# Patient Record
Sex: FEMALE | Race: BLACK OR AFRICAN AMERICAN | NOT HISPANIC OR LATINO | URBAN - METROPOLITAN AREA
[De-identification: names, ages, dates, MRNs, and addresses within clinical notes are randomized per-mention and may not be internally consistent; named-entity substitution may affect disease eponyms.]

---

## 2017-02-17 ENCOUNTER — EMERGENCY (EMERGENCY)
Facility: HOSPITAL | Age: 37
LOS: 0 days | Discharge: HOME | End: 2017-02-17
Attending: EMERGENCY MEDICINE | Admitting: EMERGENCY MEDICINE
Payer: COMMERCIAL

## 2017-02-17 VITALS
HEART RATE: 90 BPM | RESPIRATION RATE: 18 BRPM | SYSTOLIC BLOOD PRESSURE: 135 MMHG | OXYGEN SATURATION: 100 % | DIASTOLIC BLOOD PRESSURE: 90 MMHG | TEMPERATURE: 98 F

## 2017-02-17 VITALS
RESPIRATION RATE: 19 BRPM | TEMPERATURE: 98 F | DIASTOLIC BLOOD PRESSURE: 105 MMHG | HEIGHT: 63 IN | OXYGEN SATURATION: 99 % | HEART RATE: 116 BPM | SYSTOLIC BLOOD PRESSURE: 137 MMHG

## 2017-02-17 DIAGNOSIS — Z88.0 ALLERGY STATUS TO PENICILLIN: ICD-10-CM

## 2017-02-17 DIAGNOSIS — R11.0 NAUSEA: ICD-10-CM

## 2017-02-17 DIAGNOSIS — J09.X2 INFLUENZA DUE TO IDENTIFIED NOVEL INFLUENZA A VIRUS WITH OTHER RESPIRATORY MANIFESTATIONS: ICD-10-CM

## 2017-02-17 DIAGNOSIS — R50.9 FEVER, UNSPECIFIED: ICD-10-CM

## 2017-02-17 LAB
FLUAV H1 2009 PAND RNA SPEC QL NAA+PROBE: DETECTED
RAPID RVP RESULT: DETECTED

## 2017-02-17 PROCEDURE — 99053 MED SERV 10PM-8AM 24 HR FAC: CPT

## 2017-02-17 PROCEDURE — 99283 EMERGENCY DEPT VISIT LOW MDM: CPT | Mod: 25

## 2017-02-17 RX ORDER — ONDANSETRON 8 MG/1
4 TABLET, FILM COATED ORAL ONCE
Qty: 0 | Refills: 0 | Status: COMPLETED | OUTPATIENT
Start: 2017-02-17 | End: 2017-02-17

## 2017-02-17 RX ORDER — ONDANSETRON 8 MG/1
1 TABLET, FILM COATED ORAL
Qty: 9 | Refills: 0 | OUTPATIENT
Start: 2017-02-17 | End: 2017-02-20

## 2017-02-17 RX ADMIN — Medication 75 MILLIGRAM(S): at 05:08

## 2017-02-17 RX ADMIN — ONDANSETRON 4 MILLIGRAM(S): 8 TABLET, FILM COATED ORAL at 03:25

## 2017-02-17 NOTE — ED ADULT TRIAGE NOTE - CHIEF COMPLAINT QUOTE
patient reports fever and nausea worsening today since noon. reports leaving work with 102 fever earlier. took motrin 400mg PO one hour prior to arrival

## 2017-02-17 NOTE — ED ADULT NURSE REASSESSMENT NOTE - NS ED NURSE REASSESS COMMENT FT1
patient resting comfortably. VSS. patient eating and drinking without difficulty. denies nausea at this time. RVP completed. will continue ot monitor.

## 2018-09-17 NOTE — ED PROVIDER NOTE - OBJECTIVE STATEMENT
36 year old female with complaint of fever onset at work tonight 1-2 hr pta. Temp 102. took 2 advil. starting to sweat and feel better. also feels nauseous although she has not vomited. felt well earlier today. no known ill contacts. no cp . no cough. no diarrhea. no abd pain. no past medical hx. no surg hx. no tobacco. no illicits. no EtoH. did not have a flu vaccine this year. 16

## 2019-04-05 ENCOUNTER — INPATIENT (INPATIENT)
Facility: HOSPITAL | Age: 39
LOS: 14 days | Discharge: HOME | DRG: 545 | End: 2019-04-20
Attending: INTERNAL MEDICINE | Admitting: HOSPITALIST
Payer: COMMERCIAL

## 2019-04-05 VITALS
HEART RATE: 103 BPM | HEIGHT: 63 IN | OXYGEN SATURATION: 99 % | WEIGHT: 240.08 LBS | RESPIRATION RATE: 20 BRPM | TEMPERATURE: 98 F | SYSTOLIC BLOOD PRESSURE: 159 MMHG | DIASTOLIC BLOOD PRESSURE: 111 MMHG

## 2019-04-05 DIAGNOSIS — D69.6 THROMBOCYTOPENIA, UNSPECIFIED: ICD-10-CM

## 2019-04-05 DIAGNOSIS — C50.919 MALIGNANT NEOPLASM OF UNSPECIFIED SITE OF UNSPECIFIED FEMALE BREAST: Chronic | ICD-10-CM

## 2019-04-05 LAB
ABO RH CONFIRMATION: SIGNIFICANT CHANGE UP
ALBUMIN SERPL ELPH-MCNC: 3.7 G/DL — SIGNIFICANT CHANGE UP (ref 3.3–5.2)
ALP SERPL-CCNC: 112 U/L — SIGNIFICANT CHANGE UP (ref 40–120)
ALT FLD-CCNC: 31 U/L — SIGNIFICANT CHANGE UP
ANION GAP SERPL CALC-SCNC: 14 MMOL/L — SIGNIFICANT CHANGE UP (ref 5–17)
ANISOCYTOSIS BLD QL: SLIGHT — SIGNIFICANT CHANGE UP
APPEARANCE UR: ABNORMAL
AST SERPL-CCNC: 108 U/L — HIGH
BACTERIA # UR AUTO: ABNORMAL
BASOPHILS # BLD AUTO: 0 K/UL — SIGNIFICANT CHANGE UP (ref 0–0.2)
BASOPHILS NFR BLD AUTO: 0.1 % — SIGNIFICANT CHANGE UP (ref 0–2)
BILIRUB SERPL-MCNC: 1.9 MG/DL — SIGNIFICANT CHANGE UP (ref 0.4–2)
BILIRUB UR-MCNC: NEGATIVE — SIGNIFICANT CHANGE UP
BLD GP AB SCN SERPL QL: SIGNIFICANT CHANGE UP
BUN SERPL-MCNC: 32 MG/DL — HIGH (ref 8–20)
CALCIUM SERPL-MCNC: 8.6 MG/DL — SIGNIFICANT CHANGE UP (ref 8.6–10.2)
CHLORIDE SERPL-SCNC: 99 MMOL/L — SIGNIFICANT CHANGE UP (ref 98–107)
CO2 SERPL-SCNC: 23 MMOL/L — SIGNIFICANT CHANGE UP (ref 22–29)
COLOR SPEC: ABNORMAL
CREAT SERPL-MCNC: 2.8 MG/DL — HIGH (ref 0.5–1.3)
DIFF PNL FLD: ABNORMAL
EOSINOPHIL # BLD AUTO: 0 K/UL — SIGNIFICANT CHANGE UP (ref 0–0.5)
EOSINOPHIL NFR BLD AUTO: 0.3 % — SIGNIFICANT CHANGE UP (ref 0–6)
EPI CELLS # UR: ABNORMAL
GLUCOSE SERPL-MCNC: 116 MG/DL — HIGH (ref 70–115)
GLUCOSE UR QL: 50 MG/DL
HCG SERPL-ACNC: <4 MIU/ML — SIGNIFICANT CHANGE UP
HCT VFR BLD CALC: 35 % — LOW (ref 37–47)
HGB BLD-MCNC: 11.6 G/DL — LOW (ref 12–16)
INR BLD: 1.08 RATIO — SIGNIFICANT CHANGE UP (ref 0.88–1.16)
KETONES UR-MCNC: ABNORMAL
LEUKOCYTE ESTERASE UR-ACNC: ABNORMAL
LYMPHOCYTES # BLD AUTO: 18.6 % — LOW (ref 20–55)
LYMPHOCYTES # BLD AUTO: 2.8 K/UL — SIGNIFICANT CHANGE UP (ref 1–4.8)
MACROCYTES BLD QL: SLIGHT — SIGNIFICANT CHANGE UP
MCHC RBC-ENTMCNC: 28.4 PG — SIGNIFICANT CHANGE UP (ref 27–31)
MCHC RBC-ENTMCNC: 33.1 G/DL — SIGNIFICANT CHANGE UP (ref 32–36)
MCV RBC AUTO: 85.8 FL — SIGNIFICANT CHANGE UP (ref 81–99)
MICROCYTES BLD QL: SLIGHT — SIGNIFICANT CHANGE UP
MONOCYTES # BLD AUTO: 1.8 K/UL — HIGH (ref 0–0.8)
MONOCYTES NFR BLD AUTO: 11.6 % — HIGH (ref 3–10)
NEUTROPHILS # BLD AUTO: 10.5 K/UL — HIGH (ref 1.8–8)
NEUTROPHILS NFR BLD AUTO: 68.9 % — SIGNIFICANT CHANGE UP (ref 37–73)
NITRITE UR-MCNC: NEGATIVE — SIGNIFICANT CHANGE UP
PH UR: 6.5 — SIGNIFICANT CHANGE UP (ref 5–8)
PLAT MORPH BLD: NORMAL — SIGNIFICANT CHANGE UP
PLATELET # BLD AUTO: 9 K/UL — CRITICAL LOW (ref 150–400)
POTASSIUM SERPL-MCNC: 5 MMOL/L — SIGNIFICANT CHANGE UP (ref 3.5–5.3)
POTASSIUM SERPL-SCNC: 5 MMOL/L — SIGNIFICANT CHANGE UP (ref 3.5–5.3)
PROT SERPL-MCNC: 7.7 G/DL — SIGNIFICANT CHANGE UP (ref 6.6–8.7)
PROT UR-MCNC: 500 MG/DL
PROTHROM AB SERPL-ACNC: 12.4 SEC — SIGNIFICANT CHANGE UP (ref 10–12.9)
RBC # BLD: 4.08 M/UL — LOW (ref 4.4–5.2)
RBC # FLD: 16.9 % — HIGH (ref 11–15.6)
RBC BLD AUTO: NORMAL — SIGNIFICANT CHANGE UP
RBC CASTS # UR COMP ASSIST: ABNORMAL /HPF (ref 0–4)
SCHISTOCYTES BLD QL AUTO: SLIGHT — SIGNIFICANT CHANGE UP
SODIUM SERPL-SCNC: 136 MMOL/L — SIGNIFICANT CHANGE UP (ref 135–145)
SP GR SPEC: 1.01 — SIGNIFICANT CHANGE UP (ref 1.01–1.02)
TYPE + AB SCN PNL BLD: SIGNIFICANT CHANGE UP
UROBILINOGEN FLD QL: NEGATIVE MG/DL — SIGNIFICANT CHANGE UP
WBC # BLD: 15.3 K/UL — HIGH (ref 4.8–10.8)
WBC # FLD AUTO: 15.3 K/UL — HIGH (ref 4.8–10.8)
WBC UR QL: SIGNIFICANT CHANGE UP

## 2019-04-05 PROCEDURE — 99223 1ST HOSP IP/OBS HIGH 75: CPT | Mod: GC

## 2019-04-05 PROCEDURE — 99285 EMERGENCY DEPT VISIT HI MDM: CPT

## 2019-04-05 PROCEDURE — 70450 CT HEAD/BRAIN W/O DYE: CPT | Mod: 26

## 2019-04-05 PROCEDURE — 93010 ELECTROCARDIOGRAM REPORT: CPT

## 2019-04-05 PROCEDURE — 99497 ADVNCD CARE PLAN 30 MIN: CPT | Mod: 25

## 2019-04-05 RX ORDER — FOLIC ACID 0.8 MG
1 TABLET ORAL DAILY
Qty: 0 | Refills: 0 | Status: DISCONTINUED | OUTPATIENT
Start: 2019-04-05 | End: 2019-04-20

## 2019-04-05 RX ORDER — THIAMINE MONONITRATE (VIT B1) 100 MG
100 TABLET ORAL DAILY
Qty: 0 | Refills: 0 | Status: DISCONTINUED | OUTPATIENT
Start: 2019-04-05 | End: 2019-04-20

## 2019-04-05 RX ORDER — ONDANSETRON 8 MG/1
8 TABLET, FILM COATED ORAL ONCE
Qty: 0 | Refills: 0 | Status: COMPLETED | OUTPATIENT
Start: 2019-04-05 | End: 2019-04-05

## 2019-04-05 RX ORDER — SODIUM CHLORIDE 9 MG/ML
1000 INJECTION, SOLUTION INTRAVENOUS
Qty: 0 | Refills: 0 | Status: COMPLETED | OUTPATIENT
Start: 2019-04-05 | End: 2019-04-05

## 2019-04-05 RX ORDER — HYDRALAZINE HCL 50 MG
5 TABLET ORAL EVERY 6 HOURS
Qty: 0 | Refills: 0 | Status: DISCONTINUED | OUTPATIENT
Start: 2019-04-05 | End: 2019-04-20

## 2019-04-05 RX ORDER — SODIUM CHLORIDE 9 MG/ML
2000 INJECTION INTRAMUSCULAR; INTRAVENOUS; SUBCUTANEOUS ONCE
Qty: 0 | Refills: 0 | Status: COMPLETED | OUTPATIENT
Start: 2019-04-05 | End: 2019-04-05

## 2019-04-05 RX ADMIN — SODIUM CHLORIDE 2000 MILLILITER(S): 9 INJECTION INTRAMUSCULAR; INTRAVENOUS; SUBCUTANEOUS at 20:14

## 2019-04-05 RX ADMIN — Medication 60 MILLIGRAM(S): at 22:17

## 2019-04-05 RX ADMIN — SODIUM CHLORIDE 125 MILLILITER(S): 9 INJECTION, SOLUTION INTRAVENOUS at 23:41

## 2019-04-05 RX ADMIN — ONDANSETRON 8 MILLIGRAM(S): 8 TABLET, FILM COATED ORAL at 20:16

## 2019-04-05 RX ADMIN — Medication 5 MILLIGRAM(S): at 23:40

## 2019-04-05 NOTE — H&P ADULT - ASSESSMENT
37 yo F with no significant medical history presents with b/l eye swelling and n/v after alcohol use 39 yo F with no significant medical history presents with cc of n/v , weakness , blurry vision , found to have severe thrombocytopenia  and LISSETT 2/2 to rhabdomyolysis.    Severe thrombocytopenia    admit to medicine service , any bed  Unclear about cause , could be ITP   report recent URI one week ago  platelet count = 9  Hemo/onco Dr Obando was called by ER KAROLINE Hernandez , recommend to start prednisone 60 mg qd and no need for platelet transfusion at this time   denies any bleeding, Physical exam neg for rash or ecchymosis   f/u platelet count in the am   bed rest   will check HIV and hepatitis panel     LISSETT 2/2 to Rhabdomyolysis  pt states that 2-3 weeks ago she started doing exercise   was having body aches and also report dark urine   Cr= 2.8 and BUN = 32   CK= 421  pt had BL upper eyelids swelling which could be due to proteinuria due to LISSETT/ATN , will check protein /Cr ratio    s/p 2 lit of fluids in the ER   c/w IV fluids 125 cc x hour   f/u CR/BUN in the AM   will consider nephro consult if no improvement due to risk of ATN      Leukocytosis   wbc = 15.3, most likely reactive   will trend   no fever     Elevated blood pressure   pt w/o hx of HTN   most likely 2/2 to LISSETT   BP high in the ER = 159/111  started Hydralazine IV push  PRN  will consider adding po med in the am if BP still high     Abnormal UA  UA shows 11-25 rbc, most likely 2/2 to rhabdo    Elevated AST w/ normal ALT  AST = 108 w/ normal ALT (alcohol use )  pt report that over the past month she has been drinking more frequently   also could be due to rhabdo    Preventive measure  DVT phrop: VCD boot for now due to thrombocytopenia

## 2019-04-05 NOTE — H&P ADULT - HISTORY OF PRESENT ILLNESS
Ms. Gutierres is a 39 yo F with no significant PMH who p/w n/v, eye swelling/blurry vision since 2am this morning. Pt states she was out drinking tequila shots the night prior, subsequently at 2am was taking a shower when she examined her eye in the mirror and noticed her R eye looked swollen. She subsequently began vomiting the tequila she drank, NBNB., no hematemesis.  An hour later she noticed the left eye also appeared swollen. She describes her vision became "like she was underwater", but denies eye pain, photophobia, other visual disturbances. Denies LOC/head trauma/fall.   Since being in the ED she has noticed that her left eye swelling has noticeably improved.   ROS notable for URI symptoms the week prior.  Positive sick contacts in her household last week (family was sick with URI symptoms)     Patient states one episode of epistaxis for a short period of time about 1 month ago, denies gingival bleeding, hematuria, hematochezia/BRBPR/melenic stools, denies abnormal uterine bleeding/heavy menses, last pap WNL. Ms. Gutierres is a 39 yo F with no significant PMH who p/w n/v, eye swelling/blurry vision since 2am this morning. Pt states she was out drinking tequila shots the night prior, subsequently at 2am was taking a shower when she examined her eye in the mirror and noticed her R eye looked swollen. She subsequently began vomiting the tequila she drank, NBNB., no hematemesis.  An hour later she noticed the left eye also appeared swollen. She describes her vision became "like she was underwater", but denies eye pain, photophobia, other visual disturbances. Denies LOC/head trauma/fall.   Since being in the ED she has noticed that her left eye swelling has noticeably improved.   ROS notable for URI symptoms the week prior.  Positive sick contacts in her household last week (family was sick with URI symptoms) . Denies recent travel    Patient states one episode of epistaxis for a short period of time about 1 month ago, denies gingival bleeding, hematuria, hematochezia/BRBPR/melenic stools, denies abnormal uterine bleeding/heavy menses, last pap WNL. Ms. Gutierres is a 39 yo F with no significant PMH who p/w n/v, eye swelling/blurry vision since 2am this morning. Pt states she was out drinking tequila shots the night prior, subsequently at 2am was taking a shower when she examined her eye in the mirror and noticed her R eye looked swollen. She subsequently began vomiting the tequila she drank, NBNB., no hematemesis.  An hour later she noticed the left eye also appeared swollen. She describes her vision became "like she was underwater", but denies eye pain, photophobia, other visual disturbances. Denies LOC/head trauma/fall.     Since being in the ED she has noticed that her left eye swelling has noticeably improved.   ROS notable for URI symptoms the week prior. NO pain at this time, no CP, SOB, diarrhea/constipation, fever/chills  Positive sick contacts in her household last week (family was sick with URI symptoms) . Denies recent travel    Patient states one episode of epistaxis for a short period of time about 1 month ago, denies gingival bleeding, dysuria, hematuria, hematochezia/BRBPR/melenic stools, denies abnormal uterine bleeding/heavy menses, last pap WNL. Ms. Gutierres is a 39 yo F with no significant PMH who p/w n/v, eye swelling/blurry vision since 2am this morning. Pt states she was out drinking tequila shots the night prior, subsequently at 2am was taking a shower when she examined her eye in the mirror and noticed bilateral upper eyelids swollen. She subsequently began vomiting the tequila she drank, NBNB., no hematemesis.  An hour later she noticed the left eye also appeared swollen. She describes her vision became "like she was underwater", but denies eye pain, photophobia, other visual disturbances. Denies LOC/head trauma/fall. Pt also report that over the past 2-3 weeks she started going to the gym ( 4 times per weeks ) and she was following an exercise program, she had body pain .     Since being in the ED she has noticed that her left eye swelling has noticeably improved.   ROS notable for URI symptoms the week prior. NO pain at this time, no CP, SOB, diarrhea/constipation, fever/chills  Positive sick contacts in her household last week (family was sick with URI symptoms) . Denies recent travel    Patient states one episode of epistaxis for a short period of time about 1 month ago, denies gingival bleeding, dysuria, hematuria, hematochezia/BRBPR/melenic stools, denies abnormal uterine bleeding/heavy menses, last pap WNL.

## 2019-04-05 NOTE — ED STATDOCS - PROGRESS NOTE DETAILS
PA note: PT evaluated by intake physician. HPI/PE/ROS as noted above. Will follow up plan per intake physician. Dr. Doan and I spoke to patient regarding results and low platelet count of 9. Dr. De La Vega, Heme/onc was contacted regarding patient. As per Dr. De La Vega, she recommends starting PO Prednisone 60mg in ED, getting additional labs as ordered, continuing IVF to maintain renal function. She states NO need for platelet transfusion at this time. Will continue to monitor and assess patient and will admit pt. Case discussed with Dr. Bourne, will admit pt

## 2019-04-05 NOTE — ED STATDOCS - OBJECTIVE STATEMENT
38 y.o otherwise healthy F presents with c/o nausea, vomiting and blurry vision starting last night. Pt was drinking last night, when she returned home noticed her right eye swelling and took benadryl after words she began vomiting and started to experience blurry vision in both eyes. Describes vision as similar to "seeing under water". Pt was able to fall asleep, today had continuing blurry vision which has been improving in her right eye but the same in her left and continuing nausea. No hx of similar symptoms. No known sick contacts. Non-smoker. Denies diarrhea, slurred speech or additional pysical complaints at this time.   Unclear LNMP.

## 2019-04-05 NOTE — ED STATDOCS - CLINICAL SUMMARY MEDICAL DECISION MAKING FREE TEXT BOX
38 y.o F presents to ED for blurry vision, nausea, vomiting starting last night after drinking. Plan for basic labs, CT-head, UA, IV hydration, anti-emetics, check results and reassess.

## 2019-04-05 NOTE — ED STATDOCS - CARE PLAN
Principal Discharge DX:	Thrombocytopenia Principal Discharge DX:	Thrombocytopenia  Secondary Diagnosis:	LISSETT (acute kidney injury)

## 2019-04-05 NOTE — H&P ADULT - NSHPSOCIALHISTORY_GEN_ALL_CORE
Drinks 1-2 times a week when she goes out with , 2-3 drinks at a time (wine, michel)   Denies smoking  Denies drugs  Lives with  Drinks 1-2 times a week when she goes out with , 2-3 drinks at a time (wine, techila )   Denies smoking  Denies drugs  Lives with  Drinks 1-2 times a week when she goes out with , 2-3 drinks at a time (wine, tequila )   Denies smoking  Denies drugs  Lives with

## 2019-04-05 NOTE — ED STATDOCS - ATTENDING CONTRIBUTION TO CARE
I, Akira Doan, performed the initial face to face bedside interview with this patient regarding history of present illness, review of symptoms and relevant past medical, social and family history.  I completed an independent physical examination.  I was the initial provider who evaluated this patient. I have signed out the follow up of any pending tests (i.e. labs, radiological studies) to the ACP.  I have communicated the patient’s plan of care and disposition with the ACP.

## 2019-04-05 NOTE — H&P ADULT - NSHPLABSRESULTS_GEN_ALL_CORE
11.6   15.3  )-----------( 9        ( 05 Apr 2019 19:58 )             35.0   04-05    136  |  99  |  32.0<H>  ----------------------------<  116<H>  5.0   |  23.0  |  2.80<H>    Ca    8.6      05 Apr 2019 19:58    TPro  7.7  /  Alb  3.7  /  TBili  1.9  /  DBili  x   /  AST  108<H>  /  ALT  31  /  AlkPhos  112  04-05  ABO Rh Confirmatory Specimen (04.05.19 @ 22:33)    ABO Rh Confirmation: O POS  Type + Screen (04.05.19 @ 21:06)    Type + Screen: O POS  Urine Microscopic-Add On (NC) (04.05.19 @ 20:34)    Bacteria: Few    Epithelial Cells: Moderate    Red Blood Cell - Urine: 11-25 /HPF    White Blood Cell - Urine: 3-5  Urinalysis (04.05.19 @ 20:34)    pH Urine: 6.5    Glucose Qualitative, Urine: 50 mg/dL    Blood, Urine: Large    Color: Brown    Urine Appearance: very cloudy    Bilirubin: Negative    Ketone - Urine: Trace    Specific Gravity: 1.015    Protein, Urine: 500 mg/dL    Urobilinogen: Negative mg/dL    Nitrite: Negative    Leukocyte Esterase Concentration: Trace

## 2019-04-05 NOTE — ED ADULT NURSE NOTE - OBJECTIVE STATEMENT
Patient present to Ed with c/o bilateral swollen eyes, Abdominal pain, nausea and vomiting. Patient presents to ED A/Ox4, VSS, denies chest pain or sob.  Respirations are even and unlabored, lungs cta, +bowel x4 quads, abdomen soft, tender/nondistended, skin w/d/i.

## 2019-04-05 NOTE — H&P ADULT - NSHPPHYSICALEXAM_GEN_ALL_CORE
Vital Signs Last 24 Hrs  T(C): 36.9 (05 Apr 2019 22:45), Max: 36.9 (05 Apr 2019 22:45)  T(F): 98.4 (05 Apr 2019 22:45), Max: 98.4 (05 Apr 2019 22:45)  HR: 102 (05 Apr 2019 22:45) (102 - 103)  BP: 165/111 (05 Apr 2019 22:45) (159/111 - 165/111)  BP(mean): --  RR: 19 (05 Apr 2019 22:45) (19 - 20)  SpO2: 99% (05 Apr 2019 22:45) (99% - 99%) Vital Signs Last 24 Hrs  T(C): 36.9 (05 Apr 2019 22:45), Max: 36.9 (05 Apr 2019 22:45)  T(F): 98.4 (05 Apr 2019 22:45), Max: 98.4 (05 Apr 2019 22:45)  HR: 102 (05 Apr 2019 22:45) (102 - 103)  BP: 165/111 (05 Apr 2019 22:45) (159/111 - 165/111)  RR: 19 (05 Apr 2019 22:45) (19 - 20)  SpO2: 99% (05 Apr 2019 22:45) (99% - 99%)    GENERAL: NAD, well-groomed, well-developed  HEAD:  Atraumatic, Normocephalic  EYES: EOMI, PERRLA, conjunctiva and sclera clear, positive swelling over both upper eyelids   ENMT: No tonsillar erythema, exudates, or enlargement; Moist mucous membranes, Good dentition, No lesions  NECK: Supple  Respiratory: Clear to auscultation bilaterally; No rales, rhonchi, wheezing, or rubs  CV: Regular rate and rhythm; No murmurs, rubs, or gallops  Gastrointestinal: Soft, Nontender, Nondistended; Bowel sounds present  EXTREMITIES:  2+ Peripheral Pulses, No clubbing, cyanosis, or edema  LYMPH: No lymphadenopathy noted  NERVOUS SYSTEM:  Alert & Oriented X3, Good concentration; Motor Strength 5/5 B/L upper and lower extremities;. CN II-XII grossly intact   SKIN: No rashes or lesions

## 2019-04-06 DIAGNOSIS — N17.9 ACUTE KIDNEY FAILURE, UNSPECIFIED: ICD-10-CM

## 2019-04-06 DIAGNOSIS — D69.6 THROMBOCYTOPENIA, UNSPECIFIED: ICD-10-CM

## 2019-04-06 LAB
ALBUMIN SERPL ELPH-MCNC: 3.6 G/DL — SIGNIFICANT CHANGE UP (ref 3.3–5.2)
ALBUMIN SERPL ELPH-MCNC: 3.7 G/DL — SIGNIFICANT CHANGE UP (ref 3.3–5.2)
ALP SERPL-CCNC: 116 U/L — SIGNIFICANT CHANGE UP (ref 40–120)
ALP SERPL-CCNC: 76 U/L — SIGNIFICANT CHANGE UP (ref 40–120)
ALT FLD-CCNC: 23 U/L — SIGNIFICANT CHANGE UP
ALT FLD-CCNC: 28 U/L — SIGNIFICANT CHANGE UP
ANION GAP SERPL CALC-SCNC: 17 MMOL/L — SIGNIFICANT CHANGE UP (ref 5–17)
ANION GAP SERPL CALC-SCNC: 19 MMOL/L — HIGH (ref 5–17)
APPEARANCE UR: CLEAR — SIGNIFICANT CHANGE UP
AST SERPL-CCNC: 33 U/L — HIGH
AST SERPL-CCNC: 88 U/L — HIGH
BACTERIA # UR AUTO: NEGATIVE — SIGNIFICANT CHANGE UP
BILIRUB SERPL-MCNC: 1.4 MG/DL — SIGNIFICANT CHANGE UP (ref 0.4–2)
BILIRUB SERPL-MCNC: 2.1 MG/DL — HIGH (ref 0.4–2)
BILIRUB UR-MCNC: NEGATIVE — SIGNIFICANT CHANGE UP
BUN SERPL-MCNC: 42 MG/DL — HIGH (ref 8–20)
BUN SERPL-MCNC: 51 MG/DL — HIGH (ref 8–20)
CALCIUM SERPL-MCNC: 8.4 MG/DL — LOW (ref 8.6–10.2)
CALCIUM SERPL-MCNC: 8.7 MG/DL — SIGNIFICANT CHANGE UP (ref 8.6–10.2)
CHLORIDE SERPL-SCNC: 101 MMOL/L — SIGNIFICANT CHANGE UP (ref 98–107)
CHLORIDE SERPL-SCNC: 94 MMOL/L — LOW (ref 98–107)
CK MB CFR SERPL CALC: 7 NG/ML — HIGH (ref 0–6.7)
CK SERPL-CCNC: 436 U/L — HIGH (ref 25–170)
CO2 SERPL-SCNC: 17 MMOL/L — LOW (ref 22–29)
CO2 SERPL-SCNC: 28 MMOL/L — SIGNIFICANT CHANGE UP (ref 22–29)
COLOR SPEC: YELLOW — SIGNIFICANT CHANGE UP
CREAT ?TM UR-MCNC: 32 MG/DL — SIGNIFICANT CHANGE UP
CREAT SERPL-MCNC: 4.05 MG/DL — HIGH (ref 0.5–1.3)
CREAT SERPL-MCNC: 5.02 MG/DL — HIGH (ref 0.5–1.3)
DIFF PNL FLD: ABNORMAL
EPI CELLS # UR: SIGNIFICANT CHANGE UP
FOLATE SERPL-MCNC: 12.8 NG/ML — SIGNIFICANT CHANGE UP
GLUCOSE SERPL-MCNC: 215 MG/DL — HIGH (ref 70–115)
GLUCOSE SERPL-MCNC: 248 MG/DL — HIGH (ref 70–115)
GLUCOSE UR QL: 100 MG/DL
HAPTOGLOB SERPL-MCNC: <20 MG/DL — LOW (ref 34–200)
HCG SERPL-ACNC: <4 MIU/ML — SIGNIFICANT CHANGE UP
HCT VFR BLD CALC: 28.7 % — LOW (ref 37–47)
HCT VFR BLD CALC: 34.1 % — LOW (ref 37–47)
HGB BLD-MCNC: 11.3 G/DL — LOW (ref 12–16)
HGB BLD-MCNC: 9.6 G/DL — LOW (ref 12–16)
HIV 1 & 2 AB SERPL IA.RAPID: SIGNIFICANT CHANGE UP
HIV 1+2 AB+HIV1 P24 AG SERPL QL IA: SIGNIFICANT CHANGE UP
KETONES UR-MCNC: NEGATIVE — SIGNIFICANT CHANGE UP
LDH SERPL L TO P-CCNC: 1923 U/L — HIGH (ref 98–192)
LEUKOCYTE ESTERASE UR-ACNC: NEGATIVE — SIGNIFICANT CHANGE UP
MAGNESIUM SERPL-MCNC: 2.1 MG/DL — SIGNIFICANT CHANGE UP (ref 1.6–2.6)
MCHC RBC-ENTMCNC: 27.9 PG — SIGNIFICANT CHANGE UP (ref 27–31)
MCHC RBC-ENTMCNC: 28 PG — SIGNIFICANT CHANGE UP (ref 27–31)
MCHC RBC-ENTMCNC: 33.1 G/DL — SIGNIFICANT CHANGE UP (ref 32–36)
MCHC RBC-ENTMCNC: 33.4 G/DL — SIGNIFICANT CHANGE UP (ref 32–36)
MCV RBC AUTO: 83.7 FL — SIGNIFICANT CHANGE UP (ref 81–99)
MCV RBC AUTO: 84.2 FL — SIGNIFICANT CHANGE UP (ref 81–99)
MYOGLOBIN SERPL-MCNC: 80 NG/ML — HIGH (ref 25–58)
NITRITE UR-MCNC: NEGATIVE — SIGNIFICANT CHANGE UP
OSMOLALITY UR: 212 MOSM/KG — LOW (ref 300–1000)
PH UR: 7 — SIGNIFICANT CHANGE UP (ref 5–8)
PHOSPHATE SERPL-MCNC: 2.9 MG/DL — SIGNIFICANT CHANGE UP (ref 2.4–4.7)
PLATELET # BLD AUTO: 5 K/UL — CRITICAL LOW (ref 150–400)
PLATELET # BLD AUTO: 6 K/UL — CRITICAL LOW (ref 150–400)
POTASSIUM SERPL-MCNC: 3.7 MMOL/L — SIGNIFICANT CHANGE UP (ref 3.5–5.3)
POTASSIUM SERPL-MCNC: 4 MMOL/L — SIGNIFICANT CHANGE UP (ref 3.5–5.3)
POTASSIUM SERPL-SCNC: 3.7 MMOL/L — SIGNIFICANT CHANGE UP (ref 3.5–5.3)
POTASSIUM SERPL-SCNC: 4 MMOL/L — SIGNIFICANT CHANGE UP (ref 3.5–5.3)
POTASSIUM UR-SCNC: 12 MMOL/L — SIGNIFICANT CHANGE UP
PROT ?TM UR-MCNC: 104 MG/DL — HIGH (ref 0–12)
PROT SERPL-MCNC: 6.3 G/DL — LOW (ref 6.6–8.7)
PROT SERPL-MCNC: 7.6 G/DL — SIGNIFICANT CHANGE UP (ref 6.6–8.7)
PROT UR-MCNC: 100 MG/DL
PROT/CREAT UR-RTO: 3.2 RATIO — HIGH
RAPID RVP RESULT: SIGNIFICANT CHANGE UP
RBC # BLD: 3.43 M/UL — LOW (ref 4.4–5.2)
RBC # BLD: 4.02 M/UL — LOW (ref 4.4–5.2)
RBC # BLD: 4.05 M/UL — LOW (ref 4.4–5.2)
RBC # FLD: 17.4 % — HIGH (ref 11–15.6)
RBC # FLD: 17.7 % — HIGH (ref 11–15.6)
RBC CASTS # UR COMP ASSIST: ABNORMAL /HPF (ref 0–4)
RETICS #: 12.9 K/UL — LOW (ref 25–125)
RETICS/RBC NFR: 3.2 % — HIGH (ref 0.5–2.5)
SODIUM SERPL-SCNC: 137 MMOL/L — SIGNIFICANT CHANGE UP (ref 135–145)
SODIUM SERPL-SCNC: 139 MMOL/L — SIGNIFICANT CHANGE UP (ref 135–145)
SODIUM UR-SCNC: 58 MMOL/L — SIGNIFICANT CHANGE UP
SP GR SPEC: 1 — LOW (ref 1.01–1.02)
TSH SERPL-MCNC: 0.7 UIU/ML — SIGNIFICANT CHANGE UP (ref 0.27–4.2)
UROBILINOGEN FLD QL: NEGATIVE MG/DL — SIGNIFICANT CHANGE UP
VIT B12 SERPL-MCNC: 584 PG/ML — SIGNIFICANT CHANGE UP (ref 232–1245)
WBC # BLD: 15.6 K/UL — HIGH (ref 4.8–10.8)
WBC # BLD: 16.3 K/UL — HIGH (ref 4.8–10.8)
WBC # FLD AUTO: 15.6 K/UL — HIGH (ref 4.8–10.8)
WBC # FLD AUTO: 16.3 K/UL — HIGH (ref 4.8–10.8)
WBC UR QL: SIGNIFICANT CHANGE UP

## 2019-04-06 PROCEDURE — 71045 X-RAY EXAM CHEST 1 VIEW: CPT | Mod: 26

## 2019-04-06 PROCEDURE — 74176 CT ABD & PELVIS W/O CONTRAST: CPT | Mod: 26

## 2019-04-06 PROCEDURE — 99233 SBSQ HOSP IP/OBS HIGH 50: CPT | Mod: GC

## 2019-04-06 PROCEDURE — 99497 ADVNCD CARE PLAN 30 MIN: CPT | Mod: GC

## 2019-04-06 PROCEDURE — 99255 IP/OBS CONSLTJ NEW/EST HI 80: CPT

## 2019-04-06 PROCEDURE — 71250 CT THORAX DX C-: CPT | Mod: 26

## 2019-04-06 RX ORDER — SODIUM BICARBONATE 1 MEQ/ML
0.15 SYRINGE (ML) INTRAVENOUS
Qty: 150 | Refills: 0 | Status: DISCONTINUED | OUTPATIENT
Start: 2019-04-06 | End: 2019-04-07

## 2019-04-06 RX ORDER — CALCIUM GLUCONATE 100 MG/ML
2 VIAL (ML) INTRAVENOUS ONCE
Qty: 0 | Refills: 0 | Status: COMPLETED | OUTPATIENT
Start: 2019-04-06 | End: 2019-04-06

## 2019-04-06 RX ORDER — AMLODIPINE BESYLATE 2.5 MG/1
5 TABLET ORAL DAILY
Qty: 0 | Refills: 0 | Status: DISCONTINUED | OUTPATIENT
Start: 2019-04-06 | End: 2019-04-19

## 2019-04-06 RX ORDER — METOPROLOL TARTRATE 50 MG
25 TABLET ORAL
Qty: 0 | Refills: 0 | Status: DISCONTINUED | OUTPATIENT
Start: 2019-04-06 | End: 2019-04-20

## 2019-04-06 RX ORDER — INFLUENZA VIRUS VACCINE 15; 15; 15; 15 UG/.5ML; UG/.5ML; UG/.5ML; UG/.5ML
0.5 SUSPENSION INTRAMUSCULAR ONCE
Qty: 0 | Refills: 0 | Status: DISCONTINUED | OUTPATIENT
Start: 2019-04-06 | End: 2019-04-20

## 2019-04-06 RX ORDER — DIPHENHYDRAMINE HCL 50 MG
50 CAPSULE ORAL ONCE
Qty: 0 | Refills: 0 | Status: COMPLETED | OUTPATIENT
Start: 2019-04-06 | End: 2019-04-06

## 2019-04-06 RX ORDER — ACETAMINOPHEN 500 MG
650 TABLET ORAL EVERY 6 HOURS
Qty: 0 | Refills: 0 | Status: DISCONTINUED | OUTPATIENT
Start: 2019-04-06 | End: 2019-04-20

## 2019-04-06 RX ADMIN — Medication 1 TABLET(S): at 12:02

## 2019-04-06 RX ADMIN — Medication 25 MILLIGRAM(S): at 12:02

## 2019-04-06 RX ADMIN — Medication 200 GRAM(S): at 13:57

## 2019-04-06 RX ADMIN — Medication 5 MILLIGRAM(S): at 05:25

## 2019-04-06 RX ADMIN — Medication 1 MILLIGRAM(S): at 12:02

## 2019-04-06 RX ADMIN — Medication 60 MILLIGRAM(S): at 05:31

## 2019-04-06 RX ADMIN — AMLODIPINE BESYLATE 5 MILLIGRAM(S): 2.5 TABLET ORAL at 05:55

## 2019-04-06 RX ADMIN — Medication 110 MEQ/KG/HR: at 22:00

## 2019-04-06 RX ADMIN — Medication 100 MILLIGRAM(S): at 13:57

## 2019-04-06 RX ADMIN — Medication 5 MILLIGRAM(S): at 05:35

## 2019-04-06 RX ADMIN — Medication 110 MEQ/KG/HR: at 12:02

## 2019-04-06 RX ADMIN — Medication 50 MILLIGRAM(S): at 13:56

## 2019-04-06 RX ADMIN — Medication 25 MILLIGRAM(S): at 17:30

## 2019-04-06 NOTE — CONSULT NOTE ADULT - PROBLEM SELECTOR RECOMMENDATION 9
-consistent with Thrombocytopenic Thrombotic Purpura (TTP).   -discussed with ICU team as well as [facility].   -2 units FFP ordered prior to plasmapheresis  -Will initiate PLEX treatment urgently and continue with daily treatments based on patient response.  -Will send [id number] antibody for confirmation.  -BHcg negative  -check Head/Chest/Abd/Pelvis CT  -DVT prophylaxis with SCDs only for now

## 2019-04-06 NOTE — CONSULT NOTE ADULT - SUBJECTIVE AND OBJECTIVE BOX
Patient is a 38y old  Female who presents with a chief complaint of blurry vision, nausea/vomiting (2019 10:10)      BRIEF HOSPITAL COURSE: 38F with no PMHx who presented to ER yesterday with blurred vision and nausea vomiting. Reported to have had a period of confusion as well. Found to have severe thrombocytopenia and Acute renal failure. Denies any recent illness, No medications. No smoker, no drugs. Admits to drinking Tequila on friday evening, but is not an alcoholic. Eecenlty began exercising at [facility] gym and is on a 'Medi-fast" diet. She is awake and alert no in no acute distress. No fevers or chills.    Ultimately found have and elevated LDH and Schistocytes on her Peripheral Smear, consistent with suspected TTP.      Events last 24 hours: as above     PAST MEDICAL & SURGICAL HISTORY:  No pertinent past medical history  No significant past surgical history    Allergies    penicillin (Hives)    Intolerances      FAMILY HISTORY:  FHx: breast cancer      Family history otherwise noncontributory.    Social History: No smoking/Drugs ETOH: 2-4 drinks/week  Review of Systems:    ALL OTHER REVIEW OF SYSTEMS EXCEPT PER HPI NEGATIVE.      Medications:    amLODIPine   Tablet 5 milliGRAM(s) Oral daily  hydrALAZINE Injectable 5 milliGRAM(s) IV Push every 6 hours PRN  metoprolol tartrate 25 milliGRAM(s) Oral two times a day      acetaminophen   Tablet .. 650 milliGRAM(s) Oral every 6 hours PRN            predniSONE   Tablet 60 milliGRAM(s) Oral daily    folic acid 1 milliGRAM(s) Oral daily  multivitamin 1 Tablet(s) Oral daily  sodium bicarbonate  Infusion 0.152 mEq/kG/Hr IV Continuous <Continuous>  thiamine 100 milliGRAM(s) Oral daily    influenza   Vaccine 0.5 milliLiter(s) IntraMuscular once              ICU Vital Signs Last 24 Hrs  T(C): 36.8 (2019 12:00), Max: 37.2 (2019 04:00)  T(F): 98.2 (2019 12:00), Max: 99 (2019 04:00)  HR: 101 (2019 13:00) (96 - 115)  BP: 161/95 (2019 13:00) (139/84 - 189/107)  BP(mean): 121 (2019 13:00) (121 - 134)  ABP: --  ABP(mean): --  RR: 30 (2019 13:00) (17 - 33)  SpO2: 100% (2019 13:00) (98% - 100%)    Vital Signs Last 24 Hrs  T(C): 36.8 (2019 12:00), Max: 37.2 (2019 04:00)  T(F): 98.2 (2019 12:00), Max: 99 (2019 04:00)  HR: 101 (2019 13:00) (96 - 115)  BP: 161/95 (2019 13:00) (139/84 - 189/107)  BP(mean): 121 (2019 13:00) (121 - 134)  RR: 30 (2019 13:00) (17 - 33)  SpO2: 100% (2019 13:00) (98% - 100%)    Physical Examination:  GENERAL: Obese Female in no acute distress.  A+O x 3 now. Follows all commands well    EYES: Pupils equal, reactive to light.  Symmetric.  EARS, NOSE, THROAT: Normal; supple neck, no lymphadenopathy; trachea midline  PULM: Clear to auscultation bilaterally, no significant sputum production  CVS: Regular rate and rhythm, no murmurs, rubs, or gallops  GI: Soft, nondistended, nontender, normoactive bowel sounds, no masses, no guarding  EXTREMITIES: No edema.    SKIN: Warm and well perfused, no rashes noted.  NEURO: Alert, oriented, interactive, nonfocal.  No neurological deficits      LABS:                        11.3   15.6  )-----------( 6        ( 2019 06:26 )             34.1     04-    137  |  101  |  42.0<H>  ----------------------------<  215<H>  4.0   |  17.0<L>  |  4.05<H>    Ca    8.7      2019 06:25    TPro  7.6  /  Alb  3.6  /  TBili  2.1<H>  /  DBili  x   /  AST  88<H>  /  ALT  28  /  AlkPhos  116  04-06    PT/INR - ( 2019 22:33 )   PT: 12.4 sec;   INR: 1.08 ratio           Urinalysis Basic - ( 2019 11:45 )    Color: Yellow / Appearance: Clear / S.005 / pH: x  Gluc: x / Ketone: Negative  / Bili: Negative / Urobili: Negative mg/dL   Blood: x / Protein: 100 mg/dL / Nitrite: Negative   Leuk Esterase: Negative / RBC: 6-10 /HPF / WBC 0-2   Sq Epi: x / Non Sq Epi: Occasional / Bacteria: Negative      CULTURES:              RADIOLOGY: CXR: Clear     CRITICAL CARE TIME SPENT:  45 mins were spent evaluating and treating this critical patient, including speaking with all consultants as well as the staff, patient and family.

## 2019-04-06 NOTE — PROVIDER CONTACT NOTE (CRITICAL VALUE NOTIFICATION) - RECOMMENDATIONS
UNABLE TO GET HOLD OF CCPAJOSE ANGEL.  STEVE WAS CALLED.  I WAS TOLD TO GET IN TOUCH WITH CCPA AS, EICU DOES NOT WRITE ORDERS UNLESS ITS FOR ELECTROLYTES.  WILL F/U WITH CCPA.

## 2019-04-06 NOTE — CONSULT NOTE ADULT - PROBLEM SELECTOR RECOMMENDATION 2
-Likely secondary to TTP.  -Gentle Hydration.   -Beginning PLEX  - renally adjust all meds and avoid nephrotoxins.   -Nephrology consult

## 2019-04-06 NOTE — PROGRESS NOTE ADULT - SUBJECTIVE AND OBJECTIVE BOX
Patient is a 38y Female Admitted with chief complaint of  chief complaint of blurry vision, nausea/vomiting , low platelet count  (05 Apr 2019 23:15)    Patient seen and examined at bedside, No acute overnight events. This am pt report that his vision has improved but still is a little blurry , denies any nose bleeding, rectal or any other kind of bleeding   ROS: Denies fever, chest pain, SOB, abdominal pain, diarrhea, constipation, calf pain.  Patient ambulating, eating well, voiding and last BM yesterday       VS:   Vital Signs Last 24 Hrs  T(C): 37.2 (06 Apr 2019 06:25), Max: 37.2 (06 Apr 2019 04:00)  T(F): 98.9 (06 Apr 2019 06:25), Max: 99 (06 Apr 2019 04:00)  HR: 102 (06 Apr 2019 06:25) (101 - 111)  BP: 139/84 (06 Apr 2019 06:25) (139/84 - 189/107)  RR: 18 (06 Apr 2019 06:25) (17 - 20)  SpO2: 99% (06 Apr 2019 06:25) (98% - 99%)    PHYSICAL EXAM:  GENERAL: NAD, well-groomed, well-developed  	HEAD:  Atraumatic, Normocephalic  	EYES: EOMI, PERRLA, conjunctiva and sclera clear, positive swelling over both upper eyelids   	ENMT: No tonsillar erythema, exudates, or enlargement; Moist mucous membranes, Good dentition, No lesions  	NECK: Supple  	Respiratory: Clear to auscultation bilaterally; No rales, rhonchi, wheezing, or rubs  	CV: Regular rate and rhythm; No murmurs, rubs, or gallops  	Gastrointestinal: Soft, Nontender, Nondistended; Bowel sounds present  	EXTREMITIES:  2+ Peripheral Pulses, No clubbing, cyanosis, or edema  	LYMPH: No lymphadenopathy noted  	NERVOUS SYSTEM:  Alert & Oriented X3, Good concentration; Motor Strength 5/5 B/L upper and lower extremities;. CN II-XII grossly intact   SKIN: few petechial over both forearms          Labs:                        11.6   15.3  )-----------( 9        ( 05 Apr 2019 19:58 )             35.0   04-05    136  |  99  |  32.0<H>  ----------------------------<  116<H>  5.0   |  23.0  |  2.80<H>    Ca    8.6      05 Apr 2019 19:58    TPro  7.7  /  Alb  3.7  /  TBili  1.9  /  DBili  x   /  AST  108<H>  /  ALT  31  /  AlkPhos  112  04-05        Radiology:  < from: CT Head No Cont (04.05.19 @ 19:58) >    IMPRESSION:      No evidence of an acute transcortical infarction or   hemorrhage. MR is a more sensitive imaging modality for the evaluation of   an acute infarction.     < end of copied text >      Medications:  MEDICATIONS  (STANDING):  amLODIPine   Tablet 5 milliGRAM(s) Oral daily  folic acid 1 milliGRAM(s) Oral daily  influenza   Vaccine 0.5 milliLiter(s) IntraMuscular once  multivitamin 1 Tablet(s) Oral daily  predniSONE   Tablet 60 milliGRAM(s) Oral daily  thiamine 100 milliGRAM(s) Oral daily    MEDICATIONS  (PRN):  acetaminophen   Tablet .. 650 milliGRAM(s) Oral every 6 hours PRN Temp greater or equal to 38C (100.4F), Mild Pain (1 - 3)  hydrALAZINE Injectable 5 milliGRAM(s) IV Push every 6 hours PRN if SBP > 165 or DBP > 100

## 2019-04-06 NOTE — PROGRESS NOTE ADULT - ASSESSMENT
37 yo F with no significant medical history presents with cc of n/v , weakness , blurry vision , found to have severe thrombocytopenia  and LISSETT 2/2 to rhabdomyolysis. Hospital course complicated w/ HTN, she was started on amlodipine 5 mg qd. No active bleeding , pt has been tx w/ prednisone 60 mg qd . Hematology consulted by ER attending.     Severe thrombocytopenia, concern for ITP  Unclear about cause , could be ITP   report recent URI one week ago  RVP pending   platelet count = 9  Hemo/onco Dr Obando was called by ER KAROLINE Hernandez , recommend to start prednisone 60 mg qd and no need for platelet transfusion at this time   no active bleeding   f/u platelet count in the am   bed rest   will check HIV , RUBIO 13,  hepatitis panel , RVP    LISSETT 2/2 to Rhabdomyolysis  pt states that 2-3 weeks ago she started doing intensive  exercise   was having body aches and  dark urine   Cr= 2.8 and BUN = 32 , denies any hx of renal disease or abnormal labs  CK= 421  pt had BL upper eyelids swelling which could be due to proteinuria due to LISSETT/ATN , will check protein /Cr ratio    s/p 2 lit of fluids in the ER   c/w IV fluids 125 cc x hour   f/u CR/BUN in the AM   will consider nephro consult if no improvement due to risk of ATN      Leukocytosis   wbc = 15.3, most likely reactive   will trend   no fever   RVP pending     Elevated blood pressure   pt w/o hx of HTN   most likely 2/2 to LISSETT   BP was consistenly high all night even after tx w/ hydralazine  Hydralazine IV push  PRN  started on amlodipine 5 mg qd  BP normalized this am     Abnormal UA  UA shows 11-25 rbc, most likely 2/2 to rhabdo    Elevated AST w/ normal ALT  AST = 108 w/ normal ALT (alcohol use )  pt report that over the past month she has been drinking more frequently   also could be due to rhabdo    Preventive measure  DVT phrop: VCD boot for now due to thrombocytopenia Sarcoidosis

## 2019-04-06 NOTE — CONSULT NOTE ADULT - PROBLEM SELECTOR RECOMMENDATION 2
Likely secondary to TTP.  Gentle Hydration.   Beginning PLEX  Will renally adjust all meds and avoid nephrotoxins.   At present continued to make good urine  Nephrology consult

## 2019-04-06 NOTE — CONSULT NOTE ADULT - SUBJECTIVE AND OBJECTIVE BOX
Patient is a 38y old  Female who presents with a chief complaint of blurry vision, nausea/vomiting (2019 09:19)    HPI:  Ms. Gutierres is a 37 yo AA  F with no significant PMH who p/w n/v, eye swelling/blurry vision since 2am this morning. Pt states she was out drinking tequila shots the night prior, subsequently at 2am was taking a shower when she examined her eye in the mirror and noticed bilateral upper eyelids swollen. She subsequently began vomiting the tequila she drank, NBNB., no hematemesis.  An hour later she noticed the left eye also appeared swollen. She describes her vision became "like she was underwater", but denies eye pain, photophobia, other visual disturbances. Denies LOC/head trauma/fall. Pt also report that over the past 2-3 weeks she started going to the gym ( 4 times per weeks ) and she was following an exercise program, she had body pain .     Since being in the ED she has noticed that her left eye swelling has noticeably improved.   ROS notable for URI symptoms the week prior. NO pain at this time, no CP, SOB, diarrhea/constipation, fever/chills  Positive sick contacts in her household last week (family was sick with URI symptoms) . Denies recent travel    Patient states one episode of epistaxis for a short period of time about 1 month ago, denies gingival bleeding, dysuria, hematuria, hematochezia/BRBPR/melenic stools, denies abnormal uterine bleeding/heavy menses, last pap WNL. (2019 23:15)    PAST MEDICAL & SURGICAL HISTORY:  No pertinent past medical history  No significant past surgical history    FAMILY HISTORY:  FHx: breast cancer    Social History: Father - ESRD , Related to IV Drug use,    MEDICATIONS  (STANDING):  amLODIPine   Tablet 5 milliGRAM(s) Oral daily  folic acid 1 milliGRAM(s) Oral daily  influenza   Vaccine 0.5 milliLiter(s) IntraMuscular once  multivitamin 1 Tablet(s) Oral daily  predniSONE   Tablet 60 milliGRAM(s) Oral daily  sodium bicarbonate  Infusion 0.152 mEq/kG/Hr (110 mL/Hr) IV Continuous <Continuous>  thiamine 100 milliGRAM(s) Oral daily    Allergies    penicillin (Hives)    REVIEW OF SYSTEMS:    CONSTITUTIONAL: No fever, weight loss, or fatigue  EYES: +  eye pain, visual disturbances,  No  discharge  ENMT:  No difficulty hearing, tinnitus, vertigo; No sinus or throat pain  NECK: No pain or stiffness  BREASTS: No pain, masses, or nipple discharge  RESPIRATORY: No cough, wheezing, chills or hemoptysis; No shortness of breath  CARDIOVASCULAR: No chest pain, palpitations, dizziness, or leg swelling  GASTROINTESTINAL: No abdominal or epigastric pain. No nausea, vomiting, or hematemesis; No diarrhea or constipation. No melena or hematochezia.  GENITOURINARY: No dysuria, frequency, hematuria, or incontinence  NEUROLOGICAL: No headaches, memory loss, loss of strength, numbness, or tremors  SKIN: No itching, burning, rashes, or lesions   LYMPH NODES: No enlarged glands  ENDOCRINE: No heat or cold intolerance; No hair loss  MUSCULOSKELETAL: No joint pain or swelling; No muscle, back, or extremity pain  PSYCHIATRIC: No depression, anxiety, mood swings, or difficulty sleeping  HEME/LYMPH: No easy bruising, or bleeding gums  ALLERGY AND IMMUNOLOGIC: No hives or eczema      Vital Signs Last 24 Hrs  T(C): 36.9 (2019 09:36), Max: 37.2 (2019 04:00)  T(F): 98.5 (2019 09:36), Max: 99 (2019 04:00)  HR: 115 (2019 10:00) (96 - 115)  BP: 171/99 (2019 10:00) (139/84 - 189/107)  BP(mean): 128 (2019 10:00) (128 - 134)  RR: 33 (2019 10:00) (17 - 33)  SpO2: 100% (2019 10:00) (98% - 100%)    PHYSICAL EXAM:    GENERAL: NAD, well-groomed, well-developed , Pale,  HEAD:  Atraumatic, Normocephalic  EYES: EOMI, PERRLA, conjunctiva and sclera clear  ENMT: No tonsillar erythema, exudates, or enlargement; Moist mucous membranes, Good dentition, No lesions  NECK: Supple, No JVD, Normal thyroid  NERVOUS SYSTEM:  Alert & Oriented X3, Good concentration; Motor Strength 5/5 B/L upper and lower extremities; DTRs 2+ intact and symmetric  CHEST/LUNG: Clear to percussion bilaterally; No rales, rhonchi, wheezing, or rubs  HEART: Regular rate and rhythm; No murmurs, rubs, or gallops  ABDOMEN: Soft, Nontender, Nondistended; Bowel sounds present  EXTREMITIES:  2+ Peripheral Pulses, No clubbing, cyanosis, or edema  LYMPH: No lymphadenopathy noted  SKIN: No rashes or lesions      LABS:                        11.3   15.6  )-----------( 6        ( 2019 06:26 )             34.1     -    137  |  101  |  42.0<H>  ----------------------------<  215<H>  4.0   |  17.0<L>  |  4.05<H>    Ca    8.7      2019 06:25    TPro  7.6  /  Alb  3.6  /  TBili  2.1<H>  /  DBili  x   /  AST  88<H>  /  ALT  28  /  AlkPhos  116  -    PT/INR - ( 2019 22:33 )   PT: 12.4 sec;   INR: 1.08 ratio      Urinalysis Basic - ( 2019 20:34 )    Color: Brown / Appearance: very cloudy / S.015 / pH: x  Gluc: x / Ketone: Trace  / Bili: Negative / Urobili: Negative mg/dL   Blood: x / Protein: 500 mg/dL / Nitrite: Negative   Leuk Esterase: Trace / RBC: 11-25 /HPF / WBC 3-5   Sq Epi: x / Non Sq Epi: Moderate / Bacteria: Few    RADIOLOGY & ADDITIONAL TESTS:    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 The smear shows multiple helmet cells (arrows) and other fragmented red cells (small arrowhead); microspherocytes are also seen (large arrowheads). The platelet number is reduced; the large platelet in the center (dashed arrow) suggests that the thrombocytopenia is due to enhanced destruction.    EXAM:  CT BRAIN                          PROCEDURE DATE:  2019      INTERPRETATION:  CLINICAL HISTORY: Headache vision changes    COMPARISON: None.    TECHNIQUE: Noncontrast CT of the head. Multiplanar reformations are   submitted.    FINDINGS:  There is no compelling evidence for an acute transcortical infarction.   There is no evidence of mass, mass effect, midline shift or extra-axial   fluid collection. The lateral ventricles and cortical sulci are   age-appropriate in size and configuration. The orbits, mastoid air cells   and visualized paranasal sinuses are unremarkable. The calvarium is   intact.    IMPRESSION:      No evidence of an acute transcortical infarction or   hemorrhage. MR is a more sensitive imaging modality for the evaluation of   an acute infarction.     ELÍAS BENITEZ M.D., ATTENDING RADIOLOGIST  This document has been electronically signed. 2019  8:04PM    A : TTP , LISSETT     P : Complements, RUBIO TS - 13 Activity , LDH ,     PEX - Emergently,     Hematology Evaln.,     Strict UO , Trend Scr.,     Kidney Biopsy - Once Platelets Improve,  ? HD

## 2019-04-06 NOTE — PROVIDER CONTACT NOTE (CRITICAL VALUE NOTIFICATION) - ACTION/TREATMENT ORDERED:
SPOKE TO JOSE ANGEL HUANG ON 4 TOWER TO MAKE HER AWARE OF CRITICAL FINDINGS.  NO NEW ORDERS GIVEN AT THIS TIME.  CONTINUE WITH CURRENT MEDICAL MANAGEMENT.

## 2019-04-06 NOTE — PROGRESS NOTE ADULT - SUBJECTIVE AND OBJECTIVE BOX
Seen , Examined,    Vital Signs Last 24 Hrs  T(C): 36.8 (2019 09:17), Max: 37.2 (2019 04:00)  T(F): 98.3 (2019 09:17), Max: 99 (2019 04:00)  HR: 111 (2019 09:17) (96 - 111)  BP: 142/88 (2019 09:17) (139/84 - 189/107)  BP(mean): --  RR: 18 (2019 09:17) (17 - 20)  SpO2: 98% (2019 09:17) (98% - 99%)    137    |  101    |  42.0<H>  ----------------------------<  215<H>  Ca:8.7   (2019 06:25)  4.0     |  17.0<L>  |  4.05<H>      eGFR if Non : 13 <L>  eGFR if : 15 <L>    TPro  7.6    /  Alb  3.6    /  TBili  2.1<H>  /  DBili  x      /  AST  88<H>  /  ALT  28     /  AlkPhos  116    2019 06:25                           11.3<L>  15.6<H> )-----------( 6<LL>    ( 2019 06:26 )                34.1<L>    Phos:-- Mg:-- PTH:-- Uric acid:-- Serum Osm:--  Ferritin:-- Iron:-- TIBC:-- Tsat:--  B12:0.70 uIU/mL TSH:584 pg/mL ( @ 06:25)    Urinalysis Basic - ( 2019 20:34 )  Color: Brown<!> / Appearance: very cloudy<!> / S.015 / pH: x  Gluc: x / Ketone: Trace<!>  / Bili: Negative / Urobili: Negative mg/dL   Blood: x / Protein: 500 mg/dL<!> / Nitrite: Negative   Leuk Esterase: Trace<!> / RBC: 11-25 /HPF<!> / WBC 3-5   Sq Epi: x / Non Sq Epi: Moderate<!> / Bacteria: Few<!>          D/W Lalit ( MICU )    DX : TTP,     Full note to Follow,

## 2019-04-06 NOTE — CONSULT NOTE ADULT - SUBJECTIVE AND OBJECTIVE BOX
Patient is a 38y old  Female who presents with a chief complaint of blurry vision, nausea/vomiting (2019 10:10)      BRIEF HOSPITAL COURSE: 38F with no PMHx who presented to ER yesterday with blurred vision and nausea vomiting. Reported to have had a period of confusion as well. Found to have severe thrombocytopenia and Acute renal failure. Denies any recent illness, No medications. No smoker, no drugs. Admits to drinking Tequila on friday evening, but is not an alcoholic. Eecenlty began exercising at [facility] gym and is on a 'Medi-fast" diet. She is awake and alert no in no acute distress. No fevers or chills.    Ultimately found have and elevated LDH and Schistocytes on her Peripheral Smear, consistent with suspected TTP.      Events last 24 hours: as above     PAST MEDICAL & SURGICAL HISTORY:  No pertinent past medical history  No significant past surgical history    Allergies    penicillin (Hives)    Intolerances      FAMILY HISTORY:  FHx: breast cancer      Family history otherwise noncontributory.    Social History: No smoking/Drugs ETOH: 2-4 drinks/week  Review of Systems:    ALL OTHER REVIEW OF SYSTEMS EXCEPT PER HPI NEGATIVE.      Medications:    amLODIPine   Tablet 5 milliGRAM(s) Oral daily  hydrALAZINE Injectable 5 milliGRAM(s) IV Push every 6 hours PRN  metoprolol tartrate 25 milliGRAM(s) Oral two times a day      acetaminophen   Tablet .. 650 milliGRAM(s) Oral every 6 hours PRN            predniSONE   Tablet 60 milliGRAM(s) Oral daily    folic acid 1 milliGRAM(s) Oral daily  multivitamin 1 Tablet(s) Oral daily  sodium bicarbonate  Infusion 0.152 mEq/kG/Hr IV Continuous <Continuous>  thiamine 100 milliGRAM(s) Oral daily    influenza   Vaccine 0.5 milliLiter(s) IntraMuscular once              ICU Vital Signs Last 24 Hrs  T(C): 36.8 (2019 12:00), Max: 37.2 (2019 04:00)  T(F): 98.2 (2019 12:00), Max: 99 (2019 04:00)  HR: 101 (2019 13:00) (96 - 115)  BP: 161/95 (2019 13:00) (139/84 - 189/107)  BP(mean): 121 (2019 13:00) (121 - 134)  ABP: --  ABP(mean): --  RR: 30 (2019 13:00) (17 - 33)  SpO2: 100% (2019 13:00) (98% - 100%)    Vital Signs Last 24 Hrs  T(C): 36.8 (2019 12:00), Max: 37.2 (2019 04:00)  T(F): 98.2 (2019 12:00), Max: 99 (2019 04:00)  HR: 101 (2019 13:00) (96 - 115)  BP: 161/95 (2019 13:00) (139/84 - 189/107)  BP(mean): 121 (2019 13:00) (121 - 134)  RR: 30 (2019 13:00) (17 - 33)  SpO2: 100% (2019 13:00) (98% - 100%)        I&O's Detail    2019 07:01  -  2019 13:59  --------------------------------------------------------  IN:    Plasma: 250 mL    sodium bicarbonate  Infusion: 330 mL  Total IN: 580 mL    OUT:    Voided: 200 mL  Total OUT: 200 mL    Total NET: 380 mL            LABS:                        11.3   15.6  )-----------( 6        ( 2019 06:26 )             34.1     04-06    137  |  101  |  42.0<H>  ----------------------------<  215<H>  4.0   |  17.0<L>  |  4.05<H>    Ca    8.7      2019 06:25    TPro  7.6  /  Alb  3.6  /  TBili  2.1<H>  /  DBili  x   /  AST  88<H>  /  ALT  28  /  AlkPhos  116  04-06      CARDIAC MARKERS ( 2019 06:26 )  x     / x     / 436 U/L / x     / 7.0 ng/mL  CARDIAC MARKERS ( 2019 19:58 )  x     / x     / 421 U/L / x     / 5.3 ng/mL      CAPILLARY BLOOD GLUCOSE        PT/INR - ( 2019 22:33 )   PT: 12.4 sec;   INR: 1.08 ratio           Urinalysis Basic - ( 2019 11:45 )    Color: Yellow / Appearance: Clear / S.005 / pH: x  Gluc: x / Ketone: Negative  / Bili: Negative / Urobili: Negative mg/dL   Blood: x / Protein: 100 mg/dL / Nitrite: Negative   Leuk Esterase: Negative / RBC: 6-10 /HPF / WBC 0-2   Sq Epi: x / Non Sq Epi: Occasional / Bacteria: Negative      CULTURES:      Physical Examination:    GENERAL: Obese Female in no acute distress.  A+O x 3 now. Follows all commands well      EYES: Pupils equal, reactive to light.  Symmetric.    EARS, NOSE, THROAT: Normal; supple neck, no lymphadenopathy; trachea midline    PULM: Clear to auscultation bilaterally, no significant sputum production    CVS: Regular rate and rhythm, no murmurs, rubs, or gallops    GI: Soft, nondistended, nontender, normoactive bowel sounds, no masses, no guarding    EXTREMITIES: No edema    SKIN: Warm and well perfused, no rashes noted.    NEURO: Alert, oriented, interactive, nonfocal    DEVICES:     RADIOLOGY: CXR: Clear     CRITICAL CARE TIME SPENT:  45 mins were spent evaluating and treating this critical patient, including speaking with all consultants as well as the staff, patient and family.

## 2019-04-06 NOTE — CONSULT NOTE ADULT - PROBLEM SELECTOR RECOMMENDATION 9
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   Discussed with Hematology as well as [facility]. Will initiate PLEX treatment urgently and continue with daily treatments based on patient response.  Will send ADAMTS antibody for confirmation as well.  Awaiting [facility] testing   WIll check Head/Chest/Abd/Pelvis CT as well.  DVT prophylaxis with SCDs only for now

## 2019-04-07 LAB
ALBUMIN SERPL ELPH-MCNC: 3.6 G/DL — SIGNIFICANT CHANGE UP (ref 3.3–5.2)
ALBUMIN SERPL ELPH-MCNC: 3.9 G/DL — SIGNIFICANT CHANGE UP (ref 3.3–5.2)
ALP SERPL-CCNC: 68 U/L — SIGNIFICANT CHANGE UP (ref 40–120)
ALP SERPL-CCNC: 76 U/L — SIGNIFICANT CHANGE UP (ref 40–120)
ALT FLD-CCNC: 23 U/L — SIGNIFICANT CHANGE UP
ALT FLD-CCNC: 25 U/L — SIGNIFICANT CHANGE UP
ANION GAP SERPL CALC-SCNC: 14 MMOL/L — SIGNIFICANT CHANGE UP (ref 5–17)
ANION GAP SERPL CALC-SCNC: 16 MMOL/L — SIGNIFICANT CHANGE UP (ref 5–17)
AST SERPL-CCNC: 27 U/L — SIGNIFICANT CHANGE UP
AST SERPL-CCNC: 27 U/L — SIGNIFICANT CHANGE UP
BILIRUB SERPL-MCNC: 0.7 MG/DL — SIGNIFICANT CHANGE UP (ref 0.4–2)
BILIRUB SERPL-MCNC: 1.1 MG/DL — SIGNIFICANT CHANGE UP (ref 0.4–2)
BUN SERPL-MCNC: 55 MG/DL — HIGH (ref 8–20)
BUN SERPL-MCNC: 57 MG/DL — HIGH (ref 8–20)
CALCIUM SERPL-MCNC: 8.4 MG/DL — LOW (ref 8.6–10.2)
CALCIUM SERPL-MCNC: 8.4 MG/DL — LOW (ref 8.6–10.2)
CHLORIDE SERPL-SCNC: 90 MMOL/L — LOW (ref 98–107)
CHLORIDE SERPL-SCNC: 90 MMOL/L — LOW (ref 98–107)
CO2 SERPL-SCNC: 32 MMOL/L — HIGH (ref 22–29)
CO2 SERPL-SCNC: 34 MMOL/L — HIGH (ref 22–29)
CREAT ?TM UR-MCNC: 46 MG/DL — SIGNIFICANT CHANGE UP
CREAT SERPL-MCNC: 5.06 MG/DL — HIGH (ref 0.5–1.3)
CREAT SERPL-MCNC: 5.15 MG/DL — HIGH (ref 0.5–1.3)
CULTURE RESULTS: SIGNIFICANT CHANGE UP
GLUCOSE BLDC GLUCOMTR-MCNC: 201 MG/DL — HIGH (ref 70–99)
GLUCOSE SERPL-MCNC: 216 MG/DL — HIGH (ref 70–115)
GLUCOSE SERPL-MCNC: 234 MG/DL — HIGH (ref 70–115)
HAPTOGLOB SERPL-MCNC: <20 MG/DL — LOW (ref 34–200)
HAV IGM SER-ACNC: SIGNIFICANT CHANGE UP
HBV CORE IGM SER-ACNC: SIGNIFICANT CHANGE UP
HBV SURFACE AG SER-ACNC: SIGNIFICANT CHANGE UP
HCT VFR BLD CALC: 27 % — LOW (ref 37–47)
HCT VFR BLD CALC: 30.2 % — LOW (ref 37–47)
HCV AB S/CO SERPL IA: 0.08 S/CO — SIGNIFICANT CHANGE UP (ref 0–0.99)
HCV AB SERPL-IMP: SIGNIFICANT CHANGE UP
HGB BLD-MCNC: 10.1 G/DL — LOW (ref 12–16)
HGB BLD-MCNC: 9.2 G/DL — LOW (ref 12–16)
MAGNESIUM SERPL-MCNC: 1.8 MG/DL — SIGNIFICANT CHANGE UP (ref 1.6–2.6)
MAGNESIUM SERPL-MCNC: 2 MG/DL — SIGNIFICANT CHANGE UP (ref 1.6–2.6)
MCHC RBC-ENTMCNC: 28.2 PG — SIGNIFICANT CHANGE UP (ref 27–31)
MCHC RBC-ENTMCNC: 28.4 PG — SIGNIFICANT CHANGE UP (ref 27–31)
MCHC RBC-ENTMCNC: 33.4 G/DL — SIGNIFICANT CHANGE UP (ref 32–36)
MCHC RBC-ENTMCNC: 34.1 G/DL — SIGNIFICANT CHANGE UP (ref 32–36)
MCV RBC AUTO: 83.3 FL — SIGNIFICANT CHANGE UP (ref 81–99)
MCV RBC AUTO: 84.4 FL — SIGNIFICANT CHANGE UP (ref 81–99)
PHOSPHATE SERPL-MCNC: 3.5 MG/DL — SIGNIFICANT CHANGE UP (ref 2.4–4.7)
PLATELET # BLD AUTO: 11 K/UL — CRITICAL LOW (ref 150–400)
PLATELET # BLD AUTO: 13 K/UL — CRITICAL LOW (ref 150–400)
POTASSIUM SERPL-MCNC: 3.4 MMOL/L — LOW (ref 3.5–5.3)
POTASSIUM SERPL-MCNC: 3.7 MMOL/L — SIGNIFICANT CHANGE UP (ref 3.5–5.3)
POTASSIUM SERPL-SCNC: 3.4 MMOL/L — LOW (ref 3.5–5.3)
POTASSIUM SERPL-SCNC: 3.7 MMOL/L — SIGNIFICANT CHANGE UP (ref 3.5–5.3)
PROT ?TM UR-MCNC: 34 MG/DL — HIGH (ref 0–12)
PROT SERPL-MCNC: 6.5 G/DL — LOW (ref 6.6–8.7)
PROT SERPL-MCNC: 6.5 G/DL — LOW (ref 6.6–8.7)
PROT/CREAT UR-RTO: 0.7 RATIO — HIGH
RBC # BLD: 3.24 M/UL — LOW (ref 4.4–5.2)
RBC # BLD: 3.58 M/UL — LOW (ref 4.4–5.2)
RBC # FLD: 17.7 % — HIGH (ref 11–15.6)
RBC # FLD: 17.8 % — HIGH (ref 11–15.6)
SODIUM SERPL-SCNC: 136 MMOL/L — SIGNIFICANT CHANGE UP (ref 135–145)
SODIUM SERPL-SCNC: 140 MMOL/L — SIGNIFICANT CHANGE UP (ref 135–145)
SPECIMEN SOURCE: SIGNIFICANT CHANGE UP
WBC # BLD: 18 K/UL — HIGH (ref 4.8–10.8)
WBC # BLD: 20.1 K/UL — HIGH (ref 4.8–10.8)
WBC # FLD AUTO: 18 K/UL — HIGH (ref 4.8–10.8)
WBC # FLD AUTO: 20.1 K/UL — HIGH (ref 4.8–10.8)

## 2019-04-07 PROCEDURE — 99223 1ST HOSP IP/OBS HIGH 75: CPT

## 2019-04-07 PROCEDURE — 99233 SBSQ HOSP IP/OBS HIGH 50: CPT

## 2019-04-07 PROCEDURE — 76770 US EXAM ABDO BACK WALL COMP: CPT | Mod: 26

## 2019-04-07 RX ORDER — CALCIUM GLUCONATE 100 MG/ML
2 VIAL (ML) INTRAVENOUS ONCE
Qty: 0 | Refills: 0 | Status: COMPLETED | OUTPATIENT
Start: 2019-04-07 | End: 2019-04-07

## 2019-04-07 RX ORDER — POTASSIUM CHLORIDE 20 MEQ
20 PACKET (EA) ORAL ONCE
Qty: 0 | Refills: 0 | Status: COMPLETED | OUTPATIENT
Start: 2019-04-07 | End: 2019-04-07

## 2019-04-07 RX ADMIN — AMLODIPINE BESYLATE 5 MILLIGRAM(S): 2.5 TABLET ORAL at 05:56

## 2019-04-07 RX ADMIN — Medication 100 MILLIGRAM(S): at 11:22

## 2019-04-07 RX ADMIN — Medication 60 MILLIGRAM(S): at 05:56

## 2019-04-07 RX ADMIN — Medication 25 MILLIGRAM(S): at 17:27

## 2019-04-07 RX ADMIN — Medication 1 MILLIGRAM(S): at 11:22

## 2019-04-07 RX ADMIN — Medication 20 MILLIEQUIVALENT(S): at 11:22

## 2019-04-07 RX ADMIN — Medication 200 GRAM(S): at 13:11

## 2019-04-07 RX ADMIN — Medication 5 MILLIGRAM(S): at 18:14

## 2019-04-07 RX ADMIN — Medication 25 MILLIGRAM(S): at 05:56

## 2019-04-07 RX ADMIN — Medication 1 TABLET(S): at 11:23

## 2019-04-07 NOTE — CONSULT NOTE ADULT - PROBLEM SELECTOR RECOMMENDATION 9
-consistent with Thrombocytopenic Thrombotic Purpura (TTP).  Etiology unclear - ? viral-related illness  -discussed with ICU team   -s/p plasmapheresis x 2 starting 4/6/19  -continue with daily treatments based on patient response.  -[id number] pending  -BHcg negative  -Head/Chest/Abd/Pelvis CT- negative for malignancy  -DVT prophylaxis with SCDs only for now -consistent with Thrombocytopenic Thrombotic Purpura (TTP).  Etiology unclear - ? viral-related illness given vomiting episode after eating chinese food night of admit. No recent infections or new medications.  CT CAP negative.     -discussed with ICU team   -s/p plasmapheresis x 2 starting 4/6/19  -continue with daily treatments based on patient response.  -[id number] pending  -BHcg negative  -Head/Chest/Abd/Pelvis CT- negative for malignancy  -DVT prophylaxis with SCDs only for now

## 2019-04-07 NOTE — PROGRESS NOTE ADULT - SUBJECTIVE AND OBJECTIVE BOX
Patient is a 38y old  Female who presents with a chief complaint of blurry vision, nausea/vomiting (2019 09:19)    HPI:  Ms. Gutierres is a 37 yo AA  F with no significant PMH who p/w n/v, eye swelling/blurry vision since 2am this morning. Pt states she was out drinking tequila shots the night prior, subsequently at 2am was taking a shower when she examined her eye in the mirror and noticed bilateral upper eyelids swollen. She subsequently began vomiting the tequila she drank, NBNB., no hematemesis.  An hour later she noticed the left eye also appeared swollen. She describes her vision became "like she was underwater", but denies eye pain, photophobia, other visual disturbances. Denies LOC/head trauma/fall. Pt also report that over the past 2-3 weeks she started going to the gym ( 4 times per weeks ) and she was following an exercise program, she had body pain .     Since being in the ED she has noticed that her left eye swelling has noticeably improved.   ROS notable for URI symptoms the week prior. NO pain at this time, no CP, SOB, diarrhea/constipation, fever/chills  Positive sick contacts in her household last week (family was sick with URI symptoms) . Denies recent travel    Patient states one episode of epistaxis for a short period of time about 1 month ago, denies gingival bleeding, dysuria, hematuria, hematochezia/BRBPR/melenic stools, denies abnormal uterine bleeding/heavy menses, last pap WNL. (2019 23:15)    PAST MEDICAL & SURGICAL HISTORY:  No pertinent past medical history  No significant past surgical history    FAMILY HISTORY:  FHx: breast cancer    Social History: Father - ESRD , Related to IV Drug use,    MEDICATIONS  (STANDING):  amLODIPine   Tablet 5 milliGRAM(s) Oral daily  folic acid 1 milliGRAM(s) Oral daily  influenza   Vaccine 0.5 milliLiter(s) IntraMuscular once  multivitamin 1 Tablet(s) Oral daily  predniSONE   Tablet 60 milliGRAM(s) Oral daily  sodium bicarbonate  Infusion 0.152 mEq/kG/Hr (110 mL/Hr) IV Continuous <Continuous>  thiamine 100 milliGRAM(s) Oral daily    Allergies    penicillin (Hives)    REVIEW OF SYSTEMS:    CONSTITUTIONAL: No fever, weight loss, or fatigue  EYES: +  eye pain, visual disturbances,  No  discharge  ENMT:  No difficulty hearing, tinnitus, vertigo; No sinus or throat pain  NECK: No pain or stiffness  BREASTS: No pain, masses, or nipple discharge  RESPIRATORY: No cough, wheezing, chills or hemoptysis; No shortness of breath  CARDIOVASCULAR: No chest pain, palpitations, dizziness, or leg swelling  GASTROINTESTINAL: No abdominal or epigastric pain. No nausea, vomiting, or hematemesis; No diarrhea or constipation. No melena or hematochezia.  GENITOURINARY: No dysuria, frequency, hematuria, or incontinence  NEUROLOGICAL: No headaches, memory loss, loss of strength, numbness, or tremors  SKIN: No itching, burning, rashes, or lesions   LYMPH NODES: No enlarged glands  ENDOCRINE: No heat or cold intolerance; No hair loss  MUSCULOSKELETAL: No joint pain or swelling; No muscle, back, or extremity pain  PSYCHIATRIC: No depression, anxiety, mood swings, or difficulty sleeping  HEME/LYMPH: No easy bruising, or bleeding gums  ALLERGY AND IMMUNOLOGIC: No hives or eczema    PHYSICAL EXAM:    GENERAL: NAD, well-groomed, well-developed , Pale,  HEAD:  Atraumatic, Normocephalic  EYES: EOMI, PERRLA, conjunctiva and sclera clear  ENMT: No tonsillar erythema, exudates, or enlargement; Moist mucous membranes, Good dentition, No lesions  NECK: Supple, No JVD, Normal thyroid  NERVOUS SYSTEM:  Alert & Oriented X3, Good concentration; Motor Strength 5/5 B/L upper and lower extremities; DTRs 2+ intact and symmetric  CHEST/LUNG: Clear to percussion bilaterally; No rales, rhonchi, wheezing, or rubs  HEART: Regular rate and rhythm; No murmurs, rubs, or gallops  ABDOMEN: Soft, Nontender, Nondistended; Bowel sounds present  EXTREMITIES:  2+ Peripheral Pulses, No clubbing, cyanosis, or edema  LYMPH: No lymphadenopathy noted  SKIN: No rashes or lesions      LABS:                        11.3   15.6  )-----------( 6        ( 2019 06:26 )             34.1     04-06    137  |  101  |  42.0<H>  ----------------------------<  215<H>  4.0   |  17.0<L>  |  4.05<H>    Ca    8.7      2019 06:25    TPro  7.6  /  Alb  3.6  /  TBili  2.1<H>  /  DBili  x   /  AST  88<H>  /  ALT  28  /  AlkPhos  116  04-06    PT/INR - ( 2019 22:33 )   PT: 12.4 sec;   INR: 1.08 ratio      Urinalysis Basic - ( 2019 20:34 )    Color: Brown / Appearance: very cloudy / S.015 / pH: x  Gluc: x / Ketone: Trace  / Bili: Negative / Urobili: Negative mg/dL   Blood: x / Protein: 500 mg/dL / Nitrite: Negative   Leuk Esterase: Trace / RBC: 11-25 /HPF / WBC 3-5   Sq Epi: x / Non Sq Epi: Moderate / Bacteria: Few    RADIOLOGY & ADDITIONAL TESTS:  {PRIVATE "TYPE=PICT;ALT=Image"}{PRIVATE "TYPE=PICT;ALT=Image"}  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 The smear shows multiple helmet cells (arrows) and other fragmented red cells (small arrowhead); microspherocytes are also seen (large arrowheads). The platelet number is reduced; the large platelet in the center (dashed arrow) suggests that the thrombocytopenia is due to enhanced destruction.    EXAM:  CT BRAIN                          PROCEDURE DATE:  2019      INTERPRETATION:  CLINICAL HISTORY: Headache vision changes    COMPARISON: None.    TECHNIQUE: Noncontrast CT of the head. Multiplanar reformations are   submitted.    FINDINGS:  There is no compelling evidence for an acute transcortical infarction.   There is no evidence of mass, mass effect, midline shift or extra-axial   fluid collection. The lateral ventricles and cortical sulci are   age-appropriate in size and configuration. The orbits, mastoid air cells   and visualized paranasal sinuses are unremarkable. The calvarium is   intact.    IMPRESSION:      No evidence of an acute transcortical infarction or   hemorrhage. MR is a more sensitive imaging modality for the evaluation of   an acute infarction.     ELÍAS BENITEZ M.D., ATTENDING RADIOLOGIST  This document has been electronically signed. 2019  8:04PM    A : TTP , LISSETT     P : Complements, RUBIO TS - 13 Activity , LDH ,     PEX - Emergently,     Hematology Evaln.,     Strict UO , Trend Scr.,     Kidney Biopsy - Once Platelets Improve,  ? 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 Thrombotic microangiopathy (TMA) describes a specific pathologic lesion of arterioles and capillaries that leads to microvascular thrombosis. Not all MAHA is caused by a TMA, but nearly all TMAs cause MAHA and thrombocytopenia.  ?Primary TMA syndromes include thrombotic thrombocytopenic purpura (TTP; hereditary or acquired severe [id number] deficiency), Shiga toxin-mediated hemolytic uremic syndrome (ST-HUS), complement-mediated TMA (hereditary or acquired complement regulation abnormality), drug-induced TMA (DITMA; immune or toxic), metabolism-mediated TMA (hereditary disorder of vitamin B12 metabolism), and coagulation-mediated TMA (hereditary deficiency of a coagulation regulator)   ?The initial evaluation is focused on confirming that the patient has true MAHA and thrombocytopenia, and excluding systemic disorders that manifest these findings , based on a consideration of presenting findings and likely causes. Some systemic disorders such as severe hypertension and preeclampsia/HELLP syndrome are obvious. Systemic malignancies may not be initially apparent and may require other testing for diagnosis. Systemic infections should be obvious but may mimic clinical features of TTP and thus require microbial testing.   ?Clinical features helpful for distinguishing among primary TMAs include the age of the patient (eg, child versus adult), rate of onset of presenting symptoms, the severity of thrombocytopenia, and the presence and rate of onset of kidney injury.   ?In some cases, additional aspects of the history or specific physical findings may suggest a specific primary TMA. Several days of nonspecific symptoms, with or without neurologic abnormalities, is characteristic for TTP . Exposure to farm animals, under-cooked meat, or contaminated water several days prior to the onset of gastrointestinal symptoms is seen in many cases of ST-HUS. Ingestion of a quinine-containing beverage followed by the abrupt onset of fever, chills, and gastrointestinal symptoms is typical of DITMA.   ?All patients with a suspected primary TMA should have a complete blood count (CBC) with platelet count, lactate dehydrogenase (LDH) level, and serum creatinine, which are followed daily. [id number] activity is measured prior to the initiation of plasma exchange (PEX). Patients with kidney injury should have urine output measured. Those with severe abdominal pain and diarrhea or exposure to a known outbreak of infectious diarrhea should have a stool culture for enterohemorrhagic Escherichia coli (or Shigella dysenteriae in [place]) and immunoassay for Shiga toxin. Those with negative testing for TTP and ST-HUS should have serum levels of homocysteine and methylmalonic acid measured. (See 'Laboratory evaluation' above.)  ?The immediate management issue for a patient with a suspected TMA is deciding whether to perform PEX for a presumptive diagnosis of TTP or to start anti-complement therapy for a presumptive diagnosis of complement-mediated TMA. Other interventions include drug discontinuation for presumed DITMA; hydroxocobalamin, betaine, and folinic acid (leucovorin) for cobalamin C deficiency-mediated TMA; and/or supportive care, with transfusions as needed. (See 'Immediate management decisions' above.)      ICU Vital Signs Last 24 Hrs,    T(C): 37.3 (2019 08:00), Max: 37.4 (2019 20:00)  T(F): 99.1 (2019 08:00), Max: 99.4 (2019 20:00)  HR: 97 (2019 10:00) (91 - 114)  BP: 159/89 (2019 10:00) (110/71 - 182/118)  BP(mean): 117 (2019 10:00) (83 - 146)  ABP: --  ABP(mean): --  RR: 26 (2019 10:00) (18 - 36)  SpO2: 97% (2019 10:00) (97% - 100%)    136    |  90<L>  |  55.0<H>  ----------------------------<  216<H>  Ca:8.4<L> (2019 09:39)  3.4<L>   |  32.0<H>  |  5.15<H>      eGFR if Non : 10 <L>  eGFR if : 11 <L>    TPro  6.5<L>  /  Alb  3.6    /  TBili  1.1    /  DBili  x      /  AST  27     /  ALT  23     /  AlkPhos  76     2019 09:39                        9.2<L>  18.0<H> )-----------( 13<LL>    ( 2019 09:39 )             27.0<L>    Phos:3.5 mg/dL M.0 mg/dL PTH:-- Uric acid:-- Serum Osm:--  Ferritin:-- Iron:-- TIBC:-- Tsat:--  B12:-- TSH:-- ( @ 09:39)    Urinalysis Basic - ( 2019 11:45 )  Color: Yellow / Appearance: Clear / S.005<L> / pH: x  Gluc: x / Ketone: Negative  / Bili: Negative / Urobili: Negative mg/dL   Blood: x / Protein: 100 mg/dL<!> / Nitrite: Negative   Leuk Esterase: Negative / RBC: 6-10 /HPF<!> / WBC 0-2   Sq Epi: x / Non Sq Epi: Occasional / Bacteria: Negative      UProt:-- UCr:46 mg/dL P/C Ratio:0.7 Ratio<H> 24 hour Prot:-- UVol:-- CrCl:--  [place]or[place]:-- UOsm:-- UVol:-- UCl:-- [place]:-- ( @ 09:38)    I&O's Detail    2019 07:  -  2019 07:00  --------------------------------------------------------  IN:    IV PiggyBack: 100 mL    Oral Fluid: 100 mL    Plasma: 250 mL    sodium bicarbonate  Infusion: 2310 mL  Total IN: 2760 mL    OUT:    Voided: 1300 mL  Total OUT: 1300 mL    Total NET: 1460 mL      2019 07:  -  2019 10:55  --------------------------------------------------------  IN:    sodium bicarbonate  Infusion: 330 mL  Total IN: 330 mL    OUT:    Voided: 450 mL  Total OUT: 450 mL    Total NET: -120 mL    EXAM:  CT ABDOMEN AND PELVIS                         EXAM:  CT CHEST                          PROCEDURE DATE:  2019      INTERPRETATION:  CT of the chest, abdomen and pelvis.   COMPARISON: None  CLINICAL INFORMATION: Nausea vomiting. History of thrombocytopenia, acute   renal failure . episodes of hemoptysis.  PROCEDURE:   Noncontrast CT chest abdomen and pelvis.  2.5 mm axial slice thickness images were obtained. Coronal and sagittal   reformats were also obtained.   Maximum intensity projection,(MIP), imaging was created and interpreted.  FINDINGS:    The airway shows normal caliber and contour with patent lumen.    There are no focal airspace consolidations. The lung interstitium is   normal.     There is no pleural effusion or pneumothorax.    There are no mediastinal masses or lymphadenopathy.     The mediastinum great vessels are normal.     The heart is normal. There is no pericardial effusion.    There is no free intra-abdominal air or ascites.     The liver, spleen, pancreas, adrenal glands, gallbladder are normal.    There is no intra or extrahepatic biliary ductal dilatation.    The stomach, duodenum, small and large bowel and appendix are within   normal limits.    Both kidneys show normal morphology without masses or hydronephrosis.   THERE IS NONSPECIFIC PERINEPHRIC STRANDING .    The urinary bladder shows normal morphology and contour.      Uterus and adnexa unremarkable. There are no retroperitoneal masses or   abnormal lymphadenopathy.  The retroperitoneal vessels are normal.      The bones and soft tissues are intact.    IMPRESSION:     Bilateral perinephric stranding otherwise unremarkable chest, abdomen and   pelvic CT pathology. Lungs clear.      MEJIA EPPERSON M.D., ATTENDING RADIOLOGIST  This document has been electronically signed. 2019  5:19PM

## 2019-04-07 NOTE — CONSULT NOTE ADULT - REASON FOR ADMISSION
blurry vision, nausea/vomiting
blurry vision, nausea/vomiting, TTP

## 2019-04-07 NOTE — PROGRESS NOTE ADULT - ASSESSMENT
38F with No PMHx who persent with severe thrombocytopenia and acute renal failure, consistent with TTP.      Problem/Recommendation - 1:  Problem: Thrombocytopenia. 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   Discussed with Hematology as well as [facility].   Continuing Daily PLEX, which patient is tolerating well.  PLT slight rise from yesterday.  Sent  ADAMTS antibody for confirmation as well.  Checking GI PCR   Replace electrolytres    DVT prophylaxis with SCDs only for now.     Problem/Recommendation - 2:  ·  Problem: Acute renal failure, unspecified acute renal failure type.  Recommendation: Likely secondary to TTP.  Hydrated with Bicarb overnight. Serum Co2 32 now   At present continue to make good urine  Potassium remains low  Will adjust diet  Sodium Bicarb drip DC  Nephrology F/U   No need for HD at this point

## 2019-04-07 NOTE — DIETITIAN INITIAL EVALUATION ADULT. - PERTINENT LABORATORY DATA
04-07 Na136 mmol/L Glu 216 mg/dL<H> K+ 3.4 mmol/L<L> Cr  5.15 mg/dL<H> BUN 55.0 mg/dL<H> Phos 3.5 mg/dL Alb 3.6 g/dL PAB n/a

## 2019-04-07 NOTE — DIETITIAN INITIAL EVALUATION ADULT. - OTHER INFO
38F with No PMHx who persent with severe thrombocytopenia and acute renal failure, consistent with TTP. Pt with BMI of 48.7. did not want to discuss weight history and declined education at this time.

## 2019-04-07 NOTE — CONSULT NOTE ADULT - SUBJECTIVE AND OBJECTIVE BOX
HPI:  Patient is a 38y old  Female who presents with a chief complaint of blurry vision, nausea/vomiting, ultimately found have and elevated LDH and schistocytes on her peripheral smear, consistent with TTP.  She is s/p second PLEX with mild improvement in platelets.  No new events reported overnight.      PAST MEDICAL & SURGICAL HISTORY:  No pertinent past medical history  No significant past surgical history      MEDICATIONS  (STANDING):  amLODIPine   Tablet 5 milliGRAM(s) Oral daily  folic acid 1 milliGRAM(s) Oral daily  influenza   Vaccine 0.5 milliLiter(s) IntraMuscular once  metoprolol tartrate 25 milliGRAM(s) Oral two times a day  multivitamin 1 Tablet(s) Oral daily  predniSONE   Tablet 60 milliGRAM(s) Oral daily  thiamine 100 milliGRAM(s) Oral daily    MEDICATIONS  (PRN):  acetaminophen   Tablet .. 650 milliGRAM(s) Oral every 6 hours PRN Temp greater or equal to 38C (100.4F), Mild Pain (1 - 3)  hydrALAZINE Injectable 5 milliGRAM(s) IV Push every 6 hours PRN if SBP > 165 or DBP > 100      Allergies    penicillin (Hives)    Intolerances        FAMILY HISTORY:  FHx: breast cancer     Vital Signs Last 24 Hrs  T(C): 36.7 (2019 16:00), Max: 37.4 (2019 00:02)  T(F): 98 (2019 16:00), Max: 99.4 (2019 00:02)  HR: 100 (2019 20:00) (91 - 107)  BP: 148/87 (2019 20:00) (110/71 - 176/97)  BP(mean): 111 (2019 20:00) (83 - 127)  RR: 24 (2019 20:00) (18 - 34)  SpO2: 99% (2019 20:00) (97% - 100%)  d      LABS:  CBC Full  -  ( 2019 17:44 )  WBC Count : 20.1 K/uL  RBC Count : 3.58 M/uL  Hemoglobin : 10.1 g/dL  Hematocrit : 30.2 %  Platelet Count - Automated : 11 K/uL  Mean Cell Volume : 84.4 fl  Mean Cell Hemoglobin : 28.2 pg  Mean Cell Hemoglobin Concentration : 33.4 g/dL  Auto Neutrophil # : x  Auto Lymphocyte # : x  Auto Monocyte # : x  Auto Eosinophil # : x  Auto Basophil # : x  Auto Neutrophil % : x  Auto Lymphocyte % : x  Auto Monocyte % : x  Auto Eosinophil % : x  Auto Basophil % : x        140  |  90<L>  |  57.0<H>  ----------------------------<  234<H>  3.7   |  34.0<H>  |  5.06<H>    Ca    8.4<L>      2019 17:44  Phos  3.5       Mg     1.8         TPro  6.5<L>  /  Alb  3.9  /  TBili  0.7  /  DBili  x   /  AST  27  /  ALT  25  /  AlkPhos  68      PT/INR - ( 2019 22:33 )   PT: 12.4 sec;   INR: 1.08 ratio           Urinalysis Basic - ( 2019 11:45 )    Color: Yellow / Appearance: Clear / S.005 / pH: x  Gluc: x / Ketone: Negative  / Bili: Negative / Urobili: Negative mg/dL   Blood: x / Protein: 100 mg/dL / Nitrite: Negative   Leuk Esterase: Negative / RBC: 6-10 /HPF / WBC 0-2   Sq Epi: x / Non Sq Epi: Occasional / Bacteria: Negative        RADIOLOGY & ADDITIONAL STUDIES: HPI:  Patient is a 38y old  Female who presents with a chief complaint of blurry vision, nausea/vomiting, ultimately found have and elevated LDH and schistocytes on her peripheral smear, consistent with TTP.  She is s/p second PLEX with mild improvement in platelets.  No new events reported overnight.  Vision improved.  No bleeding.        PAST MEDICAL & SURGICAL HISTORY:  No pertinent past medical history  No significant past surgical history      MEDICATIONS  (STANDING):  amLODIPine   Tablet 5 milliGRAM(s) Oral daily  folic acid 1 milliGRAM(s) Oral daily  influenza   Vaccine 0.5 milliLiter(s) IntraMuscular once  metoprolol tartrate 25 milliGRAM(s) Oral two times a day  multivitamin 1 Tablet(s) Oral daily  predniSONE   Tablet 60 milliGRAM(s) Oral daily  thiamine 100 milliGRAM(s) Oral daily    MEDICATIONS  (PRN):  acetaminophen   Tablet .. 650 milliGRAM(s) Oral every 6 hours PRN Temp greater or equal to 38C (100.4F), Mild Pain (1 - 3)  hydrALAZINE Injectable 5 milliGRAM(s) IV Push every 6 hours PRN if SBP > 165 or DBP > 100      Allergies    penicillin (Hives)    Intolerances        FAMILY HISTORY:  FHx: breast cancer     Vital Signs Last 24 Hrs  T(C): 36.7 (2019 16:00), Max: 37.4 (2019 00:02)  T(F): 98 (2019 16:00), Max: 99.4 (2019 00:02)  HR: 100 (2019 20:00) (91 - 107)  BP: 148/87 (2019 20:00) (110/71 - 176/97)  BP(mean): 111 (2019 20:00) (83 - 127)  RR: 24 (2019 20:00) (18 - 34)  SpO2: 99% (2019 20:00) (97% - 100%)      LABS:  CBC Full  -  ( 2019 17:44 )  WBC Count : 20.1 K/uL  RBC Count : 3.58 M/uL  Hemoglobin : 10.1 g/dL  Hematocrit : 30.2 %  Platelet Count - Automated : 11 K/uL  Mean Cell Volume : 84.4 fl  Mean Cell Hemoglobin : 28.2 pg  Mean Cell Hemoglobin Concentration : 33.4 g/dL  Auto Neutrophil # : x  Auto Lymphocyte # : x  Auto Monocyte # : x  Auto Eosinophil # : x  Auto Basophil # : x  Auto Neutrophil % : x  Auto Lymphocyte % : x  Auto Monocyte % : x  Auto Eosinophil % : x  Auto Basophil % : x        140  |  90<L>  |  57.0<H>  ----------------------------<  234<H>  3.7   |  34.0<H>  |  5.06<H>    Ca    8.4<L>      2019 17:44  Phos  3.5       Mg     1.8         TPro  6.5<L>  /  Alb  3.9  /  TBili  0.7  /  DBili  x   /  AST  27  /  ALT  25  /  AlkPhos  68      PT/INR - ( 2019 22:33 )   PT: 12.4 sec;   INR: 1.08 ratio           Urinalysis Basic - ( 2019 11:45 )    Color: Yellow / Appearance: Clear / S.005 / pH: x  Gluc: x / Ketone: Negative  / Bili: Negative / Urobili: Negative mg/dL   Blood: x / Protein: 100 mg/dL / Nitrite: Negative   Leuk Esterase: Negative / RBC: 6-10 /HPF / WBC 0-2   Sq Epi: x / Non Sq Epi: Occasional / Bacteria: Negative        RADIOLOGY & ADDITIONAL STUDIES:

## 2019-04-07 NOTE — CONSULT NOTE ADULT - ASSESSMENT
Microangiopathic hemolytic anemia (MAHA) is a descriptive term for non-immune hemolytic anemia resulting from intravascular red blood cell fragmentation that produces schistocytes on the peripheral blood smear . Thrombotic microangiopathy (TMA) describes a specific pathologic lesion of arterioles and capillaries that leads to microvascular thrombosis. Not all MAHA is caused by a TMA, but nearly all TMAs cause MAHA and thrombocytopenia.  ?Primary TMA syndromes include thrombotic thrombocytopenic purpura (TTP; hereditary or acquired severe [id number] deficiency), Shiga toxin-mediated hemolytic uremic syndrome (ST-HUS), complement-mediated TMA (hereditary or acquired complement regulation abnormality), drug-induced TMA (DITMA; immune or toxic), metabolism-mediated TMA (hereditary disorder of vitamin B12 </contents/cyanocobalamin-vitamin-b12-drug-information?search=TTP&ucmosSsd=67403&source=see_link> metabolism), and coagulation-mediated TMA (hereditary deficiency of a coagulation regulator)   ?The initial evaluation is focused on confirming that the patient has true MAHA and thrombocytopenia, and excluding systemic disorders that manifest these findings , based on a consideration of presenting findings and likely causes. Some systemic disorders such as severe hypertension and preeclampsia/HELLP syndrome are obvious. Systemic malignancies may not be initially apparent and may require other testing for diagnosis. Systemic infections should be obvious but may mimic clinical features of TTP and thus require microbial testing.   ?Clinical features helpful for distinguishing among primary TMAs include the age of the patient (eg, child versus adult), rate of onset of presenting symptoms, the severity of thrombocytopenia, and the presence and rate of onset of kidney injury.   ?In some cases, additional aspects of the history or specific physical findings may suggest a specific primary TMA. Several days of nonspecific symptoms, with or without neurologic abnormalities, is characteristic for TTP . Exposure to farm animals, under-cooked meat, or contaminated water several days prior to the onset of gastrointestinal symptoms is seen in many cases of ST-HUS. Ingestion of a quinine-containing beverage followed by the abrupt onset of fever, chills, and gastrointestinal symptoms is typical of DITMA.   ?All patients with a suspected primary TMA should have a complete blood count (CBC) with platelet count, lactate dehydrogenase (LDH) level, and serum creatinine, which are followed daily. [id number] activity is measured prior to the initiation of plasma exchange (PEX). Patients with kidney injury should have urine output measured. Those with severe abdominal pain and diarrhea or exposure to a known outbreak of infectious diarrhea should have a stool culture for enterohemorrhagic Escherichia coli (or Shigella dysenteriae in [place]) and immunoassay for Shiga toxin. Those with negative testing for TTP and ST-HUS should have serum levels of homocysteine and methylmalonic acid measured. (See 'Laboratory evaluation' </contents/approach-to-the-patient-with-suspected-zae-zdg-jd-faoxs-yxrptoglze-qbxhnixvumujnst-tma?search=TTP&source=search_result&selectedTitle=1~140&usage_type=default&display_rank=1> above.)  ?The immediate management issue for a patient with a suspected TMA is deciding whether to perform PEX for a presumptive diagnosis of TTP or to start anti-complement therapy for a presumptive diagnosis of complement-mediated TMA. Other interventions include drug discontinuation for presumed DITMA; hydroxocobalamin </contents/hydroxocobalamin-vitamin-b12a-supplement-and-cyanide-antidote-drug-information?search=TTP&tyoywOst=61492&source=see_link>, betaine </contents/betaine-anhydrous-not-equivalent-to-betaine-hydrochloride-drug-information?search=TTP&lnpetNge=81935&source=see_link>, and folinic acid (leucovorin </contents/leucovorin-drug-information?search=TTP&qtjoiJxq=81501&source=see_link>) for cobalamin C deficiency-mediated TMA; and/or supportive care, with transfusions as needed. (See 'Immediate management decisions' </contents/approach-to-the-patient-with-suspected-vjg-vws-hg-ycsyi-qeogcyfmaj-ctbhqpslcworslf-tma?search=TTP&source=search_result&selectedTitle=1~140&usage_type=default&display_rank=1> above.)
38F with No PMHx who persent with severe thrombocytopenia and acute renal failure, consistent with TTP.
38F with no PMHx who persents with severe thrombocytopenia and acute renal failure, consistent with TTP.
38F with no PMHx who persents with severe thrombocytopenia and acute renal failure, consistent with TTP.

## 2019-04-07 NOTE — PROGRESS NOTE ADULT - SUBJECTIVE AND OBJECTIVE BOX
Patient is a 38y old  Female who presents with a chief complaint of blurry vision, nausea/vomiting (2019 10:54)      BRIEF HOSPITAL COURSE: 38F with no past history who was admitted with severe thrombocytopenia and ARF, consistent with TTP. Began PLEX treatments yetserday    Events last 24 hours: Today feeling well. without complaints PLatelet count up to 13K from 5k. Awaiting PLEX treatment today      PAST MEDICAL & SURGICAL HISTORY:  No pertinent past medical history  No significant past surgical history      Review of Systems:  CONSTITUTIONAL: No fever, chills, or fatigue  EYES: No eye pain, visual disturbances, or discharge  ENMT:  No difficulty hearing, tinnitus, vertigo; No sinus or throat pain  NECK: No pain or stiffness  RESPIRATORY: No cough, wheezing, chills or hemoptysis; No shortness of breath  CARDIOVASCULAR: No chest pain, palpitations, dizziness, or leg swelling  GASTROINTESTINAL: No abdominal or epigastric pain. No nausea, vomiting, or hematemesis; No diarrhea or constipation. No melena or hematochezia.  GENITOURINARY: No dysuria, frequency, hematuria, or incontinence  NEUROLOGICAL: No headaches, memory loss, loss of strength, numbness, or tremors  SKIN: No itching, burning, rashes, or lesions   MUSCULOSKELETAL: No joint pain or swelling; No muscle, back, or extremity pain  PSYCHIATRIC: No depression, anxiety, mood swings, or difficulty sleeping      Medications:    amLODIPine   Tablet 5 milliGRAM(s) Oral daily  hydrALAZINE Injectable 5 milliGRAM(s) IV Push every 6 hours PRN  metoprolol tartrate 25 milliGRAM(s) Oral two times a day      acetaminophen   Tablet .. 650 milliGRAM(s) Oral every 6 hours PRN            predniSONE   Tablet 60 milliGRAM(s) Oral daily    folic acid 1 milliGRAM(s) Oral daily  multivitamin 1 Tablet(s) Oral daily  thiamine 100 milliGRAM(s) Oral daily    influenza   Vaccine 0.5 milliLiter(s) IntraMuscular once              ICU Vital Signs Last 24 Hrs  T(C): 37.1 (2019 12:00), Max: 37.4 (2019 20:00)  T(F): 98.7 (2019 12:00), Max: 99.4 (2019 20:00)  HR: 102 (2019 13:00) (91 - 110)  BP: 167/97 (2019 13:00) (110/71 - 174/87)  BP(mean): 125 (2019 13:00) (83 - 125)  ABP: --  ABP(mean): --  RR: 21 (2019 13:00) (18 - 36)  SpO2: 99% (2019 13:00) (97% - 100%)          I&O's Detail    2019 07:01  -  2019 07:00  --------------------------------------------------------  IN:    IV PiggyBack: 100 mL    Oral Fluid: 100 mL    Plasma: 250 mL    sodium bicarbonate  Infusion: 2310 mL  Total IN: 2760 mL    OUT:    Voided: 1300 mL  Total OUT: 1300 mL    Total NET: 1460 mL      2019 07:01  -  2019 14:33  --------------------------------------------------------  IN:    IV PiggyBack: 100 mL    sodium bicarbonate  Infusion: 330 mL  Total IN: 430 mL    OUT:    Voided: 1650 mL  Total OUT: 1650 mL    Total NET: -1220 mL            LABS:                        9.2    18.0  )-----------( 13       ( 2019 09:39 )             27.0     04-07    136  |  90<L>  |  55.0<H>  ----------------------------<  216<H>  3.4<L>   |  32.0<H>  |  5.15<H>    Ca    8.4<L>      2019 09:39  Phos  3.5     04-07  Mg     2.0     04-07    TPro  6.5<L>  /  Alb  3.6  /  TBili  1.1  /  DBili  x   /  AST  27  /  ALT  23  /  AlkPhos  76  04-07      CARDIAC MARKERS ( 2019 06:26 )  x     / x     / 436 U/L / x     / 7.0 ng/mL  CARDIAC MARKERS ( 2019 19:58 )  x     / x     / 421 U/L / x     / 5.3 ng/mL      CAPILLARY BLOOD GLUCOSE      POCT Blood Glucose.: 201 mg/dL (2019 07:56)    PT/INR - ( 2019 22:33 )   PT: 12.4 sec;   INR: 1.08 ratio           Urinalysis Basic - ( 2019 11:45 )    Color: Yellow / Appearance: Clear / S.005 / pH: x  Gluc: x / Ketone: Negative  / Bili: Negative / Urobili: Negative mg/dL   Blood: x / Protein: 100 mg/dL / Nitrite: Negative   Leuk Esterase: Negative / RBC: 6-10 /HPF / WBC 0-2   Sq Epi: x / Non Sq Epi: Occasional / Bacteria: Negative      CULTURES:  Rapid RVP Result: NotDetec (19 @ 06:39)      Physical Examination:    General: No acute distress.  Alert, oriented, interactive, nonfocal    HEENT: Pupils equal, reactive to light.  Symmetric.    PULM: Clear to auscultation bilaterally, no significant sputum production    CVS: Regular rate and rhythm, no murmurs, rubs, or gallops    ABD: Soft, nondistended, nontender, normoactive bowel sounds, no masses    EXT: No edema, nontender    SKIN: Warm and well perfused, no rashes noted.    RADIOLOGY: CT Abd/Pelvis:   FINDINGS:    The airway shows normal caliber and contour with patent lumen.    There are no focal airspace consolidations. The lung interstitium is   normal.     There is no pleural effusion or pneumothorax.    There are no mediastinal masses or lymphadenopathy.     The mediastinum great vessels are normal.     The heart is normal. There is no pericardial effusion.    There is no free intra-abdominal air or ascites.     The liver, spleen, pancreas, adrenal glands, gallbladder are normal.    There is no intra or extrahepatic biliary ductal dilatation.    The stomach, duodenum, small and large bowel and appendix are within   normal limits.    Both kidneys show normal morphology without masses or hydronephrosis.   THERE IS NONSPECIFIC PERINEPHRIC STRANDING .    The urinary bladder shows normal morphology and contour.      Uterus and adnexa unremarkable. There are no retroperitoneal masses or   abnormal lymphadenopathy.  The retroperitoneal vessels are normal.      The bones and soft tissues are intact.    IMPRESSION:     Bilateral perinephric stranding otherwise unremarkable chest, abdomen and   pelvic CT pathology. Lungs clear.      CRITICAL CARE TIME SPENT:  35 mins were spent evaluating and treating this critical patient, including speaking with the patient, family, staff and the consultants

## 2019-04-08 DIAGNOSIS — R73.9 HYPERGLYCEMIA, UNSPECIFIED: ICD-10-CM

## 2019-04-08 LAB
ANION GAP SERPL CALC-SCNC: 17 MMOL/L — SIGNIFICANT CHANGE UP (ref 5–17)
APTT BLD: 26.4 SEC — LOW (ref 27.5–36.3)
BUN SERPL-MCNC: 58 MG/DL — HIGH (ref 8–20)
C3 SERPL-MCNC: 135 MG/DL — SIGNIFICANT CHANGE UP (ref 81–157)
C4 SERPL-MCNC: 35 MG/DL — SIGNIFICANT CHANGE UP (ref 13–39)
CALCIUM SERPL-MCNC: 8 MG/DL — LOW (ref 8.6–10.2)
CHLORIDE SERPL-SCNC: 92 MMOL/L — LOW (ref 98–107)
CO2 SERPL-SCNC: 29 MMOL/L — SIGNIFICANT CHANGE UP (ref 22–29)
CREAT SERPL-MCNC: 5.02 MG/DL — HIGH (ref 0.5–1.3)
GLUCOSE BLDC GLUCOMTR-MCNC: 162 MG/DL — HIGH (ref 70–99)
GLUCOSE BLDC GLUCOMTR-MCNC: 224 MG/DL — HIGH (ref 70–99)
GLUCOSE SERPL-MCNC: 288 MG/DL — HIGH (ref 70–115)
HCT VFR BLD CALC: 29.3 % — LOW (ref 37–47)
HGB BLD-MCNC: 9.5 G/DL — LOW (ref 12–16)
INR BLD: 1.1 RATIO — SIGNIFICANT CHANGE UP (ref 0.88–1.16)
MAGNESIUM SERPL-MCNC: 1.6 MG/DL — SIGNIFICANT CHANGE UP (ref 1.6–2.6)
MCHC RBC-ENTMCNC: 28.1 PG — SIGNIFICANT CHANGE UP (ref 27–31)
MCHC RBC-ENTMCNC: 32.4 G/DL — SIGNIFICANT CHANGE UP (ref 32–36)
MCV RBC AUTO: 86.7 FL — SIGNIFICANT CHANGE UP (ref 81–99)
MYOGLOBIN UR-MCNC: 256 MCG/L — HIGH
PHOSPHATE SERPL-MCNC: 4.3 MG/DL — SIGNIFICANT CHANGE UP (ref 2.4–4.7)
PLATELET # BLD AUTO: 54 K/UL — LOW (ref 150–400)
POTASSIUM SERPL-MCNC: 3.8 MMOL/L — SIGNIFICANT CHANGE UP (ref 3.5–5.3)
POTASSIUM SERPL-SCNC: 3.8 MMOL/L — SIGNIFICANT CHANGE UP (ref 3.5–5.3)
PROTHROM AB SERPL-ACNC: 12.7 SEC — SIGNIFICANT CHANGE UP (ref 10–12.9)
RBC # BLD: 3.38 M/UL — LOW (ref 4.4–5.2)
RBC # FLD: 17.9 % — HIGH (ref 11–15.6)
SODIUM SERPL-SCNC: 138 MMOL/L — SIGNIFICANT CHANGE UP (ref 135–145)
TOTAL HEM COMP BLD-ACNC: >98 U/ML — HIGH (ref 42–95)
WBC # BLD: 16.6 K/UL — HIGH (ref 4.8–10.8)
WBC # FLD AUTO: 16.6 K/UL — HIGH (ref 4.8–10.8)

## 2019-04-08 PROCEDURE — 99233 SBSQ HOSP IP/OBS HIGH 50: CPT

## 2019-04-08 PROCEDURE — 99232 SBSQ HOSP IP/OBS MODERATE 35: CPT

## 2019-04-08 RX ORDER — DEXTROSE 50 % IN WATER 50 %
25 SYRINGE (ML) INTRAVENOUS ONCE
Qty: 0 | Refills: 0 | Status: DISCONTINUED | OUTPATIENT
Start: 2019-04-08 | End: 2019-04-08

## 2019-04-08 RX ORDER — DEXTROSE 50 % IN WATER 50 %
15 SYRINGE (ML) INTRAVENOUS ONCE
Qty: 0 | Refills: 0 | Status: DISCONTINUED | OUTPATIENT
Start: 2019-04-08 | End: 2019-04-08

## 2019-04-08 RX ORDER — SODIUM CHLORIDE 9 MG/ML
1000 INJECTION, SOLUTION INTRAVENOUS
Qty: 0 | Refills: 0 | Status: DISCONTINUED | OUTPATIENT
Start: 2019-04-08 | End: 2019-04-08

## 2019-04-08 RX ORDER — MAGNESIUM SULFATE 500 MG/ML
2 VIAL (ML) INJECTION ONCE
Qty: 0 | Refills: 0 | Status: COMPLETED | OUTPATIENT
Start: 2019-04-08 | End: 2019-04-08

## 2019-04-08 RX ORDER — GLUCAGON INJECTION, SOLUTION 0.5 MG/.1ML
1 INJECTION, SOLUTION SUBCUTANEOUS ONCE
Qty: 0 | Refills: 0 | Status: DISCONTINUED | OUTPATIENT
Start: 2019-04-08 | End: 2019-04-08

## 2019-04-08 RX ORDER — INSULIN LISPRO 100/ML
VIAL (ML) SUBCUTANEOUS
Qty: 0 | Refills: 0 | Status: DISCONTINUED | OUTPATIENT
Start: 2019-04-08 | End: 2019-04-20

## 2019-04-08 RX ORDER — CALCIUM GLUCONATE 100 MG/ML
2 VIAL (ML) INTRAVENOUS ONCE
Qty: 0 | Refills: 0 | Status: COMPLETED | OUTPATIENT
Start: 2019-04-08 | End: 2019-04-08

## 2019-04-08 RX ORDER — POTASSIUM CHLORIDE 20 MEQ
40 PACKET (EA) ORAL ONCE
Qty: 0 | Refills: 0 | Status: COMPLETED | OUTPATIENT
Start: 2019-04-08 | End: 2019-04-08

## 2019-04-08 RX ORDER — DEXTROSE 50 % IN WATER 50 %
12.5 SYRINGE (ML) INTRAVENOUS ONCE
Qty: 0 | Refills: 0 | Status: DISCONTINUED | OUTPATIENT
Start: 2019-04-08 | End: 2019-04-08

## 2019-04-08 RX ADMIN — Medication 25 MILLIGRAM(S): at 17:44

## 2019-04-08 RX ADMIN — Medication 2: at 17:44

## 2019-04-08 RX ADMIN — Medication 5 MILLIGRAM(S): at 16:20

## 2019-04-08 RX ADMIN — Medication 1: at 21:45

## 2019-04-08 RX ADMIN — Medication 1 MILLIGRAM(S): at 12:29

## 2019-04-08 RX ADMIN — Medication 60 MILLIGRAM(S): at 05:15

## 2019-04-08 RX ADMIN — AMLODIPINE BESYLATE 5 MILLIGRAM(S): 2.5 TABLET ORAL at 05:15

## 2019-04-08 RX ADMIN — Medication 200 GRAM(S): at 13:00

## 2019-04-08 RX ADMIN — Medication 1 TABLET(S): at 12:28

## 2019-04-08 RX ADMIN — Medication 25 MILLIGRAM(S): at 05:15

## 2019-04-08 RX ADMIN — Medication 50 GRAM(S): at 12:29

## 2019-04-08 RX ADMIN — Medication 40 MILLIEQUIVALENT(S): at 12:28

## 2019-04-08 RX ADMIN — Medication 100 MILLIGRAM(S): at 12:28

## 2019-04-08 NOTE — PROGRESS NOTE ADULT - SUBJECTIVE AND OBJECTIVE BOX
Platelet count 54,000.  Creatinine remains around 5.  No headache or visual changes.  Plan -   1) Continue daily plasmapheresis  2) Monitor CBC, renal function, LDH.

## 2019-04-08 NOTE — PROGRESS NOTE ADULT - SUBJECTIVE AND OBJECTIVE BOX
Patient is a 38y old  Female who presents with a chief complaint of blurry vision, nausea/vomiting (08 Apr 2019 12:02)      BRIEF HOSPITAL COURSE: 38F with no past history who was admitted with severe thrombocytopenia and ARF, consistent with TTP. Began PLEX treatment 4/6    Events last 24 hours: no events    PAST MEDICAL & SURGICAL HISTORY:  No pertinent past medical history  No significant past surgical history        Medications:    amLODIPine   Tablet 5 milliGRAM(s) Oral daily  hydrALAZINE Injectable 5 milliGRAM(s) IV Push every 6 hours PRN  metoprolol tartrate 25 milliGRAM(s) Oral two times a day      acetaminophen   Tablet .. 650 milliGRAM(s) Oral every 6 hours PRN            dextrose 40% Gel 15 Gram(s) Oral once PRN  dextrose 50% Injectable 12.5 Gram(s) IV Push once  dextrose 50% Injectable 25 Gram(s) IV Push once  dextrose 50% Injectable 25 Gram(s) IV Push once  glucagon  Injectable 1 milliGRAM(s) IntraMuscular once PRN  insulin lispro (HumaLOG) corrective regimen sliding scale   SubCutaneous Before meals and at bedtime  predniSONE   Tablet 60 milliGRAM(s) Oral daily    calcium gluconate IVPB 2 Gram(s) IV Intermittent once  dextrose 5%. 1000 milliLiter(s) IV Continuous <Continuous>  folic acid 1 milliGRAM(s) Oral daily  multivitamin 1 Tablet(s) Oral daily  thiamine 100 milliGRAM(s) Oral daily    influenza   Vaccine 0.5 milliLiter(s) IntraMuscular once              ICU Vital Signs Last 24 Hrs  T(C): 36.9 (08 Apr 2019 12:00), Max: 37.2 (08 Apr 2019 04:00)  T(F): 98.4 (08 Apr 2019 12:00), Max: 98.9 (08 Apr 2019 04:00)  HR: 87 (08 Apr 2019 15:00) (84 - 107)  BP: 163/95 (08 Apr 2019 15:00) (137/82 - 176/97)  BP(mean): 121 (08 Apr 2019 15:00) (97 - 127)  ABP: --  ABP(mean): --  RR: 29 (08 Apr 2019 15:00) (10 - 43)  SpO2: 100% (08 Apr 2019 15:00) (95% - 100%)          I&O's Detail    07 Apr 2019 07:01  -  08 Apr 2019 07:00  --------------------------------------------------------  IN:    IV PiggyBack: 100 mL    Oral Fluid: 720 mL    sodium bicarbonate  Infusion: 330 mL  Total IN: 1150 mL    OUT:    Voided: 4425 mL  Total OUT: 4425 mL    Total NET: -3275 mL      08 Apr 2019 07:01  -  08 Apr 2019 15:47  --------------------------------------------------------  IN:    IV PiggyBack: 50 mL  Total IN: 50 mL    OUT:    Voided: 2500 mL  Total OUT: 2500 mL    Total NET: -2450 mL            LABS:                        9.5    16.6  )-----------( 54       ( 08 Apr 2019 10:05 )             29.3     04-08    138  |  92<L>  |  58.0<H>  ----------------------------<  288<H>  3.8   |  29.0  |  5.02<H>    Ca    8.0<L>      08 Apr 2019 10:05  Phos  4.3     04-08  Mg     1.6     04-08    TPro  6.5<L>  /  Alb  3.9  /  TBili  0.7  /  DBili  x   /  AST  27  /  ALT  25  /  AlkPhos  68  04-07          CAPILLARY BLOOD GLUCOSE      POCT Blood Glucose.: 201 mg/dL (07 Apr 2019 07:56)    PT/INR - ( 08 Apr 2019 10:05 )   PT: 12.7 sec;   INR: 1.10 ratio         PTT - ( 08 Apr 2019 10:05 )  PTT:26.4 sec    CULTURES:  Culture Results:   GI PCR Results: NOT detected  *******Please Note:*******  GI panel PCR evaluates for:  Campylobacter, Plesiomonas shigelloides, Salmonella,  Vibrio, Yersinia enterocolitica, Enteroaggregative  Escherichia coli (EAEC), Enteropathogenic E.coli (EPEC),  Enterotoxigenic E. coli (ETEC) lt/st, Shiga-like  toxin-producing E. coli (STEC) stx1/stx2,  Shigella/ Enteroinvasive E. coli (EIEC), Cryptosporidium,  Cyclospora cayetanensis, Entamoeba histolytica,  Giardia lamblia, Adenovirus F 40/41, Astrovirus,  Norovirus GI/GII, Rotavirus A, Sapovirus (04-07 @ 15:25)  Rapid RVP Result: NotDetec (04-06 @ 06:39)      Physical Examination:    General: No acute distress.      HEENT: Pupils equal, reactive to light.  Symmetric.    PULM: Clear to auscultation bilaterally, no significant sputum production    NECK: Supple, no lymphadenopathy, trachea midline    CVS: Regular rate and rhythm, no murmurs, rubs, or gallops    GI: Soft, nondistended, nontender, normoactive bowel sounds, no masses    EXT: trace edema, nontender    SKIN: Warm and well perfused, no rashes noted.    NEURO: Alert, oriented, interactive, nonfocal    DEVICES:     RADIOLOGY:   < from: US Kidney and Bladder (04.07.19 @ 17:42) >  FINDINGS:    Right kidney:  10.8 cm. Renal cortex increased and heterogeneous in   echogenicity. No hydronephrosis. Perinephric fat stranding on the prior   CT is not appreciated on the current ultrasound.    Left kidney:  11.2 cm. Renal cortex increase in heterogeneous in   echogenicity. No hydronephrosis. Perinephric fat stranding on the prior   CT is not appreciated on the current ultrasound.    Urinary bladder: Within normal limits.    IMPRESSION:     No hydronephrosis.    Renal cortex increase in heterogeneous in echogenicity of uncertain   etiology; clinical correlation is recommended for underlying infection.    Follow-up is recommended.                    WARNER MCPHERSON   This document has been electronically signed. Apr 8 2019 8:01AM    < end of copied text >    < from: CT Abdomen and Pelvis No Cont (04.06.19 @ 17:08) >  PROCEDURE:   Noncontrast CT chest abdomen and pelvis.  2.5 mm axial slice thickness images were obtained. Coronal and sagittal   reformats were also obtained.   Maximum intensity projection,(MIP), imaging was created and interpreted.  FINDINGS:    The airway shows normal caliber and contour with patent lumen.    There are no focal airspace consolidations. The lung interstitium is   normal.     There is no pleural effusion or pneumothorax.    There are no mediastinal masses or lymphadenopathy.     The mediastinum great vessels are normal.     The heart is normal. There is no pericardial effusion.    There is no free intra-abdominal air or ascites.     The liver, spleen, pancreas, adrenal glands, gallbladder are normal.    There is no intra or extrahepatic biliary ductal dilatation.    The stomach, duodenum, small and large bowel and appendix are within   normal limits.    Both kidneys show normal morphology without masses or hydronephrosis.   THERE IS NONSPECIFIC PERINEPHRIC STRANDING .    The urinary bladder shows normal morphology and contour.      Uterus and adnexa unremarkable. There are no retroperitoneal masses or   abnormal lymphadenopathy.  The retroperitoneal vessels are normal.      The bones and soft tissues are intact.    IMPRESSION:     Bilateral perinephric stranding otherwise unremarkable chest, abdomen and   pelvic CT pathology. Lungs clear.                    MEJIA EPPERSON M.D., ATTENDING RADIOLOGIST  This document has been electronically signed. Apr 6 2019  5:19PM        < end of copied text >  CRITICAL CARE TIME SPENT:

## 2019-04-08 NOTE — PROGRESS NOTE ADULT - SUBJECTIVE AND OBJECTIVE BOX
[facility] DIVISION OF KIDNEY DISEASES AND HYPERTENSION -- FOLLOW UP NOTE  --------------------------------------------------------------------------------  Chief Complaint: TTP    24 hour events/subjective:  Pt seen and examined  Lying in bed in NAD; seen in SICU this morning boarded for MICU  Hematology following for PLEX      PAST HISTORY  --------------------------------------------------------------------------------  No significant changes to PMH, PSH, FHx, SHx, unless otherwise noted    ALLERGIES & MEDICATIONS  --------------------------------------------------------------------------------  Allergies    penicillin (Hives)    Intolerances      Standing Inpatient Medications  amLODIPine   Tablet 5 milliGRAM(s) Oral daily  dextrose 5%. 1000 milliLiter(s) IV Continuous <Continuous>  dextrose 50% Injectable 12.5 Gram(s) IV Push once  dextrose 50% Injectable 25 Gram(s) IV Push once  dextrose 50% Injectable 25 Gram(s) IV Push once  folic acid 1 milliGRAM(s) Oral daily  influenza   Vaccine 0.5 milliLiter(s) IntraMuscular once  insulin lispro (HumaLOG) corrective regimen sliding scale   SubCutaneous Before meals and at bedtime  magnesium sulfate  IVPB 2 Gram(s) IV Intermittent once  metoprolol tartrate 25 milliGRAM(s) Oral two times a day  multivitamin 1 Tablet(s) Oral daily  potassium chloride    Tablet ER 40 milliEquivalent(s) Oral once  predniSONE   Tablet 60 milliGRAM(s) Oral daily  thiamine 100 milliGRAM(s) Oral daily    PRN Inpatient Medications  acetaminophen   Tablet .. 650 milliGRAM(s) Oral every 6 hours PRN  dextrose 40% Gel 15 Gram(s) Oral once PRN  glucagon  Injectable 1 milliGRAM(s) IntraMuscular once PRN  hydrALAZINE Injectable 5 milliGRAM(s) IV Push every 6 hours PRN      REVIEW OF SYSTEMS  --------------------------------------------------------------------------------  Gen: No weight changes, fatigue, fevers/chills, weakness  Skin: No rashes  Head/Eyes/Ears/Mouth: No headache; Normal hearing; Normal vision w/o blurriness; No sinus pain/discomfort, sore throat  Respiratory: No dyspnea, cough, wheezing, hemoptysis  CV: No chest pain, PND, orthopnea  GI: No abdominal pain, diarrhea, constipation, nausea, vomiting, melena, hematochezia  : No increased frequency, dysuria, hematuria, nocturia  MSK: No joint pain/swelling; no back pain; no edema  Neuro: No dizziness/lightheadedness, weakness, seizures, numbness, tingling  Heme: No easy bruising or bleeding  Endo: No heat/cold intolerance  Psych: No significant nervousness, anxiety, stress, depression    All other systems were reviewed and are negative, except as noted.    VITALS/PHYSICAL EXAM  --------------------------------------------------------------------------------  T(C): 37.2 (04-08-19 @ 08:00), Max: 37.2 (04-08-19 @ 04:00)  HR: 84 (04-08-19 @ 11:00) (84 - 107)  BP: 143/86 (04-08-19 @ 11:00) (137/82 - 176/97)  RR: 24 (04-08-19 @ 11:00) (10 - 43)  SpO2: 96% (04-08-19 @ 11:00) (95% - 100%)  Wt(kg): --        04-07-19 @ 07:01  -  04-08-19 @ 07:00  --------------------------------------------------------  IN: 1150 mL / OUT: 4425 mL / NET: -3275 mL    04-08-19 @ 07:01  -  04-08-19 @ 12:03  --------------------------------------------------------  IN: 0 mL / OUT: 1300 mL / NET: -1300 mL      Physical Exam:  	Gen: NAD, well-appearing  	HEENT: PERRL, supple neck, clear oropharynx  	Pulm: CTA B/L  	CV: RRR, S1S2; no rub  	Back: No spinal or CVA tenderness; no sacral edema  	Abd: +BS, soft, nontender/nondistended  	: No suprapubic tenderness  	UE: Warm, FROM, no clubbing, intact strength; no edema; no asterixis  	LE: Warm, FROM, no clubbing, intact strength; no edema  	Neuro: No focal deficits, intact gait  	Psych: Normal affect and mood  	Skin: Warm, without rashes  	Vascular access:    LABS/STUDIES  --------------------------------------------------------------------------------              9.5    16.6  >-----------<  54       [04-08-19 @ 10:05]              29.3     138  |  92  |  58.0  ----------------------------<  288      [04-08-19 @ 10:05]  3.8   |  29.0  |  5.02        Ca     8.0     [04-08-19 @ 10:05]      Mg     1.6     [04-08-19 @ 10:05]      Phos  4.3     [04-08-19 @ 10:05]    TPro  6.5  /  Alb  3.9  /  TBili  0.7  /  DBili  x   /  AST  27  /  ALT  25  /  AlkPhos  68  [04-07-19 @ 17:44]    PT/INR: PT 12.7 , INR 1.10       [04-08-19 @ 10:05]  PTT: 26.4       [04-08-19 @ 10:05]      Creatinine Trend:  SCr 5.02 [04-08 @ 10:05]  SCr 5.06 [04-07 @ 17:44]  SCr 5.15 [04-07 @ 09:39]  SCr 5.02 [04-06 @ 21:43]  SCr 4.05 [04-06 @ 06:25]    Urinalysis - [04-06-19 @ 11:45]      Color Yellow / Appearance Clear / SG 1.005 / pH 7.0      Gluc 100 / Ketone Negative  / Bili Negative / Urobili Negative       Blood Large / Protein 100 / Leuk Est Negative / Nitrite Negative      RBC 6-10 / WBC 0-2 / Hyaline  / Gran  / Sq Epi  / Non Sq Epi Occasional / Bacteria Negative    Urine Creatinine 46      [04-07-19 @ 09:38]  Urine Protein 34.0      [04-07-19 @ 09:38]  Urine Sodium 58      [04-06-19 @ 11:44]  Urine Potassium 12      [04-06-19 @ 11:44]  Urine Osmolality 212      [04-06-19 @ 11:45]    TSH 0.70      [04-06-19 @ 06:25]    HBsAg Nonreact      [04-06-19 @ 21:49]  HCV 0.08, Nonreact      [04-06-19 @ 21:49]  HIV Nonreact      [04-06-19 @ 20:48]  HIV Nonreact      [04-06-19 @ 06:25]    C3 Complement 135      [04-06-19 @ 23:56]  C4 Complement 35      [04-06-19 @ 23:56]

## 2019-04-08 NOTE — PROGRESS NOTE ADULT - ASSESSMENT
38F with No PMHx who persent with severe thrombocytopenia and acute renal failure, consistent with TTP.

## 2019-04-08 NOTE — PROGRESS NOTE ADULT - ASSESSMENT
1) TTP  2) HTN  3) LISSETT  4) Proteinuria  5) Microscopic hematuria    Continue with PLEX per hematology  Prednisone 60mg daily;  No emergent need for HD at this time;  Will continue to follow;    d/w family; SICU team

## 2019-04-09 LAB
ANION GAP SERPL CALC-SCNC: 16 MMOL/L — SIGNIFICANT CHANGE UP (ref 5–17)
BLD GP AB SCN SERPL QL: SIGNIFICANT CHANGE UP
BUN SERPL-MCNC: 50 MG/DL — HIGH (ref 8–20)
CALCIUM SERPL-MCNC: 8.2 MG/DL — LOW (ref 8.6–10.2)
CHLORIDE SERPL-SCNC: 94 MMOL/L — LOW (ref 98–107)
CO2 SERPL-SCNC: 30 MMOL/L — HIGH (ref 22–29)
CREAT SERPL-MCNC: 3.99 MG/DL — HIGH (ref 0.5–1.3)
FREE HEMOGLOBIN URINE: 3.2 MG/DL — HIGH
GLUCOSE BLDC GLUCOMTR-MCNC: 109 MG/DL — HIGH (ref 70–99)
GLUCOSE BLDC GLUCOMTR-MCNC: 222 MG/DL — HIGH (ref 70–99)
GLUCOSE BLDC GLUCOMTR-MCNC: 226 MG/DL — HIGH (ref 70–99)
GLUCOSE BLDC GLUCOMTR-MCNC: 238 MG/DL — HIGH (ref 70–99)
GLUCOSE SERPL-MCNC: 113 MG/DL — SIGNIFICANT CHANGE UP (ref 70–115)
HBA1C BLD-MCNC: 5.5 % — SIGNIFICANT CHANGE UP (ref 4–5.6)
HCT VFR BLD CALC: 29.3 % — LOW (ref 37–47)
HGB BLD-MCNC: 9.5 G/DL — LOW (ref 12–16)
LDH SERPL L TO P-CCNC: 287 U/L — HIGH (ref 98–192)
MAGNESIUM SERPL-MCNC: 2 MG/DL — SIGNIFICANT CHANGE UP (ref 1.6–2.6)
MCHC RBC-ENTMCNC: 28.4 PG — SIGNIFICANT CHANGE UP (ref 27–31)
MCHC RBC-ENTMCNC: 32.4 G/DL — SIGNIFICANT CHANGE UP (ref 32–36)
MCV RBC AUTO: 87.5 FL — SIGNIFICANT CHANGE UP (ref 81–99)
PHOSPHATE SERPL-MCNC: 5.1 MG/DL — HIGH (ref 2.4–4.7)
PLATELET # BLD AUTO: 115 K/UL — LOW (ref 150–400)
POTASSIUM SERPL-MCNC: 3.4 MMOL/L — LOW (ref 3.5–5.3)
POTASSIUM SERPL-SCNC: 3.4 MMOL/L — LOW (ref 3.5–5.3)
RBC # BLD: 3.35 M/UL — LOW (ref 4.4–5.2)
RBC # FLD: 18 % — HIGH (ref 11–15.6)
SODIUM SERPL-SCNC: 140 MMOL/L — SIGNIFICANT CHANGE UP (ref 135–145)
TYPE + AB SCN PNL BLD: SIGNIFICANT CHANGE UP
WBC # BLD: 23.8 K/UL — HIGH (ref 4.8–10.8)
WBC # FLD AUTO: 23.8 K/UL — HIGH (ref 4.8–10.8)

## 2019-04-09 PROCEDURE — 99233 SBSQ HOSP IP/OBS HIGH 50: CPT

## 2019-04-09 RX ORDER — CALCIUM GLUCONATE 100 MG/ML
2 VIAL (ML) INTRAVENOUS ONCE
Qty: 0 | Refills: 0 | Status: COMPLETED | OUTPATIENT
Start: 2019-04-09 | End: 2019-04-09

## 2019-04-09 RX ORDER — DEXTROSE MONOHYDRATE, SODIUM CHLORIDE, AND POTASSIUM CHLORIDE 50; .745; 4.5 G/1000ML; G/1000ML; G/1000ML
1000 INJECTION, SOLUTION INTRAVENOUS
Qty: 0 | Refills: 0 | Status: DISCONTINUED | OUTPATIENT
Start: 2019-04-09 | End: 2019-04-14

## 2019-04-09 RX ADMIN — Medication 60 MILLIGRAM(S): at 05:50

## 2019-04-09 RX ADMIN — DEXTROSE MONOHYDRATE, SODIUM CHLORIDE, AND POTASSIUM CHLORIDE 80 MILLILITER(S): 50; .745; 4.5 INJECTION, SOLUTION INTRAVENOUS at 11:00

## 2019-04-09 RX ADMIN — Medication 2: at 11:06

## 2019-04-09 RX ADMIN — Medication 100 MILLIGRAM(S): at 11:00

## 2019-04-09 RX ADMIN — Medication 1 MILLIGRAM(S): at 11:00

## 2019-04-09 RX ADMIN — Medication 25 MILLIGRAM(S): at 17:08

## 2019-04-09 RX ADMIN — Medication 1 TABLET(S): at 11:00

## 2019-04-09 RX ADMIN — AMLODIPINE BESYLATE 5 MILLIGRAM(S): 2.5 TABLET ORAL at 05:50

## 2019-04-09 RX ADMIN — Medication 2: at 09:17

## 2019-04-09 RX ADMIN — Medication 2: at 17:06

## 2019-04-09 RX ADMIN — Medication 25 MILLIGRAM(S): at 05:50

## 2019-04-09 RX ADMIN — Medication 200 GRAM(S): at 16:48

## 2019-04-09 NOTE — PROGRESS NOTE ADULT - ASSESSMENT
The patient is a 38 year old female with no significant past medical history who presented to the Er on 4/6 with complaints of blurred vision. She was found to have severe thrombocytopenia with LISSETT and metabolic acidosis and hypertension. Hematology was consulted and she was started on PO prednisone. Peripheral smear showed schistocytes with elevated LDH and low haptoglobin with worsening renal failure and thrombocytopenia. She was transferred to the ICu for initiation of plasmapheresis Nephrology was consulted; recommended hydration with plasmapheresis with no indication for HD at this time. HIV and hepatitis panels were negative. CT chest/abdomen/pelvis showed perinephric stranding bilaterally. CT head and chest xray were negative.     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 LISSETT with metabolic acidosis secondary to TTP: Continue treatment  Per Renal no indication for HD at this time  IV hydration  Monitor BMP    3. Hypertension: BP stable on metoprolol and Norvasc    4. Hyperglycemia likely secondary to steroids. Hba1c ordered  HSS> MOnitor bsl    VTE_ chemical prophylaxis held in the setting of thrombocytopenia. The patient is a 38 year old female with no significant past medical history who presented to the Er on 4/6 with complaints of blurred vision. She was found to have severe thrombocytopenia with LISSETT and metabolic acidosis and hypertension. Hematology was consulted and she was started on PO prednisone. Peripheral smear showed schistocytes with elevated LDH and low haptoglobin with worsening renal failure and thrombocytopenia. She was transferred to the ICu for initiation of plasmapheresis Nephrology was consulted; recommended hydration with plasmapheresis with no indication for HD at this time. HIV and hepatitis panels were negative. CT chest/abdomen/pelvis showed perinephric stranding bilaterally. CT head and chest xray were negative.     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 Sams  Hematology following  CRANE T13 sent on 4/6  Monitor CBC, renal function, LDH.      2. LISSETT with metabolic acidosis secondary to TTP: Continue treatment  Per Renal no indication for HD at this time  Kidney biopsy, Once Platelets improve as per renal  IV hydration  Monitor BMP    3. Hypertension: BP stable on metoprolol and Norvasc    4. Hyperglycemia likely secondary to steroids. Hba1c ordered  HSS> MOnitor bsl    VTE- okay to give chemical prophylaxis with resolution of thrombocytopenia

## 2019-04-09 NOTE — PROGRESS NOTE ADULT - SUBJECTIVE AND OBJECTIVE BOX
ICU Vital Signs Last 24 Hrs  T(C): 36.9 (2019 08:00), Max: 37.2 (2019 20:00)  T(F): 98.4 (2019 08:00), Max: 98.9 (2019 20:00)  HR: 82 (2019 08:00) (76 - 97)  BP: 145/87 (2019 08:00) (132/67 - 170/100)  BP(mean): 109 (2019 08:00) (93 - 129)  ABP: --  ABP(mean): --  RR: 29 (2019 08:00) (18 - 33)  SpO2: 100% (2019 08:00) (93% - 100%)    140    |  94<L>  |  50.0<H>  ----------------------------<  113    Ca:8.2<L> (2019 05:01)  3.4<L>   |  30.0<H>  |  3.99<H>      eGFR if Non : 13 <L>  eGFR if : 15 <L>    TPro  6.5<L>  /  Alb  3.9    /  TBili  0.7    /  DBili  x      /  AST  27     /  ALT  25     /  AlkPhos  68     2019 17:44                               9.5<L>  23.8<H> )-----------( 115<L>    ( 2019 05:01 )                  29.3<L>    Phos: 5.1 mg/dL<H> M.0 mg/dL PTH:-- Uric acid:-- Serum Osm:--  Ferritin:-- Iron:-- TIBC:-- Tsat:--  B12:-- TSH:-- ( @ 05:01)    Urinalysis Basic - ( 2019 11:45 )  Color: Yellow / Appearance: Clear / S.005<L> / pH: x  Gluc: x / Ketone: Negative  / Bili: Negative / Urobili: Negative mg/dL   Blood: x / Protein: 100 mg/dL<!> / Nitrite: Negative   Leuk Esterase: Negative / RBC: 6-10 /HPF<!> / WBC 0-2   Sq Epi: x / Non Sq Epi: Occasional / Bacteria: Negative    UProt:-- UCr:46 mg/dL P/C Ratio:0.7 Ratio<H> 24 hour Prot:-- UVol:-- CrCl:--  Loraine:-- UOsm:-- UVol:-- UCl:-- UK:-- ( @ 09:38)  [facility] DIVISION OF KIDNEY DISEASES AND HYPERTENSION -- FOLLOW UP NOTE  --------------------------------------------------------------------------------  Chief Complaint: TTP    24 hour events/subjective:  Pt seen and examined    S/P PLEX ( # 3)    PAST HISTORY  --------------------------------------------------------------------------------  No significant changes to PMH, PSH, FHx, SHx, unless otherwise noted    ALLERGIES & MEDICATIONS  --------------------------------------------------------------------------------  Allergies    penicillin (Hives)    Standing Inpatient Medications  amLODIPine   Tablet 5 milliGRAM(s) Oral daily  dextrose 5%. 1000 milliLiter(s) IV Continuous <Continuous>  dextrose 50% Injectable 12.5 Gram(s) IV Push once  dextrose 50% Injectable 25 Gram(s) IV Push once  dextrose 50% Injectable 25 Gram(s) IV Push once  folic acid 1 milliGRAM(s) Oral daily  influenza   Vaccine 0.5 milliLiter(s) IntraMuscular once  insulin lispro (HumaLOG) corrective regimen sliding scale   SubCutaneous Before meals and at bedtime  magnesium sulfate  IVPB 2 Gram(s) IV Intermittent once  metoprolol tartrate 25 milliGRAM(s) Oral two times a day  multivitamin 1 Tablet(s) Oral daily  potassium chloride    Tablet ER 40 milliEquivalent(s) Oral once  predniSONE   Tablet 60 milliGRAM(s) Oral daily  thiamine 100 milliGRAM(s) Oral daily    PRN Inpatient Medications  acetaminophen   Tablet .. 650 milliGRAM(s) Oral every 6 hours PRN  dextrose 40% Gel 15 Gram(s) Oral once PRN  glucagon  Injectable 1 milliGRAM(s) IntraMuscular once PRN  hydrALAZINE Injectable 5 milliGRAM(s) IV Push every 6 hours PRN      REVIEW OF SYSTEMS  --------------------------------------------------------------------------------  Gen: No weight changes, fatigue, fevers/chills, weakness  Skin: No rashes  Head/Eyes/Ears/Mouth: No headache; Normal hearing; Normal vision w/o blurriness; No sinus pain/discomfort, sore throat  Respiratory: No dyspnea, cough, wheezing, hemoptysis  CV: No chest pain, PND, orthopnea  GI: No abdominal pain, diarrhea, constipation, nausea, vomiting, melena, hematochezia  : No increased frequency, dysuria, hematuria, nocturia  MSK: No joint pain/swelling; no back pain; no edema  Neuro: No dizziness/lightheadedness, weakness, seizures, numbness, tingling  Heme: No easy bruising or bleeding  Endo: No heat/cold intolerance  Psych: No significant nervousness, anxiety, stress, depression    All other systems were reviewed and are negative, except as noted.    VITALS/PHYSICAL EXAM  --------------------------------------------------------------------------------  T(C): 37.2 (19 @ 08:00), Max: 37.2 (19 @ 04:00)  HR: 84 (19 @ 11:00) (84 - 107)  BP: 143/86 (19 @ 11:00) (137/82 - 176/97)  RR: 24 (19 @ 11:00) (10 - 43)  SpO2: 96% (19 @ 11:00) (95% - 100%)  Wt(kg): --    19 @ 07:01  -  19 @ 07:00  --------------------------------------------------------  IN: 1150 mL / OUT: 4425 mL / NET: -3275 mL    19 @ 07:01  -  19 @ 12:03  --------------------------------------------------------  IN: 0 mL / OUT: 1300 mL / NET: -1300 mL    Physical Exam:  	Gen: NAD, well-appearing  	HEENT: PERRL, supple neck, clear oropharynx  	Pulm: CTA B/L  	CV: RRR, S1S2; no rub  	Back: No spinal or CVA tenderness; no sacral edema  	Abd: +BS, soft, nontender/nondistended  	: No suprapubic tenderness  	UE: Warm, FROM, no clubbing, intact strength; no edema; no asterixis  	LE: Warm, FROM, no clubbing, intact strength; no edema  	Neuro: No focal deficits, intact gait  	Psych: Normal affect and mood  	Skin: Warm, without rashes  	Vascular access:    LABS/STUDIES  --------------------------------------------------------------------------------              9.5    16.6  >-----------<  54       [19 10:05]              29.3     138  |  92  |  58.0  ----------------------------<  288      [19 10:05]  3.8   |  29.0  |  5.02        Ca     8.0     [19 10:05]      Mg     1.6     [19 10:05]      Phos  4.3     [19 10:05]    TPro  6.5  /  Alb  3.9  /  TBili  0.7  /  DBili  x   /  AST  27  /  ALT  25  /  AlkPhos  68  [19 17:44]    PT/INR: PT 12.7 , INR 1.10       [19 10:05]  PTT: 26.4       [19 10:05]      Creatinine Trend:  SCr 5.02 [ 10:05]  SCr 5.06 [ 17:44]  SCr 5.15 [ 09:39]  SCr 5.02 [ 21:43]  SCr 4.05 [ 06:25]    Urinalysis - [19 11:45]      Color Yellow / Appearance Clear / SG 1.005 / pH 7.0      Gluc 100 / Ketone Negative  / Bili Negative / Urobili Negative       Blood Large / Protein 100 / Leuk Est Negative / Nitrite Negative      RBC 6-10 / WBC 0-2 / Hyaline  / Gran  / Sq Epi  / Non Sq Epi Occasional / Bacteria Negative    Urine Creatinine 46      [19 09:38]  Urine Protein 34.0      [19 09:38]  Urine Sodium 58      [19 11:44]  Urine Potassium 12      [19 11:44]  Urine Osmolality 212      [19 11:45]    TSH 0.70      [19 06:25]    HBsAg Nonreact      [19 21:49]  HCV 0.08, Nonreact      [19 @ 21:49]  HIV Nonreact      [19 @ 20:48]  HIV Nonreact      [19 @ 06:25]    C3 Complement 135      [19 @ 23:56]  C4 Complement 35      [19 @ 23:56]      Assessment and Plan:     1) TTP  2) HTN  3) LISSETT  4) Proteinuria  5) Microscopic hematuria    PLEX per hematology  Prednisone 60mg daily;

## 2019-04-09 NOTE — PROGRESS NOTE ADULT - ASSESSMENT
YOLANDA 13 Activity  - P :    eGFR Improving,    Will consider  Kidney biopsy, Once Platelets improve,    Diet Liberalized, K+ Repletion,    D/W RN,   & Dr. Malik,

## 2019-04-09 NOTE — PROGRESS NOTE ADULT - SUBJECTIVE AND OBJECTIVE BOX
CC: Follow up TTP    INTERVAL HPI/OVERNIGHT EVENTS: Patient seen and examined, denies headache or blurred vision. No acute events overnight.       Vital Signs Last 24 Hrs  T(C): 36.9 (09 Apr 2019 08:00), Max: 37.2 (08 Apr 2019 20:00)  T(F): 98.4 (09 Apr 2019 08:00), Max: 98.9 (08 Apr 2019 20:00)  HR: 82 (09 Apr 2019 08:00) (76 - 97)  BP: 145/87 (09 Apr 2019 08:00) (132/67 - 170/100)  BP(mean): 109 (09 Apr 2019 08:00) (93 - 129)  RR: 29 (09 Apr 2019 08:00) (18 - 33)  SpO2: 100% (09 Apr 2019 08:00) (93% - 100%)    PHYSICAL EXAM:    GENERAL: NAD, AOX3  HEAD:  Atraumatic, Normocephalic  EYES: EOMI, PERRLA, conjunctiva and sclera clear  ENMT: Moist mucous membranes  CHEST/LUNG: Clear to auscultation bilaterally; No rales, rhonchi, wheezing, or rubs  HEART: Regular rate and rhythm; No murmurs, rubs, or gallops  ABDOMEN: Soft, Nontender, Nondistended; Bowel sounds present  EXTREMITIES:  2+ Peripheral Pulses, No clubbing, cyanosis, or edema        MEDICATIONS  (STANDING):  amLODIPine   Tablet 5 milliGRAM(s) Oral daily  folic acid 1 milliGRAM(s) Oral daily  influenza   Vaccine 0.5 milliLiter(s) IntraMuscular once  insulin lispro (HumaLOG) corrective regimen sliding scale   SubCutaneous Before meals and at bedtime  metoprolol tartrate 25 milliGRAM(s) Oral two times a day  multivitamin 1 Tablet(s) Oral daily  predniSONE   Tablet 60 milliGRAM(s) Oral daily  sodium chloride 0.45% with potassium chloride 20 mEq/L 1000 milliLiter(s) (80 mL/Hr) IV Continuous <Continuous>  thiamine 100 milliGRAM(s) Oral daily    MEDICATIONS  (PRN):  acetaminophen   Tablet .. 650 milliGRAM(s) Oral every 6 hours PRN Temp greater or equal to 38C (100.4F), Mild Pain (1 - 3)  hydrALAZINE Injectable 5 milliGRAM(s) IV Push every 6 hours PRN if SBP > 165 or DBP > 100      Allergies    penicillin (Hives)    Intolerances          LABS:                          9.5    23.8  )-----------( 115      ( 09 Apr 2019 05:01 )             29.3     04-09    140  |  94<L>  |  50.0<H>  ----------------------------<  113  3.4<L>   |  30.0<H>  |  3.99<H>    Ca    8.2<L>      09 Apr 2019 05:01  Phos  5.1     04-09  Mg     2.0     04-09    TPro  6.5<L>  /  Alb  3.9  /  TBili  0.7  /  DBili  x   /  AST  27  /  ALT  25  /  AlkPhos  68  04-07    PT/INR - ( 08 Apr 2019 10:05 )   PT: 12.7 sec;   INR: 1.10 ratio         PTT - ( 08 Apr 2019 10:05 )  PTT:26.4 sec      RADIOLOGY & ADDITIONAL TESTS:

## 2019-04-09 NOTE — PROGRESS NOTE ADULT - SUBJECTIVE AND OBJECTIVE BOX
Patient is a 38y old  Female who presents with a chief complaint of blurry vision, nausea/vomiting (09 Apr 2019 09:25)      BRIEF HOSPITAL COURSE: 38F with no past history who was admitted with severe thrombocytopenia and ARF, consistent with TTP. Began PLEX treatment 4/6/19    Events last 24 hours: tolerating PLEX, completed 3/5, will have 4th today    PAST MEDICAL & SURGICAL HISTORY:  No pertinent past medical history  No significant past surgical history        Medications:    amLODIPine   Tablet 5 milliGRAM(s) Oral daily  hydrALAZINE Injectable 5 milliGRAM(s) IV Push every 6 hours PRN  metoprolol tartrate 25 milliGRAM(s) Oral two times a day      acetaminophen   Tablet .. 650 milliGRAM(s) Oral every 6 hours PRN            insulin lispro (HumaLOG) corrective regimen sliding scale   SubCutaneous Before meals and at bedtime  predniSONE   Tablet 60 milliGRAM(s) Oral daily    folic acid 1 milliGRAM(s) Oral daily  multivitamin 1 Tablet(s) Oral daily  sodium chloride 0.45% with potassium chloride 20 mEq/L 1000 milliLiter(s) IV Continuous <Continuous>  thiamine 100 milliGRAM(s) Oral daily    influenza   Vaccine 0.5 milliLiter(s) IntraMuscular once              ICU Vital Signs Last 24 Hrs  T(C): 36.9 (09 Apr 2019 08:00), Max: 37.2 (08 Apr 2019 20:00)  T(F): 98.4 (09 Apr 2019 08:00), Max: 98.9 (08 Apr 2019 20:00)  HR: 82 (09 Apr 2019 08:00) (76 - 97)  BP: 145/87 (09 Apr 2019 08:00) (132/67 - 170/100)  BP(mean): 109 (09 Apr 2019 08:00) (93 - 129)  ABP: --  ABP(mean): --  RR: 29 (09 Apr 2019 08:00) (18 - 33)  SpO2: 100% (09 Apr 2019 08:00) (93% - 100%)          I&O's Detail    08 Apr 2019 07:01  -  09 Apr 2019 07:00  --------------------------------------------------------  IN:    IV PiggyBack: 150 mL    Oral Fluid: 725 mL  Total IN: 875 mL    OUT:    Voided: 4600 mL  Total OUT: 4600 mL    Total NET: -3725 mL            LABS:                        9.5    23.8  )-----------( 115      ( 09 Apr 2019 05:01 )             29.3     04-09    140  |  94<L>  |  50.0<H>  ----------------------------<  113  3.4<L>   |  30.0<H>  |  3.99<H>    Ca    8.2<L>      09 Apr 2019 05:01  Phos  5.1     04-09  Mg     2.0     04-09    TPro  6.5<L>  /  Alb  3.9  /  TBili  0.7  /  DBili  x   /  AST  27  /  ALT  25  /  AlkPhos  68  04-07          CAPILLARY BLOOD GLUCOSE      POCT Blood Glucose.: 222 mg/dL (09 Apr 2019 09:14)    PT/INR - ( 08 Apr 2019 10:05 )   PT: 12.7 sec;   INR: 1.10 ratio         PTT - ( 08 Apr 2019 10:05 )  PTT:26.4 sec    CULTURES:  Culture Results:   GI PCR Results: NOT detected  *******Please Note:*******  GI panel PCR evaluates for:  Campylobacter, Plesiomonas shigelloides, Salmonella,  Vibrio, Yersinia enterocolitica, Enteroaggregative  Escherichia coli (EAEC), Enteropathogenic E.coli (EPEC),  Enterotoxigenic E. coli (ETEC) lt/st, Shiga-like  toxin-producing E. coli (STEC) stx1/stx2,  Shigella/ Enteroinvasive E. coli (EIEC), Cryptosporidium,  Cyclospora cayetanensis, Entamoeba histolytica,  Giardia lamblia, Adenovirus F 40/41, Astrovirus,  Norovirus GI/GII, Rotavirus A, Sapovirus (04-07 @ 15:25)  Rapid RVP Result: NotDetec (04-06 @ 06:39)      Physical Examination:    General: No acute distress.      HEENT: Pupils equal, reactive to light.  Symmetric.    PULM: Clear to auscultation bilaterally, no significant sputum production    NECK: Supple, no lymphadenopathy, trachea midline    CVS: Regular rate and rhythm, no murmurs, rubs, or gallops    GI: Soft, nondistended, nontender, normoactive bowel sounds, no masses    EXT: No edema, nontender    SKIN: Warm and well perfused, no rashes noted.    NEURO: Alert, oriented, interactive, nonfocal    DEVICES: [facility] 4/6    RADIOLOGY:     CRITICAL CARE TIME SPENT:

## 2019-04-10 LAB
ANION GAP SERPL CALC-SCNC: 15 MMOL/L — SIGNIFICANT CHANGE UP (ref 5–17)
BUN SERPL-MCNC: 50 MG/DL — HIGH (ref 8–20)
CALCIUM SERPL-MCNC: 7.7 MG/DL — LOW (ref 8.6–10.2)
CHLORIDE SERPL-SCNC: 94 MMOL/L — LOW (ref 98–107)
CO2 SERPL-SCNC: 31 MMOL/L — HIGH (ref 22–29)
CREAT SERPL-MCNC: 3.49 MG/DL — HIGH (ref 0.5–1.3)
GLUCOSE BLDC GLUCOMTR-MCNC: 154 MG/DL — HIGH (ref 70–99)
GLUCOSE BLDC GLUCOMTR-MCNC: 173 MG/DL — HIGH (ref 70–99)
GLUCOSE BLDC GLUCOMTR-MCNC: 183 MG/DL — HIGH (ref 70–99)
GLUCOSE BLDC GLUCOMTR-MCNC: 314 MG/DL — HIGH (ref 70–99)
GLUCOSE SERPL-MCNC: 126 MG/DL — HIGH (ref 70–115)
HCT VFR BLD CALC: 30.1 % — LOW (ref 37–47)
HGB BLD-MCNC: 9.7 G/DL — LOW (ref 12–16)
MCHC RBC-ENTMCNC: 28.6 PG — SIGNIFICANT CHANGE UP (ref 27–31)
MCHC RBC-ENTMCNC: 32.2 G/DL — SIGNIFICANT CHANGE UP (ref 32–36)
MCV RBC AUTO: 88.8 FL — SIGNIFICANT CHANGE UP (ref 81–99)
PLATELET # BLD AUTO: 222 K/UL — SIGNIFICANT CHANGE UP (ref 150–400)
POTASSIUM SERPL-MCNC: 3.5 MMOL/L — SIGNIFICANT CHANGE UP (ref 3.5–5.3)
POTASSIUM SERPL-SCNC: 3.5 MMOL/L — SIGNIFICANT CHANGE UP (ref 3.5–5.3)
RBC # BLD: 3.39 M/UL — LOW (ref 4.4–5.2)
RBC # FLD: 17.8 % — HIGH (ref 11–15.6)
SODIUM SERPL-SCNC: 140 MMOL/L — SIGNIFICANT CHANGE UP (ref 135–145)
WBC # BLD: 25.7 K/UL — HIGH (ref 4.8–10.8)
WBC # FLD AUTO: 25.7 K/UL — HIGH (ref 4.8–10.8)

## 2019-04-10 PROCEDURE — 99233 SBSQ HOSP IP/OBS HIGH 50: CPT

## 2019-04-10 PROCEDURE — 99232 SBSQ HOSP IP/OBS MODERATE 35: CPT

## 2019-04-10 RX ORDER — DIPHENHYDRAMINE HCL 50 MG
50 CAPSULE ORAL ONCE
Qty: 0 | Refills: 0 | Status: COMPLETED | OUTPATIENT
Start: 2019-04-10 | End: 2019-04-10

## 2019-04-10 RX ORDER — CALCIUM GLUCONATE 100 MG/ML
2 VIAL (ML) INTRAVENOUS ONCE
Qty: 0 | Refills: 0 | Status: COMPLETED | OUTPATIENT
Start: 2019-04-10 | End: 2019-04-10

## 2019-04-10 RX ADMIN — Medication 200 GRAM(S): at 11:00

## 2019-04-10 RX ADMIN — Medication 1: at 08:57

## 2019-04-10 RX ADMIN — Medication 25 MILLIGRAM(S): at 17:13

## 2019-04-10 RX ADMIN — Medication 25 MILLIGRAM(S): at 06:39

## 2019-04-10 RX ADMIN — Medication 1: at 17:12

## 2019-04-10 RX ADMIN — Medication 100 MILLIGRAM(S): at 12:05

## 2019-04-10 RX ADMIN — Medication 50 MILLIGRAM(S): at 10:15

## 2019-04-10 RX ADMIN — Medication 60 MILLIGRAM(S): at 06:39

## 2019-04-10 RX ADMIN — Medication 1 TABLET(S): at 12:05

## 2019-04-10 RX ADMIN — Medication 1: at 22:13

## 2019-04-10 RX ADMIN — Medication 4: at 12:05

## 2019-04-10 RX ADMIN — Medication 1 MILLIGRAM(S): at 12:05

## 2019-04-10 NOTE — PROGRESS NOTE ADULT - SUBJECTIVE AND OBJECTIVE BOX
[facility] DIVISION OF KIDNEY DISEASES AND HYPERTENSION -- FOLLOW UP NOTE  --------------------------------------------------------------------------------  Chief Complaint: TTP    24 hour events/subjective:  Seen/examined  Cr improving  Platelets improving  On PLEX today; #8      PAST HISTORY  --------------------------------------------------------------------------------  No significant changes to PMH, PSH, FHx, SHx, unless otherwise noted    ALLERGIES & MEDICATIONS  --------------------------------------------------------------------------------  Allergies    penicillin (Hives)    Intolerances      Standing Inpatient Medications  amLODIPine   Tablet 5 milliGRAM(s) Oral daily  folic acid 1 milliGRAM(s) Oral daily  influenza   Vaccine 0.5 milliLiter(s) IntraMuscular once  insulin lispro (HumaLOG) corrective regimen sliding scale   SubCutaneous Before meals and at bedtime  metoprolol tartrate 25 milliGRAM(s) Oral two times a day  multivitamin 1 Tablet(s) Oral daily  predniSONE   Tablet 60 milliGRAM(s) Oral daily  sodium chloride 0.45% with potassium chloride 20 mEq/L 1000 milliLiter(s) IV Continuous <Continuous>  thiamine 100 milliGRAM(s) Oral daily    PRN Inpatient Medications  acetaminophen   Tablet .. 650 milliGRAM(s) Oral every 6 hours PRN  hydrALAZINE Injectable 5 milliGRAM(s) IV Push every 6 hours PRN      REVIEW OF SYSTEMS  --------------------------------------------------------------------------------  Gen: No weight changes, fatigue, fevers/chills, weakness  Skin: No rashes  Head/Eyes/Ears/Mouth: No headache; Normal hearing; Normal vision w/o blurriness; No sinus pain/discomfort, sore throat  Respiratory: No dyspnea, cough, wheezing, hemoptysis  CV: No chest pain, PND, orthopnea  GI: No abdominal pain, diarrhea, constipation, nausea, vomiting, melena, hematochezia  : No increased frequency, dysuria, hematuria, nocturia  MSK: No joint pain/swelling; no back pain; no edema  Neuro: No dizziness/lightheadedness, weakness, seizures, numbness, tingling  Heme: No easy bruising or bleeding  Endo: No heat/cold intolerance  Psych: No significant nervousness, anxiety, stress, depression    All other systems were reviewed and are negative, except as noted.    VITALS/PHYSICAL EXAM  --------------------------------------------------------------------------------  T(C): 37.2 (04-10-19 @ 15:47), Max: 37.3 (04-10-19 @ 06:35)  HR: 82 (04-10-19 @ 15:47) (80 - 86)  BP: 144/91 (04-10-19 @ 15:47) (107/68 - 144/91)  RR: 18 (04-10-19 @ 15:47) (18 - 19)  SpO2: 97% (04-10-19 @ 15:47) (97% - 100%)  Wt(kg): --        04-09-19 @ 07:01  -  04-10-19 @ 07:00  --------------------------------------------------------  IN: 1720 mL / OUT: 1100 mL / NET: 620 mL    04-10-19 @ 07:01  -  04-10-19 @ 16:22  --------------------------------------------------------  IN: 360 mL / OUT: 750 mL / NET: -390 mL      Physical Exam:  	Gen: NAD, well-appearing  	HEENT: PERRL, supple neck, clear oropharynx  	Pulm: CTA B/L  	CV: RRR, S1S2; no rub  	Back: No spinal or CVA tenderness; no sacral edema  	Abd: +BS, soft, nontender/nondistended  	: No suprapubic tenderness  	UE: Warm, FROM, no clubbing, intact strength; no edema; no asterixis  	LE: Warm, FROM, no clubbing, intact strength; no edema  	Neuro: No focal deficits, intact gait  	Psych: Normal affect and mood  	Skin: Warm, without rashes  	Vascular access:    LABS/STUDIES  --------------------------------------------------------------------------------              9.7    25.7  >-----------<  222      [04-10-19 @ 06:09]              30.1     140  |  94  |  50.0  ----------------------------<  126      [04-10-19 @ 06:09]  3.5   |  31.0  |  3.49        Ca     7.7     [04-10-19 @ 06:09]      Mg     2.0     [04-09-19 @ 05:01]      Phos  5.1     [04-09-19 @ 05:01]                [04-09-19 @ 05:01]    Creatinine Trend:  SCr 3.49 [04-10 @ 06:09]  SCr 3.99 [04-09 @ 05:01]  SCr 5.02 [04-08 @ 10:05]  SCr 5.06 [04-07 @ 17:44]  SCr 5.15 [04-07 @ 09:39]    Urinalysis - [04-06-19 @ 11:45]      Color Yellow / Appearance Clear / SG 1.005 / pH 7.0      Gluc 100 / Ketone Negative  / Bili Negative / Urobili Negative       Blood Large / Protein 100 / Leuk Est Negative / Nitrite Negative      RBC 6-10 / WBC 0-2 / Hyaline  / Gran  / Sq Epi  / Non Sq Epi Occasional / Bacteria Negative    Urine Creatinine 46      [04-07-19 @ 09:38]  Urine Protein 34.0      [04-07-19 @ 09:38]  Urine Sodium 58      [04-06-19 @ 11:44]  Urine Potassium 12      [04-06-19 @ 11:44]  Urine Osmolality 212      [04-06-19 @ 11:45]    HbA1c 5.5      [04-09-19 @ 15:19]  TSH 0.70      [04-06-19 @ 06:25]    HBsAg Nonreact      [04-06-19 @ 21:49]  HCV 0.08, Nonreact      [04-06-19 @ 21:49]  HIV Nonreact      [04-06-19 @ 20:48]  HIV Nonreact      [04-06-19 @ 06:25]    C3 Complement 135      [04-06-19 @ 23:56]  C4 Complement 35      [04-06-19 @ 23:56]

## 2019-04-10 NOTE — PROGRESS NOTE ADULT - ASSESSMENT
The patient is a 38 year old female with no significant past medical history who presented to the Er on 4/6 with complaints of blurred vision. She was found to have severe thrombocytopenia with LISSETT and metabolic acidosis and hypertension. Hematology was consulted and she was started on PO prednisone. Peripheral smear showed schistocytes with elevated LDH and low haptoglobin with worsening renal failure and thrombocytopenia. She was transferred to the ICu for initiation of plasmapheresis Nephrology was consulted; recommended hydration with plasmapheresis with no indication for HD at this time. HIV and hepatitis panels were negative. CT chest/abdomen/pelvis showed perinephric stranding bilaterally. CT head and chest xray were negative.     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 LISSETT with metabolic acidosis secondary to TTP: Continue treatment  Per Renal no indication for HD at this time  IV hydration  Monitor BMP    3. Hypertension: BP stable on metoprolol and Norvasc    4. Hyperglycemia likely secondary to steroids. Hba1c ordered  HSS> MOnitor bsl    VTE_ chemical prophylaxis held in the setting of thrombocytopenia. The patient is a 38 year old female with no significant past medical history who presented to the Er on 4/6 with complaints of blurred vision. She was found to have severe thrombocytopenia with LISSETT and metabolic acidosis and hypertension. Hematology was consulted and she was started on PO prednisone. Peripheral smear showed schistocytes with elevated LDH and low haptoglobin with worsening renal failure and thrombocytopenia. She was transferred to the ICu for initiation of plasmapheresis Nephrology was consulted; recommended hydration with plasmapheresis with no indication for HD at this time. HIV and hepatitis panels were negative. CT chest/abdomen/pelvis showed perinephric stranding bilaterally. CT head and chest xray were negative.     Assessment/Plan:    1. 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 Sams  Hematology following  CRANE T13 sent on 4/6    2. LISSETT with metabolic acidosis secondary to TTP: Continue treatment  Per Renal no indication for HD at this time  Renal bx after platelet counts improve as per renal  IV hydration  Monitor BMP    3. Hypertension: BP stable on metoprolol and Norvasc    4. Hyperglycemia likely secondary to steroids. Hba1c 5.5  HSS> MOnitor bsl    VTE_ chemical prophylaxis started as plt counts improved, okay with hemonc

## 2019-04-10 NOTE — PROGRESS NOTE ADULT - SUBJECTIVE AND OBJECTIVE BOX
Doing well.  Platelet count now normal, creatinine improving to 3.5, LDH normalized.  [id number] pending.  Glucose elevation secondary to steroid use.  Daily therapeutic plasma exchange will continue daily until all features related to organ involvement, including renal failure, have resolved and the platelet count has stably recovered, with no further hemolysis.  Prednisone dose can be cut to 40 mg x 3 days and then tapered.

## 2019-04-10 NOTE — PROGRESS NOTE ADULT - ASSESSMENT
1) TTP  2) HTN  3) LISSETT  4) Proteinuria  5) Microscopic hematuria    PLEX per hematology  Prednisone 60mg daily;  Plan for renal biopsy in AM by IR;   Spoke with IR; on schedule  NPO after midnight; order placed    d/w Dr Cobb

## 2019-04-10 NOTE — PROGRESS NOTE ADULT - SUBJECTIVE AND OBJECTIVE BOX
CHIEF COMPLAINT/INTERVAL HISTORY:    Patient is a 38y old  Female who presents with a chief complaint of blurry vision, nausea/vomiting (09 Apr 2019 10:18)      HPI:  Ms. Gutierres is a 39 yo F with no significant PMH who p/w n/v, eye swelling/blurry vision since 2am this morning. Pt states she was out drinking tequila shots the night prior, subsequently at 2am was taking a shower when she examined her eye in the mirror and noticed bilateral upper eyelids swollen. She subsequently began vomiting the tequila she drank, NBNB., no hematemesis.  An hour later she noticed the left eye also appeared swollen. She describes her vision became "like she was underwater", but denies eye pain, photophobia, other visual disturbances. Denies LOC/head trauma/fall. Pt also report that over the past 2-3 weeks she started going to the gym ( 4 times per weeks ) and she was following an exercise program, she had body pain .     Since being in the ED she has noticed that her left eye swelling has noticeably improved.   ROS notable for URI symptoms the week prior. NO pain at this time, no CP, SOB, diarrhea/constipation, fever/chills  Positive sick contacts in her household last week (family was sick with URI symptoms) . Denies recent travel    Patient states one episode of epistaxis for a short period of time about 1 month ago, denies gingival bleeding, dysuria, hematuria, hematochezia/BRBPR/melenic stools, denies abnormal uterine bleeding/heavy menses, last pap WNL. (05 Apr 2019 23:15)      SUBJECTIVE & OBJECTIVE/ ROS: Pt seen and examined at bedside. No chest pain, palpitations, sob, light headedness/dizziness, difficulty breathing/cough, fevers/chills, abdominal pain, n/v, diarrhea/constipation, dysuria or increased urinary frequency.     ICU Vital Signs Last 24 Hrs  T(C): 37.3 (10 Apr 2019 09:51), Max: 37.3 (10 Apr 2019 06:35)  T(F): 99.1 (10 Apr 2019 09:51), Max: 99.1 (10 Apr 2019 06:35)  HR: 86 (10 Apr 2019 09:51) (80 - 86)  BP: 137/81 (10 Apr 2019 09:51) (107/68 - 139/90)  BP(mean): --  ABP: --  ABP(mean): --  RR: 18 (10 Apr 2019 09:51) (18 - 19)  SpO2: 98% (10 Apr 2019 06:35) (98% - 100%)        MEDICATIONS  (STANDING):  amLODIPine   Tablet 5 milliGRAM(s) Oral daily  folic acid 1 milliGRAM(s) Oral daily  influenza   Vaccine 0.5 milliLiter(s) IntraMuscular once  insulin lispro (HumaLOG) corrective regimen sliding scale   SubCutaneous Before meals and at bedtime  metoprolol tartrate 25 milliGRAM(s) Oral two times a day  multivitamin 1 Tablet(s) Oral daily  predniSONE   Tablet 60 milliGRAM(s) Oral daily  sodium chloride 0.45% with potassium chloride 20 mEq/L 1000 milliLiter(s) (80 mL/Hr) IV Continuous <Continuous>  thiamine 100 milliGRAM(s) Oral daily    MEDICATIONS  (PRN):  acetaminophen   Tablet .. 650 milliGRAM(s) Oral every 6 hours PRN Temp greater or equal to 38C (100.4F), Mild Pain (1 - 3)  hydrALAZINE Injectable 5 milliGRAM(s) IV Push every 6 hours PRN if SBP > 165 or DBP > 100      LABS:                        9.7    25.7  )-----------( 222      ( 10 Apr 2019 06:09 )             30.1     04-10    140  |  94<L>  |  50.0<H>  ----------------------------<  126<H>  3.5   |  31.0<H>  |  3.49<H>    Ca    7.7<L>      10 Apr 2019 06:09  Phos  5.1     04-09  Mg     2.0     04-09            CAPILLARY BLOOD GLUCOSE      POCT Blood Glucose.: 314 mg/dL (10 Apr 2019 11:54)  POCT Blood Glucose.: 154 mg/dL (10 Apr 2019 08:05)  POCT Blood Glucose.: 109 mg/dL (09 Apr 2019 21:48)  POCT Blood Glucose.: 238 mg/dL (09 Apr 2019 17:00)      RECENT CULTURES:      RADIOLOGY & ADDITIONAL TESTS:      PHYSICAL EXAM:    GENERAL: NAD, AOX3, morbidly obese  HEAD:  Atraumatic, Normocephalic  EYES: EOMI, PERRLA, conjunctiva and sclera clear  ENMT: Moist mucous membranes  CHEST/LUNG: Clear to auscultation bilaterally; No rales, rhonchi, wheezing, or rubs, +perm cath  HEART: Regular rate and rhythm; No murmurs, rubs, or gallops  ABDOMEN: Soft, Nontender, Nondistended; Bowel sounds present  EXTREMITIES:  2+ Peripheral Pulses, No clubbing, cyanosis, or edema

## 2019-04-11 ENCOUNTER — RESULT REVIEW (OUTPATIENT)
Age: 39
End: 2019-04-11

## 2019-04-11 LAB
ADAMTS13 ACTIVITY: <2 % — LOW
ADAMTS13 ACTIVITY: <2 % — LOW
ADAMTS13 REFLEX COMMENT: SIGNIFICANT CHANGE UP
ADAMTS13 REFLEX COMMENT: SIGNIFICANT CHANGE UP
ANION GAP SERPL CALC-SCNC: 14 MMOL/L — SIGNIFICANT CHANGE UP (ref 5–17)
ANISOCYTOSIS BLD QL: SLIGHT — SIGNIFICANT CHANGE UP
BUN SERPL-MCNC: 40 MG/DL — HIGH (ref 8–20)
CALCIUM SERPL-MCNC: 7.5 MG/DL — LOW (ref 8.6–10.2)
CHLORIDE SERPL-SCNC: 97 MMOL/L — LOW (ref 98–107)
CO2 SERPL-SCNC: 28 MMOL/L — SIGNIFICANT CHANGE UP (ref 22–29)
CREAT SERPL-MCNC: 2.5 MG/DL — HIGH (ref 0.5–1.3)
EOSINOPHIL NFR BLD AUTO: 2 % — SIGNIFICANT CHANGE UP (ref 0–5)
GLUCOSE BLDC GLUCOMTR-MCNC: 154 MG/DL — HIGH (ref 70–99)
GLUCOSE BLDC GLUCOMTR-MCNC: 208 MG/DL — HIGH (ref 70–99)
GLUCOSE BLDC GLUCOMTR-MCNC: 234 MG/DL — HIGH (ref 70–99)
GLUCOSE SERPL-MCNC: 103 MG/DL — SIGNIFICANT CHANGE UP (ref 70–115)
HCT VFR BLD CALC: 29.2 % — LOW (ref 37–47)
HGB BLD-MCNC: 9.2 G/DL — LOW (ref 12–16)
HYPOCHROMIA BLD QL: SLIGHT — SIGNIFICANT CHANGE UP
LDH SERPL L TO P-CCNC: 244 U/L — HIGH (ref 98–192)
LYMPHOCYTES # BLD AUTO: 26 % — SIGNIFICANT CHANGE UP (ref 20–55)
MACROCYTES BLD QL: SLIGHT — SIGNIFICANT CHANGE UP
MAGNESIUM SERPL-MCNC: 1.6 MG/DL — SIGNIFICANT CHANGE UP (ref 1.6–2.6)
MCHC RBC-ENTMCNC: 28.1 PG — SIGNIFICANT CHANGE UP (ref 27–31)
MCHC RBC-ENTMCNC: 31.5 G/DL — LOW (ref 32–36)
MCV RBC AUTO: 89.3 FL — SIGNIFICANT CHANGE UP (ref 81–99)
MICROCYTES BLD QL: SLIGHT — SIGNIFICANT CHANGE UP
MONOCYTES NFR BLD AUTO: 8 % — SIGNIFICANT CHANGE UP (ref 3–10)
NEUTROPHILS NFR BLD AUTO: 64 % — SIGNIFICANT CHANGE UP (ref 37–73)
PHOSPHATE SERPL-MCNC: 5.2 MG/DL — HIGH (ref 2.4–4.7)
PLAT MORPH BLD: NORMAL — SIGNIFICANT CHANGE UP
PLATELET # BLD AUTO: 298 K/UL — SIGNIFICANT CHANGE UP (ref 150–400)
POIKILOCYTOSIS BLD QL AUTO: SLIGHT — SIGNIFICANT CHANGE UP
POLYCHROMASIA BLD QL SMEAR: SLIGHT — SIGNIFICANT CHANGE UP
POTASSIUM SERPL-MCNC: 3.4 MMOL/L — LOW (ref 3.5–5.3)
POTASSIUM SERPL-SCNC: 3.4 MMOL/L — LOW (ref 3.5–5.3)
RBC # BLD: 3.27 M/UL — LOW (ref 4.4–5.2)
RBC # FLD: 17.4 % — HIGH (ref 11–15.6)
RBC BLD AUTO: ABNORMAL
SODIUM SERPL-SCNC: 139 MMOL/L — SIGNIFICANT CHANGE UP (ref 135–145)
VWF CP INHIB PPP-ACNC: 76 U/ML — HIGH
VWF CP INHIB PPP-ACNC: 82 U/ML — HIGH
WBC # BLD: 23.8 K/UL — HIGH (ref 4.8–10.8)
WBC # FLD AUTO: 23.8 K/UL — HIGH (ref 4.8–10.8)

## 2019-04-11 PROCEDURE — 99233 SBSQ HOSP IP/OBS HIGH 50: CPT

## 2019-04-11 PROCEDURE — 88348 ELECTRON MICROSCOPY DX: CPT | Mod: 26

## 2019-04-11 PROCEDURE — 88312 SPECIAL STAINS GROUP 1: CPT | Mod: 26

## 2019-04-11 PROCEDURE — 88350 IMFLUOR EA ADDL 1ANTB STN PX: CPT | Mod: 26

## 2019-04-11 PROCEDURE — 50200 RENAL BIOPSY PERQ: CPT | Mod: RT

## 2019-04-11 PROCEDURE — 88346 IMFLUOR 1ST 1ANTB STAIN PX: CPT | Mod: 26

## 2019-04-11 PROCEDURE — 88313 SPECIAL STAINS GROUP 2: CPT | Mod: 26

## 2019-04-11 PROCEDURE — 77012 CT SCAN FOR NEEDLE BIOPSY: CPT | Mod: 26

## 2019-04-11 PROCEDURE — 88305 TISSUE EXAM BY PATHOLOGIST: CPT | Mod: 26

## 2019-04-11 RX ORDER — POTASSIUM CHLORIDE 20 MEQ
40 PACKET (EA) ORAL ONCE
Qty: 0 | Refills: 0 | Status: COMPLETED | OUTPATIENT
Start: 2019-04-11 | End: 2019-04-11

## 2019-04-11 RX ORDER — DIPHENHYDRAMINE HCL 50 MG
50 CAPSULE ORAL ONCE
Qty: 0 | Refills: 0 | Status: COMPLETED | OUTPATIENT
Start: 2019-04-11 | End: 2019-04-11

## 2019-04-11 RX ORDER — CALCIUM GLUCONATE 100 MG/ML
1 VIAL (ML) INTRAVENOUS ONCE
Qty: 0 | Refills: 0 | Status: COMPLETED | OUTPATIENT
Start: 2019-04-11 | End: 2019-04-11

## 2019-04-11 RX ADMIN — Medication 2: at 12:46

## 2019-04-11 RX ADMIN — Medication 60 MILLIGRAM(S): at 05:31

## 2019-04-11 RX ADMIN — Medication 200 GRAM(S): at 13:17

## 2019-04-11 RX ADMIN — Medication 1: at 17:15

## 2019-04-11 RX ADMIN — Medication 40 MILLIGRAM(S): at 17:17

## 2019-04-11 RX ADMIN — Medication 25 MILLIGRAM(S): at 05:32

## 2019-04-11 RX ADMIN — AMLODIPINE BESYLATE 5 MILLIGRAM(S): 2.5 TABLET ORAL at 05:32

## 2019-04-11 RX ADMIN — Medication 25 MILLIGRAM(S): at 17:17

## 2019-04-11 RX ADMIN — Medication 100 MILLIGRAM(S): at 12:46

## 2019-04-11 RX ADMIN — Medication 50 MILLIGRAM(S): at 13:16

## 2019-04-11 RX ADMIN — Medication 40 MILLIEQUIVALENT(S): at 17:17

## 2019-04-11 RX ADMIN — Medication 2: at 22:47

## 2019-04-11 RX ADMIN — Medication 1 TABLET(S): at 12:46

## 2019-04-11 RX ADMIN — Medication 1 MILLIGRAM(S): at 12:46

## 2019-04-11 NOTE — PROGRESS NOTE ADULT - ASSESSMENT
24 hour events/subjective:  Seen/examined  SCr.,  improving  Platelets improving  On PLEX today; # 9    1) TTP  2) HTN  3) LISSETT  4) Proteinuria  5) Microscopic hematuria    PLEX per hematology  Prednisone 60mg daily;    S/P Biopsy, No Complications,

## 2019-04-11 NOTE — PROGRESS NOTE ADULT - SUBJECTIVE AND OBJECTIVE BOX
Vascular & Interventional Radiology Pre-Procedure Note    Procedure Name:  renal biopsy .      HPI: 38y Female with renal failure, TTP     Allergies: penicillin (Hives)    Medications (Abx/Cardiac/Anticoagulation/Blood Products)  amLODIPine   Tablet: 5 milliGRAM(s) Oral (04-11 @ 05:32)  metoprolol tartrate: 25 milliGRAM(s) Oral (04-11 @ 05:32)    Data:    T(C): 36.7  HR: 85  BP: 145/78  RR: 18  SpO2: 98%    04-11    139  |  97<L>  |  40.0<H>  ----------------------------<  103  3.4<L>   |  28.0  |  2.50<H>    Ca    7.5<L>      11 Apr 2019 05:46  Phos  5.2     04-11  Mg     1.6     04-11                              9.2    23.8  )-----------( 298      ( 11 Apr 2019 05:46 )             29.2       Plan:   -38y Female presents for renal biopsy.  No DDAVP due to steroirds  -Risks/Benefits/alternatives explained with the patient and/or healthcare proxy and witnessed informed consent obtained.

## 2019-04-11 NOTE — PROGRESS NOTE ADULT - SUBJECTIVE AND OBJECTIVE BOX
CHIEF COMPLAINT/INTERVAL HISTORY:    Patient is a 38y old  Female who presents with a chief complaint of blurry vision, nausea/vomiting (09 Apr 2019 10:18)      HPI:  Ms. Gutierres is a 37 yo F with no significant PMH who p/w n/v, eye swelling/blurry vision since 2am this morning. Pt states she was out drinking tequila shots the night prior, subsequently at 2am was taking a shower when she examined her eye in the mirror and noticed bilateral upper eyelids swollen. She subsequently began vomiting the tequila she drank, NBNB., no hematemesis.  An hour later she noticed the left eye also appeared swollen. She describes her vision became "like she was underwater", but denies eye pain, photophobia, other visual disturbances. Denies LOC/head trauma/fall. Pt also report that over the past 2-3 weeks she started going to the gym ( 4 times per weeks ) and she was following an exercise program, she had body pain .     Since being in the ED she has noticed that her left eye swelling has noticeably improved.   ROS notable for URI symptoms the week prior. NO pain at this time, no CP, SOB, diarrhea/constipation, fever/chills  Positive sick contacts in her household last week (family was sick with URI symptoms) . Denies recent travel    Patient states one episode of epistaxis for a short period of time about 1 month ago, denies gingival bleeding, dysuria, hematuria, hematochezia/BRBPR/melenic stools, denies abnormal uterine bleeding/heavy menses, last pap WNL. (05 Apr 2019 23:15)      SUBJECTIVE & OBJECTIVE/ ROS: Pt seen and examined at bedside. PLEX 6 ongoing. No chest pain, palpitations, sob, light headedness/dizziness, difficulty breathing/cough, fevers/chills, abdominal pain, n/v, diarrhea/constipation, dysuria or increased urinary frequency.     ICU Vital Signs Last 24 Hrs  T(C): 37.3 (10 Apr 2019 09:51), Max: 37.3 (10 Apr 2019 06:35)  T(F): 99.1 (10 Apr 2019 09:51), Max: 99.1 (10 Apr 2019 06:35)  HR: 86 (10 Apr 2019 09:51) (80 - 86)  BP: 137/81 (10 Apr 2019 09:51) (107/68 - 139/90)  BP(mean): --  ABP: --  ABP(mean): --  RR: 18 (10 Apr 2019 09:51) (18 - 19)  SpO2: 98% (10 Apr 2019 06:35) (98% - 100%)        MEDICATIONS  (STANDING):  amLODIPine   Tablet 5 milliGRAM(s) Oral daily  folic acid 1 milliGRAM(s) Oral daily  influenza   Vaccine 0.5 milliLiter(s) IntraMuscular once  insulin lispro (HumaLOG) corrective regimen sliding scale   SubCutaneous Before meals and at bedtime  metoprolol tartrate 25 milliGRAM(s) Oral two times a day  multivitamin 1 Tablet(s) Oral daily  predniSONE   Tablet 60 milliGRAM(s) Oral daily  sodium chloride 0.45% with potassium chloride 20 mEq/L 1000 milliLiter(s) (80 mL/Hr) IV Continuous <Continuous>  thiamine 100 milliGRAM(s) Oral daily    MEDICATIONS  (PRN):  acetaminophen   Tablet .. 650 milliGRAM(s) Oral every 6 hours PRN Temp greater or equal to 38C (100.4F), Mild Pain (1 - 3)  hydrALAZINE Injectable 5 milliGRAM(s) IV Push every 6 hours PRN if SBP > 165 or DBP > 100      LABS:                        9.7    25.7  )-----------( 222      ( 10 Apr 2019 06:09 )             30.1     04-10    140  |  94<L>  |  50.0<H>  ----------------------------<  126<H>  3.5   |  31.0<H>  |  3.49<H>    Ca    7.7<L>      10 Apr 2019 06:09  Phos  5.1     04-09  Mg     2.0     04-09            CAPILLARY BLOOD GLUCOSE      POCT Blood Glucose.: 314 mg/dL (10 Apr 2019 11:54)  POCT Blood Glucose.: 154 mg/dL (10 Apr 2019 08:05)  POCT Blood Glucose.: 109 mg/dL (09 Apr 2019 21:48)  POCT Blood Glucose.: 238 mg/dL (09 Apr 2019 17:00)      RECENT CULTURES:      RADIOLOGY & ADDITIONAL TESTS:      PHYSICAL EXAM:    GENERAL: NAD, AOX3, morbidly obese  HEAD:  Atraumatic, Normocephalic  EYES: EOMI, PERRLA, conjunctiva and sclera clear  ENMT: Moist mucous membranes  CHEST/LUNG: Clear to auscultation bilaterally; No rales, rhonchi, wheezing, or rubs, +perm cath  HEART: Regular rate and rhythm; No murmurs, rubs, or gallops  ABDOMEN: Soft, Nontender, Nondistended; Bowel sounds present  EXTREMITIES:  2+ Peripheral Pulses, No clubbing, cyanosis, or edema

## 2019-04-11 NOTE — PROGRESS NOTE ADULT - ASSESSMENT
The patient is a 38 year old female with no significant past medical history who presented to the Er on 4/6 with complaints of blurred vision. She was found to have severe thrombocytopenia with LISSETT and metabolic acidosis and hypertension. Hematology was consulted and she was started on PO prednisone. Peripheral smear showed schistocytes with elevated LDH and low haptoglobin with worsening renal failure and thrombocytopenia. She was transferred to the ICu for initiation of plasmapheresis Nephrology was consulted; recommended hydration with plasmapheresis with no indication for HD at this time. HIV and hepatitis panels were negative. CT chest/abdomen/pelvis showed perinephric stranding bilaterally. CT head and chest xray were negative.     Assessment/Plan:    1. TTP on plasmapheresis-  PLEX day 6, c/w daily for now until kidney function improves & then qOD as per hemonc. Daily PLEX ordered  Platelet count improved to normal  Taper prednisone to 40 x 3 days then 20 x 3 days then 10 x 3 days then stop since low  suspicion for ITP as discussed with Dr. Sams  Hematology following  CRANE T13 sent on 4/6    2. LISSETT with metabolic acidosis secondary to TTP: Continue treatment  Per Renal no indication for HD at this time  s/p Renal bx, f/u path  IV hydration  Monitor BMP    3. Hypertension: BP stable on metoprolol and Norvasc    4. Hyperglycemia likely secondary to steroids. Hba1c 5.5  HSS> MOnitor bsl    VTE_ chemical prophylaxis started as plt counts improved, okay with hemonc

## 2019-04-11 NOTE — PROGRESS NOTE ADULT - SUBJECTIVE AND OBJECTIVE BOX
Patient is a 38y old  Female who presents with a chief complaint of blurry vision, nausea/vomiting (09 Apr 2019 10:18)      HPI:  Ms. Gutierres is a 39 yo F with no significant PMH who p/w n/v, eye swelling/blurry vision since 2am this morning. Pt states she was out drinking tequila shots the night prior, subsequently at 2am was taking a shower when she examined her eye in the mirror and noticed bilateral upper eyelids swollen. She subsequently began vomiting the tequila she drank, NBNB., no hematemesis.  An hour later she noticed the left eye also appeared swollen. She describes her vision became "like she was underwater", but denies eye pain, photophobia, other visual disturbances. Denies LOC/head trauma/fall. Pt also report that over the past 2-3 weeks she started going to the gym ( 4 times per weeks ) and she was following an exercise program, she had body pain .     Since being in the ED she has noticed that her left eye swelling has noticeably improved.   ROS notable for URI symptoms the week prior. NO pain at this time, no CP, SOB, diarrhea/constipation, fever/chills  Positive sick contacts in her household last week (family was sick with URI symptoms) . Denies recent travel    Patient states one episode of epistaxis for a short period of time about 1 month ago, denies gingival bleeding, dysuria, hematuria, hematochezia/BRBPR/melenic stools, denies abnormal uterine bleeding/heavy menses, last pap WNL. (05 Apr 2019 23:15)    SUBJECTIVE & OBJECTIVE/ ROS: Pt seen and examined at bedside. PLEX 6 ongoing. No chest pain, palpitations, sob, light headedness/ dizziness, difficulty breathing/cough, fevers/chills, abdominal pain, n/v, diarrhea/constipation, dysuria or increased urinary frequency.     ICU Vital Signs Last 24 Hrs,    T(C): 37.3 (10 Apr 2019 09:51), Max: 37.3 (10 Apr 2019 06:35)  T(F): 99.1 (10 Apr 2019 09:51), Max: 99.1 (10 Apr 2019 06:35)  HR: 86 (10 Apr 2019 09:51) (80 - 86)  BP: 137/81 (10 Apr 2019 09:51) (107/68 - 139/90)  RR: 18 (10 Apr 2019 09:51) (18 - 19)  SpO2: 98% (10 Apr 2019 06:35) (98% - 100%)    MEDICATIONS  (STANDING):  amLODIPine   Tablet 5 milliGRAM(s) Oral daily  folic acid 1 milliGRAM(s) Oral daily  influenza   Vaccine 0.5 milliLiter(s) IntraMuscular once  insulin lispro (HumaLOG) corrective regimen sliding scale   SubCutaneous Before meals and at bedtime  metoprolol tartrate 25 milliGRAM(s) Oral two times a day  multivitamin 1 Tablet(s) Oral daily  predniSONE   Tablet 60 milliGRAM(s) Oral daily  sodium chloride 0.45% with potassium chloride 20 mEq/L 1000 milliLiter(s) (80 mL/Hr) IV Continuous <Continuous>  thiamine 100 milliGRAM(s) Oral daily    LABS:                        9.7    25.7  )-----------( 222      ( 10 Apr 2019 06:09 )             30.1     04-10    140  |  94<L>  |  50.0<H>  ----------------------------<  126<H>  3.5   |  31.0<H>  |  3.49<H>    Ca    7.7<L>      10 Apr 2019 06:09  Phos  5.1     04-09  Mg     2.0     04-09    CAPILLARY BLOOD GLUCOSE    POCT Blood Glucose.: 314 mg/dL (10 Apr 2019 11:54)  POCT Blood Glucose.: 154 mg/dL (10 Apr 2019 08:05)  POCT Blood Glucose.: 109 mg/dL (09 Apr 2019 21:48)  POCT Blood Glucose.: 238 mg/dL (09 Apr 2019 17:00)    PHYSICAL EXAM:    GENERAL: NAD, AOX3, obese, PLEX in Progress ( Replacement - FFP )  HEAD:  Atraumatic, Normocephalic  EYES: EOMI, PERRLA, conjunctiva and sclera clear  ENMT: Moist mucous membranes  CHEST/LUNG: Clear to auscultation bilaterally; No rales, rhonchi, wheezing, or rubs, +perm cath  HEART: Regular rate and rhythm; No murmurs, rubs, or gallops  ABDOMEN: Soft, Nontender, Nondistended; Bowel sounds present  EXTREMITIES:  2+ Peripheral Pulses, No clubbing, cyanosis, or edema    Assessment and Plan:     The patient is a 38 year old female with no significant past medical history who presented to the Er on 4/6 with complaints of blurred vision. She was found to have severe thrombocytopenia with LISSETT and metabolic acidosis and hypertension. Hematology was consulted and she was started on PO prednisone. Peripheral smear showed schistocytes with elevated LDH and low haptoglobin with worsening renal failure and thrombocytopenia. She was transferred to the ICu for initiation of plasmapheresis  recommended,  hydration with plasmapheresis with no indication for HD at this time.     HIV and hepatitis panels were negative.     CT chest/abdomen/pelvis showed perinephric stranding bilaterally.     CT head and chest x-ray were negative.     Assessment/Plan:    1. TTP on plasmapheresis-  Daily until kidney function improves & then QOD,  Platelet count improved to normal  Taper prednisone to 40 x 3 days then 20 x 3 days then 10 x 3 days then DROSA MARIA PABLO3 sent on 4/6 - P :    2. LISSETT - Related to TTP, TMA -  Improving,    3. Hypertension: BP stable on metoprolol and Norvasc    4. Hyperglycemia likely secondary to steroids. Hba1c 5.5 %,

## 2019-04-11 NOTE — PROGRESS NOTE ADULT - SUBJECTIVE AND OBJECTIVE BOX
Vascular & Interventional Radiology Post-Procedure Note    Pre-Procedure Diagnosis: TTP, renal failure  Post-Procedure Diagnosis: Same as pre.    : edgar  Assistant(s): ____    Procedure Details/Findings: 2 coaxial cores lower pole right kidney. deemed adequate acc to pathologist.  biosentry tract sealant.  no sig hematoma.      Access (if applicable): ____    Complications: 0  Estimated Blood Loss: Minimal  Anethesia: 1% Lidocane SQ  Specimen: taken by pathologist   Contrast: 0  Sedation: mod sedation  Patient Condition/Disposition: Stable    Plan:   f/u results  no anticoagulation for 48 hrs  maintain sbp < 140  bedrest on right side with rolled towel beneath puncture site 2 hrs

## 2019-04-12 LAB
ANION GAP SERPL CALC-SCNC: 13 MMOL/L — SIGNIFICANT CHANGE UP (ref 5–17)
BASOPHILS # BLD AUTO: 0 K/UL — SIGNIFICANT CHANGE UP (ref 0–0.2)
BASOPHILS NFR BLD AUTO: 0.1 % — SIGNIFICANT CHANGE UP (ref 0–2)
BUN SERPL-MCNC: 33 MG/DL — HIGH (ref 8–20)
CALCIUM SERPL-MCNC: 7.4 MG/DL — LOW (ref 8.6–10.2)
CHLORIDE SERPL-SCNC: 99 MMOL/L — SIGNIFICANT CHANGE UP (ref 98–107)
CO2 SERPL-SCNC: 27 MMOL/L — SIGNIFICANT CHANGE UP (ref 22–29)
CREAT SERPL-MCNC: 2.12 MG/DL — HIGH (ref 0.5–1.3)
EOSINOPHIL # BLD AUTO: 0.2 K/UL — SIGNIFICANT CHANGE UP (ref 0–0.5)
EOSINOPHIL NFR BLD AUTO: 0.9 % — SIGNIFICANT CHANGE UP (ref 0–6)
GLUCOSE BLDC GLUCOMTR-MCNC: 143 MG/DL — HIGH (ref 70–99)
GLUCOSE BLDC GLUCOMTR-MCNC: 177 MG/DL — HIGH (ref 70–99)
GLUCOSE BLDC GLUCOMTR-MCNC: 193 MG/DL — HIGH (ref 70–99)
GLUCOSE BLDC GLUCOMTR-MCNC: 248 MG/DL — HIGH (ref 70–99)
GLUCOSE SERPL-MCNC: 183 MG/DL — HIGH (ref 70–115)
HCT VFR BLD CALC: 27.7 % — LOW (ref 37–47)
HGB BLD-MCNC: 8.8 G/DL — LOW (ref 12–16)
LDH SERPL L TO P-CCNC: 295 U/L — HIGH (ref 98–192)
LYMPHOCYTES # BLD AUTO: 13.2 % — LOW (ref 20–55)
LYMPHOCYTES # BLD AUTO: 3.3 K/UL — SIGNIFICANT CHANGE UP (ref 1–4.8)
MAGNESIUM SERPL-MCNC: 1.6 MG/DL — SIGNIFICANT CHANGE UP (ref 1.6–2.6)
MCHC RBC-ENTMCNC: 28.6 PG — SIGNIFICANT CHANGE UP (ref 27–31)
MCHC RBC-ENTMCNC: 31.8 G/DL — LOW (ref 32–36)
MCV RBC AUTO: 89.9 FL — SIGNIFICANT CHANGE UP (ref 81–99)
MONOCYTES # BLD AUTO: 0.6 K/UL — SIGNIFICANT CHANGE UP (ref 0–0.8)
MONOCYTES NFR BLD AUTO: 2.5 % — LOW (ref 3–10)
NEUTROPHILS # BLD AUTO: 20.6 K/UL — HIGH (ref 1.8–8)
NEUTROPHILS NFR BLD AUTO: 82.7 % — HIGH (ref 37–73)
PHOSPHATE SERPL-MCNC: 3.5 MG/DL — SIGNIFICANT CHANGE UP (ref 2.4–4.7)
PLATELET # BLD AUTO: 337 K/UL — SIGNIFICANT CHANGE UP (ref 150–400)
POTASSIUM SERPL-MCNC: 3.9 MMOL/L — SIGNIFICANT CHANGE UP (ref 3.5–5.3)
POTASSIUM SERPL-SCNC: 3.9 MMOL/L — SIGNIFICANT CHANGE UP (ref 3.5–5.3)
RBC # BLD: 3.08 M/UL — LOW (ref 4.4–5.2)
RBC # FLD: 17.8 % — HIGH (ref 11–15.6)
SODIUM SERPL-SCNC: 139 MMOL/L — SIGNIFICANT CHANGE UP (ref 135–145)
WBC # BLD: 24.9 K/UL — HIGH (ref 4.8–10.8)
WBC # FLD AUTO: 24.9 K/UL — HIGH (ref 4.8–10.8)

## 2019-04-12 PROCEDURE — 99233 SBSQ HOSP IP/OBS HIGH 50: CPT

## 2019-04-12 PROCEDURE — 99232 SBSQ HOSP IP/OBS MODERATE 35: CPT

## 2019-04-12 RX ORDER — DIPHENHYDRAMINE HCL 50 MG
50 CAPSULE ORAL ONCE
Qty: 0 | Refills: 0 | Status: COMPLETED | OUTPATIENT
Start: 2019-04-12 | End: 2019-04-12

## 2019-04-12 RX ORDER — CALCIUM GLUCONATE 100 MG/ML
1 VIAL (ML) INTRAVENOUS ONCE
Qty: 0 | Refills: 0 | Status: COMPLETED | OUTPATIENT
Start: 2019-04-12 | End: 2019-04-12

## 2019-04-12 RX ADMIN — Medication 2: at 17:58

## 2019-04-12 RX ADMIN — Medication 1 MILLIGRAM(S): at 13:36

## 2019-04-12 RX ADMIN — Medication 200 GRAM(S): at 09:07

## 2019-04-12 RX ADMIN — Medication 1 TABLET(S): at 13:36

## 2019-04-12 RX ADMIN — Medication 50 MILLIGRAM(S): at 09:09

## 2019-04-12 RX ADMIN — Medication 25 MILLIGRAM(S): at 06:36

## 2019-04-12 RX ADMIN — Medication 40 MILLIGRAM(S): at 06:36

## 2019-04-12 RX ADMIN — Medication 1: at 09:23

## 2019-04-12 RX ADMIN — Medication 100 MILLIGRAM(S): at 13:37

## 2019-04-12 RX ADMIN — Medication 25 MILLIGRAM(S): at 18:01

## 2019-04-12 RX ADMIN — Medication 0: at 22:15

## 2019-04-12 RX ADMIN — Medication 1: at 12:52

## 2019-04-12 RX ADMIN — AMLODIPINE BESYLATE 5 MILLIGRAM(S): 2.5 TABLET ORAL at 06:36

## 2019-04-12 RX ADMIN — DEXTROSE MONOHYDRATE, SODIUM CHLORIDE, AND POTASSIUM CHLORIDE 80 MILLILITER(S): 50; .745; 4.5 INJECTION, SOLUTION INTRAVENOUS at 14:27

## 2019-04-12 NOTE — PROGRESS NOTE ADULT - ASSESSMENT
TTP;  PLEX per hematology  Improving renal fx  Biopsy proven;  Continue current treatment  d/w Dr Cobb

## 2019-04-12 NOTE — PROGRESS NOTE ADULT - SUBJECTIVE AND OBJECTIVE BOX
CHIEF COMPLAINT/INTERVAL HISTORY:    Patient is a 38y old  Female who presents with a chief complaint of blurry vision, nausea/vomiting (11 Apr 2019 18:30)      HPI:  Ms. Gutierres is a 37 yo F with no significant PMH who p/w n/v, eye swelling/blurry vision since 2am this morning. Pt states she was out drinking tequila shots the night prior, subsequently at 2am was taking a shower when she examined her eye in the mirror and noticed bilateral upper eyelids swollen. She subsequently began vomiting the tequila she drank, NBNB., no hematemesis.  An hour later she noticed the left eye also appeared swollen. She describes her vision became "like she was underwater", but denies eye pain, photophobia, other visual disturbances. Denies LOC/head trauma/fall. Pt also report that over the past 2-3 weeks she started going to the gym ( 4 times per weeks ) and she was following an exercise program, she had body pain .     Since being in the ED she has noticed that her left eye swelling has noticeably improved.   ROS notable for URI symptoms the week prior. NO pain at this time, no CP, SOB, diarrhea/constipation, fever/chills  Positive sick contacts in her household last week (family was sick with URI symptoms) . Denies recent travel    Patient states one episode of epistaxis for a short period of time about 1 month ago, denies gingival bleeding, dysuria, hematuria, hematochezia/BRBPR/melenic stools, denies abnormal uterine bleeding/heavy menses, last pap WNL. (05 Apr 2019 23:15)      SUBJECTIVE & OBJECTIVE/ ROS: Pt seen and examined at bedside. No chest pain, palpitations, sob, light headedness/dizziness, difficulty breathing/cough, fevers/chills, abdominal pain, n/v, diarrhea/constipation, dysuria or increased urinary frequency.     ICU Vital Signs Last 24 Hrs  T(C): 37.2 (12 Apr 2019 15:00), Max: 37.3 (11 Apr 2019 21:21)  T(F): 98.9 (12 Apr 2019 15:00), Max: 99.2 (11 Apr 2019 21:21)  HR: 87 (12 Apr 2019 15:00) (75 - 91)  BP: 142/97 (12 Apr 2019 15:00) (84/52 - 156/87)  BP(mean): --  ABP: --  ABP(mean): --  RR: 18 (12 Apr 2019 15:00) (18 - 18)  SpO2: 98% (12 Apr 2019 15:00) (98% - 99%)        MEDICATIONS  (STANDING):  amLODIPine   Tablet 5 milliGRAM(s) Oral daily  folic acid 1 milliGRAM(s) Oral daily  influenza   Vaccine 0.5 milliLiter(s) IntraMuscular once  insulin lispro (HumaLOG) corrective regimen sliding scale   SubCutaneous Before meals and at bedtime  metoprolol tartrate 25 milliGRAM(s) Oral two times a day  multivitamin 1 Tablet(s) Oral daily  predniSONE   Tablet 40 milliGRAM(s) Oral daily  sodium chloride 0.45% with potassium chloride 20 mEq/L 1000 milliLiter(s) (80 mL/Hr) IV Continuous <Continuous>  thiamine 100 milliGRAM(s) Oral daily    MEDICATIONS  (PRN):  acetaminophen   Tablet .. 650 milliGRAM(s) Oral every 6 hours PRN Temp greater or equal to 38C (100.4F), Mild Pain (1 - 3)  hydrALAZINE Injectable 5 milliGRAM(s) IV Push every 6 hours PRN if SBP > 165 or DBP > 100      LABS:                        8.8    24.9  )-----------( 337      ( 12 Apr 2019 09:35 )             27.7     04-12    139  |  99  |  33.0<H>  ----------------------------<  183<H>  3.9   |  27.0  |  2.12<H>    Ca    7.4<L>      12 Apr 2019 09:35  Phos  3.5     04-12  Mg     1.6     04-12            CAPILLARY BLOOD GLUCOSE      POCT Blood Glucose.: 193 mg/dL (12 Apr 2019 12:27)  POCT Blood Glucose.: 177 mg/dL (12 Apr 2019 09:19)  POCT Blood Glucose.: 208 mg/dL (11 Apr 2019 22:29)  POCT Blood Glucose.: 154 mg/dL (11 Apr 2019 16:59)      RECENT CULTURES:      RADIOLOGY & ADDITIONAL TESTS:      PHYSICAL EXAM:    GENERAL: NAD, AOX3, morbidly obese  HEAD:  Atraumatic, Normocephalic  EYES: EOMI, PERRLA, conjunctiva and sclera clear  ENMT: Moist mucous membranes  CHEST/LUNG: Clear to auscultation bilaterally; No rales, rhonchi, wheezing, or rubs, +perm cath  HEART: Regular rate and rhythm; No murmurs, rubs, or gallops  ABDOMEN: Soft, Nontender, Nondistended; Bowel sounds present  EXTREMITIES:  2+ Peripheral Pulses, No clubbing, cyanosis, or edema

## 2019-04-12 NOTE — PROGRESS NOTE ADULT - SUBJECTIVE AND OBJECTIVE BOX
[facility] DIVISION OF KIDNEY DISEASES AND HYPERTENSION -- FOLLOW UP NOTE  --------------------------------------------------------------------------------  Chief Complaint: TTP    24 hour events/subjective:  Seen/examined  No complaints  Getting PLEX  Biopsy confirmed TTP ; [id number] positive;      PAST HISTORY  --------------------------------------------------------------------------------  No significant changes to PMH, PSH, FHx, SHx, unless otherwise noted    ALLERGIES & MEDICATIONS  --------------------------------------------------------------------------------  Allergies    penicillin (Hives)    Intolerances      Standing Inpatient Medications  amLODIPine   Tablet 5 milliGRAM(s) Oral daily  folic acid 1 milliGRAM(s) Oral daily  influenza   Vaccine 0.5 milliLiter(s) IntraMuscular once  insulin lispro (HumaLOG) corrective regimen sliding scale   SubCutaneous Before meals and at bedtime  metoprolol tartrate 25 milliGRAM(s) Oral two times a day  multivitamin 1 Tablet(s) Oral daily  predniSONE   Tablet 40 milliGRAM(s) Oral daily  sodium chloride 0.45% with potassium chloride 20 mEq/L 1000 milliLiter(s) IV Continuous <Continuous>  thiamine 100 milliGRAM(s) Oral daily    PRN Inpatient Medications  acetaminophen   Tablet .. 650 milliGRAM(s) Oral every 6 hours PRN  hydrALAZINE Injectable 5 milliGRAM(s) IV Push every 6 hours PRN      REVIEW OF SYSTEMS  --------------------------------------------------------------------------------  Gen: No weight changes, fatigue, fevers/chills, weakness  Skin: No rashes  Head/Eyes/Ears/Mouth: No headache; Normal hearing; Normal vision w/o blurriness; No sinus pain/discomfort, sore throat  Respiratory: No dyspnea, cough, wheezing, hemoptysis  CV: No chest pain, PND, orthopnea  GI: No abdominal pain, diarrhea, constipation, nausea, vomiting, melena, hematochezia  : No increased frequency, dysuria, hematuria, nocturia  MSK: No joint pain/swelling; no back pain; no edema  Neuro: No dizziness/lightheadedness, weakness, seizures, numbness, tingling  Heme: No easy bruising or bleeding  Endo: No heat/cold intolerance  Psych: No significant nervousness, anxiety, stress, depression    All other systems were reviewed and are negative, except as noted.    VITALS/PHYSICAL EXAM  --------------------------------------------------------------------------------  T(C): 37.2 (04-12-19 @ 15:00), Max: 37.3 (04-11-19 @ 21:21)  HR: 87 (04-12-19 @ 15:00) (75 - 91)  BP: 142/97 (04-12-19 @ 15:00) (121/81 - 156/87)  RR: 18 (04-12-19 @ 15:00) (18 - 18)  SpO2: 98% (04-12-19 @ 15:00) (98% - 99%)  Wt(kg): --        04-11-19 @ 07:01  -  04-12-19 @ 07:00  --------------------------------------------------------  IN: 951 mL / OUT: 500 mL / NET: 451 mL      Physical Exam:  	Gen: NAD, well-appearing  	HEENT: PERRL, supple neck, clear oropharynx  	Pulm: CTA B/L  	CV: RRR, S1S2; no rub  	Back: No spinal or CVA tenderness; no sacral edema  	Abd: +BS, soft, nontender/nondistended  	: No suprapubic tenderness  	UE: Warm, FROM, no clubbing, intact strength; no edema; no asterixis  	LE: Warm, FROM, no clubbing, intact strength; no edema  	Neuro: No focal deficits, intact gait  	Psych: Normal affect and mood  	Skin: Warm, without rashes  	Vascular access:    LABS/STUDIES  --------------------------------------------------------------------------------              8.8    24.9  >-----------<  337      [04-12-19 @ 09:35]              27.7     139  |  99  |  33.0  ----------------------------<  183      [04-12-19 @ 09:35]  3.9   |  27.0  |  2.12        Ca     7.4     [04-12-19 @ 09:35]      Mg     1.6     [04-12-19 @ 09:35]      Phos  3.5     [04-12-19 @ 09:35]                [04-12-19 @ 09:35]    Creatinine Trend:  SCr 2.12 [04-12 @ 09:35]  SCr 2.50 [04-11 @ 05:46]  SCr 3.49 [04-10 @ 06:09]  SCr 3.99 [04-09 @ 05:01]  SCr 5.02 [04-08 @ 10:05]    Urinalysis - [04-06-19 @ 11:45]      Color Yellow / Appearance Clear / SG 1.005 / pH 7.0      Gluc 100 / Ketone Negative  / Bili Negative / Urobili Negative       Blood Large / Protein 100 / Leuk Est Negative / Nitrite Negative      RBC 6-10 / WBC 0-2 / Hyaline  / Gran  / Sq Epi  / Non Sq Epi Occasional / Bacteria Negative    Urine Creatinine 46      [04-07-19 @ 09:38]  Urine Protein 34.0      [04-07-19 @ 09:38]  Urine Sodium 58      [04-06-19 @ 11:44]  Urine Potassium 12      [04-06-19 @ 11:44]  Urine Osmolality 212      [04-06-19 @ 11:45]    HbA1c 5.5      [04-09-19 @ 15:19]  TSH 0.70      [04-06-19 @ 06:25]    HBsAg Nonreact      [04-06-19 @ 21:49]  HCV 0.08, Nonreact      [04-06-19 @ 21:49]  HIV Nonreact      [04-06-19 @ 20:48]  HIV Nonreact      [04-06-19 @ 06:25]    C3 Complement 135      [04-06-19 @ 23:56]  C4 Complement 35      [04-06-19 @ 23:56]

## 2019-04-12 NOTE — PROGRESS NOTE ADULT - ASSESSMENT
The patient is a 38 year old female with no significant past medical history who presented to the [facility] on 4/6 with complaints of blurred vision. She was found to have severe thrombocytopenia with LISSETT and metabolic acidosis and hypertension. Hematology was consulted and she was started on PO prednisone. Peripheral smear showed schistocytes with elevated LDH and low haptoglobin with worsening renal failure and thrombocytopenia. She was transferred to the [facility] for initiation of plasmapheresis Nephrology was consulted; recommended hydration with plasmapheresis with no indication for HD at this time. HIV and hepatitis panels were negative. CT chest/abdomen/pelvis showed perinephric stranding bilaterally. CT head and chest xray were negative.     Assessment/Plan:    1. 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 Daily PLEX ordered  Platelet count improved to normal  Taper prednisone to 40 x 3 days then 20 x 3 days then 10 x 3 days then stop since low  suspicion for ITP as discussed with Dr. Sams  Hematology following  CRANE T13 antibody high    2. LISSETT with metabolic acidosis secondary to TTP: Continue treatment  Per Renal no indication for HD at this time  Renal bx consistent with TTP, f/u path  IV hydration  Monitor BMP    3. Hypertension: BP stable on metoprolol and Norvasc    4. Hyperglycemia likely secondary to steroids. Hba1c 5.5  HSS> MOnitor bsl    VTE_ chemical prophylaxis started as plt counts improved, okay with hemonc The patient is a 38 year old female with no significant past medical history who presented to the Er on 4/6 with complaints of blurred vision. She was found to have severe thrombocytopenia with LISSETT and metabolic acidosis and hypertension. Hematology was consulted and she was started on PO prednisone. Peripheral smear showed schistocytes with elevated LDH and low haptoglobin with worsening renal failure and thrombocytopenia. She was transferred to the [facility] for initiation of plasmapheresis Nephrology was consulted; recommended hydration with plasmapheresis with no indication for HD at this time. HIV and hepatitis panels were negative. CT chest/abdomen/pelvis showed perinephric stranding bilaterally. CT head and chest xray were negative. Pt on PLEX day 7. c/w daily for now until kidney function improves & then qOD as per hemonc. Daily PLEX ordered. Renal bx consistent with TTP. CRANE T13 antibody high      Assessment/Plan:    1. 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 Daily PLEX ordered  Platelet count improved to normal  Taper prednisone to 40 x 3 days then 20 x 3 days then 10 x 3 days then stop since low  suspicion for ITP as discussed with Dr. Sams  Hematology following  CRANE T13 antibody high    2. LISSETT with metabolic acidosis secondary to TTP: Continue treatment  Per Renal no indication for HD at this time  Renal bx consistent with TTP  IV hydration  Monitor BMP    3. Hypertension: BP stable on metoprolol and Norvasc    4. Hyperglycemia likely secondary to steroids. Hba1c 5.5  HSS> MOnitor bsl    VTE_ chemical prophylaxis started as plt counts improved, okay with hemonc

## 2019-04-13 LAB
ALBUMIN SERPL ELPH-MCNC: 3.6 G/DL — SIGNIFICANT CHANGE UP (ref 3.3–5.2)
ANION GAP SERPL CALC-SCNC: 11 MMOL/L — SIGNIFICANT CHANGE UP (ref 5–17)
ANION GAP SERPL CALC-SCNC: 13 MMOL/L — SIGNIFICANT CHANGE UP (ref 5–17)
ANISOCYTOSIS BLD QL: SLIGHT — SIGNIFICANT CHANGE UP
BUN SERPL-MCNC: 33 MG/DL — HIGH (ref 8–20)
BUN SERPL-MCNC: 34 MG/DL — HIGH (ref 8–20)
CALCIUM SERPL-MCNC: 7 MG/DL — LOW (ref 8.6–10.2)
CALCIUM SERPL-MCNC: 7.3 MG/DL — LOW (ref 8.6–10.2)
CHLORIDE SERPL-SCNC: 100 MMOL/L — SIGNIFICANT CHANGE UP (ref 98–107)
CHLORIDE SERPL-SCNC: 99 MMOL/L — SIGNIFICANT CHANGE UP (ref 98–107)
CO2 SERPL-SCNC: 26 MMOL/L — SIGNIFICANT CHANGE UP (ref 22–29)
CO2 SERPL-SCNC: 27 MMOL/L — SIGNIFICANT CHANGE UP (ref 22–29)
CREAT SERPL-MCNC: 1.77 MG/DL — HIGH (ref 0.5–1.3)
CREAT SERPL-MCNC: 1.77 MG/DL — HIGH (ref 0.5–1.3)
EOSINOPHIL NFR BLD AUTO: 3 % — SIGNIFICANT CHANGE UP (ref 0–5)
GLUCOSE BLDC GLUCOMTR-MCNC: 140 MG/DL — HIGH (ref 70–99)
GLUCOSE BLDC GLUCOMTR-MCNC: 194 MG/DL — HIGH (ref 70–99)
GLUCOSE BLDC GLUCOMTR-MCNC: 210 MG/DL — HIGH (ref 70–99)
GLUCOSE BLDC GLUCOMTR-MCNC: 220 MG/DL — HIGH (ref 70–99)
GLUCOSE SERPL-MCNC: 140 MG/DL — HIGH (ref 70–115)
GLUCOSE SERPL-MCNC: 214 MG/DL — HIGH (ref 70–115)
HCT VFR BLD CALC: 27.2 % — LOW (ref 37–47)
HGB BLD-MCNC: 8.6 G/DL — LOW (ref 12–16)
HYPOCHROMIA BLD QL: SLIGHT — SIGNIFICANT CHANGE UP
LDH SERPL L TO P-CCNC: 243 U/L — HIGH (ref 98–192)
LYMPHOCYTES # BLD AUTO: 32 % — SIGNIFICANT CHANGE UP (ref 20–55)
MACROCYTES BLD QL: SLIGHT — SIGNIFICANT CHANGE UP
MAGNESIUM SERPL-MCNC: 1.5 MG/DL — LOW (ref 1.8–2.6)
MCHC RBC-ENTMCNC: 28.3 PG — SIGNIFICANT CHANGE UP (ref 27–31)
MCHC RBC-ENTMCNC: 31.6 G/DL — LOW (ref 32–36)
MCV RBC AUTO: 89.5 FL — SIGNIFICANT CHANGE UP (ref 81–99)
MICROCYTES BLD QL: SLIGHT — SIGNIFICANT CHANGE UP
MONOCYTES NFR BLD AUTO: 7 % — SIGNIFICANT CHANGE UP (ref 3–10)
MYELOCYTES NFR BLD: 1 % — HIGH (ref 0–0)
NEUTROPHILS NFR BLD AUTO: 57 % — SIGNIFICANT CHANGE UP (ref 37–73)
PHOSPHATE SERPL-MCNC: 3 MG/DL — SIGNIFICANT CHANGE UP (ref 2.4–4.7)
PHOSPHATE SERPL-MCNC: 3.1 MG/DL — SIGNIFICANT CHANGE UP (ref 2.4–4.7)
PLAT MORPH BLD: NORMAL — SIGNIFICANT CHANGE UP
PLATELET # BLD AUTO: 351 K/UL — SIGNIFICANT CHANGE UP (ref 150–400)
POIKILOCYTOSIS BLD QL AUTO: SLIGHT — SIGNIFICANT CHANGE UP
POTASSIUM SERPL-MCNC: 3.3 MMOL/L — LOW (ref 3.5–5.3)
POTASSIUM SERPL-MCNC: 4.2 MMOL/L — SIGNIFICANT CHANGE UP (ref 3.5–5.3)
POTASSIUM SERPL-SCNC: 3.3 MMOL/L — LOW (ref 3.5–5.3)
POTASSIUM SERPL-SCNC: 4.2 MMOL/L — SIGNIFICANT CHANGE UP (ref 3.5–5.3)
RBC # BLD: 3.04 M/UL — LOW (ref 4.4–5.2)
RBC # FLD: 17.6 % — HIGH (ref 11–15.6)
RBC BLD AUTO: ABNORMAL
SODIUM SERPL-SCNC: 137 MMOL/L — SIGNIFICANT CHANGE UP (ref 135–145)
SODIUM SERPL-SCNC: 139 MMOL/L — SIGNIFICANT CHANGE UP (ref 135–145)
WBC # BLD: 18 K/UL — HIGH (ref 4.8–10.8)
WBC # FLD AUTO: 18 K/UL — HIGH (ref 4.8–10.8)

## 2019-04-13 PROCEDURE — 99232 SBSQ HOSP IP/OBS MODERATE 35: CPT

## 2019-04-13 PROCEDURE — 99233 SBSQ HOSP IP/OBS HIGH 50: CPT

## 2019-04-13 RX ORDER — DIPHENHYDRAMINE HCL 50 MG
50 CAPSULE ORAL ONCE
Qty: 0 | Refills: 0 | Status: COMPLETED | OUTPATIENT
Start: 2019-04-14 | End: 2019-04-17

## 2019-04-13 RX ORDER — CALCIUM GLUCONATE 100 MG/ML
1 VIAL (ML) INTRAVENOUS ONCE
Qty: 0 | Refills: 0 | Status: DISCONTINUED | OUTPATIENT
Start: 2019-04-14 | End: 2019-04-20

## 2019-04-13 RX ORDER — MAGNESIUM SULFATE 500 MG/ML
1 VIAL (ML) INJECTION EVERY 8 HOURS
Qty: 0 | Refills: 0 | Status: COMPLETED | OUTPATIENT
Start: 2019-04-13 | End: 2019-04-15

## 2019-04-13 RX ORDER — DIPHENHYDRAMINE HCL 50 MG
50 CAPSULE ORAL ONCE
Qty: 0 | Refills: 0 | Status: COMPLETED | OUTPATIENT
Start: 2019-04-13 | End: 2019-04-13

## 2019-04-13 RX ORDER — POTASSIUM CHLORIDE 20 MEQ
20 PACKET (EA) ORAL
Qty: 0 | Refills: 0 | Status: COMPLETED | OUTPATIENT
Start: 2019-04-13 | End: 2019-04-13

## 2019-04-13 RX ORDER — CALCIUM GLUCONATE 100 MG/ML
1 VIAL (ML) INTRAVENOUS ONCE
Qty: 0 | Refills: 0 | Status: COMPLETED | OUTPATIENT
Start: 2019-04-13 | End: 2019-04-13

## 2019-04-13 RX ADMIN — Medication 20 MILLIEQUIVALENT(S): at 17:15

## 2019-04-13 RX ADMIN — Medication 20 MILLIEQUIVALENT(S): at 18:40

## 2019-04-13 RX ADMIN — Medication 200 GRAM(S): at 10:54

## 2019-04-13 RX ADMIN — Medication 50 MILLIGRAM(S): at 10:22

## 2019-04-13 RX ADMIN — Medication 25 MILLIGRAM(S): at 06:26

## 2019-04-13 RX ADMIN — Medication 100 MILLIGRAM(S): at 12:58

## 2019-04-13 RX ADMIN — Medication 100 GRAM(S): at 21:55

## 2019-04-13 RX ADMIN — Medication 25 MILLIGRAM(S): at 18:14

## 2019-04-13 RX ADMIN — DEXTROSE MONOHYDRATE, SODIUM CHLORIDE, AND POTASSIUM CHLORIDE 80 MILLILITER(S): 50; .745; 4.5 INJECTION, SOLUTION INTRAVENOUS at 06:26

## 2019-04-13 RX ADMIN — Medication 1 MILLIGRAM(S): at 12:58

## 2019-04-13 RX ADMIN — AMLODIPINE BESYLATE 5 MILLIGRAM(S): 2.5 TABLET ORAL at 06:26

## 2019-04-13 RX ADMIN — Medication 1 TABLET(S): at 12:58

## 2019-04-13 RX ADMIN — Medication 1: at 18:13

## 2019-04-13 RX ADMIN — Medication 2: at 13:01

## 2019-04-13 RX ADMIN — Medication 20 MILLIEQUIVALENT(S): at 21:55

## 2019-04-13 RX ADMIN — Medication 40 MILLIGRAM(S): at 06:26

## 2019-04-13 RX ADMIN — Medication 2: at 21:56

## 2019-04-13 RX ADMIN — DEXTROSE MONOHYDRATE, SODIUM CHLORIDE, AND POTASSIUM CHLORIDE 80 MILLILITER(S): 50; .745; 4.5 INJECTION, SOLUTION INTRAVENOUS at 21:55

## 2019-04-13 NOTE — CHART NOTE - NSCHARTNOTEFT_GEN_A_CORE
Source: Patient    Current Diet: Tenrox    Patient reports good appetite/intake -- not interested in diet education at this time     PO intake:  100%    Current Weight:   (4/11) 123kg  (4/6) 124kg    % Weight Change -- 1+ generalized edema noted     Pertinent Medications: MEDICATIONS  (STANDING):  amLODIPine   Tablet 5 milliGRAM(s) Oral daily  folic acid 1 milliGRAM(s) Oral daily  influenza   Vaccine 0.5 milliLiter(s) IntraMuscular once  insulin lispro (HumaLOG) corrective regimen sliding scale   SubCutaneous Before meals and at bedtime  metoprolol tartrate 25 milliGRAM(s) Oral two times a day  multivitamin 1 Tablet(s) Oral daily  predniSONE   Tablet 40 milliGRAM(s) Oral daily  sodium chloride 0.45% with potassium chloride 20 mEq/L 1000 milliLiter(s) (80 mL/Hr) IV Continuous <Continuous>  thiamine 100 milliGRAM(s) Oral daily    MEDICATIONS  (PRN):  acetaminophen   Tablet .. 650 milliGRAM(s) Oral every 6 hours PRN Temp greater or equal to 38C (100.4F), Mild Pain (1 - 3)  hydrALAZINE Injectable 5 milliGRAM(s) IV Push every 6 hours PRN if SBP > 165 or DBP > 100    Pertinent Labs: CBC Full  -  ( 13 Apr 2019 06:50 )  WBC Count : 18.0 K/uL  RBC Count : 3.04 M/uL  Hemoglobin : 8.6 g/dL  Hematocrit : 27.2 %  Platelet Count - Automated : 351 K/uL  Mean Cell Volume : 89.5 fl  Mean Cell Hemoglobin : 28.3 pg  Mean Cell Hemoglobin Concentration : 31.6 g/dL  Auto Neutrophil # : x  Auto Lymphocyte # : x  Auto Monocyte # : x  Auto Eosinophil # : x  Auto Basophil # : x  Auto Neutrophil % : 57.0 %  Auto Lymphocyte % : 32.0 %  Auto Monocyte % : 7.0 %  Auto Eosinophil % : 3.0 %  Auto Basophil % : x  BUN 34, Cr 1.77, Glu 140     Skin: intact     Nutrition focused physical exam conducted - found signs of malnutrition [ x]absent [ ]present    Subcutaneous fat loss: [ ] Orbital fat pads region, [ ]Buccal fat region, [ ]Triceps region,  [ ]Ribs region    Muscle wasting: [ ]Temples region, [ ]Clavicle region, [ ]Shoulder region, [ ]Scapula region, [ ]Interosseous region,  [ ]thigh region, [ ]Calf region    Estimated Needs:   [ x] no change since previous assessment  [ ] recalculated:     Current Nutrition Diagnosis:· Obese, Class III related to energy intake exceeding expenditure as evidenced by BMI 48.7       Recommendations:   1. Continue current diet  2. Weight loss education needed- Pt declined. Encouraged healthy eating     Monitoring and Evaluation:   [x ] PO intake [x ] Tolerance to diet prescription [X] Weights  [X] Follow up per protocol [X] Labs:

## 2019-04-13 NOTE — PROGRESS NOTE ADULT - SUBJECTIVE AND OBJECTIVE BOX
Doing very well clinically.  Platelet count normal 351,000.  Creatinine improving 1.7.  [id number] activity and antibody studies and renal biopsy results all confirm TTP.  Continue daily PLEX until renal function normal.  Continue steroid taper.

## 2019-04-13 NOTE — PROGRESS NOTE ADULT - SUBJECTIVE AND OBJECTIVE BOX
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  The patient is in no acute distress.  Denied any chest pain, palpitations, dyspnea, or abdominal pain.  Offers no complaints.    Vital Signs Last 24 Hrs  T(C): 36.7 (13 Apr 2019 15:29), Max: 37.6 (12 Apr 2019 23:32)  T(F): 98.1 (13 Apr 2019 15:29), Max: 99.6 (12 Apr 2019 23:32)  HR: 78 (13 Apr 2019 15:29) (78 - 89)  BP: 115/73 (13 Apr 2019 15:29) (115/73 - 142/90)  BP(mean): --  RR: 19 (13 Apr 2019 15:29) (16 - 19)  SpO2: 100% (13 Apr 2019 15:29) (100% - 100%)    CAPILLARY BLOOD GLUCOSE  POCT Blood Glucose.: 194 mg/dL (13 Apr 2019 17:14)  POCT Blood Glucose.: 220 mg/dL (13 Apr 2019 13:00)  POCT Blood Glucose.: 140 mg/dL (13 Apr 2019 08:07)  POCT Blood Glucose.: 143 mg/dL (12 Apr 2019 22:13)    PHYSICAL EXAMINATION:  ----------------------------------------  General appearance: No acute distress, Awake, Alert  HEENT: Normocephalic, Atraumatic, Conjunctiva clear, EOMI  Neck: Supple, No JVD, No tenderness, Right neck catheter  Lungs: Breath sound equal bilaterally, No wheezes, No rales  Cardiovascular: S1S2, Regular rhythm  Abdomen: Soft, Nontender, Nondistended, No guarding/rebound, Positive bowel sounds  Extremities: No clubbing, No cyanosis, No edema, No calf tenderness  Neuro: Strength equal bilaterally, No tremors  Psychiatric: Appropriate mood, Normal affect    LABORATORY STUDIES:  ----------------------------------------             8.6    18.0  )-----------( 351      ( 13 Apr 2019 06:50 )             27.2     04-13    139  |  100  |  34.0<H>  ----------------------------<  140<H>  3.3<L>   |  26.0  |  1.77<H>    Ca    7.0<L>      13 Apr 2019 06:50  Phos  3.1     04-13  Mg     1.5     04-13    MEDICATIONS  (STANDING):  amLODIPine   Tablet 5 milliGRAM(s) Oral daily  folic acid 1 milliGRAM(s) Oral daily  influenza   Vaccine 0.5 milliLiter(s) IntraMuscular once  insulin lispro (HumaLOG) corrective regimen sliding scale   SubCutaneous Before meals and at bedtime  magnesium sulfate  IVPB 1 Gram(s) IV Intermittent every 8 hours  metoprolol tartrate 25 milliGRAM(s) Oral two times a day  multivitamin 1 Tablet(s) Oral daily  potassium chloride    Tablet ER 20 milliEquivalent(s) Oral every 2 hours  predniSONE   Tablet 40 milliGRAM(s) Oral daily  sodium chloride 0.45% with potassium chloride 20 mEq/L 1000 milliLiter(s) (80 mL/Hr) IV Continuous <Continuous>  thiamine 100 milliGRAM(s) Oral daily    MEDICATIONS  (PRN):  acetaminophen   Tablet .. 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 Hematology consultation noted. To taper prednisone.    Acute kidney injury - Renal function improved. Potassium and magnesium supplemented for hypokalemia and hypomagnesemia.    Hypertension - Close blood pressure monitoring. On metoprolol and amlodipine.    Discussed with the patient and her  at the bedside.

## 2019-04-13 NOTE — PROGRESS NOTE ADULT - SUBJECTIVE AND OBJECTIVE BOX
Vital Signs Last 24 Hrs,    T(C): 36.1 (:32), Max: 37.6 (2019 23:32)  T(F): 97 (:32), Max: 99.6 (2019 23:32)  HR: 86 (:) (80 - 89)  BP: 139/87 (:32) (139/87 - 142/90)  BP(mean): --  RR: 16 (:32) (16 - 17)  SpO2: 100% (2019 08:00) (100% - 100%)    139    |  100    |  34.0<H>  ----------------------------<  140<H>  Ca:7.0<L> (2019 06:50)  3.3<L>   |  26.0   |  1.77<H>    eGFR if Non : 36 <L>  eGFR if : 41 <L>                               8.6<L>  18.0<H> )-----------( 351      ( 2019 06:50 )                  27.2<L>    Phos:3.1 mg/dL M.5 mg/dL<L> PTH:-- Uric acid:-- Serum Osm:--  Ferritin:-- Iron:-- TIBC:-- Tsat:--  B12:-- TSH:-- ( @ 06:50)    UProt:-- UCr:46 mg/dL P/C Ratio:0.7 Ratio<H> 24 hour Prot:-- UVol:-- CrCl:--  Loraine:-- UOsm:-- UVol:-- UCl:-- UK:-- ( @ 09:38)    [facility] DIVISION OF KIDNEY DISEASES AND HYPERTENSION -- FOLLOW UP NOTE  --------------------------------------------------------------------------------  Chief Complaint: TTP    24 hour events/subjective:  Seen/examined  No complaints,    Getting PLEX  Biopsy confirmed TTP ; [id number] positive;    PAST HISTORY  --------------------------------------------------------------------------------  No significant changes to PMH, PSH, FHx, SHx, unless otherwise noted    ALLERGIES & MEDICATIONS  --------------------------------------------------------------------------------  Allergies    penicillin (Hives)    Standing Inpatient Medications,    amLODIPine   Tablet 5 milliGRAM(s) Oral daily  folic acid 1 milliGRAM(s) Oral daily  insulin lispro (HumaLOG) corrective regimen sliding scale   SubCutaneous Before meals and at bedtime  metoprolol tartrate 25 milliGRAM(s) Oral two times a day  multivitamin 1 Tablet(s) Oral daily  predniSONE   Tablet 40 milliGRAM(s) Oral daily  sodium chloride 0.45% with potassium chloride 20 mEq/L 1000 milliLiter(s) IV Continuous <Continuous>  thiamine 100 milliGRAM(s) Oral daily    REVIEW OF SYSTEMS  --------------------------------------------------------------------------------  Gen: No weight changes, fatigue, fevers/chills, weakness  Skin: No rashes  Head/Eyes/Ears/Mouth: No headache; Normal hearing; Normal vision w/o blurriness; No sinus pain/discomfort, sore throat  Respiratory: No dyspnea, cough, wheezing, hemoptysis  CV: No chest pain, PND, orthopnea  GI: No abdominal pain, diarrhea, constipation, nausea, vomiting, melena, hematochezia  : No increased frequency, dysuria, hematuria, nocturia  MSK: No joint pain/swelling; no back pain; no edema  Neuro: No dizziness/lightheadedness, weakness, seizures, numbness, tingling  Heme: No easy bruising or bleeding  Endo: No heat/cold intolerance  Psych: No significant nervousness, anxiety, stress, depression    All other systems were reviewed and are negative, except as noted.    VITALS/PHYSICAL EXAM  --------------------------------------------------------------------------------  T(C): 37.2 (19 @ 15:00), Max: 37.3 (19 @ 21:21)  HR: 87 (19 @ 15:00) (75 - 91)  BP: 142/97 (19 @ 15:00) (121/81 - 156/87)  RR: 18 (19 @ 15:00) (18 - 18)  SpO2: 98% (19 @ 15:00) (98% - 99%)    19 @ 07:01  -  19 @ 07:00  --------------------------------------------------------  IN: 951 mL / OUT: 500 mL / NET: 451 mL    Physical Exam:  	Gen: NAD, well-appearing  	HEENT: PERRL, supple neck, clear oropharynx  	Pulm: CTA B/L  	CV: RRR, S1S2; no rub  	Back: No spinal or CVA tenderness; no sacral edema  	Abd: +BS, soft, nontender/nondistended  	: No suprapubic tenderness  	UE: Warm, FROM, no clubbing, intact strength; no edema; no asterixis  	LE: Warm, FROM, no clubbing, intact strength; no edema  	Neuro: No focal deficits, intact gait  	Psych: Normal affect and mood  	Skin: Warm, without rashes  	Vascular access:    LABS/STUDIES  --------------------------------------------------------------------------------              8.8    24.9  >-----------<  337      [19 09:35]              27.7     139  |  99  |  33.0  ----------------------------<  183      [19 09:35]  3.9   |  27.0  |  2.12        Ca     7.4     [19 09:35]      Mg     1.6     [19 09:35]      Phos  3.5     [19 09:35]          [19 09:35]    Creatinine Trend:  SCr 2.12 [:35]  SCr 2.50 [ 05:46]  SCr 3.49 [04-10 @ 06:09]  SCr 3.99 [ 05:01]  SCr 5.02 [ 10:05]    Urinalysis - [19 11:45]      Color Yellow / Appearance Clear / SG 1.005 / pH 7.0      Gluc 100 / Ketone Negative  / Bili Negative / Urobili Negative       Blood Large / Protein 100 / Leuk Est Negative / Nitrite Negative      RBC 6-10 / WBC 0-2 / Hyaline  / Gran  / Sq Epi  / Non Sq Epi Occasional / Bacteria Negative    Urine Creatinine 46      [19 09:38]  Urine Protein 34.0      [19 09:38]  Urine Sodium 58      [19 11:44]  Urine Potassium 12      [19 11:44]  Urine Osmolality 212      [19 11:45]    HbA1c 5.5      [04-09-19 @ 15:19]  TSH 0.70      [19 @ 06:25]    HBsAg Nonreact      [19 @ 21:49]  HCV 0.08, Nonreact      [19 @ 21:49]  HIV Nonreact      [19 @ 20:48]  HIV Nonreact      [19 @ 06:25]    C3 Complement 135      [19 @ 23:56]  C4 Complement 35      [19 @ 23:56]    ADAMTS 13 Activity Reflex (19 @ 23:49)    [id number] Activity: <2.0: This test was developed and its performance characteristics  determined by "[employer]. It has not been cleared or  approved by the Food and Drug Administration.  Performed At: [address] [id number]  Gary Arteaga MD Ph:[phone]    Gtyvls45 Reflex Comment: Comment: Severe deficiency of HAQFMN93 (<10% activity for the  [facility] assay) is a relatively specific finding in patients  with a clinical diagnosis of either hereditary or acquired  thrombotic thrombocytopenic purpura (TTP).    Xvrecj83 Antibody: 76: Results for this test are for research purposes only by the  assays .  The performance characteristics of  this product have not been established.  Results should not  be used as a diagnostic procedure without confirmation of  the diagnosis by another medically established diagnostic  product or procedure.  Performed At: [address] [id number]  Gary Arteaga MD Ph:[phone] u/mL    ADAMTS 13 Activity Reflex (19 @ 23:49)    Vvagvo70 Activity: <2.0: This test was developed and its performance characteristics  determined by "[employer]. It has not been cleared or  approved by the Food and Drug Administration.  Performed At: [address] [id number]  Gary Arteaga MD Ph:[phone] %      Asicuo14 Reflex Comment: Comment: Severe deficiency of INVXID80 (<10% activity for the  [facility] assay) is a relatively specific finding in patients  with a clinical diagnosis of either hereditary or acquired  thrombotic thrombocytopenic purpura (TTP).      Zmoaxe92 Antibody: 82: Results for this test are for research purposes only by the  assays .  The performance characteristics of  this product have not been established.  Results should not  be used as a diagnostic procedure without confirmation of  the diagnosis by another medically established diagnostic  product or procedure.  Performed At:  [facility] [id number]  Gary Arteaga MD Ph:[phone] u/mL    Assessment and Plan:     TTP;  ATN    PLEX per hematology  Improving renal fx  Biopsy proven;    Continue current treatment  Replete K + & Mg ++,

## 2019-04-14 LAB
24R-OH-CALCIDIOL SERPL-MCNC: 23 NG/ML — LOW (ref 30–80)
ANION GAP SERPL CALC-SCNC: 13 MMOL/L — SIGNIFICANT CHANGE UP (ref 5–17)
APPEARANCE UR: CLEAR — SIGNIFICANT CHANGE UP
BILIRUB UR-MCNC: NEGATIVE — SIGNIFICANT CHANGE UP
BUN SERPL-MCNC: 29 MG/DL — HIGH (ref 8–20)
CALCIUM SERPL-MCNC: 7.6 MG/DL — LOW (ref 8.6–10.2)
CALCIUM SERPL-MCNC: 8.1 MG/DL — LOW (ref 8.4–10.5)
CHLORIDE SERPL-SCNC: 104 MMOL/L — SIGNIFICANT CHANGE UP (ref 98–107)
CO2 SERPL-SCNC: 22 MMOL/L — SIGNIFICANT CHANGE UP (ref 22–29)
COLOR SPEC: YELLOW — SIGNIFICANT CHANGE UP
CREAT SERPL-MCNC: 1.47 MG/DL — HIGH (ref 0.5–1.3)
DIFF PNL FLD: ABNORMAL
EPI CELLS # UR: SIGNIFICANT CHANGE UP
GLUCOSE BLDC GLUCOMTR-MCNC: 183 MG/DL — HIGH (ref 70–99)
GLUCOSE BLDC GLUCOMTR-MCNC: 196 MG/DL — HIGH (ref 70–99)
GLUCOSE BLDC GLUCOMTR-MCNC: 200 MG/DL — HIGH (ref 70–99)
GLUCOSE BLDC GLUCOMTR-MCNC: 203 MG/DL — HIGH (ref 70–99)
GLUCOSE SERPL-MCNC: 173 MG/DL — HIGH (ref 70–115)
GLUCOSE UR QL: 100 MG/DL
HCT VFR BLD CALC: 27.8 % — LOW (ref 37–47)
HGB BLD-MCNC: 8.7 G/DL — LOW (ref 12–16)
KETONES UR-MCNC: NEGATIVE — SIGNIFICANT CHANGE UP
LEUKOCYTE ESTERASE UR-ACNC: ABNORMAL
MCHC RBC-ENTMCNC: 28.7 PG — SIGNIFICANT CHANGE UP (ref 27–31)
MCHC RBC-ENTMCNC: 31.3 G/DL — LOW (ref 32–36)
MCV RBC AUTO: 91.7 FL — SIGNIFICANT CHANGE UP (ref 81–99)
NITRITE UR-MCNC: NEGATIVE — SIGNIFICANT CHANGE UP
PH UR: 7 — SIGNIFICANT CHANGE UP (ref 5–8)
PLATELET # BLD AUTO: 450 K/UL — HIGH (ref 150–400)
POTASSIUM SERPL-MCNC: 4.3 MMOL/L — SIGNIFICANT CHANGE UP (ref 3.5–5.3)
POTASSIUM SERPL-SCNC: 4.3 MMOL/L — SIGNIFICANT CHANGE UP (ref 3.5–5.3)
PROT UR-MCNC: 30 MG/DL
PTH-INTACT FLD-MCNC: 285 PG/ML — HIGH (ref 15–65)
RBC # BLD: 3.03 M/UL — LOW (ref 4.4–5.2)
RBC # FLD: 17.8 % — HIGH (ref 11–15.6)
RBC CASTS # UR COMP ASSIST: SIGNIFICANT CHANGE UP /HPF (ref 0–4)
SODIUM SERPL-SCNC: 139 MMOL/L — SIGNIFICANT CHANGE UP (ref 135–145)
SP GR SPEC: 1.01 — SIGNIFICANT CHANGE UP (ref 1.01–1.02)
UROBILINOGEN FLD QL: NEGATIVE MG/DL — SIGNIFICANT CHANGE UP
WBC # BLD: 20.9 K/UL — HIGH (ref 4.8–10.8)
WBC # FLD AUTO: 20.9 K/UL — HIGH (ref 4.8–10.8)
WBC UR QL: SIGNIFICANT CHANGE UP

## 2019-04-14 PROCEDURE — 99233 SBSQ HOSP IP/OBS HIGH 50: CPT

## 2019-04-14 PROCEDURE — 99232 SBSQ HOSP IP/OBS MODERATE 35: CPT

## 2019-04-14 RX ORDER — CALCIUM GLUCONATE 100 MG/ML
1 VIAL (ML) INTRAVENOUS ONCE
Qty: 0 | Refills: 0 | Status: DISCONTINUED | OUTPATIENT
Start: 2019-04-15 | End: 2019-04-20

## 2019-04-14 RX ORDER — DIPHENHYDRAMINE HCL 50 MG
50 CAPSULE ORAL ONCE
Qty: 0 | Refills: 0 | Status: DISCONTINUED | OUTPATIENT
Start: 2019-04-15 | End: 2019-04-20

## 2019-04-14 RX ADMIN — Medication 100 GRAM(S): at 05:17

## 2019-04-14 RX ADMIN — Medication 2: at 12:49

## 2019-04-14 RX ADMIN — Medication 1: at 17:37

## 2019-04-14 RX ADMIN — Medication 25 MILLIGRAM(S): at 18:41

## 2019-04-14 RX ADMIN — Medication 1 MILLIGRAM(S): at 12:48

## 2019-04-14 RX ADMIN — Medication 1 TABLET(S): at 12:48

## 2019-04-14 RX ADMIN — Medication 100 MILLIGRAM(S): at 12:48

## 2019-04-14 RX ADMIN — Medication 40 MILLIGRAM(S): at 05:18

## 2019-04-14 RX ADMIN — Medication 25 MILLIGRAM(S): at 05:17

## 2019-04-14 RX ADMIN — Medication 100 GRAM(S): at 17:25

## 2019-04-14 RX ADMIN — Medication 1: at 22:12

## 2019-04-14 RX ADMIN — Medication 1: at 09:01

## 2019-04-14 RX ADMIN — AMLODIPINE BESYLATE 5 MILLIGRAM(S): 2.5 TABLET ORAL at 05:17

## 2019-04-14 NOTE — PROGRESS NOTE ADULT - SUBJECTIVE AND OBJECTIVE BOX
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  The patient is in no acute distress.  Denied any chest pain, palpitations, dyspnea, or abdominal pain.  Offers no complaints.    Vital Signs Last 24 Hrs  T(C): 37.3 (14 Apr 2019 07:25), Max: 37.3 (14 Apr 2019 07:25)  T(F): 99.2 (14 Apr 2019 07:25), Max: 99.2 (14 Apr 2019 07:25)  HR: 77 (14 Apr 2019 07:25) (77 - 86)  BP: 142/92 (14 Apr 2019 07:55) (115/73 - 157/99)  BP(mean): --  RR: 18 (14 Apr 2019 07:25) (18 - 19)  SpO2: 95% (14 Apr 2019 07:25) (95% - 100%)    CAPILLARY BLOOD GLUCOSE  POCT Blood Glucose.: 196 mg/dL (14 Apr 2019 08:52)  POCT Blood Glucose.: 210 mg/dL (13 Apr 2019 21:54)  POCT Blood Glucose.: 194 mg/dL (13 Apr 2019 17:14)  POCT Blood Glucose.: 220 mg/dL (13 Apr 2019 13:00)    PHYSICAL EXAMINATION:  ----------------------------------------  General appearance: No acute distress, Awake, Alert  HEENT: Normocephalic, Atraumatic, Conjunctiva clear, EOMI  Neck: Supple, No JVD, No tenderness, Right neck catheter  Lungs: Breath sound equal bilaterally, No wheezes, No rales  Cardiovascular: S1S2, Regular rhythm  Abdomen: Soft, Nontender, Nondistended, No guarding/rebound, Positive bowel sounds  Extremities: No clubbing, No cyanosis, No edema, No calf tenderness  Neuro: Strength equal bilaterally, No tremors  Psychiatric: Appropriate mood, Normal affect    LABORATORY STUDIES:  ----------------------------------------             8.7    20.9  )-----------( 450      ( 14 Apr 2019 07:22 )             27.8     04-14    139  |  104  |  29.0<H>  ----------------------------<  173<H>  4.3   |  22.0  |  1.47<H>    Ca    7.6<L>      14 Apr 2019 07:22  Phos  3.0     04-13  Mg     1.5     04-13    TPro  x   /  Alb  3.6  /  TBili  x   /  DBili  x   /  AST  x   /  ALT  x   /  AlkPhos  x   04-13    LIVER FUNCTIONS - ( 13 Apr 2019 19:09 )  Alb: 3.6 g/dL / Pro: x     / ALK PHOS: x     / ALT: x     / AST: x     / GGT: x           MEDICATIONS  (STANDING):  amLODIPine   Tablet 5 milliGRAM(s) Oral daily  calcium gluconate IVPB 1 Gram(s) IV Intermittent once  diphenhydrAMINE   Injectable 50 milliGRAM(s) IV Push once  folic acid 1 milliGRAM(s) Oral daily  influenza   Vaccine 0.5 milliLiter(s) IntraMuscular once  insulin lispro (HumaLOG) corrective regimen sliding scale   SubCutaneous Before meals and at bedtime  magnesium sulfate  IVPB 1 Gram(s) IV Intermittent every 8 hours  metoprolol tartrate 25 milliGRAM(s) Oral two times a day  multivitamin 1 Tablet(s) Oral daily  predniSONE   Tablet 40 milliGRAM(s) Oral daily  thiamine 100 milliGRAM(s) Oral daily    MEDICATIONS  (PRN):  acetaminophen   Tablet .. 650 milliGRAM(s) Oral every 6 hours PRN Temp greater or equal to 38C (100.4F), Mild Pain (1 - 3)  hydrALAZINE Injectable 5 milliGRAM(s) IV Push every 6 hours PRN if SBP > 165 or DBP > 100      ASSESSMENT / PLAN:  ----------------------------------------  Thrombotic thrombocytopenic purpura - On plasma exchange with improvement in renal function. On prednisone with plans for tapering. Hematology follow up in regards to duration of plasma exchange.    Acute kidney injury - Renal function improved. Potassium and magnesium previously supplemented for hypokalemia and hypomagnesemia.    Hypertension - Close blood pressure monitoring. On metoprolol and amlodipine.

## 2019-04-15 PROBLEM — Z00.00 ENCOUNTER FOR PREVENTIVE HEALTH EXAMINATION: Status: ACTIVE | Noted: 2019-04-15

## 2019-04-15 PROBLEM — Z78.9 OTHER SPECIFIED HEALTH STATUS: Chronic | Status: ACTIVE | Noted: 2019-04-05

## 2019-04-15 LAB
ANION GAP SERPL CALC-SCNC: 14 MMOL/L — SIGNIFICANT CHANGE UP (ref 5–17)
BUN SERPL-MCNC: 27 MG/DL — HIGH (ref 8–20)
CALCIUM SERPL-MCNC: 8.5 MG/DL — LOW (ref 8.6–10.2)
CHLORIDE SERPL-SCNC: 97 MMOL/L — LOW (ref 98–107)
CO2 SERPL-SCNC: 25 MMOL/L — SIGNIFICANT CHANGE UP (ref 22–29)
CREAT SERPL-MCNC: 1.43 MG/DL — HIGH (ref 0.5–1.3)
GLUCOSE BLDC GLUCOMTR-MCNC: 107 MG/DL — HIGH (ref 70–99)
GLUCOSE BLDC GLUCOMTR-MCNC: 160 MG/DL — HIGH (ref 70–99)
GLUCOSE BLDC GLUCOMTR-MCNC: 162 MG/DL — HIGH (ref 70–99)
GLUCOSE BLDC GLUCOMTR-MCNC: 354 MG/DL — HIGH (ref 70–99)
GLUCOSE SERPL-MCNC: 196 MG/DL — HIGH (ref 70–115)
POTASSIUM SERPL-MCNC: 4.4 MMOL/L — SIGNIFICANT CHANGE UP (ref 3.5–5.3)
POTASSIUM SERPL-SCNC: 4.4 MMOL/L — SIGNIFICANT CHANGE UP (ref 3.5–5.3)
SODIUM SERPL-SCNC: 136 MMOL/L — SIGNIFICANT CHANGE UP (ref 135–145)

## 2019-04-15 PROCEDURE — 99233 SBSQ HOSP IP/OBS HIGH 50: CPT

## 2019-04-15 PROCEDURE — 99232 SBSQ HOSP IP/OBS MODERATE 35: CPT

## 2019-04-15 RX ORDER — CHLORHEXIDINE GLUCONATE 213 G/1000ML
1 SOLUTION TOPICAL DAILY
Qty: 0 | Refills: 0 | Status: DISCONTINUED | OUTPATIENT
Start: 2019-04-15 | End: 2019-04-20

## 2019-04-15 RX ADMIN — Medication 1 MILLIGRAM(S): at 12:14

## 2019-04-15 RX ADMIN — Medication 25 MILLIGRAM(S): at 05:56

## 2019-04-15 RX ADMIN — Medication 1: at 08:45

## 2019-04-15 RX ADMIN — Medication 40 MILLIGRAM(S): at 05:56

## 2019-04-15 RX ADMIN — Medication 100 MILLIGRAM(S): at 12:14

## 2019-04-15 RX ADMIN — Medication 1: at 21:35

## 2019-04-15 RX ADMIN — Medication 1 TABLET(S): at 12:14

## 2019-04-15 RX ADMIN — CHLORHEXIDINE GLUCONATE 1 APPLICATION(S): 213 SOLUTION TOPICAL at 12:12

## 2019-04-15 RX ADMIN — Medication 5: at 12:14

## 2019-04-15 RX ADMIN — Medication 100 GRAM(S): at 01:04

## 2019-04-15 RX ADMIN — Medication 25 MILLIGRAM(S): at 17:33

## 2019-04-15 RX ADMIN — AMLODIPINE BESYLATE 5 MILLIGRAM(S): 2.5 TABLET ORAL at 05:56

## 2019-04-15 NOTE — PROGRESS NOTE ADULT - ASSESSMENT
1) TTP  2) HTN  3) LISSETT  4) Proteinuria  5) Microscopic hematuria      S/P PLEX   Improving renal fx  Biopsy proven  Continue current treatment  Stable from a renal standpoint - will sign off at this time. May recall if the need arises.

## 2019-04-15 NOTE — PROGRESS NOTE ADULT - SUBJECTIVE AND OBJECTIVE BOX
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  The patient is in no acute distress.  Denied any chest pain, palpitations, dyspnea, or abdominal pain.  Offers no complaints.    Vital Signs Last 24 Hrs  T(C): 37.3 (15 Apr 2019 07:00), Max: 37.3 (15 Apr 2019 07:00)  T(F): 99.2 (15 Apr 2019 07:00), Max: 99.2 (15 Apr 2019 07:00)  HR: 71 (15 Apr 2019 07:00) (71 - 83)  BP: 144/86 (15 Apr 2019 07:00) (139/88 - 150/90)  BP(mean): --  RR: 18 (15 Apr 2019 07:00) (18 - 18)  SpO2: 98% (15 Apr 2019 07:00) (95% - 98%)    CAPILLARY BLOOD GLUCOSE  POCT Blood Glucose.: 162 mg/dL (15 Apr 2019 08:07)  POCT Blood Glucose.: 200 mg/dL (2019 22:09)  POCT Blood Glucose.: 183 mg/dL (2019 17:32)  POCT Blood Glucose.: 203 mg/dL (2019 12:46)    PHYSICAL EXAMINATION:  ----------------------------------------  General appearance: No acute distress, Awake, Alert  HEENT: Normocephalic, Atraumatic, Conjunctiva clear, EOMI  Neck: Supple, No JVD, No tenderness, Right neck catheter  Lungs: Breath sound equal bilaterally, No wheezes, No rales  Cardiovascular: S1S2, Regular rhythm  Abdomen: Soft, Nontender, Nondistended, No guarding/rebound, Positive bowel sounds  Extremities: No clubbing, No cyanosis, No edema, No calf tenderness  Neuro: Strength equal bilaterally, No tremors  Psychiatric: Appropriate mood, Normal affect    LABORATORY STUDIES:  ----------------------------------------             8.7    20.9  )-----------( 450      ( 2019 07:22 )             27.8     04-14    139  |  104  |  29.0<H>  ----------------------------<  173<H>  4.3   |  22.0  |  1.47<H>    Ca    7.6<L>      2019 07:22  Phos  3.0     -    TPro  x   /  Alb  3.6  /  TBili  x   /  DBili  x   /  AST  x   /  ALT  x   /  AlkPhos  x       LIVER FUNCTIONS - ( 2019 19:09 )  Alb: 3.6 g/dL / Pro: x     / ALK PHOS: x     / ALT: x     / AST: x     / GGT: x           Urinalysis Basic - ( 2019 17:35 )  Color: Yellow / Appearance: Clear / S.010 / pH: x  Gluc: x / Ketone: Negative  / Bili: Negative / Urobili: Negative mg/dL   Blood: x / Protein: 30 mg/dL / Nitrite: Negative   Leuk Esterase: Trace / RBC: 0-2 /HPF / WBC 0-2   Sq Epi: x / Non Sq Epi: Occasional / Bacteria: x    MEDICATIONS  (STANDING):  amLODIPine   Tablet 5 milliGRAM(s) Oral daily  calcium gluconate IVPB 1 Gram(s) IV Intermittent once  calcium gluconate IVPB 1 Gram(s) IV Intermittent once  chlorhexidine 2% Cloths 1 Application(s) Topical daily  diphenhydrAMINE   Injectable 50 milliGRAM(s) IV Push once  diphenhydrAMINE   Injectable 50 milliGRAM(s) IV Push once  folic acid 1 milliGRAM(s) Oral daily  influenza   Vaccine 0.5 milliLiter(s) IntraMuscular once  insulin lispro (HumaLOG) corrective regimen sliding scale   SubCutaneous Before meals and at bedtime  metoprolol tartrate 25 milliGRAM(s) Oral two times a day  multivitamin 1 Tablet(s) Oral daily  predniSONE   Tablet 40 milliGRAM(s) Oral daily  thiamine 100 milliGRAM(s) Oral daily    MEDICATIONS  (PRN):  acetaminophen   Tablet .. 650 milliGRAM(s) Oral every 6 hours PRN Temp greater or equal to 38C (100.4F), Mild Pain (1 - 3)  hydrALAZINE Injectable 5 milliGRAM(s) IV Push every 6 hours PRN if SBP > 165 or DBP > 100      ASSESSMENT / PLAN:  ----------------------------------------  Thrombotic thrombocytopenic purpura - Status post plasma exchange with improvement in renal function. On prednisone with plans for tapering. Hematology follow up in regards to duration of plasma exchange.    Acute kidney injury - Renal function improved. Potassium and magnesium previously supplemented for hypokalemia and hypomagnesemia. Discussed with Nephrology.    Hypertension - Close blood pressure monitoring. On metoprolol and amlodipine.

## 2019-04-16 DIAGNOSIS — M31.1 THROMBOTIC MICROANGIOPATHY: ICD-10-CM

## 2019-04-16 DIAGNOSIS — N17.9 ACUTE KIDNEY FAILURE, UNSPECIFIED: ICD-10-CM

## 2019-04-16 LAB
ANION GAP SERPL CALC-SCNC: 11 MMOL/L — SIGNIFICANT CHANGE UP (ref 5–17)
BUN SERPL-MCNC: 29 MG/DL — HIGH (ref 8–20)
CALCIUM SERPL-MCNC: 8 MG/DL — LOW (ref 8.6–10.2)
CHLORIDE SERPL-SCNC: 100 MMOL/L — SIGNIFICANT CHANGE UP (ref 98–107)
CO2 SERPL-SCNC: 23 MMOL/L — SIGNIFICANT CHANGE UP (ref 22–29)
CREAT SERPL-MCNC: 1.49 MG/DL — HIGH (ref 0.5–1.3)
GLUCOSE BLDC GLUCOMTR-MCNC: 114 MG/DL — HIGH (ref 70–99)
GLUCOSE BLDC GLUCOMTR-MCNC: 169 MG/DL — HIGH (ref 70–99)
GLUCOSE BLDC GLUCOMTR-MCNC: 196 MG/DL — HIGH (ref 70–99)
GLUCOSE BLDC GLUCOMTR-MCNC: 208 MG/DL — HIGH (ref 70–99)
GLUCOSE SERPL-MCNC: 151 MG/DL — HIGH (ref 70–115)
HCT VFR BLD CALC: 26.7 % — LOW (ref 37–47)
HGB BLD-MCNC: 8.4 G/DL — LOW (ref 12–16)
MCHC RBC-ENTMCNC: 28.5 PG — SIGNIFICANT CHANGE UP (ref 27–31)
MCHC RBC-ENTMCNC: 31.5 G/DL — LOW (ref 32–36)
MCV RBC AUTO: 90.5 FL — SIGNIFICANT CHANGE UP (ref 81–99)
PLATELET # BLD AUTO: 497 K/UL — HIGH (ref 150–400)
POTASSIUM SERPL-MCNC: 3.6 MMOL/L — SIGNIFICANT CHANGE UP (ref 3.5–5.3)
POTASSIUM SERPL-SCNC: 3.6 MMOL/L — SIGNIFICANT CHANGE UP (ref 3.5–5.3)
RBC # BLD: 2.95 M/UL — LOW (ref 4.4–5.2)
RBC # FLD: 17.3 % — HIGH (ref 11–15.6)
SODIUM SERPL-SCNC: 134 MMOL/L — LOW (ref 135–145)
WBC # BLD: 24.4 K/UL — HIGH (ref 4.8–10.8)
WBC # FLD AUTO: 24.4 K/UL — HIGH (ref 4.8–10.8)

## 2019-04-16 PROCEDURE — 99232 SBSQ HOSP IP/OBS MODERATE 35: CPT

## 2019-04-16 RX ORDER — DIPHENHYDRAMINE HCL 50 MG
50 CAPSULE ORAL ONCE
Qty: 0 | Refills: 0 | Status: DISCONTINUED | OUTPATIENT
Start: 2019-04-17 | End: 2019-04-20

## 2019-04-16 RX ORDER — CALCIUM GLUCONATE 100 MG/ML
1 VIAL (ML) INTRAVENOUS ONCE
Qty: 0 | Refills: 0 | Status: DISCONTINUED | OUTPATIENT
Start: 2019-04-17 | End: 2019-04-20

## 2019-04-16 RX ADMIN — Medication 25 MILLIGRAM(S): at 05:56

## 2019-04-16 RX ADMIN — Medication 30 MILLIGRAM(S): at 05:56

## 2019-04-16 RX ADMIN — Medication 1 TABLET(S): at 12:09

## 2019-04-16 RX ADMIN — Medication 1 MILLIGRAM(S): at 12:09

## 2019-04-16 RX ADMIN — Medication 2: at 12:10

## 2019-04-16 RX ADMIN — Medication 40 MILLIGRAM(S): at 21:57

## 2019-04-16 RX ADMIN — Medication 25 MILLIGRAM(S): at 17:41

## 2019-04-16 RX ADMIN — Medication 100 MILLIGRAM(S): at 12:09

## 2019-04-16 RX ADMIN — AMLODIPINE BESYLATE 5 MILLIGRAM(S): 2.5 TABLET ORAL at 05:56

## 2019-04-16 RX ADMIN — CHLORHEXIDINE GLUCONATE 1 APPLICATION(S): 213 SOLUTION TOPICAL at 12:10

## 2019-04-16 RX ADMIN — Medication 1: at 21:56

## 2019-04-16 RX ADMIN — Medication 1: at 08:15

## 2019-04-16 RX ADMIN — Medication 40 MILLIGRAM(S): at 17:43

## 2019-04-16 NOTE — PROGRESS NOTE ADULT - ASSESSMENT
38 year old female admitted with confusion, blurry vision, ARF, severely thrombocytopenia - diagnosed with TTP. ADAMTS 13 activity <2%.  Kidney biopsy c/w chronic thrombotic microangiopathy. 38 year old female admitted with confusion, blurry vision, ARF, severely thrombocytopenia - diagnosed with TTP. ADAMTS 13 activity <2%, ADAMTS 13 antibody is 70-80 (high(.  Kidney biopsy c/w chronic thrombotic microangiopathy.  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    Platelets have recovered however her Cr has stabilized and is not dropping below 1.4    Recommend another 2-3 sessions of PLEX  Increase Prednisone to 1 mg/kg daily until PLEX is stopped, can taper over 3 weeks after that  Repeat ADAMTS 13 activity and antibody today  Will follow

## 2019-04-16 NOTE — PROGRESS NOTE ADULT - SUBJECTIVE AND OBJECTIVE BOX
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  The patient is in no acute distress.  Denied any chest pain, palpitations, dyspnea, or abdominal pain.  Offers no complaints.    Vital Signs Last 24 Hrs  T(C): 36.9 (15 Apr 2019 15:05), Max: 36.9 (15 Apr 2019 15:05)  T(F): 98.4 (15 Apr 2019 15:05), Max: 98.4 (15 Apr 2019 15:05)  HR: 77 (2019 05:54) (77 - 92)  BP: 138/92 (2019 05:54) (128/79 - 156/92)  BP(mean): --  RR: 18 (15 Apr 2019 15:05) (18 - 18)  SpO2: 100% (15 Apr 2019 15:05) (100% - 100%)    CAPILLARY BLOOD GLUCOSE  POCT Blood Glucose.: 208 mg/dL (2019 12:02)  POCT Blood Glucose.: 169 mg/dL (2019 07:58)  POCT Blood Glucose.: 160 mg/dL (15 Apr 2019 21:33)  POCT Blood Glucose.: 107 mg/dL (15 Apr 2019 17:30)    PHYSICAL EXAMINATION:  ----------------------------------------  General appearance: No acute distress, Awake, Alert  HEENT: Normocephalic, Atraumatic, Conjunctiva clear, EOMI  Neck: Supple, No JVD, No tenderness, Right neck catheter  Lungs: Breath sound equal bilaterally, No wheezes, No rales  Cardiovascular: S1S2, Regular rhythm  Abdomen: Soft, Nontender, Nondistended, No guarding/rebound, Positive bowel sounds  Extremities: No clubbing, No cyanosis, No edema, No calf tenderness  Neuro: Strength equal bilaterally, No tremors  Psychiatric: Appropriate mood, Normal affect    LABORATORY STUDIES:  ----------------------------------------             8.4    24.4  )-----------( 497      ( 2019 07:23 )             26.7     04-16    134<L>  |  100  |  29.0<H>  ----------------------------<  151<H>  3.6   |  23.0  |  1.49<H>    Ca    8.0<L>      2019 07:23    Urinalysis Basic - ( 2019 17:35 )  Color: Yellow / Appearance: Clear / S.010 / pH: x  Gluc: x / Ketone: Negative  / Bili: Negative / Urobili: Negative mg/dL   Blood: x / Protein: 30 mg/dL / Nitrite: Negative   Leuk Esterase: Trace / RBC: 0-2 /HPF / WBC 0-2   Sq Epi: x / Non Sq Epi: Occasional / Bacteria: x    MEDICATIONS  (STANDING):  amLODIPine   Tablet 5 milliGRAM(s) Oral daily  calcium gluconate IVPB 1 Gram(s) IV Intermittent once  calcium gluconate IVPB 1 Gram(s) IV Intermittent once  chlorhexidine 2% Cloths 1 Application(s) Topical daily  diphenhydrAMINE   Injectable 50 milliGRAM(s) IV Push once  diphenhydrAMINE   Injectable 50 milliGRAM(s) IV Push once  folic acid 1 milliGRAM(s) Oral daily  influenza   Vaccine 0.5 milliLiter(s) IntraMuscular once  insulin lispro (HumaLOG) corrective regimen sliding scale   SubCutaneous Before meals and at bedtime  metoprolol tartrate 25 milliGRAM(s) Oral two times a day  multivitamin 1 Tablet(s) Oral daily  predniSONE   Tablet 40 milliGRAM(s) Oral three times a day  thiamine 100 milliGRAM(s) Oral daily    MEDICATIONS  (PRN):  acetaminophen   Tablet .. 650 milliGRAM(s) Oral every 6 hours PRN Temp greater or equal to 38C (100.4F), Mild Pain (1 - 3)  hydrALAZINE Injectable 5 milliGRAM(s) IV Push every 6 hours PRN if SBP > 165 or DBP > 100      ASSESSMENT / PLAN:  ----------------------------------------  Thrombotic thrombocytopenic purpura - Status post prior plasma exchange with improvement in renal function. Discussed with Hematology. To resume high dose prednisone and to resume plasma exchange. ADAMSTS 13 to be repeated to monitor activity.    Acute kidney injury - Renal function stable. Potassium and magnesium previously supplemented for hypokalemia and hypomagnesemia. To follow up with Nephrology on an outpatient basis.    Hypertension - Close blood pressure monitoring. On metoprolol and amlodipine.

## 2019-04-16 NOTE — PROGRESS NOTE ADULT - SUBJECTIVE AND OBJECTIVE BOX
REASON FOR CONSULTATION: TTP    INTERVAL HISTORY:  S/p PLEX x 10 doses  Afebrile    REVIEW OF SYSTEMS:      Allergies    penicillin (Hives)    Intolerances        MEDICATIONS  (STANDING):  amLODIPine   Tablet 5 milliGRAM(s) Oral daily  calcium gluconate IVPB 1 Gram(s) IV Intermittent once  calcium gluconate IVPB 1 Gram(s) IV Intermittent once  chlorhexidine 2% Cloths 1 Application(s) Topical daily  diphenhydrAMINE   Injectable 50 milliGRAM(s) IV Push once  diphenhydrAMINE   Injectable 50 milliGRAM(s) IV Push once  folic acid 1 milliGRAM(s) Oral daily  influenza   Vaccine 0.5 milliLiter(s) IntraMuscular once  insulin lispro (HumaLOG) corrective regimen sliding scale   SubCutaneous Before meals and at bedtime  metoprolol tartrate 25 milliGRAM(s) Oral two times a day  multivitamin 1 Tablet(s) Oral daily  predniSONE   Tablet 30 milliGRAM(s) Oral daily  thiamine 100 milliGRAM(s) Oral daily    MEDICATIONS  (PRN):  acetaminophen   Tablet .. 650 milliGRAM(s) Oral every 6 hours PRN Temp greater or equal to 38C (100.4F), Mild Pain (1 - 3)  hydrALAZINE Injectable 5 milliGRAM(s) IV Push every 6 hours PRN if SBP > 165 or DBP > 100      Vital Signs Last 24 Hrs  T(C): 36.9 (15 Apr 2019 15:05), Max: 36.9 (15 Apr 2019 15:05)  T(F): 98.4 (15 Apr 2019 15:05), Max: 98.4 (15 Apr 2019 15:05)  HR: 77 (2019 05:54) (77 - 92)  BP: 138/92 (2019 05:54) (128/79 - 156/92)  BP(mean): --  RR: 18 (15 Apr 2019 15:05) (18 - 18)  SpO2: 100% (15 Apr 2019 15:05) (100% - 100%)    PHYSICAL EXAM:    GENERAL: NAD, well-groomed, well-developed  HEAD:  Atraumatic, Normocephalic  EYES: EOMI, PERRLA, conjunctiva and sclera clear  ENMT: No tonsillar erythema, exudates, or enlargement; Moist mucous membranes, Good dentition, No lesions  NECK: Supple, No JVD, Normal thyroid  NERVOUS SYSTEM:  Alert & Oriented X3, Good concentration; Motor Strength 5/5 B/L upper and lower extremities; DTRs 2+ intact and symmetric  CHEST/LUNG: Clear to percussion bilaterally; No rales, rhonchi, wheezing, or rubs  HEART: Regular rate and rhythm; No murmurs, rubs, or gallops  ABDOMEN: Soft, Nontender, Nondistended; Bowel sounds present  EXTREMITIES:  2+ Peripheral Pulses, No clubbing, cyanosis, or edema  LYMPH: No lymphadenopathy noted  SKIN: No rashes or lesions      LABS:                        8.4    24.4  )-----------( 497      ( 2019 07:23 )             26.7     04-16    134<L>  |  100  |  29.0<H>  ----------------------------<  151<H>  3.6   |  23.0  |  1.49<H>    Ca    8.0<L>      2019 07:23        Urinalysis Basic - ( 2019 17:35 )    Color: Yellow / Appearance: Clear / S.010 / pH: x  Gluc: x / Ketone: Negative  / Bili: Negative / Urobili: Negative mg/dL   Blood: x / Protein: 30 mg/dL / Nitrite: Negative   Leuk Esterase: Trace / RBC: 0-2 /HPF / WBC 0-2   Sq Epi: x / Non Sq Epi: Occasional / Bacteria: x          RADIOLOGY & ADDITIONAL STUDIES:  < from: CT Abdomen and Pelvis No Cont (19 @ 17:08) >  Bilateral perinephric stranding otherwise unremarkable chest, abdomen and   pelvic CT pathology. Lungs clear.      < end of copied text >      Surgical Pathology Report (19 @ 08:45)    Surgical Pathology Report:   ACCESSION No:  [id number]  LOY SAMUEL                      3        Surgical Final Report          Final Diagnosis    Kidney, needle core biopsy    Severe obliterative arterial sclerosis, with mild segmental  glomerular endothelial injury, suggesting chronic thrombotic  angiopathy (see comment)    Acute tubular injury with focal tubular necrosis and mild  reactive interstitial inflammation (see comment)    Summary of chronic changes:  - Global glomerulosclerosis (5% of glomeruli)  - Tubular atrophy and interstitial fibrosis (5% of cortex)  - Severe obliterative arterial and arteriolar sclerosis    Verified by: Tia Haines MD  (Electronic Signature)  Reported on: 04/15/19 14:34 EDT, [address] [place] [address]  _________________________________________________________________    Comment    The preliminary findings were discussed with Dr. Marquis on  2019 at 10:20AM.    The changes seen in the vessels and in the glomeruli are most  likely the result of a chronic thrombotic microangiopathy. Signs  of currently active intravascular thrombosis are not seen in the  tissue examined. The term thrombotic microangiopathy is  relatively non-specific and  serves as a common histopathologic  diagnosis for a number of clinical conditions and diseases that  result in vascular or endothelial cell injury including the renal  involvement of the lupus anticoagulant or anti-phospholipid  antibody syndrome, scleroderma and the undifferentiated  connective tissue disorder or overlap syndrome, typical or  atypical hemolytic-uremic syndrome (HUS) and the thrombotic  thrombocytopenic purpura (TTP), renal complications during  pregnancy (severe pre-eclampsia, HELLP syndrome, post-partum  HUS), drugs with vascular toxicity (such as cocaine,  amphetamines, and decongestants), and toxicity of  chemotherapeutic and immunosuppressive regimen (gemcitabine, VEGF  inhibitors, mitomycin, bleomycin plus cisplatin, cyclosporine,  tacrolimus, OKT3).                LOY SAMUEL                      3        Surgical Final Report          Acute tubulointerstitial injury, with acute tubular necrosis and  mild reactive interstitial inflammation and edema, likely  represents acute ischemic and/or toxic injury to the kidney.    Microscopic Description    1. Light Microscopy:  Sections of formalin-fixed, paraffin embedded tissue were  evaluated using H&E, PAS, JMS, and trichrome stains. An H&E-  stained frozen section taken from the tissue allocated for  immunofluorescencemicroscopy and semi-thin toluidine blue-  stained epoxy sections of the tissue processed for electron  microscopy were also evaluated using light microscopy.    The sample submitted for light microscopy consists of renal  cortex and medulla, with up to 9 glomeruli. The entire biopsy  contains 21 glomeruli (LM-9; IF-7; EM-5), 1 of which is globally  sclerosed. The non-sclerosed glomeruli are of normal size and  cellularity. The glomerular capillary loops are of normal  thickness and texture, with segmental irregularities and signs of  remodeling in some capillaries. Significant endocapillary  proliferation is not present and cellular crescents are not seen  in glomeruli. The mesangium is mildly expanded by extracellular  matrix and increased numbers of mesangial cells. Tubules reveal  focal degenerative changes and flattening of the epithelium. Few  tubules contain necrotic cellular debris. The interstitium  reveals focal lymphocytic inflammation, associated with mild  edema. Approximately5% of the renal cortex shows tubular atrophy  and interstitial fibrosis. Arteries show severe sclerosis with  frequent narrowing or obliteration of the lumens. Arterioles show  severe sclerosis and hyalinosis.    2. Immunofluorescence Microscopy:  The sections of the sample submitted for immunofluorescence  studies were incubated with antibodies specific for the heavy  chains of IgG, IgA, and IgM, for kappa and lambda light chains,  fibrin, albumin, and complement components C3 and C1q. The  intensity of immunofluorescence reactivity is expressed on a  scale 1-  -4+.    The sample contains 7 glomeruli. There is 1 globally sclerosed  glomerulus. There is finely granular reactivity for IgA (1+),  kappa LC (trace) and lambda LC (trace) in the mesangium. Tubules  contain few intraluminal casts reactive for polyclonal IgA. The  interstitium reveals scattered fibrin deposits. There is no  difference in reactivity between kappa and lambda light chains in  the glomeruli, tubular casts, or in the background of the tissue.              LOY SAMUEL                      3        Surgical Final Report          3. Electron Microscopy:  The sample submitted for electron microscopy examination contains  5 glomeruli; 2 glomeruli are examined ultrastructurally. The  glomerular visceral epithelial cells reveal mild segmental  effacement of their foot processes. The glomerular basement  membranes are of normal thickness and texture. The subendothelial  space of the basement membrane is segmentally expanded by  electron-lucent fluffy material. The endothelial cells show focal  swelling. The mesangium reveals normal cellular elements and a  mildly increased amount of matrix. An isolated segment with a few  small mesangial electron dense deposits is noted; there are no  well formed capillary wall or mesangial deposits in the rest of  glomerular segments.    Clinical History    The patient is a 38 year old female with no significant past  medical history who presented to the ER on  with complaints of  blurred vision. She was found to have severe thrombocytopenia  with LISSETT and metabolic acidosis and hypertension. Hematology was  consulted and she was started on PO prednisone. Peripheral smear  showed schistocytes with elevated LDH and low haptoglobin with  worsening renal failure and thrombocytopenia. She was transferred  to the ICU for initiation of plasmapheresis. [id number] is below 2.    Specimen(s) Submitted    Right kidney    Gross Description    The specimen submitted for light microscopy is received in  formalin and the specimen container is labeled: Right kidney bx.  It consists of 1 fragment of cylindrical pink-tan soft tissue  measuring 1.7 cm in length and 0.1 cm in diameter; this specimen  is entirely submitted in one cassette for paraffin processing.  Also received is a specimen submitted for electron microscopy in  glutaraldehyde (1 fragment, 0.7 cm in length) and a specimen  submitted for immunofluorescence microscopy in transport medium  (1 fragment, 0.6 cm in length).    In addition to other data that may appear on the specimen  container, the label has been inspected to confirm the presence  of the patient's name and date of birth.    Joann Cosme 2019 16:25 REASON FOR CONSULTATION: TTP    INTERVAL HISTORY:  S/p PLEX multiple sessions  Afebrile  Feels very well. No confusion.  No N/V/D/C    REVIEW OF SYSTEMS:  negative except as reviewed above    Allergies    penicillin (Hives)    Intolerances        MEDICATIONS  (STANDING):  amLODIPine   Tablet 5 milliGRAM(s) Oral daily  calcium gluconate IVPB 1 Gram(s) IV Intermittent once  calcium gluconate IVPB 1 Gram(s) IV Intermittent once  chlorhexidine 2% Cloths 1 Application(s) Topical daily  diphenhydrAMINE   Injectable 50 milliGRAM(s) IV Push once  diphenhydrAMINE   Injectable 50 milliGRAM(s) IV Push once  folic acid 1 milliGRAM(s) Oral daily  influenza   Vaccine 0.5 milliLiter(s) IntraMuscular once  insulin lispro (HumaLOG) corrective regimen sliding scale   SubCutaneous Before meals and at bedtime  metoprolol tartrate 25 milliGRAM(s) Oral two times a day  multivitamin 1 Tablet(s) Oral daily  predniSONE   Tablet 30 milliGRAM(s) Oral daily  thiamine 100 milliGRAM(s) Oral daily    MEDICATIONS  (PRN):  acetaminophen   Tablet .. 650 milliGRAM(s) Oral every 6 hours PRN Temp greater or equal to 38C (100.4F), Mild Pain (1 - 3)  hydrALAZINE Injectable 5 milliGRAM(s) IV Push every 6 hours PRN if SBP > 165 or DBP > 100      Vital Signs Last 24 Hrs  T(C): 36.9 (15 Apr 2019 15:05), Max: 36.9 (15 Apr 2019 15:05)  T(F): 98.4 (15 Apr 2019 15:05), Max: 98.4 (15 Apr 2019 15:05)  HR: 77 (2019 05:54) (77 - 92)  BP: 138/92 (2019 05:54) (128/79 - 156/92)  BP(mean): --  RR: 18 (15 Apr 2019 15:05) (18 - 18)  SpO2: 100% (15 Apr 2019 15:05) (100% - 100%)    PHYSICAL EXAM:    GENERAL: NAD, well-groomed, well-developed  HEAD:  Atraumatic, Normocephalic  EYES: EOMI, PERRLA, conjunctiva and sclera clear  NECK: Supple,   NERVOUS SYSTEM:  Alert & Oriented X3, Good concentration;   CHEST/LUNG: Clear bilaterally;  HEART: Regular rate and rhythm;   ABDOMEN: Soft,obese  EXTREMITIES:  no edema  LYMPH: No lymphadenopathy noted  SKIN: No rashes or lesions      LABS:                        8.4    24.4  )-----------( 497      ( 2019 07:23 )             26.7     04-16    134<L>  |  100  |  29.0<H>  ----------------------------<  151<H>  3.6   |  23.0  |  1.49<H>    Ca    8.0<L>      2019 07:23        Urinalysis Basic - ( 2019 17:35 )    Color: Yellow / Appearance: Clear / S.010 / pH: x  Gluc: x / Ketone: Negative  / Bili: Negative / Urobili: Negative mg/dL   Blood: x / Protein: 30 mg/dL / Nitrite: Negative   Leuk Esterase: Trace / RBC: 0-2 /HPF / WBC 0-2   Sq Epi: x / Non Sq Epi: Occasional / Bacteria: x          RADIOLOGY & ADDITIONAL STUDIES:  < from: CT Abdomen and Pelvis No Cont (19 @ 17:08) >  Bilateral perinephric stranding otherwise unremarkable chest, abdomen and   pelvic CT pathology. Lungs clear.      < end of copied text >      Surgical Pathology Report (19 @ 08:45)    Surgical Pathology Report:   ACCESSION No:  [id number]  LOY SAMUEL                      3        Surgical Final Report          Final Diagnosis    Kidney, needle core biopsy    Severe obliterative arterial sclerosis, with mild segmental  glomerular endothelial injury, suggesting chronic thrombotic  angiopathy (see comment)    Acute tubular injury with focal tubular necrosis and mild  reactive interstitial inflammation (see comment)    Summary of chronic changes:  - Global glomerulosclerosis (5% of glomeruli)  - Tubular atrophy and interstitial fibrosis (5% of cortex)  - Severe obliterative arterial and arteriolar sclerosis    Verified by: Tia Haines MD  (Electronic Signature)  Reported on: 04/15/19 14:34 EDT, [address] [place] [id number]  _________________________________________________________________    Comment    The preliminary findings were discussed with Dr. Marquis on  2019 at 10:20AM.    The changes seen in the vessels and in the glomeruli are most  likely the result of a chronic thrombotic microangiopathy. Signs  of currently active intravascular thrombosis are not seen in the  tissue examined. The term thrombotic microangiopathy is  relatively non-specific and  serves as a common histopathologic  diagnosis for a number of clinical conditions and diseases that  result in vascular or endothelial cell injury including the renal  involvement of the lupus anticoagulant or anti-phospholipid  antibody syndrome, scleroderma and the undifferentiated  connective tissue disorder or overlap syndrome, typical or  atypical hemolytic-uremic syndrome (HUS) and the thrombotic  thrombocytopenic purpura (TTP), renal complications during  pregnancy (severe pre-eclampsia, HELLP syndrome, post-partum  HUS), drugs with vascular toxicity (such as cocaine,  amphetamines, and decongestants), and toxicity of  chemotherapeutic and immunosuppressive regimen (gemcitabine, VEGF  inhibitors, mitomycin, bleomycin plus cisplatin, cyclosporine,  tacrolimus, OKT3).                LOY SAMUEL                      3        Surgical Final Report          Acute tubulointerstitial injury, with acute tubular necrosis and  mild reactive interstitial inflammation and edema, likely  represents acute ischemic and/or toxic injury to the kidney.    Microscopic Description    1. Light Microscopy:  Sections of formalin-fixed, paraffin embedded tissue were  evaluated using H&E, PAS, JMS, and trichrome stains. An H&E-  stained frozen section taken from the tissue allocated for  immunofluorescencemicroscopy and semi-thin toluidine blue-  stained epoxy sections of the tissue processed for electron  microscopy were also evaluated using light microscopy.    The sample submitted for light microscopy consists of renal  cortex and medulla, with up to 9 glomeruli. The entire biopsy  contains 21 glomeruli (LM-9; IF-7; EM-5), 1 of which is globally  sclerosed. The non-sclerosed glomeruli are of normal size and  cellularity. The glomerular capillary loops are of normal  thickness and texture, with segmental irregularities and signs of  remodeling in some capillaries. Significant endocapillary  proliferation is not present and cellular crescents are not seen  in glomeruli. The mesangium is mildly expanded by extracellular  matrix and increased numbers of mesangial cells. Tubules reveal  focal degenerative changes and flattening of the epithelium. Few  tubules contain necrotic cellular debris. The interstitium  reveals focal lymphocytic inflammation, associated with mild  edema. Approximately5% of the renal cortex shows tubular atrophy  and interstitial fibrosis. Arteries show severe sclerosis with  frequent narrowing or obliteration of the lumens. Arterioles show  severe sclerosis and hyalinosis.    2. Immunofluorescence Microscopy:  The sections of the sample submitted for immunofluorescence  studies were incubated with antibodies specific for the heavy  chains of IgG, IgA, and IgM, for kappa and lambda light chains,  fibrin, albumin, and complement components C3 and C1q. The  intensity of immunofluorescence reactivity is expressed on a  scale 1-  -4+.    The sample contains 7 glomeruli. There is 1 globally sclerosed  glomerulus. There is finely granular reactivity for IgA (1+),  kappa LC (trace) and lambda LC (trace) in the mesangium. Tubules  contain few intraluminal casts reactive for polyclonal IgA. The  interstitium reveals scattered fibrin deposits. There is no  difference in reactivity between kappa and lambda light chains in  the glomeruli, tubular casts, or in the background of the tissue.              LOY SAMUEL                      3        Surgical Final Report          3. Electron Microscopy:  The sample submitted for electron microscopy examination contains  5 glomeruli; 2 glomeruli are examined ultrastructurally. The  glomerular visceral epithelial cells reveal mild segmental  effacement of their foot processes. The glomerular basement  membranes are of normal thickness and texture. The subendothelial  space of the basement membrane is segmentally expanded by  electron-lucent fluffy material. The endothelial cells show focal  swelling. The mesangium reveals normal cellular elements and a  mildly increased amount of matrix. An isolated segment with a few  small mesangial electron dense deposits is noted; there are no  well formed capillary wall or mesangial deposits in the rest of  glomerular segments.    Clinical History    The patient is a 38 year old female with no significant past  medical history who presented to the ER on  with complaints of  blurred vision. She was found to have severe thrombocytopenia  with LISSETT and metabolic acidosis and hypertension. Hematology was  consulted and she was started on PO prednisone. Peripheral smear  showed schistocytes with elevated LDH and low haptoglobin with  worsening renal failure and thrombocytopenia. She was transferred  to the ICU for initiation of plasmapheresis. [id number] is below 2.    Specimen(s) Submitted    Right kidney    Gross Description    The specimen submitted for light microscopy is received in  formalin and the specimen container is labeled: Right kidney bx.  It consists of 1 fragment of cylindrical pink-tan soft tissue  measuring 1.7 cm in length and 0.1 cm in diameter; this specimen  is entirely submitted in one cassette for paraffin processing.  Also received is a specimen submitted for electron microscopy in  glutaraldehyde (1 fragment, 0.7 cm in length) and a specimen  submitted for immunofluorescence microscopy in transport medium  (1 fragment, 0.6 cm in length).    In addition to other data that may appear on the specimen  container, the label has been inspected to confirm the presence  of the patient's name and date of birth.    Joann Cosme 2019 16:25

## 2019-04-17 LAB
GLUCOSE BLDC GLUCOMTR-MCNC: 199 MG/DL — HIGH (ref 70–99)
GLUCOSE BLDC GLUCOMTR-MCNC: 264 MG/DL — HIGH (ref 70–99)
GLUCOSE BLDC GLUCOMTR-MCNC: 269 MG/DL — HIGH (ref 70–99)
GLUCOSE BLDC GLUCOMTR-MCNC: 299 MG/DL — HIGH (ref 70–99)
LDH SERPL L TO P-CCNC: 256 U/L — HIGH (ref 98–192)

## 2019-04-17 PROCEDURE — 99232 SBSQ HOSP IP/OBS MODERATE 35: CPT

## 2019-04-17 RX ADMIN — CHLORHEXIDINE GLUCONATE 1 APPLICATION(S): 213 SOLUTION TOPICAL at 13:09

## 2019-04-17 RX ADMIN — Medication 1: at 09:01

## 2019-04-17 RX ADMIN — Medication 1 MILLIGRAM(S): at 13:04

## 2019-04-17 RX ADMIN — Medication 3: at 22:43

## 2019-04-17 RX ADMIN — Medication 40 MILLIGRAM(S): at 06:07

## 2019-04-17 RX ADMIN — Medication 100 MILLIGRAM(S): at 13:04

## 2019-04-17 RX ADMIN — Medication 1 TABLET(S): at 13:04

## 2019-04-17 RX ADMIN — Medication 25 MILLIGRAM(S): at 17:43

## 2019-04-17 RX ADMIN — Medication 40 MILLIGRAM(S): at 22:37

## 2019-04-17 RX ADMIN — Medication 3: at 13:02

## 2019-04-17 RX ADMIN — AMLODIPINE BESYLATE 5 MILLIGRAM(S): 2.5 TABLET ORAL at 06:07

## 2019-04-17 RX ADMIN — Medication 50 MILLIGRAM(S): at 13:06

## 2019-04-17 RX ADMIN — Medication 3: at 17:40

## 2019-04-17 RX ADMIN — Medication 25 MILLIGRAM(S): at 06:06

## 2019-04-17 RX ADMIN — Medication 40 MILLIGRAM(S): at 13:48

## 2019-04-17 NOTE — PROGRESS NOTE ADULT - SUBJECTIVE AND OBJECTIVE BOX
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  The patient is in no acute distress.  Denied any chest pain, palpitations, dyspnea, or abdominal pain.  Offers no complaints.    Vital Signs Last 24 Hrs  T(C): 36.6 (17 Apr 2019 08:00), Max: 36.6 (17 Apr 2019 08:00)  T(F): 97.8 (17 Apr 2019 08:00), Max: 97.8 (17 Apr 2019 08:00)  HR: 78 (17 Apr 2019 08:00) (78 - 90)  BP: 152/78 (17 Apr 2019 08:00) (149/83 - 167/100)  BP(mean): --  RR: 18 (17 Apr 2019 08:00) (18 - 18)  SpO2: 98% (17 Apr 2019 08:00) (98% - 99%)    CAPILLARY BLOOD GLUCOSE  POCT Blood Glucose.: 299 mg/dL (17 Apr 2019 12:17)  POCT Blood Glucose.: 199 mg/dL (17 Apr 2019 08:59)  POCT Blood Glucose.: 196 mg/dL (16 Apr 2019 21:55)  POCT Blood Glucose.: 114 mg/dL (16 Apr 2019 17:11)    PHYSICAL EXAMINATION:  ----------------------------------------  General appearance: No acute distress, Awake, Alert  HEENT: Normocephalic, Atraumatic, Conjunctiva clear, EOMI  Neck: Supple, No JVD, No tenderness, Right neck catheter  Lungs: Breath sound equal bilaterally, No wheezes, No rales  Cardiovascular: S1S2, Regular rhythm  Abdomen: Soft, Nontender, Nondistended, No guarding/rebound, Positive bowel sounds  Extremities: No clubbing, No cyanosis, No edema, No calf tenderness  Neuro: Strength equal bilaterally, No tremors  Psychiatric: Appropriate mood, Normal affect    LABORATORY STUDIES:  ----------------------------------------             8.4    24.4  )-----------( 497      ( 16 Apr 2019 07:23 )             26.7     04-16    134<L>  |  100  |  29.0<H>  ----------------------------<  151<H>  3.6   |  23.0  |  1.49<H>    Ca    8.0<L>      16 Apr 2019 07:23    MEDICATIONS  (STANDING):  amLODIPine   Tablet 5 milliGRAM(s) Oral daily  calcium gluconate IVPB 1 Gram(s) IV Intermittent once  calcium gluconate IVPB 1 Gram(s) IV Intermittent once  calcium gluconate IVPB 1 Gram(s) IV Intermittent once  chlorhexidine 2% Cloths 1 Application(s) Topical daily  diphenhydrAMINE   Injectable 50 milliGRAM(s) IV Push once  diphenhydrAMINE   Injectable 50 milliGRAM(s) IV Push once  diphenhydrAMINE   Injectable 50 milliGRAM(s) IV Push once  folic acid 1 milliGRAM(s) Oral daily  influenza   Vaccine 0.5 milliLiter(s) IntraMuscular once  insulin lispro (HumaLOG) corrective regimen sliding scale   SubCutaneous Before meals and at bedtime  metoprolol tartrate 25 milliGRAM(s) Oral two times a day  multivitamin 1 Tablet(s) Oral daily  predniSONE   Tablet 40 milliGRAM(s) Oral three times a day  thiamine 100 milliGRAM(s) Oral daily    MEDICATIONS  (PRN):  acetaminophen   Tablet .. 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 Awaiting repeat ADAMSTS 13 activity results. Follow up with Hematology.    Acute kidney injury - Repeat laboratory studies ordered to monitor.    Hypertension - Close blood pressure monitoring. On metoprolol and amlodipine.

## 2019-04-18 LAB
ANION GAP SERPL CALC-SCNC: 12 MMOL/L — SIGNIFICANT CHANGE UP (ref 5–17)
BUN SERPL-MCNC: 28 MG/DL — HIGH (ref 8–20)
CALCIUM SERPL-MCNC: 9 MG/DL — SIGNIFICANT CHANGE UP (ref 8.6–10.2)
CHLORIDE SERPL-SCNC: 101 MMOL/L — SIGNIFICANT CHANGE UP (ref 98–107)
CO2 SERPL-SCNC: 26 MMOL/L — SIGNIFICANT CHANGE UP (ref 22–29)
CREAT SERPL-MCNC: 1.34 MG/DL — HIGH (ref 0.5–1.3)
GLUCOSE BLDC GLUCOMTR-MCNC: 182 MG/DL — HIGH (ref 70–99)
GLUCOSE BLDC GLUCOMTR-MCNC: 192 MG/DL — HIGH (ref 70–99)
GLUCOSE BLDC GLUCOMTR-MCNC: 195 MG/DL — HIGH (ref 70–99)
GLUCOSE BLDC GLUCOMTR-MCNC: 256 MG/DL — HIGH (ref 70–99)
GLUCOSE SERPL-MCNC: 261 MG/DL — HIGH (ref 70–115)
HCT VFR BLD CALC: 28 % — LOW (ref 37–47)
HGB BLD-MCNC: 9 G/DL — LOW (ref 12–16)
MCHC RBC-ENTMCNC: 29 PG — SIGNIFICANT CHANGE UP (ref 27–31)
MCHC RBC-ENTMCNC: 32.1 G/DL — SIGNIFICANT CHANGE UP (ref 32–36)
MCV RBC AUTO: 90.3 FL — SIGNIFICANT CHANGE UP (ref 81–99)
PLATELET # BLD AUTO: 552 K/UL — HIGH (ref 150–400)
POTASSIUM SERPL-MCNC: 4.8 MMOL/L — SIGNIFICANT CHANGE UP (ref 3.5–5.3)
POTASSIUM SERPL-SCNC: 4.8 MMOL/L — SIGNIFICANT CHANGE UP (ref 3.5–5.3)
RBC # BLD: 3.1 M/UL — LOW (ref 4.4–5.2)
RBC # FLD: 17.6 % — HIGH (ref 11–15.6)
SODIUM SERPL-SCNC: 139 MMOL/L — SIGNIFICANT CHANGE UP (ref 135–145)
WBC # BLD: 29 K/UL — HIGH (ref 4.8–10.8)
WBC # FLD AUTO: 29 K/UL — HIGH (ref 4.8–10.8)

## 2019-04-18 PROCEDURE — 99232 SBSQ HOSP IP/OBS MODERATE 35: CPT

## 2019-04-18 RX ORDER — CALCIUM GLUCONATE 100 MG/ML
1 VIAL (ML) INTRAVENOUS ONCE
Qty: 0 | Refills: 0 | Status: DISCONTINUED | OUTPATIENT
Start: 2019-04-19 | End: 2019-04-20

## 2019-04-18 RX ORDER — CALCIUM GLUCONATE 100 MG/ML
1 VIAL (ML) INTRAVENOUS ONCE
Qty: 0 | Refills: 0 | Status: COMPLETED | OUTPATIENT
Start: 2019-04-18 | End: 2019-04-18

## 2019-04-18 RX ORDER — DIPHENHYDRAMINE HCL 50 MG
50 CAPSULE ORAL ONCE
Qty: 0 | Refills: 0 | Status: DISCONTINUED | OUTPATIENT
Start: 2019-04-19 | End: 2019-04-20

## 2019-04-18 RX ORDER — DIPHENHYDRAMINE HCL 50 MG
50 CAPSULE ORAL ONCE
Qty: 0 | Refills: 0 | Status: DISCONTINUED | OUTPATIENT
Start: 2019-04-18 | End: 2019-04-20

## 2019-04-18 RX ADMIN — Medication 1 TABLET(S): at 11:52

## 2019-04-18 RX ADMIN — Medication 100 MILLIGRAM(S): at 11:52

## 2019-04-18 RX ADMIN — Medication 1: at 09:48

## 2019-04-18 RX ADMIN — Medication 5 MILLIGRAM(S): at 20:57

## 2019-04-18 RX ADMIN — Medication 3: at 12:43

## 2019-04-18 RX ADMIN — Medication 40 MILLIGRAM(S): at 21:07

## 2019-04-18 RX ADMIN — Medication 1 MILLIGRAM(S): at 11:52

## 2019-04-18 RX ADMIN — Medication 40 MILLIGRAM(S): at 18:29

## 2019-04-18 RX ADMIN — Medication 1: at 23:21

## 2019-04-18 RX ADMIN — AMLODIPINE BESYLATE 5 MILLIGRAM(S): 2.5 TABLET ORAL at 05:36

## 2019-04-18 RX ADMIN — Medication 40 MILLIGRAM(S): at 05:36

## 2019-04-18 RX ADMIN — CHLORHEXIDINE GLUCONATE 1 APPLICATION(S): 213 SOLUTION TOPICAL at 18:37

## 2019-04-18 RX ADMIN — Medication 1: at 18:29

## 2019-04-18 RX ADMIN — Medication 25 MILLIGRAM(S): at 05:36

## 2019-04-18 RX ADMIN — Medication 25 MILLIGRAM(S): at 18:44

## 2019-04-18 RX ADMIN — Medication 200 GRAM(S): at 20:33

## 2019-04-18 NOTE — PROGRESS NOTE ADULT - SUBJECTIVE AND OBJECTIVE BOX
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  The patient is in no acute distress.  Denied any chest pain, palpitations, dyspnea, or abdominal pain.  Offers no complaints.    HPI:  The patient is a 38 year old female with no significant past medical history who presented to the Er on 4/6 with complaints of blurred vision. She was found to have severe thrombocytopenia with LISSETT and metabolic acidosis and hypertension. Hematology was consulted and she was started on PO prednisone. Peripheral smear showed schistocytes with elevated LDH and low haptoglobin with worsening renal failure and thrombocytopenia. She was transferred to the ICu for initiation of plasmapheresis Nephrology was consulted; recommended hydration with plasmapheresis with no indication for HD at this time. HIV and hepatitis panels were negative. CT chest/abdomen/pelvis showed perinephric stranding bilaterally. CT head and chest xray were negative. Pt on PLEX day 7. c/w daily for now until kidney function improves & then qOD as per hemonc. Daily PLEX ordered. Renal bx consistent with TTP. CRANE T13 antibody high  Thrombotic thrombocytopenic purpura - Status post prior plasma exchange with improvement in renal function. Discussed with Hematology. To resume high dose prednisone and to resume plasma exchange. ADAMSTS 13 to be repeated to monitor activity.    The patient was continued on plasma exchange with improvement in the renal function and anemia. She had completed the scheduled sessions and was seen by Hematology for evaluation. The recommendation was to pursue three more sessions of plasma exchange and increase the dose of prednisone while awaiting repeat ADAMST 13 results. The patient tolerated the treatments well and was without complaint.    Vital Signs Last 24 Hrs  T(C): 36.8 (18 Apr 2019 07:30), Max: 37.1 (18 Apr 2019 00:34)  T(F): 98.2 (18 Apr 2019 07:30), Max: 98.8 (18 Apr 2019 00:34)  HR: 67 (18 Apr 2019 07:30) (67 - 90)  BP: 156/101 (18 Apr 2019 05:34) (138/84 - 156/107)  BP(mean): --  RR: 18 (18 Apr 2019 07:30) (18 - 18)  SpO2: 98% (18 Apr 2019 07:30) (98% - 100%)    CAPILLARY BLOOD GLUCOSE  POCT Blood Glucose.: 192 mg/dL (18 Apr 2019 08:52)  POCT Blood Glucose.: 269 mg/dL (17 Apr 2019 21:35)  POCT Blood Glucose.: 264 mg/dL (17 Apr 2019 17:19)  POCT Blood Glucose.: 299 mg/dL (17 Apr 2019 12:17)    PHYSICAL EXAMINATION:  ----------------------------------------  General appearance: No acute distress, Awake, Alert  HEENT: Normocephalic, Atraumatic, Conjunctiva clear, EOMI  Neck: Supple, No JVD, No tenderness, Right neck catheter  Lungs: Breath sound equal bilaterally, No wheezes, No rales  Cardiovascular: S1S2, Regular rhythm  Abdomen: Soft, Nontender, Nondistended, No guarding/rebound, Positive bowel sounds  Extremities: No clubbing, No cyanosis, No edema, No calf tenderness  Neuro: Strength equal bilaterally, No tremors  Psychiatric: Appropriate mood, Normal affect    LABORATORY STUDIES:  ----------------------------------------             9.0    29.0  )-----------( 552      ( 18 Apr 2019 06:38 )             28.0     04-18    139  |  101  |  28.0<H>  ----------------------------<  261<H>  4.8   |  26.0  |  1.34<H>    Ca    9.0      18 Apr 2019 06:38    MEDICATIONS  (STANDING):  amLODIPine   Tablet 5 milliGRAM(s) Oral daily  calcium gluconate IVPB 1 Gram(s) IV Intermittent once  calcium gluconate IVPB 1 Gram(s) IV Intermittent once  calcium gluconate IVPB 1 Gram(s) IV Intermittent once  calcium gluconate IVPB 1 Gram(s) IV Intermittent once  chlorhexidine 2% Cloths 1 Application(s) Topical daily  diphenhydrAMINE   Injectable 50 milliGRAM(s) IV Push once  diphenhydrAMINE   Injectable 50 milliGRAM(s) IV Push once  diphenhydrAMINE   Injectable 50 milliGRAM(s) IV Push once  folic acid 1 milliGRAM(s) Oral daily  influenza   Vaccine 0.5 milliLiter(s) IntraMuscular once  insulin lispro (HumaLOG) corrective regimen sliding scale   SubCutaneous Before meals and at bedtime  metoprolol tartrate 25 milliGRAM(s) Oral two times a day  multivitamin 1 Tablet(s) Oral daily  predniSONE   Tablet 40 milliGRAM(s) Oral three times a day  thiamine 100 milliGRAM(s) Oral daily    MEDICATIONS  (PRN):  acetaminophen   Tablet .. 650 milliGRAM(s) Oral every 6 hours PRN Temp greater or equal to 38C (100.4F), Mild Pain (1 - 3)  hydrALAZINE Injectable 5 milliGRAM(s) IV Push every 6 hours PRN if SBP > 165 or DBP > 100      ASSESSMENT / PLAN:  ----------------------------------------  Thrombotic thrombocytopenic purpura - For further plasma exchange today. On prednisone. Awaiting repeat ADAMSTS 13 results. To follow up with Hematology.    Acute kidney injury - Renal function continues to improve on repeat laboratory studies. To follow up with Nephrology on an outpatient basis.    Hypertension - Close blood pressure monitoring. On metoprolol and amlodipine.

## 2019-04-18 NOTE — PROGRESS NOTE ADULT - PROBLEM SELECTOR PROBLEM 1
TTP (thrombotic thrombocytopenic purpura)
Thrombocytopenia
Thrombocytopenia
TTP (thrombotic thrombocytopenic purpura)

## 2019-04-18 NOTE — PROGRESS NOTE ADULT - ASSESSMENT
38 year old female admitted with confusion, blurry vision, ARF, severely thrombocytopenia - diagnosed with TTP. ADAMTS 13 activity <2%, ADAMTS 13 antibody is 70-80 (high(.  Kidney biopsy c/w chronic thrombotic microangiopathy.  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    Platelets have recovered however her Cr has plateaued at  1.4      Creatinine improved to 1.3  Another PLEX session today and tomorrow, repeat creatinine tomorrow.  Would add another 1-2 sessions if creatinine continues to respond  Now on Prednisone to 1 mg/kg daily until PLEX is stopped, can taper over 3 weeks after that  Repeat ADAMTS 13 activity and antibody is pending

## 2019-04-18 NOTE — PROGRESS NOTE ADULT - ATTENDING COMMENTS
Estimate date of discharge 4/18/19
Estimate date of discharge 4/19/19
Estimate date of discharge 4/22/19
Patient seen and examined this morning. Chart, VS and labs reviewed. D/w the patient that etiology of thrombocytopenia is unclear at this point although presentation is consistent with Thrombocytopenic Thrombotic Purpura (TTP) given schistocytes/ renal failure with uptrending creatine from 2 to 4 and worsening thrombocytopenia from 9 to 6. D/w Dr. De La Vega and ICU PA will transfer the pt to the ICU, have central line placed and initiate PLEX treatment urgently. [id number] pending. D/w the pt in regards to current plan of care.  at bedside, James who is the patients next of kin. Upon discussion with both in regards to the treatment plan also d/w them both about advanced directives. Pt would want all aggressive measures including CPR/ life support. Advanced care discussion took 31 minutes. Patient to resume care as per the ICU team.
 updated at bedside
Pt stable to transfer to floor.

## 2019-04-18 NOTE — PROGRESS NOTE ADULT - SUBJECTIVE AND OBJECTIVE BOX
REASON FOR CONSULTATION: TTP    INTERVAL HISTORY:  S/p additional dose of PLEX on 19  Afebrile  Feels very well. No confusion.  No N/V/D/C    REVIEW OF SYSTEMS:  negative except as reviewed above    Allergies    penicillin (Hives)    Intolerances        MEDICATIONS  (STANDING):  amLODIPine   Tablet 5 milliGRAM(s) Oral daily  calcium gluconate IVPB 1 Gram(s) IV Intermittent once  calcium gluconate IVPB 1 Gram(s) IV Intermittent once  calcium gluconate IVPB 1 Gram(s) IV Intermittent once  calcium gluconate IVPB 1 Gram(s) IV Intermittent once  chlorhexidine 2% Cloths 1 Application(s) Topical daily  diphenhydrAMINE   Injectable 50 milliGRAM(s) IV Push once  diphenhydrAMINE   Injectable 50 milliGRAM(s) IV Push once  diphenhydrAMINE   Injectable 50 milliGRAM(s) IV Push once  folic acid 1 milliGRAM(s) Oral daily  influenza   Vaccine 0.5 milliLiter(s) IntraMuscular once  insulin lispro (HumaLOG) corrective regimen sliding scale   SubCutaneous Before meals and at bedtime  metoprolol tartrate 25 milliGRAM(s) Oral two times a day  multivitamin 1 Tablet(s) Oral daily  predniSONE   Tablet 40 milliGRAM(s) Oral three times a day  thiamine 100 milliGRAM(s) Oral daily    MEDICATIONS  (PRN):  acetaminophen   Tablet .. 650 milliGRAM(s) Oral every 6 hours PRN Temp greater or equal to 38C (100.4F), Mild Pain (1 - 3)  hydrALAZINE Injectable 5 milliGRAM(s) IV Push every 6 hours PRN if SBP > 165 or DBP > 100      Vital Signs Last 24 Hrs  T(C): 36.8 (2019 07:30), Max: 37.1 (2019 00:34)  T(F): 98.2 (2019 07:30), Max: 98.8 (2019 00:34)  HR: 67 (2019 07:30) (67 - 90)  BP: 156/101 (2019 05:34) (138/84 - 156/107)  BP(mean): --  RR: 18 (2019 07:30) (18 - 18)  SpO2: 98% (2019 07:30) (98% - 100%)  PHYSICAL EXAM:    GENERAL: NAD, well-groomed, well-developed  HEAD:  Atraumatic, Normocephalic  EYES: EOMI, PERRLA, conjunctiva and sclera clear  NECK: Supple,   NERVOUS SYSTEM:  Alert & Oriented X3, Good concentration;   CHEST/LUNG: Clear bilaterally;  HEART: Regular rate and rhythm;   ABDOMEN: Soft,obese  EXTREMITIES:  no edema  LYMPH: No lymphadenopathy noted  SKIN: No rashes or lesions      LABS:                                   9.0    29.0  )-----------( 552      ( 2019 06:38 )             28.0       139  |  101  |  28.0<H>  ----------------------------<  261<H>  4.8   |  26.0  |  1.34<H>    Ca    9.0      2019 06:38          Urinalysis Basic - ( 2019 17:35 )    Color: Yellow / Appearance: Clear / S.010 / pH: x  Gluc: x / Ketone: Negative  / Bili: Negative / Urobili: Negative mg/dL   Blood: x / Protein: 30 mg/dL / Nitrite: Negative   Leuk Esterase: Trace / RBC: 0-2 /HPF / WBC 0-2   Sq Epi: x / Non Sq Epi: Occasional / Bacteria: x          RADIOLOGY & ADDITIONAL STUDIES:  < from: CT Abdomen and Pelvis No Cont (19 @ 17:08) >  Bilateral perinephric stranding otherwise unremarkable chest, abdomen and   pelvic CT pathology. Lungs clear.      < end of copied text >      Surgical Pathology Report (19 @ 08:45)    Surgical Pathology Report:   ACCESSION No:  [id number]  LOY SAMUEL                      3        Surgical Final Report          Final Diagnosis    Kidney, needle core biopsy    Severe obliterative arterial sclerosis, with mild segmental  glomerular endothelial injury, suggesting chronic thrombotic  angiopathy (see comment)    Acute tubular injury with focal tubular necrosis and mild  reactive interstitial inflammation (see comment)    Summary of chronic changes:  - Global glomerulosclerosis (5% of glomeruli)  - Tubular atrophy and interstitial fibrosis (5% of cortex)  - Severe obliterative arterial and arteriolar sclerosis    Verified by: Tia Haines MD  (Electronic Signature)  Reported on: 04/15/19 14:34 EDT, [address] [place] [id number]  _________________________________________________________________    Comment    The preliminary findings were discussed with Dr. Marquis on  2019 at 10:20AM.    The changes seen in the vessels and in the glomeruli are most  likely the result of a chronic thrombotic microangiopathy. Signs  of currently active intravascular thrombosis are not seen in the  tissue examined. The term thrombotic microangiopathy is  relatively non-specific and  serves as a common histopathologic  diagnosis for a number of clinical conditions and diseases that  result in vascular or endothelial cell injury including the renal  involvement of the lupus anticoagulant or anti-phospholipid  antibody syndrome, scleroderma and the undifferentiated  connective tissue disorder or overlap syndrome, typical or  atypical hemolytic-uremic syndrome (HUS) and the thrombotic  thrombocytopenic purpura (TTP), renal complications during  pregnancy (severe pre-eclampsia, HELLP syndrome, post-partum  HUS), drugs with vascular toxicity (such as cocaine,  amphetamines, and decongestants), and toxicity of  chemotherapeutic and immunosuppressive regimen (gemcitabine, VEGF  inhibitors, mitomycin, bleomycin plus cisplatin, cyclosporine,  tacrolimus, OKT3).                LOY SAMUEL                      3        Surgical Final Report          Acute tubulointerstitial injury, with acute tubular necrosis and  mild reactive interstitial inflammation and edema, likely  represents acute ischemic and/or toxic injury to the kidney.    Microscopic Description    1. Light Microscopy:  Sections of formalin-fixed, paraffin embedded tissue were  evaluated using H&E, PAS, JMS, and trichrome stains. An H&E-  stained frozen section taken from the tissue allocated for  immunofluorescencemicroscopy and semi-thin toluidine blue-  stained epoxy sections of the tissue processed for electron  microscopy were also evaluated using light microscopy.    The sample submitted for light microscopy consists of renal  cortex and medulla, with up to 9 glomeruli. The entire biopsy  contains 21 glomeruli (LM-9; IF-7; EM-5), 1 of which is globally  sclerosed. The non-sclerosed glomeruli are of normal size and  cellularity. The glomerular capillary loops are of normal  thickness and texture, with segmental irregularities and signs of  remodeling in some capillaries. Significant endocapillary  proliferation is not present and cellular crescents are not seen  in glomeruli. The mesangium is mildly expanded by extracellular  matrix and increased numbers of mesangial cells. Tubules reveal  focal degenerative changes and flattening of the epithelium. Few  tubules contain necrotic cellular debris. The interstitium  reveals focal lymphocytic inflammation, associated with mild  edema. Approximately5% of the renal cortex shows tubular atrophy  and interstitial fibrosis. Arteries show severe sclerosis with  frequent narrowing or obliteration of the lumens. Arterioles show  severe sclerosis and hyalinosis.    2. Immunofluorescence Microscopy:  The sections of the sample submitted for immunofluorescence  studies were incubated with antibodies specific for the heavy  chains of IgG, IgA, and IgM, for kappa and lambda light chains,  fibrin, albumin, and complement components C3 and C1q. The  intensity of immunofluorescence reactivity is expressed on a  scale 1-  -4+.    The sample contains 7 glomeruli. There is 1 globally sclerosed  glomerulus. There is finely granular reactivity for IgA (1+),  kappa LC (trace) and lambda LC (trace) in the mesangium. Tubules  contain few intraluminal casts reactive for polyclonal IgA. The  interstitium reveals scattered fibrin deposits. There is no  difference in reactivity between kappa and lambda light chains in  the glomeruli, tubular casts, or in the background of the tissue.              LOY SAMUEL                      3        Surgical Final Report          3. Electron Microscopy:  The sample submitted for electron microscopy examination contains  5 glomeruli; 2 glomeruli are examined ultrastructurally. The  glomerular visceral epithelial cells reveal mild segmental  effacement of their foot processes. The glomerular basement  membranes are of normal thickness and texture. The subendothelial  space of the basement membrane is segmentally expanded by  electron-lucent fluffy material. The endothelial cells show focal  swelling. The mesangium reveals normal cellular elements and a  mildly increased amount of matrix. An isolated segment with a few  small mesangial electron dense deposits is noted; there are no  well formed capillary wall or mesangial deposits in the rest of  glomerular segments.    Clinical History    The patient is a 38 year old female with no significant past  medical history who presented to the ER on  with complaints of  blurred vision. She was found to have severe thrombocytopenia  with LISSETT and metabolic acidosis and hypertension. Hematology was  consulted and she was started on PO prednisone. Peripheral smear  showed schistocytes with elevated LDH and low haptoglobin with  worsening renal failure and thrombocytopenia. She was transferred  to the ICU for initiation of plasmapheresis. [id number] is below 2.    Specimen(s) Submitted    Right kidney    Gross Description    The specimen submitted for light microscopy is received in  formalin and the specimen container is labeled: Right kidney bx.  It consists of 1 fragment of cylindrical pink-tan soft tissue  measuring 1.7 cm in length and 0.1 cm in diameter; this specimen  is entirely submitted in one cassette for paraffin processing.  Also received is a specimen submitted for electron microscopy in  glutaraldehyde (1 fragment, 0.7 cm in length) and a specimen  submitted for immunofluorescence microscopy in transport medium  (1 fragment, 0.6 cm in length).    In addition to other data that may appear on the specimen  container, the label has been inspected to confirm the presence  of the patient's name and date of birth.    Joann Cosme 2019 16:25

## 2019-04-18 NOTE — PROGRESS NOTE ADULT - PROBLEM SELECTOR PROBLEM 2
LISSETT (acute kidney injury)
Acute renal failure, unspecified acute renal failure type
Acute renal failure, unspecified acute renal failure type
LISSETT (acute kidney injury)

## 2019-04-19 LAB
ANION GAP SERPL CALC-SCNC: 14 MMOL/L — SIGNIFICANT CHANGE UP (ref 5–17)
ANISOCYTOSIS BLD QL: SLIGHT — SIGNIFICANT CHANGE UP
BUN SERPL-MCNC: 25 MG/DL — HIGH (ref 8–20)
CALCIUM SERPL-MCNC: 8.8 MG/DL — SIGNIFICANT CHANGE UP (ref 8.6–10.2)
CHLORIDE SERPL-SCNC: 96 MMOL/L — LOW (ref 98–107)
CLOSURE TME COLL+EPINEP BLD: 514 K/UL — HIGH (ref 150–400)
CO2 SERPL-SCNC: 25 MMOL/L — SIGNIFICANT CHANGE UP (ref 22–29)
CREAT SERPL-MCNC: 1.1 MG/DL — SIGNIFICANT CHANGE UP (ref 0.5–1.3)
GLUCOSE BLDC GLUCOMTR-MCNC: 222 MG/DL — HIGH (ref 70–99)
GLUCOSE BLDC GLUCOMTR-MCNC: 248 MG/DL — HIGH (ref 70–99)
GLUCOSE BLDC GLUCOMTR-MCNC: 267 MG/DL — HIGH (ref 70–99)
GLUCOSE BLDC GLUCOMTR-MCNC: 327 MG/DL — HIGH (ref 70–99)
GLUCOSE SERPL-MCNC: 314 MG/DL — HIGH (ref 70–115)
HCT VFR BLD CALC: 32.1 % — LOW (ref 37–47)
HGB BLD-MCNC: 10.1 G/DL — LOW (ref 12–16)
MACROCYTES BLD QL: SLIGHT — SIGNIFICANT CHANGE UP
MCHC RBC-ENTMCNC: 28.5 PG — SIGNIFICANT CHANGE UP (ref 27–31)
MCHC RBC-ENTMCNC: 31.5 G/DL — LOW (ref 32–36)
MCV RBC AUTO: 90.7 FL — SIGNIFICANT CHANGE UP (ref 81–99)
MICROCYTES BLD QL: SLIGHT — SIGNIFICANT CHANGE UP
PLAT MORPH BLD: ABNORMAL
PLATELET # BLD AUTO: 274 K/UL — SIGNIFICANT CHANGE UP (ref 150–400)
PLATELET CLUMP BLD QL SMEAR: SIGNIFICANT CHANGE UP
POIKILOCYTOSIS BLD QL AUTO: SLIGHT — SIGNIFICANT CHANGE UP
POTASSIUM SERPL-MCNC: 4.7 MMOL/L — SIGNIFICANT CHANGE UP (ref 3.5–5.3)
POTASSIUM SERPL-SCNC: 4.7 MMOL/L — SIGNIFICANT CHANGE UP (ref 3.5–5.3)
RBC # BLD: 3.54 M/UL — LOW (ref 4.4–5.2)
RBC # FLD: 18.1 % — HIGH (ref 11–15.6)
RBC BLD AUTO: PRESENT — SIGNIFICANT CHANGE UP
SODIUM SERPL-SCNC: 135 MMOL/L — SIGNIFICANT CHANGE UP (ref 135–145)
WBC # BLD: 28.2 K/UL — HIGH (ref 4.8–10.8)
WBC # FLD AUTO: 28.2 K/UL — HIGH (ref 4.8–10.8)

## 2019-04-19 PROCEDURE — 99232 SBSQ HOSP IP/OBS MODERATE 35: CPT

## 2019-04-19 RX ORDER — AMLODIPINE BESYLATE 2.5 MG/1
10 TABLET ORAL DAILY
Qty: 0 | Refills: 0 | Status: DISCONTINUED | OUTPATIENT
Start: 2019-04-20 | End: 2019-04-20

## 2019-04-19 RX ORDER — AMLODIPINE BESYLATE 2.5 MG/1
5 TABLET ORAL ONCE
Qty: 0 | Refills: 0 | Status: COMPLETED | OUTPATIENT
Start: 2019-04-19 | End: 2019-04-19

## 2019-04-19 RX ADMIN — AMLODIPINE BESYLATE 5 MILLIGRAM(S): 2.5 TABLET ORAL at 14:51

## 2019-04-19 RX ADMIN — CHLORHEXIDINE GLUCONATE 1 APPLICATION(S): 213 SOLUTION TOPICAL at 14:51

## 2019-04-19 RX ADMIN — Medication 40 MILLIGRAM(S): at 14:52

## 2019-04-19 RX ADMIN — Medication 3: at 21:55

## 2019-04-19 RX ADMIN — Medication 1 TABLET(S): at 14:43

## 2019-04-19 RX ADMIN — Medication 40 MILLIGRAM(S): at 22:05

## 2019-04-19 RX ADMIN — Medication 2: at 14:44

## 2019-04-19 RX ADMIN — Medication 100 MILLIGRAM(S): at 14:43

## 2019-04-19 RX ADMIN — Medication 4: at 18:03

## 2019-04-19 RX ADMIN — Medication 25 MILLIGRAM(S): at 18:03

## 2019-04-19 RX ADMIN — Medication 25 MILLIGRAM(S): at 05:31

## 2019-04-19 RX ADMIN — Medication 1 MILLIGRAM(S): at 14:43

## 2019-04-19 RX ADMIN — Medication 2: at 08:55

## 2019-04-19 RX ADMIN — AMLODIPINE BESYLATE 5 MILLIGRAM(S): 2.5 TABLET ORAL at 05:31

## 2019-04-19 RX ADMIN — Medication 40 MILLIGRAM(S): at 05:31

## 2019-04-19 NOTE — PROGRESS NOTE ADULT - ASSESSMENT
This is 37YO woman with renal failure and thrombocytopenia found to have TTP improving on plasma exchange. Followed by Hematology and planned for a few more sessions of plasma exchange depending on the renal function.    A/P    >Thrombotic thrombocytopenic purpura -   Appreciate hematology input - For further plasma exchange today. On prednisone. Awaiting repeat ADAMSTS 13 results.    >Acute kidney injury - Renal function continues to improve on repeat laboratory studies. To follow up with Nephrology on an outpatient basis.    >Hypertension - uncontrolled  c/w metoprolol and increase amlodipine 10

## 2019-04-19 NOTE — PROGRESS NOTE ADULT - SUBJECTIVE AND OBJECTIVE BOX
Internal Medicine Hospitalist - Dr. Talia SAMUEL    [id number]    38y      Female    Patient is a 38y old  Female who presents with a chief complaint of blurry vision, nausea/vomiting (18 Apr 2019 09:42)          INTERVAL HPI/ OVERNIGHT EVENTS: Patient is seen and examined, no new event overnight.     REVIEW OF SYSTEMS:    Denied fever, chills, abd. pain, nausea, vomiting, chest pain, SOB, headache, dizziness    PHYSICAL EXAM:    Vital Signs Last 24 Hrs  T(C): 37.1 (19 Apr 2019 09:51), Max: 37.4 (19 Apr 2019 00:20)  T(F): 98.7 (19 Apr 2019 09:51), Max: 99.3 (19 Apr 2019 00:20)  HR: 84 (19 Apr 2019 09:51) (71 - 84)  BP: 158/88 (19 Apr 2019 07:00) (158/88 - 180/111)  BP(mean): --  RR: 18 (19 Apr 2019 09:51) (18 - 18)  SpO2: 99% (19 Apr 2019 09:51) (98% - 99%)    GENERAL: NAD  HEENT: EOMI  Neck: supple  CHEST/LUNG: CTA b/l   HEART: S1S2+ audible  ABDOMEN: Soft, Nontender, Nondistended; Bowel sounds present  EXTREMITIES:  no edema  CNS: AAO X 3  Psychiatry: normal mood    LABS:                        9.0    29.0  )-----------( 552      ( 18 Apr 2019 06:38 )             28.0     04-18    139  |  101  |  28.0<H>  ----------------------------<  261<H>  4.8   |  26.0  |  1.34<H>    Ca    9.0      18 Apr 2019 06:38              MEDICATIONS  (STANDING):  amLODIPine   Tablet 5 milliGRAM(s) Oral once  calcium gluconate IVPB 1 Gram(s) IV Intermittent once  calcium gluconate IVPB 1 Gram(s) IV Intermittent once  calcium gluconate IVPB 1 Gram(s) IV Intermittent once  calcium gluconate IVPB 1 Gram(s) IV Intermittent once  chlorhexidine 2% Cloths 1 Application(s) Topical daily  diphenhydrAMINE   Injectable 50 milliGRAM(s) IV Push once  diphenhydrAMINE   Injectable 50 milliGRAM(s) IV Push once  diphenhydrAMINE   Injectable 50 milliGRAM(s) IV Push once  diphenhydrAMINE   Injectable 50 milliGRAM(s) IV Push once  folic acid 1 milliGRAM(s) Oral daily  influenza   Vaccine 0.5 milliLiter(s) IntraMuscular once  insulin lispro (HumaLOG) corrective regimen sliding scale   SubCutaneous Before meals and at bedtime  metoprolol tartrate 25 milliGRAM(s) Oral two times a day  multivitamin 1 Tablet(s) Oral daily  predniSONE   Tablet 40 milliGRAM(s) Oral three times a day  thiamine 100 milliGRAM(s) Oral daily    MEDICATIONS  (PRN):  acetaminophen   Tablet .. 650 milliGRAM(s) Oral every 6 hours PRN Temp greater or equal to 38C (100.4F), Mild Pain (1 - 3)  hydrALAZINE Injectable 5 milliGRAM(s) IV Push every 6 hours PRN if SBP > 165 or DBP > 100      RADIOLOGY & ADDITIONAL TEST

## 2019-04-20 ENCOUNTER — TRANSCRIPTION ENCOUNTER (OUTPATIENT)
Age: 39
End: 2019-04-20

## 2019-04-20 VITALS
HEART RATE: 86 BPM | RESPIRATION RATE: 18 BRPM | DIASTOLIC BLOOD PRESSURE: 88 MMHG | OXYGEN SATURATION: 96 % | TEMPERATURE: 99 F | SYSTOLIC BLOOD PRESSURE: 148 MMHG

## 2019-04-20 LAB
ANION GAP SERPL CALC-SCNC: 13 MMOL/L — SIGNIFICANT CHANGE UP (ref 5–17)
BUN SERPL-MCNC: 33 MG/DL — HIGH (ref 8–20)
CALCIUM SERPL-MCNC: 8.5 MG/DL — LOW (ref 8.6–10.2)
CHLORIDE SERPL-SCNC: 95 MMOL/L — LOW (ref 98–107)
CO2 SERPL-SCNC: 25 MMOL/L — SIGNIFICANT CHANGE UP (ref 22–29)
CREAT SERPL-MCNC: 1.2 MG/DL — SIGNIFICANT CHANGE UP (ref 0.5–1.3)
GLUCOSE BLDC GLUCOMTR-MCNC: 270 MG/DL — HIGH (ref 70–99)
GLUCOSE BLDC GLUCOMTR-MCNC: 288 MG/DL — HIGH (ref 70–99)
GLUCOSE SERPL-MCNC: 301 MG/DL — HIGH (ref 70–115)
HCT VFR BLD CALC: 27.1 % — LOW (ref 37–47)
HGB BLD-MCNC: 8.7 G/DL — LOW (ref 12–16)
MCHC RBC-ENTMCNC: 29.2 PG — SIGNIFICANT CHANGE UP (ref 27–31)
MCHC RBC-ENTMCNC: 32.1 G/DL — SIGNIFICANT CHANGE UP (ref 32–36)
MCV RBC AUTO: 90.9 FL — SIGNIFICANT CHANGE UP (ref 81–99)
PLATELET # BLD AUTO: 553 K/UL — HIGH (ref 150–400)
POTASSIUM SERPL-MCNC: 3.8 MMOL/L — SIGNIFICANT CHANGE UP (ref 3.5–5.3)
POTASSIUM SERPL-SCNC: 3.8 MMOL/L — SIGNIFICANT CHANGE UP (ref 3.5–5.3)
RBC # BLD: 2.98 M/UL — LOW (ref 4.4–5.2)
RBC # FLD: 17.9 % — HIGH (ref 11–15.6)
SODIUM SERPL-SCNC: 133 MMOL/L — LOW (ref 135–145)
WBC # BLD: 25 K/UL — HIGH (ref 4.8–10.8)
WBC # FLD AUTO: 25 K/UL — HIGH (ref 4.8–10.8)

## 2019-04-20 PROCEDURE — 82570 ASSAY OF URINE CREATININE: CPT

## 2019-04-20 PROCEDURE — 86850 RBC ANTIBODY SCREEN: CPT

## 2019-04-20 PROCEDURE — 85610 PROTHROMBIN TIME: CPT

## 2019-04-20 PROCEDURE — 85027 COMPLETE CBC AUTOMATED: CPT

## 2019-04-20 PROCEDURE — 84702 CHORIONIC GONADOTROPIN TEST: CPT

## 2019-04-20 PROCEDURE — 82306 VITAMIN D 25 HYDROXY: CPT

## 2019-04-20 PROCEDURE — 80048 BASIC METABOLIC PNL TOTAL CA: CPT

## 2019-04-20 PROCEDURE — 36430 TRANSFUSION BLD/BLD COMPNT: CPT

## 2019-04-20 PROCEDURE — 83615 LACTATE (LD) (LDH) ENZYME: CPT

## 2019-04-20 PROCEDURE — 84133 ASSAY OF URINE POTASSIUM: CPT

## 2019-04-20 PROCEDURE — 82550 ASSAY OF CK (CPK): CPT

## 2019-04-20 PROCEDURE — 70450 CT HEAD/BRAIN W/O DYE: CPT

## 2019-04-20 PROCEDURE — 82553 CREATINE MB FRACTION: CPT

## 2019-04-20 PROCEDURE — 99239 HOSP IP/OBS DSCHRG MGMT >30: CPT

## 2019-04-20 PROCEDURE — 88312 SPECIAL STAINS GROUP 1: CPT

## 2019-04-20 PROCEDURE — 82746 ASSAY OF FOLIC ACID SERUM: CPT

## 2019-04-20 PROCEDURE — 88305 TISSUE EXAM BY PATHOLOGIST: CPT

## 2019-04-20 PROCEDURE — 83935 ASSAY OF URINE OSMOLALITY: CPT

## 2019-04-20 PROCEDURE — 87389 HIV-1 AG W/HIV-1&-2 AB AG IA: CPT

## 2019-04-20 PROCEDURE — 36415 COLL VENOUS BLD VENIPUNCTURE: CPT

## 2019-04-20 PROCEDURE — 76770 US EXAM ABDO BACK WALL COMP: CPT

## 2019-04-20 PROCEDURE — 82962 GLUCOSE BLOOD TEST: CPT

## 2019-04-20 PROCEDURE — 93005 ELECTROCARDIOGRAM TRACING: CPT

## 2019-04-20 PROCEDURE — 87486 CHLMYD PNEUM DNA AMP PROBE: CPT

## 2019-04-20 PROCEDURE — 87507 IADNA-DNA/RNA PROBE TQ 12-25: CPT

## 2019-04-20 PROCEDURE — 86985 SPLIT BLOOD OR PRODUCTS: CPT

## 2019-04-20 PROCEDURE — 83735 ASSAY OF MAGNESIUM: CPT

## 2019-04-20 PROCEDURE — 81001 URINALYSIS AUTO W/SCOPE: CPT

## 2019-04-20 PROCEDURE — P9011: CPT

## 2019-04-20 PROCEDURE — P9059: CPT

## 2019-04-20 PROCEDURE — 74176 CT ABD & PELVIS W/O CONTRAST: CPT

## 2019-04-20 PROCEDURE — 83970 ASSAY OF PARATHORMONE: CPT

## 2019-04-20 PROCEDURE — 88346 IMFLUOR 1ST 1ANTB STAIN PX: CPT

## 2019-04-20 PROCEDURE — 86901 BLOOD TYPING SEROLOGIC RH(D): CPT

## 2019-04-20 PROCEDURE — 96374 THER/PROPH/DIAG INJ IV PUSH: CPT

## 2019-04-20 PROCEDURE — 85730 THROMBOPLASTIN TIME PARTIAL: CPT

## 2019-04-20 PROCEDURE — 83010 ASSAY OF HAPTOGLOBIN QUANT: CPT

## 2019-04-20 PROCEDURE — 80074 ACUTE HEPATITIS PANEL: CPT

## 2019-04-20 PROCEDURE — 84100 ASSAY OF PHOSPHORUS: CPT

## 2019-04-20 PROCEDURE — 85045 AUTOMATED RETICULOCYTE COUNT: CPT

## 2019-04-20 PROCEDURE — 83036 HEMOGLOBIN GLYCOSYLATED A1C: CPT

## 2019-04-20 PROCEDURE — 86162 COMPLEMENT TOTAL (CH50): CPT

## 2019-04-20 PROCEDURE — 71045 X-RAY EXAM CHEST 1 VIEW: CPT

## 2019-04-20 PROCEDURE — P9017: CPT

## 2019-04-20 PROCEDURE — 77012 CT SCAN FOR NEEDLE BIOPSY: CPT

## 2019-04-20 PROCEDURE — 84300 ASSAY OF URINE SODIUM: CPT

## 2019-04-20 PROCEDURE — 87798 DETECT AGENT NOS DNA AMP: CPT

## 2019-04-20 PROCEDURE — 99285 EMERGENCY DEPT VISIT HI MDM: CPT | Mod: 25

## 2019-04-20 PROCEDURE — 88348 ELECTRON MICROSCOPY DX: CPT

## 2019-04-20 PROCEDURE — 84156 ASSAY OF PROTEIN URINE: CPT

## 2019-04-20 PROCEDURE — 71250 CT THORAX DX C-: CPT

## 2019-04-20 PROCEDURE — 86160 COMPLEMENT ANTIGEN: CPT

## 2019-04-20 PROCEDURE — 80069 RENAL FUNCTION PANEL: CPT

## 2019-04-20 PROCEDURE — 88350 IMFLUOR EA ADDL 1ANTB STN PX: CPT

## 2019-04-20 PROCEDURE — 83874 ASSAY OF MYOGLOBIN: CPT

## 2019-04-20 PROCEDURE — 88313 SPECIAL STAINS GROUP 2: CPT

## 2019-04-20 PROCEDURE — 86703 HIV-1/HIV-2 1 RESULT ANTBDY: CPT

## 2019-04-20 PROCEDURE — 85397 CLOTTING FUNCT ACTIVITY: CPT

## 2019-04-20 PROCEDURE — 84443 ASSAY THYROID STIM HORMONE: CPT

## 2019-04-20 PROCEDURE — 87633 RESP VIRUS 12-25 TARGETS: CPT

## 2019-04-20 PROCEDURE — 82607 VITAMIN B-12: CPT

## 2019-04-20 PROCEDURE — 86900 BLOOD TYPING SEROLOGIC ABO: CPT

## 2019-04-20 PROCEDURE — 80053 COMPREHEN METABOLIC PANEL: CPT

## 2019-04-20 PROCEDURE — 87581 M.PNEUMON DNA AMP PROBE: CPT

## 2019-04-20 PROCEDURE — 82310 ASSAY OF CALCIUM: CPT

## 2019-04-20 RX ORDER — PANTOPRAZOLE SODIUM 20 MG/1
1 TABLET, DELAYED RELEASE ORAL
Qty: 30 | Refills: 0
Start: 2019-04-20 | End: 2019-05-19

## 2019-04-20 RX ORDER — AMLODIPINE BESYLATE 2.5 MG/1
1 TABLET ORAL
Qty: 30 | Refills: 0
Start: 2019-04-20 | End: 2019-05-19

## 2019-04-20 RX ORDER — INSULIN LISPRO 100/ML
1 VIAL (ML) SUBCUTANEOUS
Qty: 1 | Refills: 0
Start: 2019-04-20

## 2019-04-20 RX ADMIN — Medication 3: at 08:42

## 2019-04-20 RX ADMIN — CHLORHEXIDINE GLUCONATE 1 APPLICATION(S): 213 SOLUTION TOPICAL at 11:10

## 2019-04-20 RX ADMIN — AMLODIPINE BESYLATE 10 MILLIGRAM(S): 2.5 TABLET ORAL at 05:35

## 2019-04-20 RX ADMIN — Medication 3: at 12:23

## 2019-04-20 RX ADMIN — Medication 25 MILLIGRAM(S): at 05:35

## 2019-04-20 RX ADMIN — Medication 40 MILLIGRAM(S): at 13:13

## 2019-04-20 RX ADMIN — Medication 100 MILLIGRAM(S): at 11:09

## 2019-04-20 RX ADMIN — Medication 1 MILLIGRAM(S): at 11:09

## 2019-04-20 RX ADMIN — Medication 1 TABLET(S): at 11:09

## 2019-04-20 RX ADMIN — Medication 40 MILLIGRAM(S): at 05:35

## 2019-04-20 NOTE — DISCHARGE NOTE PROVIDER - HOSPITAL COURSE
The patient is a 38 year old female with no significant past medical history who presented to the Er on 4/6 with complaints of blurred vision. She was found to have severe thrombocytopenia with LISSETT and metabolic acidosis and hypertension. Hematology was consulted and she was started on PO prednisone. Peripheral smear showed schistocytes with elevated LDH and low haptoglobin with worsening renal failure and thrombocytopenia concerning for TTP. She was transferred to the ICU for initiation of plasmapheresis  Nephrology was consulted; recommended hydration with plasmapheresis with no indication for HD at this time. HIV and hepatitis panels were negative. CT chest/abdomen/pelvis showed perinephric stranding bilaterally. CT head and chest xray were negative. Renal bx consistent with TTP. Pt improved on plasma exchange and prednisone, normal platelet and renal function. Followed by Hematology clear to discharge home with PO prednisone taper over 2-3 weeks        A/P        >Thrombotic thrombocytopenic purpura - improved    Appreciate hematology input - s/p plasma exchange     On prednisone taper 2-3 weeks    Awaiting repeat ADAMSTS 13 results. - f/u as an outpatient        >Acute kidney injury - Renal function continues to improve on repeat laboratory studies. To follow up with Nephrology on an outpatient basis.        >Hypertension - newly diagnosed    c/w amlodipine 10, monitor BP and f/u with PCP        >Hyperglycemia due to steroid    A1c 5.5     c/w insulin per sliding scale while on Prednisone        stable for discharge         time spent 45 minutes

## 2019-04-20 NOTE — PROGRESS NOTE ADULT - ASSESSMENT
The patient is a 38 year old female with no significant past medical history who presented to the Er on 4/6 with complaints of blurred vision. She was found to have severe thrombocytopenia with LISSETT and metabolic acidosis and hypertension. Hematology was consulted and she was started on PO prednisone. Peripheral smear showed schistocytes with elevated LDH and low haptoglobin with worsening renal failure and thrombocytopenia concerning for TTP. She was transferred to the ICU for initiation of plasmapheresis  Nephrology was consulted; recommended hydration with plasmapheresis with no indication for HD at this time. HIV and hepatitis panels were negative. CT chest/abdomen/pelvis showed perinephric stranding bilaterally. CT head and chest xray were negative. Renal bx consistent with TTP. Pt improved on plasma exchange and prednisone, normal platelet and renal function. Followed by Hematology clear to discharge home with PO prednisone taper over 2-3 weeks    A/P    >Thrombotic thrombocytopenic purpura - improved  Appreciate hematology input - s/p plasma exchange   On prednisone taper 2-3 weeks  Awaiting repeat ADAMSTS 13 results. - f/u as an outpatient    >Acute kidney injury - Renal function continues to improve on repeat laboratory studies. To follow up with Nephrology on an outpatient basis.    >Hypertension - newly diagnosed  c/w amlodipine 10, monitor BP and f/u with PCP    >Hyperglycemia due to steroid  A1c 5.5   c/w insulin per sliding scale while on Prednisone    stable for discharge

## 2019-04-20 NOTE — DISCHARGE NOTE PROVIDER - CARE PROVIDERS DIRECT ADDRESSES
[email or web address]

## 2019-04-20 NOTE — PROGRESS NOTE ADULT - NSHPATTENDINGPLANDISCUSS_GEN_ALL_CORE
NP
RN
pt, rn, hemonc
pt, rn, hemonc
pt, rn, Dr. Sams
pt, rn, hemonc
Patient  RN ICU PA Dr. De La Vega

## 2019-04-20 NOTE — DISCHARGE NOTE PROVIDER - NSDCCPCAREPLAN_GEN_ALL_CORE_FT
PRINCIPAL DISCHARGE DIAGNOSIS  Diagnosis: TTP (thrombotic thrombopenic purpura)  Assessment and Plan of Treatment: imrpoved  c/w PO prednisone taper as directed  f/u hematology   f/u cbc in 1 week      SECONDARY DISCHARGE DIAGNOSES  Diagnosis: LISSETT (acute kidney injury)  Assessment and Plan of Treatment: improved  f/u BMP in 1 week  f/u nephrology    Diagnosis: Hyperglycemia  Assessment and Plan of Treatment: due to steorid   Finger stick and sliding scale    Diagnosis: HTN (hypertension)  Assessment and Plan of Treatment: c/w amlodipine 10mg daily  monitor BP

## 2019-04-20 NOTE — PROGRESS NOTE ADULT - REASON FOR ADMISSION
blurry vision, nausea/vomiting
blurry vision, nausea/vomiting
TTP
blurry vision, nausea/vomiting

## 2019-04-20 NOTE — PROGRESS NOTE ADULT - SUBJECTIVE AND OBJECTIVE BOX
Internal Medicine Hospitalist - Dr. Talia SAMUEL    [id number]    38y      Female    Patient is a 38y old  Female who presents with a chief complaint of blurry vision, nausea/vomiting (19 Apr 2019 12:27)  INTERVAL HPI/ OVERNIGHT EVENTS: Patient is seen and examined, no new event overnight.     REVIEW OF SYSTEMS:    Denied fever, chills, abd. pain, nausea, vomiting, chest pain, SOB, headache, dizziness    PHYSICAL EXAM:    Vital Signs Last 24 Hrs  T(C): 36.9 (20 Apr 2019 09:43), Max: 37.1 (19 Apr 2019 15:00)  T(F): 98.4 (20 Apr 2019 09:43), Max: 98.7 (19 Apr 2019 15:00)  HR: 85 (20 Apr 2019 09:43) (80 - 94)  BP: 120/90 (20 Apr 2019 09:43) (120/90 - 169/106)  BP(mean): --  RR: 18 (20 Apr 2019 09:43) (18 - 18)  SpO2: 100% (20 Apr 2019 09:43) (98% - 100%)    GENERAL: obese  CHEST/LUNG: CTA b/l   HEART: S1S2+ audible  ABDOMEN: Soft, Nontender, Nondistended; Bowel sounds present  EXTREMITIES:  no edema  CNS: AAO X 3  Psychiatry: normal mood    LABS:                        8.7    25.0  )-----------( 553      ( 20 Apr 2019 07:30 )             27.1     04-20    133<L>  |  95<L>  |  33.0<H>  ----------------------------<  301<H>  3.8   |  25.0  |  1.20    Ca    8.5<L>      20 Apr 2019 07:30              MEDICATIONS  (STANDING):  amLODIPine   Tablet 10 milliGRAM(s) Oral daily  calcium gluconate IVPB 1 Gram(s) IV Intermittent once  calcium gluconate IVPB 1 Gram(s) IV Intermittent once  calcium gluconate IVPB 1 Gram(s) IV Intermittent once  calcium gluconate IVPB 1 Gram(s) IV Intermittent once  chlorhexidine 2% Cloths 1 Application(s) Topical daily  diphenhydrAMINE   Injectable 50 milliGRAM(s) IV Push once  diphenhydrAMINE   Injectable 50 milliGRAM(s) IV Push once  diphenhydrAMINE   Injectable 50 milliGRAM(s) IV Push once  diphenhydrAMINE   Injectable 50 milliGRAM(s) IV Push once  folic acid 1 milliGRAM(s) Oral daily  influenza   Vaccine 0.5 milliLiter(s) IntraMuscular once  insulin lispro (HumaLOG) corrective regimen sliding scale   SubCutaneous Before meals and at bedtime  metoprolol tartrate 25 milliGRAM(s) Oral two times a day  multivitamin 1 Tablet(s) Oral daily  predniSONE   Tablet 40 milliGRAM(s) Oral three times a day  thiamine 100 milliGRAM(s) Oral daily    MEDICATIONS  (PRN):  acetaminophen   Tablet .. 650 milliGRAM(s) Oral every 6 hours PRN Temp greater or equal to 38C (100.4F), Mild Pain (1 - 3)  hydrALAZINE Injectable 5 milliGRAM(s) IV Push every 6 hours PRN if SBP > 165 or DBP > 100      RADIOLOGY & ADDITIONAL TEST

## 2019-04-20 NOTE — DISCHARGE NOTE NURSING/CASE MANAGEMENT/SOCIAL WORK - NSDCDPATPORTLINK_GEN_ALL_CORE
You can access the [facility] Patient Portal, offered by [facility], by registering with the following website: [email or web address]

## 2019-04-20 NOTE — PROGRESS NOTE ADULT - PROVIDER SPECIALTY LIST ADULT
Critical Care
Family Medicine
Heme/Onc
Hospitalist
Intervent Radiology
Intervent Radiology
Nephrology
Hospitalist
Nephrology
Nephrology

## 2019-04-20 NOTE — DISCHARGE NOTE PROVIDER - CARE PROVIDER_API CALL
Marlena Colon)  HematologyOncology; HospicePalliative Medicine; Internal Medicine  [address] [place] [address]  Phone: [phone]  Fax: [phone]  Follow Up Time:     Anton Marquis ()  Internal Medicine  [address] [place] [address], [address] [place] [address]  Phone: [phone]  Fax: [phone]  Follow Up Time:

## 2019-04-24 ENCOUNTER — OUTPATIENT (OUTPATIENT)
Dept: OUTPATIENT SERVICES | Facility: HOSPITAL | Age: 39
LOS: 1 days | Discharge: HOME | End: 2019-04-24

## 2019-04-24 DIAGNOSIS — D64.9 ANEMIA, UNSPECIFIED: ICD-10-CM

## 2019-04-25 ENCOUNTER — RESULT REVIEW (OUTPATIENT)
Age: 39
End: 2019-04-25

## 2019-04-25 ENCOUNTER — APPOINTMENT (OUTPATIENT)
Dept: HEMATOLOGY ONCOLOGY | Facility: CLINIC | Age: 39
End: 2019-04-25
Payer: COMMERCIAL

## 2019-04-25 VITALS
WEIGHT: 257.05 LBS | DIASTOLIC BLOOD PRESSURE: 115 MMHG | SYSTOLIC BLOOD PRESSURE: 158 MMHG | HEIGHT: 63 IN | BODY MASS INDEX: 45.55 KG/M2 | HEART RATE: 82 BPM | TEMPERATURE: 98 F | OXYGEN SATURATION: 96 %

## 2019-04-25 LAB
ANISOCYTOSIS BLD QL: SLIGHT — SIGNIFICANT CHANGE UP
BASOPHILS # BLD AUTO: 0 K/UL — SIGNIFICANT CHANGE UP (ref 0–0.2)
EOSINOPHIL # BLD AUTO: 0 K/UL — SIGNIFICANT CHANGE UP (ref 0–0.5)
HCT VFR BLD CALC: 32.8 % — LOW (ref 34.5–45)
HGB BLD-MCNC: 10.3 G/DL — LOW (ref 11.5–15.5)
HYPOCHROMIA BLD QL: SLIGHT — SIGNIFICANT CHANGE UP
LYMPHOCYTES # BLD AUTO: 1.4 K/UL — SIGNIFICANT CHANGE UP (ref 1–3.3)
LYMPHOCYTES # BLD AUTO: 9 % — LOW (ref 13–44)
MACROCYTES BLD QL: SLIGHT — SIGNIFICANT CHANGE UP
MCHC RBC-ENTMCNC: 29.1 PG — SIGNIFICANT CHANGE UP (ref 27–34)
MCHC RBC-ENTMCNC: 31.4 G/DL — LOW (ref 32–36)
MCV RBC AUTO: 92.6 FL — SIGNIFICANT CHANGE UP (ref 80–100)
MONOCYTES # BLD AUTO: 0.9 K/UL — SIGNIFICANT CHANGE UP (ref 0–0.9)
MONOCYTES NFR BLD AUTO: 4 % — SIGNIFICANT CHANGE UP (ref 2–14)
NEUTROPHILS # BLD AUTO: 24.2 K/UL — HIGH (ref 1.8–7.4)
NEUTROPHILS NFR BLD AUTO: 87 % — HIGH (ref 43–77)
PLAT MORPH BLD: NORMAL — SIGNIFICANT CHANGE UP
PLATELET # BLD AUTO: 506 K/UL — HIGH (ref 150–400)
POIKILOCYTOSIS BLD QL AUTO: SLIGHT — SIGNIFICANT CHANGE UP
POLYCHROMASIA BLD QL SMEAR: SLIGHT — SIGNIFICANT CHANGE UP
RBC # BLD: 3.55 M/UL — LOW (ref 3.8–5.2)
RBC # FLD: 17 % — HIGH (ref 10.3–14.5)
RBC BLD AUTO: SIGNIFICANT CHANGE UP
WBC # BLD: 26.5 K/UL — HIGH (ref 3.8–10.5)
WBC # FLD AUTO: 26.5 K/UL — HIGH (ref 3.8–10.5)

## 2019-04-25 PROCEDURE — 99214 OFFICE O/P EST MOD 30 MIN: CPT

## 2019-04-25 NOTE — CONSULT LETTER
[Dear  ___] : Dear  [unfilled], [Please see my note below.] : Please see my note below. [Sincerely,] : Sincerely, [Courtesy Letter:] : I had the pleasure of seeing your patient, [unfilled], in my office today. [FreeTextEntry3] : Marlena Colon MD\par Medical Oncology/Hematology\par [facility], [facility]\par \par \par [facility]\par

## 2019-04-25 NOTE — PHYSICAL EXAM
[Obese] : obese [Normal] : affect appropriate [de-identified] : supple [de-identified] : clear [de-identified] : soft, obese [de-identified] : S1/S2 [de-identified] : no cervical adenopathy [de-identified] : NO edema

## 2019-04-25 NOTE — HISTORY OF PRESENT ILLNESS
[de-identified] : Ms. Gutierres presents with TTP at the age of 38.\par \par The patient was admitted to [facility] 4/5/19-4/20-/19 for blurry vision and nausea/vomiting. She was found to have severe thrombocytopenia with LISSETT and metabolic acidosis and HTN. Peripheral smear showed schistocytes with elevated LDH and low haptoglobin with worsening renal failure and thrombocytopenia concerning for TTP. A CT C/A/P showed perinephric stranding bilaterally. \par \par A renal biopsy showed severe obliterative arterial sclerosis, with mild segmental glomerular endothelial injury, suggesting chronic thrombotic angiopathy. Acute tubular injury with focal tubular necrosis and mild reactive interstitial inflammation. \par \par She was started on high dose prednisone with a fast taper and received PLEX x 10 sessions, after which her platelets recovered but kidney function plateaued at 1.4.\par Her prednisone was again increased to 1 mg/kg and she received another 3 sessions of plasma exchange.  Creatinine improved to 1.1 - 1.2 \par She received total 13 sessions of plasmapheresis. She was started on prednisone taper after kidney function normalized to 1.1 and to taper over 2-3 weeks. \par \par 4/6/19 ADAMTS 13 activity <2 % (range >66%)\par ADAMTS 13 antibody 82 (range <12)\par 4/5/19 WBC 15.3, HGB 11.6, PLT 9 K,  BUN/Cr 2.80\par 4/6/19 WBC 15.6, HGB 11.3, PLT 6 K, LDH 1923, Haptoglobin <20\par 4/7/19 WBC 20.1, HGB 10.1, PLT 11 K\par 4/8/19 WBC 16.6, HGB 9.5, PLT 54 K \par 4/13/19 WBC 18.0, HGB 8.6,  K, \par 4/17/19 \par 4/20/19 WBC 25.0, HGB 8.7,  K\par 4/23/19 WBC 26.5, HGB 10.3,  K\par \par She is currently on Prednisone 60 mg a day and scheduled to finish the taper on 5/3/19. No dysuria. No headaches or blurred vision. No abdominal pain. Bowel movements are normal. \par \par PCP: Dr. Bobby Saunders, [facility]

## 2019-04-25 NOTE — ASSESSMENT
[FreeTextEntry1] : 38 year old female admitted with confusion, blurry vision, ARF, severe thrombocytopenia - diagnosed with TTP. ADAMTS 13 activity <2%, ADAMTS 13 antibody is 70-80 (high).  Kidney biopsy c/w chronic thrombotic microangiopathy.  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    S/p ~13 sessions of plasma exchange and high dose prednisone.  Platelets recovered to 400-500s range, and kidney function improved to creatinine 1.1-1.2\par \par Doing well post hospitalization, continues on prednisone taper and expected to finish on 5/3/19.  Remains asymptomatic. \par \par Plan:\par - CBC on 5/2/19, she will do it locally\par -ADAMTS 13 activity sent on 4/17 from the hospital is still pending\par - LDH and CMP today\par - RTO 2 weeks

## 2019-04-25 NOTE — ADDENDUM
[FreeTextEntry1] : I, Sharon Duenas, acted solely as a scribe for Dr. Marlena Colon on this date 4/25/19.

## 2019-04-26 LAB
ADAMTS13 ACTIVITY: 2.9 % — LOW
ADAMTS13 REFLEX COMMENT: SIGNIFICANT CHANGE UP
ALBUMIN SERPL ELPH-MCNC: 4.3 G/DL
ALP BLD-CCNC: 117 U/L
ALT SERPL-CCNC: 20 U/L
ANION GAP SERPL CALC-SCNC: 17 MMOL/L
AST SERPL-CCNC: 12 U/L
BILIRUB SERPL-MCNC: 0.2 MG/DL
BUN SERPL-MCNC: 30 MG/DL
CALCIUM SERPL-MCNC: 9.8 MG/DL
CHLORIDE SERPL-SCNC: 98 MMOL/L
CO2 SERPL-SCNC: 21 MMOL/L
CREAT SERPL-MCNC: 1.01 MG/DL
LDH SERPL-CCNC: 277 U/L
POTASSIUM SERPL-SCNC: 5.3 MMOL/L
PROT SERPL-MCNC: 6.9 G/DL
SODIUM SERPL-SCNC: 136 MMOL/L
VWF CP INHIB PPP-ACNC: 144 U/ML — HIGH

## 2019-05-02 ENCOUNTER — LABORATORY RESULT (OUTPATIENT)
Age: 39
End: 2019-05-02

## 2019-05-07 ENCOUNTER — RESULT REVIEW (OUTPATIENT)
Age: 39
End: 2019-05-07

## 2019-05-07 ENCOUNTER — APPOINTMENT (OUTPATIENT)
Dept: HEMATOLOGY ONCOLOGY | Facility: CLINIC | Age: 39
End: 2019-05-07
Payer: COMMERCIAL

## 2019-05-07 VITALS
TEMPERATURE: 98.2 F | HEART RATE: 100 BPM | HEIGHT: 63 IN | OXYGEN SATURATION: 98 % | BODY MASS INDEX: 45.19 KG/M2 | DIASTOLIC BLOOD PRESSURE: 96 MMHG | SYSTOLIC BLOOD PRESSURE: 158 MMHG | WEIGHT: 255.06 LBS

## 2019-05-07 LAB
BASOPHILS # BLD AUTO: 0.1 K/UL — SIGNIFICANT CHANGE UP (ref 0–0.2)
BASOPHILS NFR BLD AUTO: 0.6 % — SIGNIFICANT CHANGE UP (ref 0–2)
EOSINOPHIL # BLD AUTO: 0 K/UL — SIGNIFICANT CHANGE UP (ref 0–0.5)
EOSINOPHIL NFR BLD AUTO: 0.3 % — SIGNIFICANT CHANGE UP (ref 0–6)
HCT VFR BLD CALC: 36.2 % — SIGNIFICANT CHANGE UP (ref 34.5–45)
HGB BLD-MCNC: 11.3 G/DL — LOW (ref 11.5–15.5)
LYMPHOCYTES # BLD AUTO: 1.4 K/UL — SIGNIFICANT CHANGE UP (ref 1–3.3)
LYMPHOCYTES # BLD AUTO: 10.5 % — LOW (ref 13–44)
MCHC RBC-ENTMCNC: 29.5 PG — SIGNIFICANT CHANGE UP (ref 27–34)
MCHC RBC-ENTMCNC: 31.2 G/DL — LOW (ref 32–36)
MCV RBC AUTO: 94.4 FL — SIGNIFICANT CHANGE UP (ref 80–100)
MONOCYTES # BLD AUTO: 0.4 K/UL — SIGNIFICANT CHANGE UP (ref 0–0.9)
MONOCYTES NFR BLD AUTO: 3.1 % — SIGNIFICANT CHANGE UP (ref 2–14)
NEUTROPHILS # BLD AUTO: 11.7 K/UL — HIGH (ref 1.8–7.4)
NEUTROPHILS NFR BLD AUTO: 85.5 % — HIGH (ref 43–77)
PLATELET # BLD AUTO: 299 K/UL — SIGNIFICANT CHANGE UP (ref 150–400)
RBC # BLD: 3.84 M/UL — SIGNIFICANT CHANGE UP (ref 3.8–5.2)
RBC # FLD: 16.4 % — HIGH (ref 10.3–14.5)
WBC # BLD: 13.6 K/UL — HIGH (ref 3.8–10.5)
WBC # FLD AUTO: 13.6 K/UL — HIGH (ref 3.8–10.5)

## 2019-05-07 PROCEDURE — 99214 OFFICE O/P EST MOD 30 MIN: CPT

## 2019-05-07 NOTE — ADDENDUM
[FreeTextEntry1] : I, Sharon Duenas, acted solely as a scribe for Dr. Marlena Colon on this date 5/7/19.

## 2019-05-07 NOTE — ASSESSMENT
[FreeTextEntry1] : 38 year old female admitted with confusion, blurry vision, ARF, severe thrombocytopenia - diagnosed with TTP. ADAMTS 13 activity <2%, ADAMTS 13 antibody is 70-80 (high).  Kidney biopsy c/w chronic thrombotic microangiopathy.  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    S/p ~13 sessions of plasma exchange and high dose prednisone.  Platelets recovered to 400-500s range, and kidney function improved to creatinine 1.1-1.2\par \par Doing well continues on prednisone taper and expected to finish over next 4 days.  \par Repeat ADAMTS 13 activity on 4/17 was 2.9% which is very low, after this she received another 3 sessions of plasma exchange and steroids were increased to 1 mg/kg given persistent elevation in creatinine.  \par \par \par Plan:\par - Repeat ADAMTS 13 activity now\par - LDH and CMP today\par - RTO 4 weeks

## 2019-05-07 NOTE — PHYSICAL EXAM
[Obese] : obese [Normal] : affect appropriate [de-identified] : supple [de-identified] : clear [de-identified] : soft, obese [de-identified] : S1/S2 [de-identified] : NO edema [de-identified] : no cervical adenopathy

## 2019-05-08 LAB
ALBUMIN SERPL ELPH-MCNC: 4.4 G/DL
ALP BLD-CCNC: 115 U/L
ALT SERPL-CCNC: 19 U/L
ANION GAP SERPL CALC-SCNC: 14 MMOL/L
AST SERPL-CCNC: 15 U/L
BILIRUB SERPL-MCNC: 0.2 MG/DL
BUN SERPL-MCNC: 19 MG/DL
CALCIUM SERPL-MCNC: 9.7 MG/DL
CHLORIDE SERPL-SCNC: 99 MMOL/L
CO2 SERPL-SCNC: 23 MMOL/L
CREAT SERPL-MCNC: 0.88 MG/DL
LDH SERPL-CCNC: 308 U/L
POTASSIUM SERPL-SCNC: 4.5 MMOL/L
PROT SERPL-MCNC: 7 G/DL
SODIUM SERPL-SCNC: 136 MMOL/L

## 2019-05-13 LAB
AD13ACT COM: NORMAL
ADAMTS13 ACTIVITY: 71.2 %

## 2019-05-21 ENCOUNTER — APPOINTMENT (OUTPATIENT)
Dept: NEPHROLOGY | Facility: CLINIC | Age: 39
End: 2019-05-21
Payer: COMMERCIAL

## 2019-05-21 VITALS
HEIGHT: 63 IN | WEIGHT: 260 LBS | BODY MASS INDEX: 46.07 KG/M2 | SYSTOLIC BLOOD PRESSURE: 134 MMHG | HEART RATE: 111 BPM | DIASTOLIC BLOOD PRESSURE: 92 MMHG

## 2019-05-21 DIAGNOSIS — Z78.9 OTHER SPECIFIED HEALTH STATUS: ICD-10-CM

## 2019-05-21 PROCEDURE — 36415 COLL VENOUS BLD VENIPUNCTURE: CPT

## 2019-05-21 PROCEDURE — 99245 OFF/OP CONSLTJ NEW/EST HI 55: CPT | Mod: 25

## 2019-05-23 LAB
ALBUMIN SERPL ELPH-MCNC: 4.2 G/DL
ANION GAP SERPL CALC-SCNC: 15 MMOL/L
APPEARANCE: ABNORMAL
BACTERIA: ABNORMAL
BASOPHILS # BLD AUTO: 0.04 K/UL
BASOPHILS NFR BLD AUTO: 0.5 %
BILIRUBIN URINE: NEGATIVE
BLOOD URINE: ABNORMAL
BUN SERPL-MCNC: 17 MG/DL
CALCIUM SERPL-MCNC: 9.5 MG/DL
CHLORIDE SERPL-SCNC: 103 MMOL/L
CO2 SERPL-SCNC: 25 MMOL/L
COLOR: YELLOW
CREAT SERPL-MCNC: 0.92 MG/DL
CREAT SPEC-SCNC: 189 MG/DL
CREAT/PROT UR: 0.2 RATIO
EOSINOPHIL # BLD AUTO: 0.13 K/UL
EOSINOPHIL NFR BLD AUTO: 1.7 %
GLUCOSE QUALITATIVE U: NORMAL
GLUCOSE SERPL-MCNC: 192 MG/DL
HCT VFR BLD CALC: 37.5 %
HGB BLD-MCNC: 11.1 G/DL
HYALINE CASTS: 0 /LPF
IMM GRANULOCYTES NFR BLD AUTO: 0.3 %
KETONES URINE: NEGATIVE
LDH SERPL-CCNC: 362 U/L
LEUKOCYTE ESTERASE URINE: NEGATIVE
LYMPHOCYTES # BLD AUTO: 2.06 K/UL
LYMPHOCYTES NFR BLD AUTO: 26.9 %
MAN DIFF?: NORMAL
MCHC RBC-ENTMCNC: 28.7 PG
MCHC RBC-ENTMCNC: 29.6 GM/DL
MCV RBC AUTO: 96.9 FL
MICROSCOPIC-UA: NORMAL
MONOCYTES # BLD AUTO: 0.62 K/UL
MONOCYTES NFR BLD AUTO: 8.1 %
NEUTROPHILS # BLD AUTO: 4.8 K/UL
NEUTROPHILS NFR BLD AUTO: 62.5 %
NITRITE URINE: NEGATIVE
PH URINE: 6.5
PHOSPHATE SERPL-MCNC: 3.2 MG/DL
PLATELET # BLD AUTO: 444 K/UL
POTASSIUM SERPL-SCNC: 3.9 MMOL/L
PROT UR-MCNC: 31 MG/DL
PROTEIN URINE: ABNORMAL
RBC # BLD: 3.87 M/UL
RBC # FLD: 15.2 %
RED BLOOD CELLS URINE: 11 /HPF
SODIUM SERPL-SCNC: 143 MMOL/L
SPECIFIC GRAVITY URINE: 1.02
SQUAMOUS EPITHELIAL CELLS: 14 /HPF
TRIPLE PHOSPHATE CRYSTALS: ABNORMAL
UROBILINOGEN URINE: NORMAL
WBC # FLD AUTO: 7.67 K/UL
WHITE BLOOD CELLS URINE: 4 /HPF

## 2019-06-10 ENCOUNTER — OUTPATIENT (OUTPATIENT)
Dept: OUTPATIENT SERVICES | Facility: HOSPITAL | Age: 39
LOS: 1 days | Discharge: HOME | End: 2019-06-10

## 2019-06-10 DIAGNOSIS — D64.9 ANEMIA, UNSPECIFIED: ICD-10-CM

## 2019-06-11 ENCOUNTER — RESULT REVIEW (OUTPATIENT)
Age: 39
End: 2019-06-11

## 2019-06-11 ENCOUNTER — APPOINTMENT (OUTPATIENT)
Dept: HEMATOLOGY ONCOLOGY | Facility: CLINIC | Age: 39
End: 2019-06-11
Payer: COMMERCIAL

## 2019-06-11 VITALS
WEIGHT: 268.02 LBS | OXYGEN SATURATION: 99 % | TEMPERATURE: 98.1 F | DIASTOLIC BLOOD PRESSURE: 98 MMHG | HEIGHT: 63 IN | HEART RATE: 93 BPM | BODY MASS INDEX: 47.49 KG/M2 | SYSTOLIC BLOOD PRESSURE: 166 MMHG

## 2019-06-11 LAB
BASOPHILS # BLD AUTO: 0.1 K/UL — SIGNIFICANT CHANGE UP (ref 0–0.2)
BASOPHILS NFR BLD AUTO: 0.9 % — SIGNIFICANT CHANGE UP (ref 0–2)
EOSINOPHIL # BLD AUTO: 0.3 K/UL — SIGNIFICANT CHANGE UP (ref 0–0.5)
EOSINOPHIL NFR BLD AUTO: 2.9 % — SIGNIFICANT CHANGE UP (ref 0–6)
HCT VFR BLD CALC: 35.1 % — SIGNIFICANT CHANGE UP (ref 34.5–45)
HGB BLD-MCNC: 11 G/DL — LOW (ref 11.5–15.5)
LYMPHOCYTES # BLD AUTO: 2.9 K/UL — SIGNIFICANT CHANGE UP (ref 1–3.3)
LYMPHOCYTES # BLD AUTO: 24.4 % — SIGNIFICANT CHANGE UP (ref 13–44)
MCHC RBC-ENTMCNC: 28.2 PG — SIGNIFICANT CHANGE UP (ref 27–34)
MCHC RBC-ENTMCNC: 31.3 G/DL — LOW (ref 32–36)
MCV RBC AUTO: 90.3 FL — SIGNIFICANT CHANGE UP (ref 80–100)
MONOCYTES # BLD AUTO: 0.6 K/UL — SIGNIFICANT CHANGE UP (ref 0–0.9)
MONOCYTES NFR BLD AUTO: 5.4 % — SIGNIFICANT CHANGE UP (ref 2–14)
NEUTROPHILS # BLD AUTO: 7.7 K/UL — HIGH (ref 1.8–7.4)
NEUTROPHILS NFR BLD AUTO: 66.4 % — SIGNIFICANT CHANGE UP (ref 43–77)
PLATELET # BLD AUTO: 474 K/UL — HIGH (ref 150–400)
RBC # BLD: 3.88 M/UL — SIGNIFICANT CHANGE UP (ref 3.8–5.2)
RBC # FLD: 14.4 % — SIGNIFICANT CHANGE UP (ref 10.3–14.5)
WBC # BLD: 11.7 K/UL — HIGH (ref 3.8–10.5)
WBC # FLD AUTO: 11.7 K/UL — HIGH (ref 3.8–10.5)

## 2019-06-11 PROCEDURE — 99214 OFFICE O/P EST MOD 30 MIN: CPT

## 2019-06-11 RX ORDER — PREDNISONE 20 MG/1
20 TABLET ORAL
Refills: 0 | Status: DISCONTINUED | COMMUNITY
Start: 2019-04-25 | End: 2019-06-11

## 2019-06-12 LAB
ALBUMIN SERPL ELPH-MCNC: 3.8 G/DL
ALP BLD-CCNC: 123 U/L
ALT SERPL-CCNC: 13 U/L
ANION GAP SERPL CALC-SCNC: 11 MMOL/L
AST SERPL-CCNC: 14 U/L
BILIRUB SERPL-MCNC: <0.2 MG/DL
BUN SERPL-MCNC: 11 MG/DL
CALCIUM SERPL-MCNC: 8.8 MG/DL
CHLORIDE SERPL-SCNC: 104 MMOL/L
CO2 SERPL-SCNC: 24 MMOL/L
CREAT SERPL-MCNC: 0.93 MG/DL
GLUCOSE SERPL-MCNC: 158 MG/DL
LDH SERPL-CCNC: 233 U/L
POTASSIUM SERPL-SCNC: 4.2 MMOL/L
PROT SERPL-MCNC: 6.6 G/DL
SODIUM SERPL-SCNC: 139 MMOL/L

## 2019-06-12 NOTE — ADDENDUM
[FreeTextEntry1] : I, Gabriela Melendez, acted solely as a scribe for Dr. Marlena Colon on this date 6/11/19.

## 2019-06-12 NOTE — HISTORY OF PRESENT ILLNESS
[de-identified] : Ms. Gutierres presents with TTP at the age of 38.\par \par The patient was admitted to [facility] 4/5/19-4/20-/19 for blurry vision and nausea/vomiting. She was found to have severe thrombocytopenia with LISSETT and metabolic acidosis and HTN. Peripheral smear showed schistocytes with elevated LDH and low haptoglobin with worsening renal failure and thrombocytopenia concerning for TTP. A CT C/A/P showed perinephric stranding bilaterally. \par \par A renal biopsy showed severe obliterative arterial sclerosis, with mild segmental glomerular endothelial injury, suggesting chronic thrombotic angiopathy. Acute tubular injury with focal tubular necrosis and mild reactive interstitial inflammation. \par \par She was started on high dose prednisone with a fast taper and received PLEX x 10 sessions, after which her platelets recovered but kidney function plateaued at 1.4.\par Her prednisone was again increased to 1 mg/kg and she received another 3 sessions of plasma exchange starting 4/17/19 - 4/19/19.  Creatinine improved to 1.1 - 1.2 \par She received total 13 sessions of plasmapheresis. She was started on prednisone taper after kidney function normalized to 1.1 and to taper over 2-3 weeks. \par \par 4/6/19 ADAMTS 13 activity <2 % (range >66%)\par ADAMTS 13 antibody 82 (range <12)\par 4/5/19 WBC 15.3, HGB 11.6, PLT 9 K,  BUN/Cr 2.80\par 4/6/19 WBC 15.6, HGB 11.3, PLT 6 K, LDH 1923, Haptoglobin <20\par 4/7/19 WBC 20.1, HGB 10.1, PLT 11 K\par 4/8/19 WBC 16.6, HGB 9.5, PLT 54 K \par 4/13/19 WBC 18.0, HGB 8.6,  K, \par 4/17/19 \par 4/20/19 WBC 25.0, HGB 8.7,  K\par 4/23/19 WBC 26.5, HGB 10.3,  K\par \par She is currently on Prednisone 60 mg a day and scheduled to finish the taper on 5/3/19. No dysuria. No headaches or blurred vision. No abdominal pain. Bowel movements are normal. \par \par PCP: Dr. Bobby Saunders, [facility] [de-identified] : Patient returns for follow up.\par Completed course of Prednisone on 5/11/19.\par Recently started on Losartan 5/21/19 as per Nephrology. Also started on Humalog for Diabetes. \par She reports fatigue. No fevers. \par No headaches. No pain. No N/V. \par No cough. No SOB. \par No joint pain\par \par \par 4/17/19 [id number] , ADAMTS 13 activity 2.9% (low)\par 4/25/19 \par 5/3/19 WBC 16.75, HGB 10.7, PLT 332K \par 5/21/19 WBC 7.67, HGB 11.1 , PLT 444K \par

## 2019-06-12 NOTE — ASSESSMENT
[FreeTextEntry1] : 39 year old female admitted with confusion, blurry vision, ARF, severe thrombocytopenia - diagnosed with TTP. ADAMTS 13 activity <2%, ADAMTS 13 antibody is 70-80 (high).  Kidney biopsy c/w chronic thrombotic microangiopathy.  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    S/p ~13 sessions of plasma exchange and high dose prednisone with recovery of platelets and kidney function.  Prednisone taper completed 5/11/19.\par On 5/7/19 her [id number] activity was normal at 71%.\par \par She's doing well off Prednisone.  Platelets today are 444K, creatinine is normal.  LDH normal.\par \par \par Plan:\par -Repeat labs today\par -Follow up with Nephrology \par -RTO 4 weeks

## 2019-06-12 NOTE — PHYSICAL EXAM
[Obese] : obese [Normal] : grossly intact [Fully active, able to carry on all pre-disease performance without restriction] : Status 0 - Fully active, able to carry on all pre-disease performance without restriction [de-identified] : supple [de-identified] : clear [de-identified] : S1/S2 [de-identified] : NO edema [de-identified] : soft, obese [de-identified] : no cervical adenopathy

## 2019-06-17 LAB
AD13ACT COM: NORMAL
ADAMTS13 ACTIVITY: 65.5 %

## 2019-07-02 ENCOUNTER — APPOINTMENT (OUTPATIENT)
Dept: HEMATOLOGY ONCOLOGY | Facility: CLINIC | Age: 39
End: 2019-07-02

## 2019-07-02 ENCOUNTER — APPOINTMENT (OUTPATIENT)
Dept: NEPHROLOGY | Facility: CLINIC | Age: 39
End: 2019-07-02

## 2019-07-25 ENCOUNTER — EMERGENCY (EMERGENCY)
Facility: HOSPITAL | Age: 39
LOS: 0 days | Discharge: HOME | End: 2019-07-25
Attending: EMERGENCY MEDICINE | Admitting: EMERGENCY MEDICINE
Payer: COMMERCIAL

## 2019-07-25 VITALS
OXYGEN SATURATION: 100 % | TEMPERATURE: 99 F | HEART RATE: 100 BPM | DIASTOLIC BLOOD PRESSURE: 96 MMHG | SYSTOLIC BLOOD PRESSURE: 161 MMHG | RESPIRATION RATE: 16 BRPM

## 2019-07-25 VITALS — WEIGHT: 250 LBS | HEIGHT: 72 IN

## 2019-07-25 DIAGNOSIS — L50.9 URTICARIA, UNSPECIFIED: ICD-10-CM

## 2019-07-25 DIAGNOSIS — Z88.0 ALLERGY STATUS TO PENICILLIN: ICD-10-CM

## 2019-07-25 DIAGNOSIS — Z79.899 OTHER LONG TERM (CURRENT) DRUG THERAPY: ICD-10-CM

## 2019-07-25 DIAGNOSIS — M31.1 THROMBOTIC MICROANGIOPATHY: ICD-10-CM

## 2019-07-25 LAB
BASOPHILS # BLD AUTO: 0.01 K/UL — SIGNIFICANT CHANGE UP (ref 0–0.2)
BASOPHILS NFR BLD AUTO: 0.1 % — SIGNIFICANT CHANGE UP (ref 0–2)
EOSINOPHIL # BLD AUTO: 0.02 K/UL — SIGNIFICANT CHANGE UP (ref 0–0.5)
EOSINOPHIL NFR BLD AUTO: 0.2 % — SIGNIFICANT CHANGE UP (ref 0–6)
HCT VFR BLD CALC: 35.9 % — SIGNIFICANT CHANGE UP (ref 34.5–45)
HGB BLD-MCNC: 11.1 G/DL — LOW (ref 11.5–15.5)
IMM GRANULOCYTES NFR BLD AUTO: 0.4 % — SIGNIFICANT CHANGE UP (ref 0–1.5)
LYMPHOCYTES # BLD AUTO: 16 % — SIGNIFICANT CHANGE UP (ref 13–44)
LYMPHOCYTES # BLD AUTO: 2.06 K/UL — SIGNIFICANT CHANGE UP (ref 1–3.3)
MCHC RBC-ENTMCNC: 27.1 PG — SIGNIFICANT CHANGE UP (ref 27–34)
MCHC RBC-ENTMCNC: 30.9 GM/DL — LOW (ref 32–36)
MCV RBC AUTO: 87.8 FL — SIGNIFICANT CHANGE UP (ref 80–100)
MONOCYTES # BLD AUTO: 0.25 K/UL — SIGNIFICANT CHANGE UP (ref 0–0.9)
MONOCYTES NFR BLD AUTO: 1.9 % — LOW (ref 2–14)
NEUTROPHILS # BLD AUTO: 10.49 K/UL — HIGH (ref 1.8–7.4)
NEUTROPHILS NFR BLD AUTO: 81.4 % — HIGH (ref 43–77)
PLATELET # BLD AUTO: 468 K/UL — HIGH (ref 150–400)
RBC # BLD: 4.09 M/UL — SIGNIFICANT CHANGE UP (ref 3.8–5.2)
RBC # FLD: 15.1 % — HIGH (ref 10.3–14.5)
WBC # BLD: 12.88 K/UL — HIGH (ref 3.8–10.5)
WBC # FLD AUTO: 12.88 K/UL — HIGH (ref 3.8–10.5)

## 2019-07-25 PROCEDURE — 99284 EMERGENCY DEPT VISIT MOD MDM: CPT | Mod: 25

## 2019-07-25 RX ORDER — DIPHENHYDRAMINE HCL 50 MG
50 CAPSULE ORAL ONCE
Refills: 0 | Status: COMPLETED | OUTPATIENT
Start: 2019-07-25 | End: 2019-07-25

## 2019-07-25 RX ORDER — DEXAMETHASONE 0.5 MG/5ML
10 ELIXIR ORAL ONCE
Refills: 0 | Status: COMPLETED | OUTPATIENT
Start: 2019-07-25 | End: 2019-07-25

## 2019-07-25 RX ADMIN — Medication 10 MILLIGRAM(S): at 22:06

## 2019-07-25 RX ADMIN — Medication 50 MILLIGRAM(S): at 21:22

## 2019-07-25 NOTE — ED STATDOCS - NSFOLLOWUPINSTRUCTIONS_ED_ALL_ED_FT
Acute Rash    WHAT YOU NEED TO KNOW:    A rash is irritation, redness, or itchiness in the skin or mucus membranes. Mucus membranes are areas such as the lining of your nose or throat. Acute means the rash starts suddenly, worsens quickly, and lasts a short time. An acute rash may be caused by a disease, such as hepatitis or vasculitis. The rash may be a reaction to something you are allergic to, such as certain foods, or latex. Certain medicines, including antibiotics, NSAIDs, prescription pain medicine, and aspirin can also cause a rash.     DISCHARGE INSTRUCTIONS:    Return to the emergency department if:     You have sudden trouble breathing or chest pain.      You are vomiting, have a headache or muscle aches, and your throat hurts.    Contact your healthcare provider if:     You have a fever.      You get open wounds from scratching your skin, or you have a wound that is red, swollen, or painful.       Your rash lasts longer than 3 months.      You have swelling or pain in your joints.       You have questions or concerns about your condition or care.    Medicines: If your rash does not go away on its own, you may need the following medicines:     Antihistamines may be given to help decrease itching.       Steroids may be given to decrease inflammation.      Antibiotics help fight or prevent a bacterial infection.       Take your medicine as directed. Contact your healthcare provider if you think your medicine is not helping or if you have side effects. Tell him of her if you are allergic to any medicine. Keep a list of the medicines, vitamins, and herbs you take. Include the amounts, and when and why you take them. Bring the list or the pill bottles to follow-up visits. Carry your medicine list with you in case of an emergency.    Prevent a rash or care for your skin when you have a rash: Dry skin can lead to more problems. Do not scratch your skin if it itches. You may cause a skin infection by scratching. The following may prevent dry skin, and help your skin look better:     Use thick cream lotions or petroleum jelly to help soothe your rash. These products work well on areas with thick skin, such as your feet. Cool compresses may also be used to soothe your skin. Apply a cool compress or a cool, wet towel, and then cover it with a dry towel.       Use lukewarm water when you bathe. Hot water may damage your skin more. Pat your skin dry. Do not rub your skin with a towel.       Use detergents, soaps, shampoos, and bubble baths made for sensitive skin. Wear clothes that are made of cotton instead of nylon or wool. Cotton is softer, so it will not hurt your skin as much.    Follow up with your healthcare provider as directed: You may need to see a dermatologist if healthcare providers do not know what is causing your rash. You may also need to see a dermatologist if your rash does not get better even with treatment. You may need to see a dietitian if you have allergies to foods. Write down your questions so you remember to ask them during your visits.

## 2019-07-25 NOTE — ED STATDOCS - ATTENDING CONTRIBUTION TO CARE
Attending Contribution to Care: I, Maris Faria, performed the initial face to face bedside interview with this patient regarding history of present illness, review of symptoms and relevant past medical, social and family history.  I completed an independent physical examination.  I was the initial provider who evaluated this patient. I have signed out the follow up of any pending tests (i.e. labs, radiological studies) to the ACP.  I have communicated the patient’s plan of care and disposition with the ACP.

## 2019-07-25 NOTE — ED STATDOCS - CARE PROVIDER_API CALL
Jamila Millard)  Allergy and Immunology  [address] [place] [address]  Phone: [phone]  Fax: [phone]  Follow Up Time:

## 2019-07-25 NOTE — ED ADULT TRIAGE NOTE - CHIEF COMPLAINT QUOTE
c/o  generalized pruritis & hives since Saturday. Took benadryl earlier today with some relief. Hives noted to B/L UE. Denies SOB. Denies trouble swallowing. No distress noted. Able to speak in full sentences.

## 2019-07-25 NOTE — ED STATDOCS - PROGRESS NOTE DETAILS
Patient initially seen and evaluated with ED attending at intake.  She is well appearing with urticarial rash.  She has been dealing with these rashes every sicne she needed a plasmapheresis.  Given this recent history, screening labs performed.  No acute abnormalities.  Will give dose of decadron here and refer to allergist -Toñito Escobar PA-C

## 2019-07-25 NOTE — ED STATDOCS - CPE ED ENMT NORM
no loss of consciousness, no gait abnormality, no headache, no sensory deficits, and no weakness.
normal...

## 2019-07-25 NOTE — ED STATDOCS - OBJECTIVE STATEMENT
38 y/o f with PMHx of TTP, plasma infusions presenting to the ED c/o hives to BL UE x5 days. Pt states she thinks it is from prolonged sun exposure. Took benadryl earlier today with some relief. Denies fever, chills, SOB, trouble swallowing, any other acute c/o.

## 2019-07-31 ENCOUNTER — APPOINTMENT (OUTPATIENT)
Dept: NEPHROLOGY | Facility: CLINIC | Age: 39
End: 2019-07-31

## 2019-08-01 ENCOUNTER — OUTPATIENT (OUTPATIENT)
Dept: OUTPATIENT SERVICES | Facility: HOSPITAL | Age: 39
LOS: 1 days | Discharge: HOME | End: 2019-08-01

## 2019-08-01 DIAGNOSIS — D64.9 ANEMIA, UNSPECIFIED: ICD-10-CM

## 2019-08-01 DIAGNOSIS — M31.1 THROMBOTIC MICROANGIOPATHY: ICD-10-CM

## 2019-08-02 ENCOUNTER — APPOINTMENT (OUTPATIENT)
Dept: HEMATOLOGY ONCOLOGY | Facility: CLINIC | Age: 39
End: 2019-08-02
Payer: COMMERCIAL

## 2019-08-02 ENCOUNTER — RESULT REVIEW (OUTPATIENT)
Age: 39
End: 2019-08-02

## 2019-08-02 VITALS
SYSTOLIC BLOOD PRESSURE: 159 MMHG | DIASTOLIC BLOOD PRESSURE: 99 MMHG | HEIGHT: 63 IN | HEART RATE: 99 BPM | WEIGHT: 293 LBS | OXYGEN SATURATION: 98 % | BODY MASS INDEX: 51.91 KG/M2

## 2019-08-02 LAB
BASOPHILS # BLD AUTO: 0.1 K/UL — SIGNIFICANT CHANGE UP (ref 0–0.2)
BASOPHILS NFR BLD AUTO: 1 % — SIGNIFICANT CHANGE UP (ref 0–2)
EOSINOPHIL # BLD AUTO: 0.3 K/UL — SIGNIFICANT CHANGE UP (ref 0–0.5)
EOSINOPHIL NFR BLD AUTO: 2.8 % — SIGNIFICANT CHANGE UP (ref 0–6)
HCT VFR BLD CALC: 39.4 % — SIGNIFICANT CHANGE UP (ref 34.5–45)
HGB BLD-MCNC: 11.9 G/DL — SIGNIFICANT CHANGE UP (ref 11.5–15.5)
LYMPHOCYTES # BLD AUTO: 26.4 % — SIGNIFICANT CHANGE UP (ref 13–44)
LYMPHOCYTES # BLD AUTO: 3.1 K/UL — SIGNIFICANT CHANGE UP (ref 1–3.3)
MCHC RBC-ENTMCNC: 25.9 PG — LOW (ref 27–34)
MCHC RBC-ENTMCNC: 30.2 G/DL — LOW (ref 32–36)
MCV RBC AUTO: 85.7 FL — SIGNIFICANT CHANGE UP (ref 80–100)
MONOCYTES # BLD AUTO: 0.6 K/UL — SIGNIFICANT CHANGE UP (ref 0–0.9)
MONOCYTES NFR BLD AUTO: 5.3 % — SIGNIFICANT CHANGE UP (ref 2–14)
NEUTROPHILS # BLD AUTO: 7.5 K/UL — HIGH (ref 1.8–7.4)
NEUTROPHILS NFR BLD AUTO: 64.4 % — SIGNIFICANT CHANGE UP (ref 43–77)
PLATELET # BLD AUTO: 584 K/UL — HIGH (ref 150–400)
RBC # BLD: 4.6 M/UL — SIGNIFICANT CHANGE UP (ref 3.8–5.2)
RBC # FLD: 14.2 % — SIGNIFICANT CHANGE UP (ref 10.3–14.5)
WBC # BLD: 11.6 K/UL — HIGH (ref 3.8–10.5)
WBC # FLD AUTO: 11.6 K/UL — HIGH (ref 3.8–10.5)

## 2019-08-02 PROCEDURE — 99214 OFFICE O/P EST MOD 30 MIN: CPT

## 2019-08-02 RX ORDER — AMLODIPINE BESYLATE 10 MG/1
10 TABLET ORAL
Refills: 0 | Status: DISCONTINUED | COMMUNITY
Start: 2019-04-25 | End: 2019-08-02

## 2019-08-02 RX ORDER — PANTOPRAZOLE SODIUM 40 MG/1
40 TABLET, DELAYED RELEASE ORAL
Refills: 0 | Status: DISCONTINUED | COMMUNITY
Start: 2019-04-25 | End: 2019-08-02

## 2019-08-02 NOTE — HISTORY OF PRESENT ILLNESS
[de-identified] : Ms. Gutierres presents with TTP at the age of 38.\par \par The patient was admitted to [facility] 4/5/19-4/20-/19 for blurry vision and nausea/vomiting. She was found to have severe thrombocytopenia with LISSETT and metabolic acidosis and HTN. Peripheral smear showed schistocytes with elevated LDH and low haptoglobin with worsening renal failure and thrombocytopenia concerning for TTP. A CT C/A/P showed perinephric stranding bilaterally. \par \par A renal biopsy showed severe obliterative arterial sclerosis, with mild segmental glomerular endothelial injury, suggesting chronic thrombotic angiopathy. Acute tubular injury with focal tubular necrosis and mild reactive interstitial inflammation. \par \par She was started on high dose prednisone with a fast taper and received PLEX x 10 sessions, after which her platelets recovered but kidney function plateaued at 1.4.\par Her prednisone was again increased to 1 mg/kg and she received another 3 sessions of plasma exchange starting 4/17/19 - 4/19/19.  Creatinine improved to 1.1 - 1.2 \par She received total 13 sessions of plasmapheresis. She was started on prednisone taper after kidney function normalized to 1.1 and to taper over 2-3 weeks. \par \par 4/6/19 ADAMTS 13 activity <2 % (range >66%)\par ADAMTS 13 antibody 82 (range <12)\par 4/5/19 WBC 15.3, HGB 11.6, PLT 9 K,  BUN/Cr 2.80\par 4/6/19 WBC 15.6, HGB 11.3, PLT 6 K, LDH 1923, Haptoglobin <20\par 4/7/19 WBC 20.1, HGB 10.1, PLT 11 K\par 4/8/19 WBC 16.6, HGB 9.5, PLT 54 K \par 4/13/19 WBC 18.0, HGB 8.6,  K, \par 4/17/19 \par 4/20/19 WBC 25.0, HGB 8.7,  K\par 4/23/19 WBC 26.5, HGB 10.3,  K\par \par She is currently on Prednisone 60 mg a day and scheduled to finish the taper on 5/3/19. No dysuria. No headaches or blurred vision. No abdominal pain. Bowel movements are normal. \par \par PCP: Dr. Bobby Saunders, [facility] [de-identified] : Patient returns for follow up.\par She complains of sudden onset of lip swelling, hives and pruritus last week. She is not taking any new medications, has not tried any new foods. She presented to [facility] where she was given Benadryl for her symptoms. \par  No headaches, dizziness, difficulty breathing, chest pain, leg swelling at this time.\par Her LNMP was last week.\par \par 8/2/19\par WBC =11.6\par Hgb 11.9\par PLT = 584

## 2019-08-02 NOTE — PHYSICAL EXAM
[Fully active, able to carry on all pre-disease performance without restriction] : Status 0 - Fully active, able to carry on all pre-disease performance without restriction [Obese] : obese [Normal] : RRR, normal S1S2, no murmurs, rubs, gallops [de-identified] : supple [de-identified] : clear [de-identified] : NO edema [de-identified] : S1/S2 [de-identified] : soft, obese [de-identified] : no cervical adenopathy

## 2019-08-02 NOTE — ASSESSMENT
[FreeTextEntry1] : 39 year old female admitted with confusion, blurry vision, ARF, severe thrombocytopenia - diagnosed with TTP. ADAMTS 13 activity <2%, ADAMTS 13 antibody is 70-80 (high).  Kidney biopsy c/w chronic thrombotic microangiopathy.  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    S/p ~13 sessions of plasma exchange and high dose prednisone with recovery of platelets and kidney function.  Prednisone taper completed 5/11/19.\par On 5/7/19 her [id number] activity was normal at 71%.\par \par Hgb is improved today, platelets 584, WBC 11.6.\par Mild leukocytosis and thrombocytosis - unclear etiology, possibly reactive. \par \par \par Plan:\par -iron studies, B12, folate\par -if leukocytosis and thrombocytosis persists --> will need BCR/ABL, JAK2, and flow cytometry next visit\par -RTO 4 m ont

## 2019-08-05 PROBLEM — M31.1 THROMBOTIC MICROANGIOPATHY: Chronic | Status: ACTIVE | Noted: 2019-08-02

## 2019-08-05 LAB
ALBUMIN SERPL ELPH-MCNC: 4.2 G/DL
ALP BLD-CCNC: 129 U/L
ALT SERPL-CCNC: 32 U/L
ANION GAP SERPL CALC-SCNC: 13 MMOL/L
AST SERPL-CCNC: 20 U/L
BILIRUB SERPL-MCNC: 0.2 MG/DL
BUN SERPL-MCNC: 12 MG/DL
CALCIUM SERPL-MCNC: 9.9 MG/DL
CHLORIDE SERPL-SCNC: 102 MMOL/L
CO2 SERPL-SCNC: 28 MMOL/L
CREAT SERPL-MCNC: 0.84 MG/DL
GLUCOSE SERPL-MCNC: 176 MG/DL
LDH SERPL-CCNC: 244 U/L
POTASSIUM SERPL-SCNC: 5.2 MMOL/L
PROT SERPL-MCNC: 7.5 G/DL
SODIUM SERPL-SCNC: 143 MMOL/L

## 2019-08-14 ENCOUNTER — APPOINTMENT (OUTPATIENT)
Dept: NEPHROLOGY | Facility: CLINIC | Age: 39
End: 2019-08-14
Payer: COMMERCIAL

## 2019-08-14 VITALS
HEIGHT: 63 IN | WEIGHT: 272 LBS | SYSTOLIC BLOOD PRESSURE: 142 MMHG | BODY MASS INDEX: 48.2 KG/M2 | HEART RATE: 86 BPM | DIASTOLIC BLOOD PRESSURE: 88 MMHG

## 2019-08-14 PROCEDURE — 99215 OFFICE O/P EST HI 40 MIN: CPT

## 2019-08-14 NOTE — PHYSICAL EXAM
[General Appearance - Alert] : alert [General Appearance - In No Acute Distress] : in no acute distress [General Appearance - Well Developed] : well developed [General Appearance - Well Nourished] : well nourished [Sclera] : the sclera and conjunctiva were normal [Outer Ear] : the ears and nose were normal in appearance [Neck Appearance] : the appearance of the neck was normal [Hearing Threshold Finger Rub Not [place]] : hearing was normal [] : no respiratory distress [Respiration, Rhythm And Depth] : normal respiratory rhythm and effort [Auscultation Breath Sounds / Voice Sounds] : lungs were clear to auscultation bilaterally [Heart Rate And Rhythm] : heart rate was normal and rhythm regular [Heart Sounds] : normal S1 and S2 [Arterial Pulses Carotid] : carotid pulses were normal with no bruits [Bowel Sounds] : normal bowel sounds [Abdomen Soft] : soft [Abdomen Tenderness] : non-tender [Cervical Lymph Nodes Enlarged Posterior Bilaterally] : posterior cervical [No CVA Tenderness] : no ~M costovertebral angle tenderness [Abnormal Walk] : normal gait [Skin Color & Pigmentation] : normal skin color and pigmentation [Skin Turgor] : normal skin turgor [Cranial Nerves] : cranial nerves 2-12 were intact [Impaired Insight] : insight and judgment were intact [Oriented To Time, Place, And Person] : oriented to person, place, and time [Affect] : the affect was normal Patient

## 2019-08-14 NOTE — REVIEW OF SYSTEMS
[Fever] : no fever [Chills] : no chills [Eye Pain] : no eye pain [Red Eyes] : eyes not red [Loss Of Hearing] : no hearing loss [Earache] : no earache [Heart Rate Is Slow] : the heart rate was not slow [Shortness Of Breath] : no shortness of breath [Heart Rate Is Fast] : the heart rate was not fast [Wheezing] : no wheezing [Vomiting] : no vomiting [Abdominal Pain] : no abdominal pain [Arthralgias] : no arthralgias [Joint Pain] : no joint pain [Skin Lesions] : no skin lesions [Skin Wound] : no skin wound [Confused] : no confusion [Proptosis] : no proptosis [Convulsions] : no convulsions [Hot Flashes] : no hot flashes [Easy Bleeding] : no tendency for easy bleeding [Easy Bruising] : no tendency for easy bruising [Negative] : Heme/Lymph

## 2019-08-14 NOTE — HISTORY OF PRESENT ILLNESS
[FreeTextEntry1] : Ms. Gutierres is a 39 yo F with no significant PMH who p/w n/v, eye swelling/blurry vision since 2am this morning. Pt states she was out drinking tequila shots the night prior, subsequently at 2am was taking a shower when she examined her eye in the mirror and noticed bilateral upper eyelids swollen. She subsequently began vomiting the tequila she drank, NBNB., no hematemesis.  An hour later she noticed the left eye also appeared swollen. She describes her vision became "like she was underwater", but denies eye pain, photophobia, other visual disturbances. Denies LOC/head trauma/fall. Pt also report that over the past 2-3 weeks she started going to the gym ( 4 times per weeks ) and she was following an exercise program, she had body pain . \par \par Since being in the ED she has noticed that her left eye swelling has noticeably improved. \par ROS notable for URI symptoms the week prior. NO pain at this time, no CP, SOB, diarrhea/constipation, fever/chills\par Positive sick contacts in her household last week (family was sick with URI symptoms) . Denies recent travel\par \par Patient states one episode of epistaxis for a short period of time about 1 month ago, denies gingival bleeding, dysuria, hematuria, hematochezia/BRBPR/melenic stools, denies abnormal uterine bleeding/heavy menses, last pap WNL. \par \par Ms. Gutierres presents with TTP at the age of 38.\par \par The patient was admitted to [facility] 4/5/19-4/20-/19 for blurry vision and nausea/vomiting. She was found to have severe thrombocytopenia with LISSETT and metabolic acidosis and HTN. Peripheral smear showed schistocytes with elevated LDH and low haptoglobin with worsening renal failure and thrombocytopenia concerning for TTP. A CT C/A/P showed perinephric stranding bilaterally. \par \par A renal biopsy showed severe obliterative arterial sclerosis, with mild segmental glomerular endothelial injury, suggesting chronic thrombotic angiopathy. Acute tubular injury with focal tubular necrosis and mild reactive interstitial inflammation. \par \par She was started on high dose prednisone with a fast taper and received PLEX x 10 sessions, after which her platelets recovered but kidney function plateaued at 1.4.\par Her prednisone was again increased to 1 mg/kg and she received another 3 sessions of plasma exchange starting 4/17/19 - 4/19/19. Creatinine improved to 1.1 - 1.2 \par She received total 13 sessions of plasmapheresis. She was started on prednisone taper after kidney function normalized to 1.1 and to taper over 2-3 weeks. \par \par 4/6/19 ADAMTS 13 activity <2 % (range >66%)\par ADAMTS 13 antibody 82 (range <12)\par 4/5/19 WBC 15.3, HGB 11.6, PLT 9 K, BUN/Cr 2.80\par 4/6/19 WBC 15.6, HGB 11.3, PLT 6 K, LDH 1923, Haptoglobin <20\par 4/7/19 WBC 20.1, HGB 10.1, PLT 11 K\par 4/8/19 WBC 16.6, HGB 9.5, PLT 54 K \par 4/13/19 WBC 18.0, HGB 8.6,  K, \par 4/17/19 \par 4/20/19 WBC 25.0, HGB 8.7,  K\par 4/23/19 WBC 26.5, HGB 10.3,  K\par \par She is currently on Prednisone 60 mg a day and scheduled to finish the taper on 5/3/19. No dysuria. No headaches or blurred vision. No abdominal pain. Bowel movements are normal. \par \par PCP: Dr. Bobby Saunders, [facility].

## 2019-08-14 NOTE — ASSESSMENT
[FreeTextEntry1] : 1) TTP\par 2) HTN\par 3) LISSETT\par 4) Proteinuria\par 5) Microscopic hematuria\par \par S/P PLEX \par Improving renal fx\par Biopsy proven\par cw losartan 25mg daily;\par Renal fx normalized\par f/u Dec

## 2019-10-08 ENCOUNTER — APPOINTMENT (OUTPATIENT)
Dept: ANTEPARTUM | Facility: CLINIC | Age: 39
End: 2019-10-08
Payer: COMMERCIAL

## 2019-10-08 ENCOUNTER — ASOB RESULT (OUTPATIENT)
Age: 39
End: 2019-10-08

## 2019-10-08 PROCEDURE — 76801 OB US < 14 WKS SINGLE FETUS: CPT

## 2019-11-18 ENCOUNTER — LABORATORY RESULT (OUTPATIENT)
Age: 39
End: 2019-11-18

## 2019-11-19 ENCOUNTER — LABORATORY RESULT (OUTPATIENT)
Age: 39
End: 2019-11-19

## 2019-11-19 ENCOUNTER — APPOINTMENT (OUTPATIENT)
Dept: PEDIATRIC MEDICAL GENETICS | Facility: CLINIC | Age: 39
End: 2019-11-19
Payer: COMMERCIAL

## 2019-11-19 DIAGNOSIS — Z84.89 FAMILY HISTORY OF OTHER SPECIFIED CONDITIONS: ICD-10-CM

## 2019-11-19 PROCEDURE — 99241 OFFICE CONSULTATION NEW/ESTAB PATIENT 15 MIN: CPT

## 2019-11-21 ENCOUNTER — ASOB RESULT (OUTPATIENT)
Age: 39
End: 2019-11-21

## 2019-11-21 ENCOUNTER — OUTPATIENT (OUTPATIENT)
Dept: OUTPATIENT SERVICES | Facility: HOSPITAL | Age: 39
LOS: 1 days | End: 2019-11-21
Payer: COMMERCIAL

## 2019-11-21 ENCOUNTER — APPOINTMENT (OUTPATIENT)
Dept: ANTEPARTUM | Facility: CLINIC | Age: 39
End: 2019-11-21
Payer: COMMERCIAL

## 2019-11-21 DIAGNOSIS — Z01.818 ENCOUNTER FOR OTHER PREPROCEDURAL EXAMINATION: ICD-10-CM

## 2019-11-21 PROCEDURE — 76805 OB US >/= 14 WKS SNGL FETUS: CPT

## 2019-11-21 PROCEDURE — 88271 CYTOGENETICS DNA PROBE: CPT

## 2019-11-21 PROCEDURE — 88269 CHROMOSOME ANALYS AMNIOTIC: CPT

## 2019-11-21 PROCEDURE — 88291 CYTO/MOLECULAR REPORT: CPT

## 2019-11-21 PROCEDURE — 59000 AMNIOCENTESIS DIAGNOSTIC: CPT

## 2019-11-21 PROCEDURE — 82106 ALPHA-FETOPROTEIN AMNIOTIC: CPT

## 2019-11-21 PROCEDURE — 88235 TISSUE CULTURE PLACENTA: CPT

## 2019-11-21 PROCEDURE — 88280 CHROMOSOME KARYOTYPE STUDY: CPT

## 2019-11-21 PROCEDURE — 76946 ECHO GUIDE FOR AMNIOCENTESIS: CPT | Mod: 26

## 2019-11-21 PROCEDURE — 88275 CYTOGENETICS 100-300: CPT

## 2019-11-21 PROCEDURE — 76805 OB US >/= 14 WKS SNGL FETUS: CPT | Mod: 26

## 2019-11-21 PROCEDURE — 88285 CHROMOSOME COUNT ADDITIONAL: CPT

## 2019-11-21 PROCEDURE — 76946 ECHO GUIDE FOR AMNIOCENTESIS: CPT

## 2019-12-03 ENCOUNTER — APPOINTMENT (OUTPATIENT)
Dept: NEPHROLOGY | Facility: CLINIC | Age: 39
End: 2019-12-03
Payer: COMMERCIAL

## 2019-12-03 VITALS
HEART RATE: 102 BPM | WEIGHT: 289 LBS | SYSTOLIC BLOOD PRESSURE: 172 MMHG | HEIGHT: 63 IN | BODY MASS INDEX: 51.21 KG/M2 | DIASTOLIC BLOOD PRESSURE: 98 MMHG

## 2019-12-03 PROCEDURE — 36415 COLL VENOUS BLD VENIPUNCTURE: CPT

## 2019-12-03 PROCEDURE — 99215 OFFICE O/P EST HI 40 MIN: CPT | Mod: 25

## 2019-12-03 RX ORDER — LOSARTAN POTASSIUM 25 MG/1
25 TABLET, FILM COATED ORAL
Qty: 90 | Refills: 3 | Status: DISCONTINUED | COMMUNITY
Start: 2019-05-21 | End: 2019-12-03

## 2019-12-03 NOTE — PHYSICAL EXAM
[General Appearance - In No Acute Distress] : in no acute distress [General Appearance - Well Developed] : well developed [General Appearance - Alert] : alert [General Appearance - Well Nourished] : well nourished [Outer Ear] : the ears and nose were normal in appearance [Sclera] : the sclera and conjunctiva were normal [Hearing Threshold Finger Rub Not [place]] : hearing was normal [Neck Appearance] : the appearance of the neck was normal [Heart Rate And Rhythm] : heart rate was normal and rhythm regular [Auscultation Breath Sounds / Voice Sounds] : lungs were clear to auscultation bilaterally [] : no respiratory distress [Respiration, Rhythm And Depth] : normal respiratory rhythm and effort [Arterial Pulses Carotid] : carotid pulses were normal with no bruits [Heart Sounds] : normal S1 and S2 [Bowel Sounds] : normal bowel sounds [Abdomen Tenderness] : non-tender [Abdomen Soft] : soft [No CVA Tenderness] : no ~M costovertebral angle tenderness [Cervical Lymph Nodes Enlarged Posterior Bilaterally] : posterior cervical [Abnormal Walk] : normal gait [Skin Color & Pigmentation] : normal skin color and pigmentation [Skin Turgor] : normal skin turgor [Cranial Nerves] : cranial nerves 2-12 were intact [Impaired Insight] : insight and judgment were intact [Oriented To Time, Place, And Person] : oriented to person, place, and time [Affect] : the affect was normal

## 2019-12-03 NOTE — ASSESSMENT
[FreeTextEntry1] : 1) TTP\par 2) HTN\par 3) LISSETT\par 4) Proteinuria\par 5) Microscopic hematuria\par \par S/P PLEX \par Improving renal fx\par Biopsy proven\par STOPPED losartan ; now pregnant\par BP running high\par Starting nifedipine 30mg XL daily\par Will check UA with micro; urine pro/cr ratio; renal panel\par Follow up in 2 weeks for BP check\par Renal fx normalized\par f/u Dec

## 2019-12-03 NOTE — HISTORY OF PRESENT ILLNESS
[FreeTextEntry1] : Ms. Gutierres is a 39 yo F with no significant PMH who p/w n/v, eye swelling/blurry vision since 2am this morning. Pt states she was out drinking tequila shots the night prior, subsequently at 2am was taking a shower when she examined her eye in the mirror and noticed bilateral upper eyelids swollen. She subsequently began vomiting the tequila she drank, NBNB., no hematemesis.  An hour later she noticed the left eye also appeared swollen. She describes her vision became "like she was underwater", but denies eye pain, photophobia, other visual disturbances. Denies LOC/head trauma/fall. Pt also report that over the past 2-3 weeks she started going to the gym ( 4 times per weeks ) and she was following an exercise program, she had body pain . \par \par Since being in the ED she has noticed that her left eye swelling has noticeably improved. \par ROS notable for URI symptoms the week prior. NO pain at this time, no CP, SOB, diarrhea/constipation, fever/chills\par Positive sick contacts in her household last week (family was sick with URI symptoms) . Denies recent travel\par \par Patient states one episode of epistaxis for a short period of time about 1 month ago, denies gingival bleeding, dysuria, hematuria, hematochezia/BRBPR/melenic stools, denies abnormal uterine bleeding/heavy menses, last pap WNL. \par \par Ms. Gutierres presents with TTP at the age of 38.\par \par The patient was admitted to [facility] 4/5/19-4/20-/19 for blurry vision and nausea/vomiting. She was found to have severe thrombocytopenia with LISSETT and metabolic acidosis and HTN. Peripheral smear showed schistocytes with elevated LDH and low haptoglobin with worsening renal failure and thrombocytopenia concerning for TTP. A CT C/A/P showed perinephric stranding bilaterally. \par \par A renal biopsy showed severe obliterative arterial sclerosis, with mild segmental glomerular endothelial injury, suggesting chronic thrombotic angiopathy. Acute tubular injury with focal tubular necrosis and mild reactive interstitial inflammation. \par \par She was started on high dose prednisone with a fast taper and received PLEX x 10 sessions, after which her platelets recovered but kidney function plateaued at 1.4.\par Her prednisone was again increased to 1 mg/kg and she received another 3 sessions of plasma exchange starting 4/17/19 - 4/19/19. Creatinine improved to 1.1 - 1.2 \par She received total 13 sessions of plasmapheresis. She was started on prednisone taper after kidney function normalized to 1.1 and to taper over 2-3 weeks. \par \par 4/6/19 ADAMTS 13 activity <2 % (range >66%)\par ADAMTS 13 antibody 82 (range <12)\par 4/5/19 WBC 15.3, HGB 11.6, PLT 9 K, BUN/Cr 2.80\par 4/6/19 WBC 15.6, HGB 11.3, PLT 6 K, LDH 1923, Haptoglobin <20\par 4/7/19 WBC 20.1, HGB 10.1, PLT 11 K\par 4/8/19 WBC 16.6, HGB 9.5, PLT 54 K \par 4/13/19 WBC 18.0, HGB 8.6,  K, \par 4/17/19 \par 4/20/19 WBC 25.0, HGB 8.7,  K\par 4/23/19 WBC 26.5, HGB 10.3,  K\par \par She is currently on Prednisone 60 mg a day and scheduled to finish the taper on 5/3/19. No dysuria. No headaches or blurred vision. No abdominal pain. Bowel movements are normal. \par \par PCP: Dr. Bobby Saunders, [facility].

## 2019-12-03 NOTE — REVIEW OF SYSTEMS
[Fever] : no fever [Eye Pain] : no eye pain [Chills] : no chills [Earache] : no earache [Red Eyes] : eyes not red [Loss Of Hearing] : no hearing loss [Shortness Of Breath] : no shortness of breath [Wheezing] : no wheezing [Heart Rate Is Slow] : the heart rate was not slow [Heart Rate Is Fast] : the heart rate was not fast [Arthralgias] : no arthralgias [Vomiting] : no vomiting [Abdominal Pain] : no abdominal pain [Joint Pain] : no joint pain [Skin Lesions] : no skin lesions [Skin Wound] : no skin wound [Confused] : no confusion [Convulsions] : no convulsions [Proptosis] : no proptosis [Hot Flashes] : no hot flashes [Easy Bleeding] : no tendency for easy bleeding [Easy Bruising] : no tendency for easy bruising [Negative] : Heme/Lymph

## 2019-12-10 LAB
ALBUMIN SERPL ELPH-MCNC: 3.9 G/DL
ANION GAP SERPL CALC-SCNC: 19 MMOL/L
APPEARANCE: CLEAR
BACTERIA: NEGATIVE
BILIRUBIN URINE: NEGATIVE
BLOOD URINE: NEGATIVE
BUN SERPL-MCNC: 7 MG/DL
CALCIUM SERPL-MCNC: 9.6 MG/DL
CHLORIDE SERPL-SCNC: 104 MMOL/L
CO2 SERPL-SCNC: 19 MMOL/L
COLOR: YELLOW
CREAT SERPL-MCNC: 0.7 MG/DL
CREAT SPEC-SCNC: 151 MG/DL
CREAT/PROT UR: 0.2 RATIO
GLUCOSE QUALITATIVE U: NORMAL
GLUCOSE SERPL-MCNC: 113 MG/DL
HYALINE CASTS: 2 /LPF
KETONES URINE: NEGATIVE
LEUKOCYTE ESTERASE URINE: NEGATIVE
MICROSCOPIC-UA: NORMAL
NITRITE URINE: NEGATIVE
PH URINE: 7
PHOSPHATE SERPL-MCNC: 3.5 MG/DL
POTASSIUM SERPL-SCNC: 4.6 MMOL/L
PROT UR-MCNC: 24 MG/DL
PROTEIN URINE: NORMAL
RED BLOOD CELLS URINE: 3 /HPF
SODIUM SERPL-SCNC: 142 MMOL/L
SPECIFIC GRAVITY URINE: 1.02
SQUAMOUS EPITHELIAL CELLS: 7 /HPF
UROBILINOGEN URINE: NORMAL
WHITE BLOOD CELLS URINE: 2 /HPF

## 2019-12-10 NOTE — ED STATDOCS - SECONDARY DIAGNOSIS.
[id number] LISSETT (acute kidney injury)

## 2019-12-11 PROBLEM — Z84.89 PATIENT'S FATHER IS DECEASED: Status: ACTIVE | Noted: 2019-12-11

## 2019-12-12 ENCOUNTER — APPOINTMENT (OUTPATIENT)
Dept: ANTEPARTUM | Facility: CLINIC | Age: 39
End: 2019-12-12
Payer: COMMERCIAL

## 2019-12-12 ENCOUNTER — OUTPATIENT (OUTPATIENT)
Dept: OUTPATIENT SERVICES | Facility: HOSPITAL | Age: 39
LOS: 1 days | Discharge: HOME | End: 2019-12-12

## 2019-12-12 ENCOUNTER — ASOB RESULT (OUTPATIENT)
Age: 39
End: 2019-12-12

## 2019-12-12 DIAGNOSIS — M31.1 THROMBOTIC MICROANGIOPATHY: ICD-10-CM

## 2019-12-12 DIAGNOSIS — D64.9 ANEMIA, UNSPECIFIED: ICD-10-CM

## 2019-12-12 PROCEDURE — 76811 OB US DETAILED SNGL FETUS: CPT

## 2019-12-12 PROCEDURE — 99212 OFFICE O/P EST SF 10 MIN: CPT | Mod: 25

## 2019-12-12 PROCEDURE — 76817 TRANSVAGINAL US OBSTETRIC: CPT

## 2019-12-13 ENCOUNTER — APPOINTMENT (OUTPATIENT)
Dept: ANTEPARTUM | Facility: CLINIC | Age: 39
End: 2019-12-13

## 2019-12-13 ENCOUNTER — APPOINTMENT (OUTPATIENT)
Dept: HEMATOLOGY ONCOLOGY | Facility: CLINIC | Age: 39
End: 2019-12-13

## 2019-12-17 ENCOUNTER — APPOINTMENT (OUTPATIENT)
Dept: NEPHROLOGY | Facility: CLINIC | Age: 39
End: 2019-12-17
Payer: COMMERCIAL

## 2019-12-17 VITALS
SYSTOLIC BLOOD PRESSURE: 170 MMHG | HEART RATE: 90 BPM | WEIGHT: 290 LBS | DIASTOLIC BLOOD PRESSURE: 112 MMHG | HEIGHT: 63 IN | BODY MASS INDEX: 51.38 KG/M2

## 2019-12-17 PROCEDURE — 36415 COLL VENOUS BLD VENIPUNCTURE: CPT

## 2019-12-17 PROCEDURE — 99215 OFFICE O/P EST HI 40 MIN: CPT | Mod: 25

## 2019-12-18 ENCOUNTER — OUTPATIENT (OUTPATIENT)
Dept: OUTPATIENT SERVICES | Age: 39
LOS: 1 days | Discharge: HOME | End: 2019-12-18

## 2019-12-18 ENCOUNTER — APPOINTMENT (OUTPATIENT)
Dept: PEDIATRIC CARDIOLOGY | Facility: CLINIC | Age: 39
End: 2019-12-18
Payer: COMMERCIAL

## 2019-12-18 LAB
ALBUMIN SERPL ELPH-MCNC: 3.8 G/DL
ANION GAP SERPL CALC-SCNC: 13 MMOL/L
APPEARANCE: CLEAR
BACTERIA: NEGATIVE
BASOPHILS # BLD AUTO: 0.02 K/UL
BASOPHILS NFR BLD AUTO: 0.2 %
BILIRUBIN URINE: NEGATIVE
BLOOD URINE: NEGATIVE
BUN SERPL-MCNC: 8 MG/DL
CALCIUM SERPL-MCNC: 9.4 MG/DL
CHLORIDE SERPL-SCNC: 103 MMOL/L
CO2 SERPL-SCNC: 22 MMOL/L
COLOR: NORMAL
CREAT SERPL-MCNC: 0.71 MG/DL
CREAT SPEC-SCNC: 72 MG/DL
CREAT/PROT UR: 0.2 RATIO
EOSINOPHIL # BLD AUTO: 0.18 K/UL
EOSINOPHIL NFR BLD AUTO: 1.8 %
GLUCOSE QUALITATIVE U: NEGATIVE
GLUCOSE SERPL-MCNC: 121 MG/DL
HCT VFR BLD CALC: 38.2 %
HGB BLD-MCNC: 12 G/DL
HYALINE CASTS: 0 /LPF
IMM GRANULOCYTES NFR BLD AUTO: 0.3 %
KETONES URINE: NEGATIVE
LEUKOCYTE ESTERASE URINE: NEGATIVE
LYMPHOCYTES # BLD AUTO: 2.25 K/UL
LYMPHOCYTES NFR BLD AUTO: 22.6 %
MAN DIFF?: NORMAL
MCHC RBC-ENTMCNC: 28.7 PG
MCHC RBC-ENTMCNC: 31.4 GM/DL
MCV RBC AUTO: 91.4 FL
MICROSCOPIC-UA: NORMAL
MONOCYTES # BLD AUTO: 0.71 K/UL
MONOCYTES NFR BLD AUTO: 7.1 %
NEUTROPHILS # BLD AUTO: 6.76 K/UL
NEUTROPHILS NFR BLD AUTO: 68 %
NITRITE URINE: NEGATIVE
PH URINE: 7
PHOSPHATE SERPL-MCNC: 3.5 MG/DL
PLATELET # BLD AUTO: 465 K/UL
POTASSIUM SERPL-SCNC: 4.8 MMOL/L
PROT UR-MCNC: 12 MG/DL
PROTEIN URINE: NEGATIVE
RBC # BLD: 4.18 M/UL
RBC # FLD: 15.3 %
RED BLOOD CELLS URINE: 0 /HPF
SODIUM SERPL-SCNC: 138 MMOL/L
SPECIFIC GRAVITY URINE: 1.01
SQUAMOUS EPITHELIAL CELLS: 1 /HPF
UROBILINOGEN URINE: NORMAL
WBC # FLD AUTO: 9.95 K/UL
WHITE BLOOD CELLS URINE: 0 /HPF

## 2019-12-18 PROCEDURE — 99202 OFFICE O/P NEW SF 15 MIN: CPT | Mod: 25

## 2019-12-18 PROCEDURE — 76825 ECHO EXAM OF FETAL HEART: CPT

## 2019-12-18 PROCEDURE — 76827 ECHO EXAM OF FETAL HEART: CPT

## 2019-12-18 PROCEDURE — 93325 DOPPLER ECHO COLOR FLOW MAPG: CPT

## 2019-12-18 RX ORDER — NIFEDIPINE 60 MG/1
60 TABLET, EXTENDED RELEASE ORAL DAILY
Refills: 0 | Status: DISCONTINUED | COMMUNITY
Start: 2019-12-18 | End: 2019-12-18

## 2019-12-18 RX ORDER — NIFEDIPINE 30 MG/1
30 TABLET, EXTENDED RELEASE ORAL DAILY
Qty: 90 | Refills: 3 | Status: DISCONTINUED | COMMUNITY
Start: 2019-12-03 | End: 2019-12-18

## 2019-12-18 NOTE — PHYSICAL EXAM
[General Appearance - Alert] : alert [General Appearance - Well Nourished] : well nourished [General Appearance - In No Acute Distress] : in no acute distress [Sclera] : the sclera and conjunctiva were normal [General Appearance - Well Developed] : well developed [Outer Ear] : the ears and nose were normal in appearance [Hearing Threshold Finger Rub Not [place]] : hearing was normal [Neck Appearance] : the appearance of the neck was normal [] : no respiratory distress [Respiration, Rhythm And Depth] : normal respiratory rhythm and effort [Auscultation Breath Sounds / Voice Sounds] : lungs were clear to auscultation bilaterally [Heart Rate And Rhythm] : heart rate was normal and rhythm regular [Heart Sounds] : normal S1 and S2 [Bowel Sounds] : normal bowel sounds [Abdomen Soft] : soft [Abdomen Tenderness] : non-tender [Abnormal Walk] : normal gait [Skin Color & Pigmentation] : normal skin color and pigmentation [Skin Turgor] : normal skin turgor [Impaired Insight] : insight and judgment were intact [Oriented To Time, Place, And Person] : oriented to person, place, and time [Affect] : the affect was normal [No Focal Deficits] : no focal deficits [FreeTextEntry1] : obese

## 2019-12-18 NOTE — HISTORY OF PRESENT ILLNESS
[FreeTextEntry1] : Ms. Gutierres is a 49 yo F with no significant PMH who p/w n/v, eye swelling/blurry vision since 2am this morning. Pt states she was out drinking tequila shots the night prior, subsequently at 2am was taking a shower when she examined her eye in the mirror and noticed bilateral upper eyelids swollen. She subsequently began vomiting the tequila she drank, NBNB., no hematemesis.  An hour later she noticed the left eye also appeared swollen. She describes her vision became "like she was underwater", but denies eye pain, photophobia, other visual disturbances. Denies LOC/head trauma/fall. Pt also report that over the past 2-3 weeks she started going to the gym ( 4 times per weeks ) and she was following an exercise program, she had body pain . \par \par Since being in the ED she has noticed that her left eye swelling has noticeably improved. \par ROS notable for URI symptoms the week prior. NO pain at this time, no CP, SOB, diarrhea/constipation, fever/chills\par Positive sick contacts in her household last week (family was sick with URI symptoms) . Denies recent travel\par \par Patient states one episode of epistaxis for a short period of time about 1 month ago, denies gingival bleeding, dysuria, hematuria, hematochezia/BRBPR/melenic stools, denies abnormal uterine bleeding/heavy menses, last pap WNL. \par \par Ms. Gutierres presents with TTP at the age of 38.\par \par The patient was admitted to [facility] 4/5/19-4/20-/19 for blurry vision and nausea/vomiting. She was found to have severe thrombocytopenia with LISSETT and metabolic acidosis and HTN. Peripheral smear showed schistocytes with elevated LDH and low haptoglobin with worsening renal failure and thrombocytopenia concerning for TTP. A CT C/A/P showed perinephric stranding bilaterally. \par \par A renal biopsy showed severe obliterative arterial sclerosis, with mild segmental glomerular endothelial injury, suggesting chronic thrombotic angiopathy. Acute tubular injury with focal tubular necrosis and mild reactive interstitial inflammation. \par \par She was started on high dose prednisone with a fast taper and received PLEX x 10 sessions, after which her platelets recovered but kidney function plateaued at 1.4.\par Her prednisone was again increased to 1 mg/kg and she received another 3 sessions of plasma exchange starting 4/17/19 - 4/19/19. Creatinine improved to 1.1 - 1.2 \par She received total 13 sessions of plasmapheresis. She was started on prednisone taper after kidney function normalized to 1.1 and to taper over 2-3 weeks. \par \par 4/6/19 ADAMTS 13 activity <2 % (range >66%)\par ADAMTS 13 antibody 82 (range <12)\par 4/5/19 WBC 15.3, HGB 11.6, PLT 9 K, BUN/Cr 2.80\par 4/6/19 WBC 15.6, HGB 11.3, PLT 6 K, LDH 1923, Haptoglobin <20\par 4/7/19 WBC 20.1, HGB 10.1, PLT 11 K\par 4/8/19 WBC 16.6, HGB 9.5, PLT 54 K \par 4/13/19 WBC 18.0, HGB 8.6,  K, \par 4/17/19 \par 4/20/19 WBC 25.0, HGB 8.7,  K\par 4/23/19 WBC 26.5, HGB 10.3,  K\par \par She is currently on Prednisone 60 mg a day and scheduled to finish the taper on 5/3/19. No dysuria. No headaches or blurred vision. No abdominal pain. Bowel movements are normal. \par \par PCP: Dr. Bobby Saunders, [facility]. \par \par 12/17/2019\par Pt now presents for a BP check. States she feels well. Doesnt have any acute complaint. Denies headaches, nausea, vomiting, diarrhea. Reports leg edema at the end of the day after prolonged standing. Denies hematuria, foamy urine.

## 2019-12-18 NOTE — REVIEW OF SYSTEMS
[Negative] : Heme/Lymph [Fever] : no fever [Chills] : no chills [Eye Pain] : no eye pain [Red Eyes] : eyes not red [Earache] : no earache [Loss Of Hearing] : no hearing loss [Heart Rate Is Slow] : the heart rate was not slow [Heart Rate Is Fast] : the heart rate was not fast [Shortness Of Breath] : no shortness of breath [Wheezing] : no wheezing [Abdominal Pain] : no abdominal pain [Vomiting] : no vomiting [Skin Lesions] : no skin lesions [Joint Pain] : no joint pain [Arthralgias] : no arthralgias [Skin Wound] : no skin wound [Confused] : no confusion [Convulsions] : no convulsions [Proptosis] : no proptosis [Hot Flashes] : no hot flashes [Easy Bleeding] : no tendency for easy bleeding [Easy Bruising] : no tendency for easy bruising

## 2019-12-18 NOTE — ASSESSMENT
[FreeTextEntry1] : 1) Chronic HTN in pregnant pt;\par 2) TTP- s/p PLEX\par 3) LISSETT- resolved\par 4) Proteinuria- stopped Losartan \par 5) Microscopic hematuria\par \par BP running high\par Increase Nifedipine to 60 mg XL daily\par Repeat renal panel, UA, TP/Cr ratio\par Advised to check BP at home\par Follow up in 2 weeks for BP check\par \par Addendum\par Spoke to pt\par She started Nifedipine 60 XL today; feels well\par Hasnt checked BP yet\par Labs reviewed- Scr wnl; no proteinuria\par discussed results with pt\par

## 2019-12-31 ENCOUNTER — APPOINTMENT (OUTPATIENT)
Dept: NEPHROLOGY | Facility: CLINIC | Age: 39
End: 2019-12-31
Payer: COMMERCIAL

## 2019-12-31 VITALS
SYSTOLIC BLOOD PRESSURE: 156 MMHG | BODY MASS INDEX: 50.85 KG/M2 | HEART RATE: 102 BPM | WEIGHT: 287 LBS | HEIGHT: 63 IN | DIASTOLIC BLOOD PRESSURE: 99 MMHG

## 2019-12-31 DIAGNOSIS — Z86.2 PERSONAL HISTORY OF DISEASES OF THE BLOOD AND BLOOD-FORMING ORGANS AND CERTAIN DISORDERS INVOLVING THE IMMUNE MECHANISM: ICD-10-CM

## 2019-12-31 PROCEDURE — 36415 COLL VENOUS BLD VENIPUNCTURE: CPT

## 2019-12-31 PROCEDURE — 99214 OFFICE O/P EST MOD 30 MIN: CPT | Mod: 25

## 2019-12-31 NOTE — HISTORY OF PRESENT ILLNESS
[FreeTextEntry1] : Ms. Gutierres is a 40 yo F with history of  TTP  at the age of 38.\par \par Patient was admitted to [facility] 4/5/19-4/20-/19 for blurry vision and nausea/vomiting. She was found to have severe thrombocytopenia with LISSETT and metabolic acidosis and HTN. Peripheral smear showed schistocytes with elevated LDH and low haptoglobin with worsening renal failure and thrombocytopenia concerning for TTP. A CT C/A/P showed perinephric stranding bilaterally. A renal biopsy showed severe obliterative arterial sclerosis, with mild segmental glomerular endothelial injury, suggesting chronic thrombotic angiopathy. Acute tubular injury with focal tubular necrosis and mild reactive interstitial inflammation.  She was started on high dose prednisone with a fast taper and received PLEX x 10 sessions, after which her platelets recovered but kidney function plateaued at 1.4. Her prednisone was again increased to 1 mg/kg and she received another 3 sessions of plasma exchange starting 4/17/19 - 4/19/19. Creatinine improved to 1.1 - 1.2 . She received total 13 sessions of plasmapheresis. She was started on prednisone taper after kidney function normalized to 1.1and finished the taper on 5/3/19. \par \par 4/6/19 ADAMTS 13 activity <2 % (range >66%)\par ADAMTS 13 antibody 82 (range <12)\par \par PCP: Dr. Bobby Saunders, [facility]\par \par Today,\par Pt presents for a follow up. She is 22 weeks pregnant. Nifedipine was increased to 60 mg daily on last visit (12/17). Pt states she feels well.  Doesn't have any acute complaint. Denies headaches, nausea, vomiting, diarrhea, leg edema.  Denies hematuria, foamy urine. Checks BP at home; reports BP runs high. Bp was 170/110 this AM.

## 2019-12-31 NOTE — REVIEW OF SYSTEMS
[Negative] : Heme/Lymph [Fever] : no fever [Chills] : no chills [Eye Pain] : no eye pain [Red Eyes] : eyes not red [Earache] : no earache [Loss Of Hearing] : no hearing loss [Heart Rate Is Slow] : the heart rate was not slow [Heart Rate Is Fast] : the heart rate was not fast [Shortness Of Breath] : no shortness of breath [Wheezing] : no wheezing [Abdominal Pain] : no abdominal pain [Vomiting] : no vomiting [Arthralgias] : no arthralgias [Joint Pain] : no joint pain [Skin Lesions] : no skin lesions [Skin Wound] : no skin wound [Confused] : no confusion [Convulsions] : no convulsions [Proptosis] : no proptosis [Hot Flashes] : no hot flashes [Easy Bleeding] : no tendency for easy bleeding [Easy Bruising] : no tendency for easy bruising

## 2019-12-31 NOTE — PHYSICAL EXAM
[General Appearance - Alert] : alert [General Appearance - Well Nourished] : well nourished [General Appearance - In No Acute Distress] : in no acute distress [Sclera] : the sclera and conjunctiva were normal [General Appearance - Well Developed] : well developed [Outer Ear] : the ears and nose were normal in appearance [Hearing Threshold Finger Rub Not [place]] : hearing was normal [Neck Appearance] : the appearance of the neck was normal [] : the neck was supple [Respiration, Rhythm And Depth] : normal respiratory rhythm and effort [Auscultation Breath Sounds / Voice Sounds] : lungs were clear to auscultation bilaterally [Heart Rate And Rhythm] : heart rate was normal and rhythm regular [Heart Sounds] : normal S1 and S2 [Bowel Sounds] : normal bowel sounds [Abdomen Soft] : soft [Abdomen Tenderness] : non-tender [Abnormal Walk] : normal gait [Skin Color & Pigmentation] : normal skin color and pigmentation [Skin Turgor] : normal skin turgor [No Focal Deficits] : no focal deficits [Oriented To Time, Place, And Person] : oriented to person, place, and time [Impaired Insight] : insight and judgment were intact [Affect] : the affect was normal [Strabismus] : no strabismus was seen [Edema] : there was no peripheral edema [FreeTextEntry1] : obese

## 2019-12-31 NOTE — ASSESSMENT
[FreeTextEntry1] : 1) Chronic HTN in pregnant pt\par 2) TTP- s/p PLEX\par 3) LISSETT- resolved\par 4) Proteinuria- stopped Losartan \par 5) Microscopic hematuria\par \par BP control is suboptimal\par Increase Nifedipine to 90 mg XL daily\par Instructed pt to check BP three times daily and keep BP log\par Repeat CMP,  UA, TP/Cr ratio\par Follow up in 2 weeks for BP check\par \par

## 2020-01-02 LAB
ALBUMIN SERPL ELPH-MCNC: 3.9 G/DL
ALP BLD-CCNC: 116 U/L
ALT SERPL-CCNC: 13 U/L
ANION GAP SERPL CALC-SCNC: 15 MMOL/L
APPEARANCE: ABNORMAL
AST SERPL-CCNC: 12 U/L
BACTERIA: ABNORMAL
BASOPHILS # BLD AUTO: 0.04 K/UL
BASOPHILS NFR BLD AUTO: 0.4 %
BILIRUB SERPL-MCNC: <0.2 MG/DL
BILIRUBIN URINE: NEGATIVE
BLOOD URINE: NEGATIVE
BUN SERPL-MCNC: 8 MG/DL
CALCIUM SERPL-MCNC: 9.4 MG/DL
CHLORIDE SERPL-SCNC: 99 MMOL/L
CO2 SERPL-SCNC: 22 MMOL/L
COLOR: YELLOW
CREAT SERPL-MCNC: 0.67 MG/DL
CREAT SPEC-SCNC: 208 MG/DL
CREAT/PROT UR: 0.2 RATIO
EOSINOPHIL # BLD AUTO: 0.16 K/UL
EOSINOPHIL NFR BLD AUTO: 1.5 %
GLUCOSE QUALITATIVE U: NORMAL
GLUCOSE SERPL-MCNC: 154 MG/DL
HCT VFR BLD CALC: 38.8 %
HGB BLD-MCNC: 12.2 G/DL
HYALINE CASTS: 0 /LPF
IMM GRANULOCYTES NFR BLD AUTO: 0.4 %
KETONES URINE: NEGATIVE
LEUKOCYTE ESTERASE URINE: NEGATIVE
LYMPHOCYTES # BLD AUTO: 2.14 K/UL
LYMPHOCYTES NFR BLD AUTO: 19.5 %
MAN DIFF?: NORMAL
MCHC RBC-ENTMCNC: 28.8 PG
MCHC RBC-ENTMCNC: 31.4 GM/DL
MCV RBC AUTO: 91.7 FL
MICROSCOPIC-UA: NORMAL
MONOCYTES # BLD AUTO: 0.73 K/UL
MONOCYTES NFR BLD AUTO: 6.7 %
NEUTROPHILS # BLD AUTO: 7.84 K/UL
NEUTROPHILS NFR BLD AUTO: 71.5 %
NITRITE URINE: NEGATIVE
PH URINE: 6
PLATELET # BLD AUTO: 471 K/UL
POTASSIUM SERPL-SCNC: 4.3 MMOL/L
PROT SERPL-MCNC: 6.7 G/DL
PROT UR-MCNC: 37 MG/DL
PROTEIN URINE: ABNORMAL
RBC # BLD: 4.23 M/UL
RBC # FLD: 16 %
RED BLOOD CELLS URINE: 3 /HPF
SODIUM SERPL-SCNC: 136 MMOL/L
SPECIFIC GRAVITY URINE: 1.02
SQUAMOUS EPITHELIAL CELLS: 3 /HPF
UROBILINOGEN URINE: NORMAL
WBC # FLD AUTO: 10.95 K/UL
WHITE BLOOD CELLS URINE: 4 /HPF

## 2020-01-03 ENCOUNTER — APPOINTMENT (OUTPATIENT)
Dept: ANTEPARTUM | Facility: CLINIC | Age: 40
End: 2020-01-03
Payer: COMMERCIAL

## 2020-01-03 ENCOUNTER — ASOB RESULT (OUTPATIENT)
Age: 40
End: 2020-01-03

## 2020-01-03 ENCOUNTER — OUTPATIENT (OUTPATIENT)
Dept: INPATIENT UNIT | Facility: HOSPITAL | Age: 40
LOS: 1 days | Discharge: HOME | End: 2020-01-03
Payer: COMMERCIAL

## 2020-01-03 DIAGNOSIS — Z3A.23 23 WEEKS GESTATION OF PREGNANCY: ICD-10-CM

## 2020-01-03 LAB
ALBUMIN SERPL ELPH-MCNC: 2.6 G/DL — LOW (ref 3.3–5)
ALP SERPL-CCNC: 103 U/L — SIGNIFICANT CHANGE UP (ref 40–120)
ALT FLD-CCNC: 16 U/L — SIGNIFICANT CHANGE UP (ref 12–78)
ANION GAP SERPL CALC-SCNC: 7 MMOL/L — SIGNIFICANT CHANGE UP (ref 5–17)
APPEARANCE UR: CLEAR — SIGNIFICANT CHANGE UP
AST SERPL-CCNC: 7 U/L — LOW (ref 15–37)
BASOPHILS # BLD AUTO: 0.02 K/UL — SIGNIFICANT CHANGE UP (ref 0–0.2)
BASOPHILS NFR BLD AUTO: 0.2 % — SIGNIFICANT CHANGE UP (ref 0–2)
BILIRUB SERPL-MCNC: 0.2 MG/DL — SIGNIFICANT CHANGE UP (ref 0.2–1.2)
BILIRUB UR-MCNC: NEGATIVE — SIGNIFICANT CHANGE UP
BUN SERPL-MCNC: 9 MG/DL — SIGNIFICANT CHANGE UP (ref 7–23)
CALCIUM SERPL-MCNC: 8.7 MG/DL — SIGNIFICANT CHANGE UP (ref 8.5–10.1)
CHLORIDE SERPL-SCNC: 107 MMOL/L — SIGNIFICANT CHANGE UP (ref 96–108)
CO2 SERPL-SCNC: 26 MMOL/L — SIGNIFICANT CHANGE UP (ref 22–31)
COLOR SPEC: YELLOW — SIGNIFICANT CHANGE UP
CREAT ?TM UR-MCNC: 177 MG/DL — SIGNIFICANT CHANGE UP
CREAT SERPL-MCNC: 0.66 MG/DL — SIGNIFICANT CHANGE UP (ref 0.5–1.3)
DIFF PNL FLD: NEGATIVE — SIGNIFICANT CHANGE UP
EOSINOPHIL # BLD AUTO: 0.12 K/UL — SIGNIFICANT CHANGE UP (ref 0–0.5)
EOSINOPHIL NFR BLD AUTO: 1.1 % — SIGNIFICANT CHANGE UP (ref 0–6)
GLUCOSE SERPL-MCNC: 151 MG/DL — HIGH (ref 70–99)
GLUCOSE UR QL: NEGATIVE MG/DL — SIGNIFICANT CHANGE UP
HCT VFR BLD CALC: 35 % — SIGNIFICANT CHANGE UP (ref 34.5–45)
HGB BLD-MCNC: 11.5 G/DL — SIGNIFICANT CHANGE UP (ref 11.5–15.5)
IMM GRANULOCYTES NFR BLD AUTO: 0.5 % — SIGNIFICANT CHANGE UP (ref 0–1.5)
KETONES UR-MCNC: NEGATIVE — SIGNIFICANT CHANGE UP
LDH SERPL L TO P-CCNC: 155 U/L — SIGNIFICANT CHANGE UP (ref 84–241)
LEUKOCYTE ESTERASE UR-ACNC: NEGATIVE — SIGNIFICANT CHANGE UP
LYMPHOCYTES # BLD AUTO: 2.16 K/UL — SIGNIFICANT CHANGE UP (ref 1–3.3)
LYMPHOCYTES # BLD AUTO: 20.1 % — SIGNIFICANT CHANGE UP (ref 13–44)
MCHC RBC-ENTMCNC: 29.2 PG — SIGNIFICANT CHANGE UP (ref 27–34)
MCHC RBC-ENTMCNC: 32.9 GM/DL — SIGNIFICANT CHANGE UP (ref 32–36)
MCV RBC AUTO: 88.8 FL — SIGNIFICANT CHANGE UP (ref 80–100)
MONOCYTES # BLD AUTO: 0.66 K/UL — SIGNIFICANT CHANGE UP (ref 0–0.9)
MONOCYTES NFR BLD AUTO: 6.2 % — SIGNIFICANT CHANGE UP (ref 2–14)
NEUTROPHILS # BLD AUTO: 7.72 K/UL — HIGH (ref 1.8–7.4)
NEUTROPHILS NFR BLD AUTO: 71.9 % — SIGNIFICANT CHANGE UP (ref 43–77)
NITRITE UR-MCNC: NEGATIVE — SIGNIFICANT CHANGE UP
PH UR: 6 — SIGNIFICANT CHANGE UP (ref 5–8)
PLATELET # BLD AUTO: 407 K/UL — HIGH (ref 150–400)
POTASSIUM SERPL-MCNC: 4.4 MMOL/L — SIGNIFICANT CHANGE UP (ref 3.5–5.3)
POTASSIUM SERPL-SCNC: 4.4 MMOL/L — SIGNIFICANT CHANGE UP (ref 3.5–5.3)
PROT ?TM UR-MCNC: 39 MG/DL — HIGH (ref 0–12)
PROT SERPL-MCNC: 7.2 GM/DL — SIGNIFICANT CHANGE UP (ref 6–8.3)
PROT UR-MCNC: 15 MG/DL
PROT/CREAT UR-RTO: 0.2 RATIO — SIGNIFICANT CHANGE UP (ref 0–0.2)
RBC # BLD: 3.94 M/UL — SIGNIFICANT CHANGE UP (ref 3.8–5.2)
RBC # FLD: 15.6 % — HIGH (ref 10.3–14.5)
SODIUM SERPL-SCNC: 140 MMOL/L — SIGNIFICANT CHANGE UP (ref 135–145)
SP GR SPEC: 1.02 — SIGNIFICANT CHANGE UP (ref 1.01–1.02)
UROBILINOGEN FLD QL: NEGATIVE MG/DL — SIGNIFICANT CHANGE UP
WBC # BLD: 10.73 K/UL — HIGH (ref 3.8–10.5)
WBC # FLD AUTO: 10.73 K/UL — HIGH (ref 3.8–10.5)

## 2020-01-03 PROCEDURE — 80053 COMPREHEN METABOLIC PANEL: CPT

## 2020-01-03 PROCEDURE — 81001 URINALYSIS AUTO W/SCOPE: CPT

## 2020-01-03 PROCEDURE — 82570 ASSAY OF URINE CREATININE: CPT

## 2020-01-03 PROCEDURE — 85025 COMPLETE CBC W/AUTO DIFF WBC: CPT

## 2020-01-03 PROCEDURE — 76816 OB US FOLLOW-UP PER FETUS: CPT

## 2020-01-03 PROCEDURE — 84156 ASSAY OF PROTEIN URINE: CPT

## 2020-01-03 PROCEDURE — 87086 URINE CULTURE/COLONY COUNT: CPT

## 2020-01-03 PROCEDURE — 36415 COLL VENOUS BLD VENIPUNCTURE: CPT

## 2020-01-03 PROCEDURE — G0463: CPT

## 2020-01-03 PROCEDURE — 83615 LACTATE (LD) (LDH) ENZYME: CPT

## 2020-01-04 LAB
CULTURE RESULTS: SIGNIFICANT CHANGE UP
SPECIMEN SOURCE: SIGNIFICANT CHANGE UP

## 2020-01-06 DIAGNOSIS — O47.9 FALSE LABOR, UNSPECIFIED: ICD-10-CM

## 2020-01-07 ENCOUNTER — RESULT REVIEW (OUTPATIENT)
Age: 40
End: 2020-01-07

## 2020-01-07 ENCOUNTER — APPOINTMENT (OUTPATIENT)
Dept: HEMATOLOGY ONCOLOGY | Facility: CLINIC | Age: 40
End: 2020-01-07
Payer: COMMERCIAL

## 2020-01-07 ENCOUNTER — OUTPATIENT (OUTPATIENT)
Dept: OUTPATIENT SERVICES | Facility: HOSPITAL | Age: 40
LOS: 1 days | End: 2020-01-07
Payer: COMMERCIAL

## 2020-01-07 VITALS
SYSTOLIC BLOOD PRESSURE: 137 MMHG | BODY MASS INDEX: 50.85 KG/M2 | HEIGHT: 63 IN | TEMPERATURE: 98 F | OXYGEN SATURATION: 98 % | HEART RATE: 109 BPM | WEIGHT: 287 LBS | DIASTOLIC BLOOD PRESSURE: 88 MMHG

## 2020-01-07 DIAGNOSIS — D64.9 ANEMIA, UNSPECIFIED: ICD-10-CM

## 2020-01-07 LAB
BASOPHILS # BLD AUTO: 0.1 K/UL — SIGNIFICANT CHANGE UP (ref 0–0.2)
BASOPHILS NFR BLD AUTO: 0.6 % — SIGNIFICANT CHANGE UP (ref 0–2)
EOSINOPHIL # BLD AUTO: 0.2 K/UL — SIGNIFICANT CHANGE UP (ref 0–0.5)
EOSINOPHIL NFR BLD AUTO: 1.6 % — SIGNIFICANT CHANGE UP (ref 0–6)
HCT VFR BLD CALC: 38.9 % — SIGNIFICANT CHANGE UP (ref 34.5–45)
HGB BLD-MCNC: 12.5 G/DL — SIGNIFICANT CHANGE UP (ref 11.5–15.5)
LYMPHOCYTES # BLD AUTO: 2.8 K/UL — SIGNIFICANT CHANGE UP (ref 1–3.3)
LYMPHOCYTES # BLD AUTO: 21.1 % — SIGNIFICANT CHANGE UP (ref 13–44)
MCHC RBC-ENTMCNC: 28.8 PG — SIGNIFICANT CHANGE UP (ref 27–34)
MCHC RBC-ENTMCNC: 32.1 G/DL — SIGNIFICANT CHANGE UP (ref 32–36)
MCV RBC AUTO: 89.6 FL — SIGNIFICANT CHANGE UP (ref 80–100)
MONOCYTES # BLD AUTO: 0.8 K/UL — SIGNIFICANT CHANGE UP (ref 0–0.9)
MONOCYTES NFR BLD AUTO: 6.1 % — SIGNIFICANT CHANGE UP (ref 2–14)
NEUTROPHILS # BLD AUTO: 9.3 K/UL — HIGH (ref 1.8–7.4)
NEUTROPHILS NFR BLD AUTO: 70.6 % — SIGNIFICANT CHANGE UP (ref 43–77)
PLATELET # BLD AUTO: 430 K/UL — HIGH (ref 150–400)
RBC # BLD: 4.34 M/UL — SIGNIFICANT CHANGE UP (ref 3.8–5.2)
RBC # FLD: 14.9 % — HIGH (ref 10.3–14.5)
WBC # BLD: 13.2 K/UL — HIGH (ref 3.8–10.5)
WBC # FLD AUTO: 13.2 K/UL — HIGH (ref 3.8–10.5)

## 2020-01-07 PROCEDURE — 99214 OFFICE O/P EST MOD 30 MIN: CPT

## 2020-01-07 PROCEDURE — 81270 JAK2 GENE: CPT

## 2020-01-07 PROCEDURE — G0452: CPT | Mod: 26

## 2020-01-07 RX ORDER — INSULIN LISPRO 100 [IU]/ML
100 INJECTION, SOLUTION INTRAVENOUS; SUBCUTANEOUS
Refills: 0 | Status: DISCONTINUED | COMMUNITY
Start: 2019-04-25 | End: 2020-01-07

## 2020-01-07 NOTE — ASSESSMENT
[FreeTextEntry1] : 39 year old female with TTP diagnosed in 4/2019.  ADAMTS 13 activity <2%, ADAMTS 13 antibody is 70-80 (high).  Kidney biopsy c/w chronic thrombotic microangiopathy.  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    S/p ~13 sessions of plasma exchange and high dose prednisone with recovery of platelets and kidney function.  Prednisone taper completed 5/11/19.\par On 5/7/19 her [id number] activity was normal at 71%.\par \par Mild leukocytosis is intermittent, primarily neutrophilia, present prior to pregnancy also.\par Thrombocytosis persists. \par No new symptoms. \par \par Plan:\par -JAK2 testing today\par -F/u in 2 months.

## 2020-01-07 NOTE — HISTORY OF PRESENT ILLNESS
[de-identified] : Ms. Gutierres presents with TTP at the age of 38.\par \par The patient was admitted to [facility] 4/5/19-4/20-/19 for blurry vision and nausea/vomiting. She was found to have severe thrombocytopenia with LISSETT and metabolic acidosis and HTN. Peripheral smear showed schistocytes with elevated LDH and low haptoglobin with worsening renal failure and thrombocytopenia concerning for TTP. A CT C/A/P showed perinephric stranding bilaterally. \par \par A renal biopsy showed severe obliterative arterial sclerosis, with mild segmental glomerular endothelial injury, suggesting chronic thrombotic angiopathy. Acute tubular injury with focal tubular necrosis and mild reactive interstitial inflammation. \par \par She was started on high dose prednisone with a fast taper and received PLEX x 10 sessions, after which her platelets recovered but kidney function plateaued at 1.4.\par Her prednisone was again increased to 1 mg/kg and she received another 3 sessions of plasma exchange starting 4/17/19 - 4/19/19.  Creatinine improved to 1.1 - 1.2 \par She received total 13 sessions of plasmapheresis. She was started on prednisone taper after kidney function normalized to 1.1 and to taper over 2-3 weeks. \par \par 4/6/19 ADAMTS 13 activity <2 % (range >66%)\par ADAMTS 13 antibody 82 (range <12)\par 4/5/19 WBC 15.3, HGB 11.6, PLT 9 K,  BUN/Cr 2.80\par 4/6/19 WBC 15.6, HGB 11.3, PLT 6 K, LDH 1923, Haptoglobin <20\par 4/7/19 WBC 20.1, HGB 10.1, PLT 11 K\par 4/8/19 WBC 16.6, HGB 9.5, PLT 54 K \par 4/13/19 WBC 18.0, HGB 8.6,  K, \par 4/17/19 \par 4/20/19 WBC 25.0, HGB 8.7,  K\par 4/23/19 WBC 26.5, HGB 10.3,  K\par \par She is currently on Prednisone 60 mg a day and scheduled to finish the taper on 5/3/19. No dysuria. No headaches or blurred vision. No abdominal pain. Bowel movements are normal. \par \par PCP: Dr. Bobby Saunders, [facility] [de-identified] : Patient returns for follow up.\par She is 23 weeks pregnant. Due on April 30th 2020. \par Her blood pressure during pregnancy is being monitored/managed by nephrology. \par She has trouble sleeping. Minor leg swelling. No SOB. \par No fevers or night sweats. No headaches. No dizziness. \par \par 01/7/2020 CBC: WBC 13.2, HGB 12.5, PLT 430K \par \par

## 2020-01-07 NOTE — PHYSICAL EXAM
[Fully active, able to carry on all pre-disease performance without restriction] : Status 0 - Fully active, able to carry on all pre-disease performance without restriction [Obese] : obese [Normal] : affect appropriate [de-identified] : supple [de-identified] : clear [de-identified] : NO edema [de-identified] : S1/S2 [de-identified] : soft, obese [de-identified] : no cervical adenopathy

## 2020-01-08 ENCOUNTER — ASOB RESULT (OUTPATIENT)
Age: 40
End: 2020-01-08

## 2020-01-08 ENCOUNTER — APPOINTMENT (OUTPATIENT)
Dept: MATERNAL FETAL MEDICINE | Facility: CLINIC | Age: 40
End: 2020-01-08
Payer: COMMERCIAL

## 2020-01-08 ENCOUNTER — APPOINTMENT (OUTPATIENT)
Dept: ANTEPARTUM | Facility: CLINIC | Age: 40
End: 2020-01-08
Payer: COMMERCIAL

## 2020-01-08 VITALS
WEIGHT: 286 LBS | HEIGHT: 63 IN | HEART RATE: 88 BPM | OXYGEN SATURATION: 96 % | SYSTOLIC BLOOD PRESSURE: 148 MMHG | BODY MASS INDEX: 50.68 KG/M2 | DIASTOLIC BLOOD PRESSURE: 102 MMHG

## 2020-01-08 PROCEDURE — 99243 OFF/OP CNSLTJ NEW/EST LOW 30: CPT | Mod: 25

## 2020-01-08 PROCEDURE — 76816 OB US FOLLOW-UP PER FETUS: CPT

## 2020-01-21 ENCOUNTER — APPOINTMENT (OUTPATIENT)
Dept: NEPHROLOGY | Facility: CLINIC | Age: 40
End: 2020-01-21

## 2020-01-24 ENCOUNTER — ASOB RESULT (OUTPATIENT)
Age: 40
End: 2020-01-24

## 2020-01-24 ENCOUNTER — APPOINTMENT (OUTPATIENT)
Dept: ANTEPARTUM | Facility: CLINIC | Age: 40
End: 2020-01-24
Payer: COMMERCIAL

## 2020-01-24 PROCEDURE — 76816 OB US FOLLOW-UP PER FETUS: CPT

## 2020-02-04 ENCOUNTER — APPOINTMENT (OUTPATIENT)
Dept: ANTEPARTUM | Facility: CLINIC | Age: 40
End: 2020-02-04

## 2020-02-04 ENCOUNTER — ASOB RESULT (OUTPATIENT)
Age: 40
End: 2020-02-04

## 2020-02-04 ENCOUNTER — APPOINTMENT (OUTPATIENT)
Dept: ANTEPARTUM | Facility: CLINIC | Age: 40
End: 2020-02-04
Payer: COMMERCIAL

## 2020-02-04 ENCOUNTER — LABORATORY RESULT (OUTPATIENT)
Age: 40
End: 2020-02-04

## 2020-02-04 PROCEDURE — 76818 FETAL BIOPHYS PROFILE W/NST: CPT

## 2020-02-04 PROCEDURE — 76820 UMBILICAL ARTERY ECHO: CPT

## 2020-02-04 PROCEDURE — 76816 OB US FOLLOW-UP PER FETUS: CPT

## 2020-02-05 LAB
ALBUMIN SERPL ELPH-MCNC: 3.9 G/DL
ALP BLD-CCNC: 122 U/L
ALT SERPL-CCNC: 13 U/L
ANION GAP SERPL CALC-SCNC: 15 MMOL/L
AST SERPL-CCNC: 13 U/L
BASOPHILS # BLD AUTO: 0.03 K/UL
BASOPHILS NFR BLD AUTO: 0.2 %
BILIRUB SERPL-MCNC: 0.2 MG/DL
BUN SERPL-MCNC: 9 MG/DL
CALCIUM SERPL-MCNC: 10.1 MG/DL
CHLORIDE SERPL-SCNC: 105 MMOL/L
CO2 SERPL-SCNC: 18 MMOL/L
CREAT SERPL-MCNC: 0.76 MG/DL
CREAT SPEC-SCNC: 48 MG/DL
CREAT/PROT UR: 0.2 RATIO
EOSINOPHIL # BLD AUTO: 0.2 K/UL
EOSINOPHIL NFR BLD AUTO: 1.6 %
GLUCOSE SERPL-MCNC: 116 MG/DL
HCT VFR BLD CALC: 40.2 %
HGB BLD-MCNC: 12.7 G/DL
IMM GRANULOCYTES NFR BLD AUTO: 0.2 %
LYMPHOCYTES # BLD AUTO: 2.79 K/UL
LYMPHOCYTES NFR BLD AUTO: 22.7 %
MAN DIFF?: NORMAL
MCHC RBC-ENTMCNC: 29.6 PG
MCHC RBC-ENTMCNC: 31.6 GM/DL
MCV RBC AUTO: 93.7 FL
MONOCYTES # BLD AUTO: 0.94 K/UL
MONOCYTES NFR BLD AUTO: 7.6 %
NEUTROPHILS # BLD AUTO: 8.33 K/UL
NEUTROPHILS NFR BLD AUTO: 67.7 %
PLATELET # BLD AUTO: 433 K/UL
POTASSIUM SERPL-SCNC: 5.8 MMOL/L
PROT SERPL-MCNC: 7.1 G/DL
PROT UR-MCNC: 8 MG/DL
RBC # BLD: 4.29 M/UL
RBC # FLD: 16.1 %
SODIUM SERPL-SCNC: 138 MMOL/L
URATE SERPL-MCNC: 3.9 MG/DL
WBC # FLD AUTO: 12.31 K/UL

## 2020-02-07 ENCOUNTER — ASOB RESULT (OUTPATIENT)
Age: 40
End: 2020-02-07

## 2020-02-07 ENCOUNTER — APPOINTMENT (OUTPATIENT)
Dept: OTHER | Facility: CLINIC | Age: 40
End: 2020-02-07
Payer: COMMERCIAL

## 2020-02-07 ENCOUNTER — OUTPATIENT (OUTPATIENT)
Dept: OUTPATIENT SERVICES | Facility: HOSPITAL | Age: 40
LOS: 1 days | End: 2020-02-07
Payer: COMMERCIAL

## 2020-02-07 ENCOUNTER — APPOINTMENT (OUTPATIENT)
Dept: ANTEPARTUM | Facility: CLINIC | Age: 40
End: 2020-02-07
Payer: COMMERCIAL

## 2020-02-07 ENCOUNTER — INPATIENT (INPATIENT)
Facility: HOSPITAL | Age: 40
LOS: 15 days | Discharge: HOME | End: 2020-02-23
Attending: OBSTETRICS & GYNECOLOGY | Admitting: OBSTETRICS & GYNECOLOGY
Payer: COMMERCIAL

## 2020-02-07 DIAGNOSIS — Z3A.28 28 WEEKS GESTATION OF PREGNANCY: ICD-10-CM

## 2020-02-07 DIAGNOSIS — O28.1 ABNORMAL BIOCHEMICAL FINDING ON ANTENATAL SCREENING OF MOTHER: ICD-10-CM

## 2020-02-07 DIAGNOSIS — O26.899 OTHER SPECIFIED PREGNANCY RELATED CONDITIONS, UNSPECIFIED TRIMESTER: ICD-10-CM

## 2020-02-07 DIAGNOSIS — Z3A.00 WEEKS OF GESTATION OF PREGNANCY NOT SPECIFIED: ICD-10-CM

## 2020-02-07 DIAGNOSIS — O10.013 PRE-EXISTING ESSENTIAL HYPERTENSION COMPLICATING PREGNANCY, THIRD TRIMESTER: ICD-10-CM

## 2020-02-07 DIAGNOSIS — Z34.80 ENCOUNTER FOR SUPERVISION OF OTHER NORMAL PREGNANCY, UNSPECIFIED TRIMESTER: ICD-10-CM

## 2020-02-07 DIAGNOSIS — O28.0 ABNORMAL HEMATOLOGICAL FINDING ON ANTENATAL SCREENING OF MOTHER: ICD-10-CM

## 2020-02-07 LAB
ALBUMIN SERPL ELPH-MCNC: 3.6 G/DL — SIGNIFICANT CHANGE UP (ref 3.3–5)
ALP SERPL-CCNC: 121 U/L — HIGH (ref 40–120)
ALT FLD-CCNC: 14 U/L — SIGNIFICANT CHANGE UP (ref 10–45)
ANION GAP SERPL CALC-SCNC: 17 MMOL/L — SIGNIFICANT CHANGE UP (ref 5–17)
APPEARANCE UR: CLEAR — SIGNIFICANT CHANGE UP
APTT BLD: 26.2 SEC — LOW (ref 27.5–36.3)
AST SERPL-CCNC: 15 U/L — SIGNIFICANT CHANGE UP (ref 10–40)
BASOPHILS # BLD AUTO: 0.02 K/UL — SIGNIFICANT CHANGE UP (ref 0–0.2)
BASOPHILS NFR BLD AUTO: 0.2 % — SIGNIFICANT CHANGE UP (ref 0–2)
BILIRUB SERPL-MCNC: 0.2 MG/DL — SIGNIFICANT CHANGE UP (ref 0.2–1.2)
BILIRUB UR-MCNC: NEGATIVE — SIGNIFICANT CHANGE UP
BLD GP AB SCN SERPL QL: NEGATIVE — SIGNIFICANT CHANGE UP
BUN SERPL-MCNC: 10 MG/DL — SIGNIFICANT CHANGE UP (ref 7–23)
CALCIUM SERPL-MCNC: 9.5 MG/DL — SIGNIFICANT CHANGE UP (ref 8.4–10.5)
CHLORIDE SERPL-SCNC: 103 MMOL/L — SIGNIFICANT CHANGE UP (ref 96–108)
CO2 SERPL-SCNC: 19 MMOL/L — LOW (ref 22–31)
COLOR SPEC: COLORLESS — SIGNIFICANT CHANGE UP
CREAT ?TM UR-MCNC: 42 MG/DL — SIGNIFICANT CHANGE UP
CREAT SERPL-MCNC: 0.7 MG/DL — SIGNIFICANT CHANGE UP (ref 0.5–1.3)
DIFF PNL FLD: NEGATIVE — SIGNIFICANT CHANGE UP
EOSINOPHIL # BLD AUTO: 0.25 K/UL — SIGNIFICANT CHANGE UP (ref 0–0.5)
EOSINOPHIL NFR BLD AUTO: 2 % — SIGNIFICANT CHANGE UP (ref 0–6)
FIBRINOGEN PPP-MCNC: 814 MG/DL — HIGH (ref 350–510)
GLUCOSE BLDC GLUCOMTR-MCNC: 104 MG/DL — HIGH (ref 70–99)
GLUCOSE BLDC GLUCOMTR-MCNC: 137 MG/DL — HIGH (ref 70–99)
GLUCOSE SERPL-MCNC: 111 MG/DL — HIGH (ref 70–99)
GLUCOSE UR QL: NEGATIVE — SIGNIFICANT CHANGE UP
HCT VFR BLD CALC: 38.6 % — SIGNIFICANT CHANGE UP (ref 34.5–45)
HGB BLD-MCNC: 12.1 G/DL — SIGNIFICANT CHANGE UP (ref 11.5–15.5)
IMM GRANULOCYTES NFR BLD AUTO: 0.4 % — SIGNIFICANT CHANGE UP (ref 0–1.5)
INR BLD: 0.91 RATIO — SIGNIFICANT CHANGE UP (ref 0.88–1.16)
KETONES UR-MCNC: NEGATIVE — SIGNIFICANT CHANGE UP
LDH SERPL L TO P-CCNC: 222 U/L — SIGNIFICANT CHANGE UP (ref 50–242)
LEUKOCYTE ESTERASE UR-ACNC: NEGATIVE — SIGNIFICANT CHANGE UP
LYMPHOCYTES # BLD AUTO: 2.59 K/UL — SIGNIFICANT CHANGE UP (ref 1–3.3)
LYMPHOCYTES # BLD AUTO: 21.1 % — SIGNIFICANT CHANGE UP (ref 13–44)
MCHC RBC-ENTMCNC: 28.9 PG — SIGNIFICANT CHANGE UP (ref 27–34)
MCHC RBC-ENTMCNC: 31.3 GM/DL — LOW (ref 32–36)
MCV RBC AUTO: 92.3 FL — SIGNIFICANT CHANGE UP (ref 80–100)
MONOCYTES # BLD AUTO: 0.91 K/UL — HIGH (ref 0–0.9)
MONOCYTES NFR BLD AUTO: 7.4 % — SIGNIFICANT CHANGE UP (ref 2–14)
NEUTROPHILS # BLD AUTO: 8.43 K/UL — HIGH (ref 1.8–7.4)
NEUTROPHILS NFR BLD AUTO: 68.9 % — SIGNIFICANT CHANGE UP (ref 43–77)
NITRITE UR-MCNC: NEGATIVE — SIGNIFICANT CHANGE UP
NRBC # BLD: 0 /100 WBCS — SIGNIFICANT CHANGE UP (ref 0–0)
PH UR: 6.5 — SIGNIFICANT CHANGE UP (ref 5–8)
PLATELET # BLD AUTO: 425 K/UL — HIGH (ref 150–400)
POTASSIUM SERPL-MCNC: 3.9 MMOL/L — SIGNIFICANT CHANGE UP (ref 3.5–5.3)
POTASSIUM SERPL-SCNC: 3.9 MMOL/L — SIGNIFICANT CHANGE UP (ref 3.5–5.3)
PROT ?TM UR-MCNC: 10 MG/DL — SIGNIFICANT CHANGE UP (ref 0–12)
PROT SERPL-MCNC: 7.5 G/DL — SIGNIFICANT CHANGE UP (ref 6–8.3)
PROT UR-MCNC: NEGATIVE — SIGNIFICANT CHANGE UP
PROT/CREAT UR-RTO: 0.2 RATIO — SIGNIFICANT CHANGE UP (ref 0–0.2)
PROTHROM AB SERPL-ACNC: 10.3 SEC — SIGNIFICANT CHANGE UP (ref 10–12.9)
RBC # BLD: 4.18 M/UL — SIGNIFICANT CHANGE UP (ref 3.8–5.2)
RBC # FLD: 15.8 % — HIGH (ref 10.3–14.5)
RH IG SCN BLD-IMP: POSITIVE — SIGNIFICANT CHANGE UP
RH IG SCN BLD-IMP: POSITIVE — SIGNIFICANT CHANGE UP
SODIUM SERPL-SCNC: 139 MMOL/L — SIGNIFICANT CHANGE UP (ref 135–145)
SP GR SPEC: 1.01 — LOW (ref 1.01–1.02)
URATE SERPL-MCNC: 5 MG/DL — SIGNIFICANT CHANGE UP (ref 2.5–7)
UROBILINOGEN FLD QL: NEGATIVE — SIGNIFICANT CHANGE UP
WBC # BLD: 12.25 K/UL — HIGH (ref 3.8–10.5)
WBC # FLD AUTO: 12.25 K/UL — HIGH (ref 3.8–10.5)

## 2020-02-07 PROCEDURE — 76818 FETAL BIOPHYS PROFILE W/NST: CPT | Mod: 26

## 2020-02-07 PROCEDURE — 76821 MIDDLE CEREBRAL ARTERY ECHO: CPT | Mod: 26

## 2020-02-07 PROCEDURE — 76821 MIDDLE CEREBRAL ARTERY ECHO: CPT

## 2020-02-07 PROCEDURE — 76820 UMBILICAL ARTERY ECHO: CPT | Mod: 26

## 2020-02-07 PROCEDURE — 76820 UMBILICAL ARTERY ECHO: CPT

## 2020-02-07 PROCEDURE — 76818 FETAL BIOPHYS PROFILE W/NST: CPT

## 2020-02-07 PROCEDURE — 99243 OFF/OP CNSLTJ NEW/EST LOW 30: CPT

## 2020-02-07 RX ORDER — DEXTROSE 50 % IN WATER 50 %
25 SYRINGE (ML) INTRAVENOUS ONCE
Refills: 0 | Status: DISCONTINUED | OUTPATIENT
Start: 2020-02-07 | End: 2020-02-19

## 2020-02-07 RX ORDER — MAGNESIUM SULFATE 500 MG/ML
4 VIAL (ML) INJECTION ONCE
Refills: 0 | Status: COMPLETED | OUTPATIENT
Start: 2020-02-07 | End: 2020-02-07

## 2020-02-07 RX ORDER — SODIUM CHLORIDE 9 MG/ML
1000 INJECTION INTRAMUSCULAR; INTRAVENOUS; SUBCUTANEOUS
Refills: 0 | Status: DISCONTINUED | OUTPATIENT
Start: 2020-02-07 | End: 2020-02-17

## 2020-02-07 RX ORDER — HYDRALAZINE HCL 50 MG
10 TABLET ORAL ONCE
Refills: 0 | Status: DISCONTINUED | OUTPATIENT
Start: 2020-02-07 | End: 2020-02-07

## 2020-02-07 RX ORDER — HYDRALAZINE HCL 50 MG
10 TABLET ORAL ONCE
Refills: 0 | Status: COMPLETED | OUTPATIENT
Start: 2020-02-07 | End: 2020-02-07

## 2020-02-07 RX ORDER — NIFEDIPINE 30 MG
10 TABLET, EXTENDED RELEASE 24 HR ORAL ONCE
Refills: 0 | Status: COMPLETED | OUTPATIENT
Start: 2020-02-07 | End: 2020-02-07

## 2020-02-07 RX ORDER — LABETALOL HCL 100 MG
100 TABLET ORAL ONCE
Refills: 0 | Status: COMPLETED | OUTPATIENT
Start: 2020-02-07 | End: 2020-02-07

## 2020-02-07 RX ORDER — MAGNESIUM SULFATE 500 MG/ML
2 VIAL (ML) INJECTION
Qty: 40 | Refills: 0 | Status: DISCONTINUED | OUTPATIENT
Start: 2020-02-07 | End: 2020-02-09

## 2020-02-07 RX ORDER — LABETALOL HCL 100 MG
300 TABLET ORAL EVERY 8 HOURS
Refills: 0 | Status: DISCONTINUED | OUTPATIENT
Start: 2020-02-07 | End: 2020-02-07

## 2020-02-07 RX ORDER — DEXTROSE 50 % IN WATER 50 %
15 SYRINGE (ML) INTRAVENOUS ONCE
Refills: 0 | Status: DISCONTINUED | OUTPATIENT
Start: 2020-02-07 | End: 2020-02-19

## 2020-02-07 RX ORDER — DEXTROSE 50 % IN WATER 50 %
12.5 SYRINGE (ML) INTRAVENOUS ONCE
Refills: 0 | Status: DISCONTINUED | OUTPATIENT
Start: 2020-02-07 | End: 2020-02-19

## 2020-02-07 RX ORDER — INSULIN LISPRO 100/ML
VIAL (ML) SUBCUTANEOUS
Refills: 0 | Status: DISCONTINUED | OUTPATIENT
Start: 2020-02-07 | End: 2020-02-10

## 2020-02-07 RX ORDER — LABETALOL HCL 100 MG
20 TABLET ORAL ONCE
Refills: 0 | Status: COMPLETED | OUTPATIENT
Start: 2020-02-07 | End: 2020-02-07

## 2020-02-07 RX ORDER — SODIUM CHLORIDE 9 MG/ML
1000 INJECTION, SOLUTION INTRAVENOUS
Refills: 0 | Status: DISCONTINUED | OUTPATIENT
Start: 2020-02-07 | End: 2020-02-23

## 2020-02-07 RX ORDER — HYDRALAZINE HCL 50 MG
5 TABLET ORAL ONCE
Refills: 0 | Status: COMPLETED | OUTPATIENT
Start: 2020-02-07 | End: 2020-02-07

## 2020-02-07 RX ORDER — GLUCAGON INJECTION, SOLUTION 0.5 MG/.1ML
1 INJECTION, SOLUTION SUBCUTANEOUS ONCE
Refills: 0 | Status: DISCONTINUED | OUTPATIENT
Start: 2020-02-07 | End: 2020-02-23

## 2020-02-07 RX ORDER — LABETALOL HCL 100 MG
400 TABLET ORAL EVERY 8 HOURS
Refills: 0 | Status: DISCONTINUED | OUTPATIENT
Start: 2020-02-07 | End: 2020-02-08

## 2020-02-07 RX ORDER — NIFEDIPINE 30 MG
90 TABLET, EXTENDED RELEASE 24 HR ORAL DAILY
Refills: 0 | Status: DISCONTINUED | OUTPATIENT
Start: 2020-02-07 | End: 2020-02-11

## 2020-02-07 RX ADMIN — Medication 300 MILLIGRAM(S): at 17:16

## 2020-02-07 RX ADMIN — Medication 10 MILLIGRAM(S): at 16:28

## 2020-02-07 RX ADMIN — Medication 10 MILLIGRAM(S): at 19:02

## 2020-02-07 RX ADMIN — Medication 50 GM/HR: at 17:15

## 2020-02-07 RX ADMIN — Medication 10 MILLIGRAM(S): at 18:05

## 2020-02-07 RX ADMIN — Medication 12 MILLIGRAM(S): at 16:43

## 2020-02-07 RX ADMIN — Medication 100 MILLIGRAM(S): at 22:30

## 2020-02-07 RX ADMIN — Medication 5 MILLIGRAM(S): at 16:52

## 2020-02-07 RX ADMIN — Medication 20 MILLIGRAM(S): at 20:59

## 2020-02-07 RX ADMIN — Medication 200 GRAM(S): at 16:42

## 2020-02-08 VITALS — HEIGHT: 63 IN | WEIGHT: 288.81 LBS

## 2020-02-08 LAB
ALBUMIN SERPL ELPH-MCNC: 3.4 G/DL — SIGNIFICANT CHANGE UP (ref 3.3–5)
ALBUMIN SERPL ELPH-MCNC: 3.4 G/DL — SIGNIFICANT CHANGE UP (ref 3.3–5)
ALBUMIN SERPL ELPH-MCNC: 3.6 G/DL — SIGNIFICANT CHANGE UP (ref 3.3–5)
ALBUMIN SERPL ELPH-MCNC: 3.7 G/DL — SIGNIFICANT CHANGE UP (ref 3.3–5)
ALP SERPL-CCNC: 107 U/L — SIGNIFICANT CHANGE UP (ref 40–120)
ALP SERPL-CCNC: 107 U/L — SIGNIFICANT CHANGE UP (ref 40–120)
ALP SERPL-CCNC: 115 U/L — SIGNIFICANT CHANGE UP (ref 40–120)
ALP SERPL-CCNC: 123 U/L — HIGH (ref 40–120)
ALT FLD-CCNC: 12 U/L — SIGNIFICANT CHANGE UP (ref 10–45)
ALT FLD-CCNC: 12 U/L — SIGNIFICANT CHANGE UP (ref 10–45)
ALT FLD-CCNC: 13 U/L — SIGNIFICANT CHANGE UP (ref 10–45)
ALT FLD-CCNC: 14 U/L — SIGNIFICANT CHANGE UP (ref 10–45)
ANION GAP SERPL CALC-SCNC: 14 MMOL/L — SIGNIFICANT CHANGE UP (ref 5–17)
ANION GAP SERPL CALC-SCNC: 16 MMOL/L — SIGNIFICANT CHANGE UP (ref 5–17)
ANION GAP SERPL CALC-SCNC: 17 MMOL/L — SIGNIFICANT CHANGE UP (ref 5–17)
ANION GAP SERPL CALC-SCNC: 17 MMOL/L — SIGNIFICANT CHANGE UP (ref 5–17)
APTT BLD: 24.7 SEC — LOW (ref 27.5–36.3)
APTT BLD: 26.2 SEC — LOW (ref 27.5–36.3)
APTT BLD: 26.4 SEC — LOW (ref 27.5–36.3)
AST SERPL-CCNC: 11 U/L — SIGNIFICANT CHANGE UP (ref 10–40)
AST SERPL-CCNC: 12 U/L — SIGNIFICANT CHANGE UP (ref 10–40)
AST SERPL-CCNC: 13 U/L — SIGNIFICANT CHANGE UP (ref 10–40)
AST SERPL-CCNC: 9 U/L — LOW (ref 10–40)
BASOPHILS # BLD AUTO: 0.01 K/UL — SIGNIFICANT CHANGE UP (ref 0–0.2)
BASOPHILS NFR BLD AUTO: 0.1 % — SIGNIFICANT CHANGE UP (ref 0–2)
BILIRUB SERPL-MCNC: 0.1 MG/DL — LOW (ref 0.2–1.2)
BUN SERPL-MCNC: 10 MG/DL — SIGNIFICANT CHANGE UP (ref 7–23)
BUN SERPL-MCNC: 9 MG/DL — SIGNIFICANT CHANGE UP (ref 7–23)
CALCIUM SERPL-MCNC: 7.3 MG/DL — LOW (ref 8.4–10.5)
CALCIUM SERPL-MCNC: 7.6 MG/DL — LOW (ref 8.4–10.5)
CALCIUM SERPL-MCNC: 8.1 MG/DL — LOW (ref 8.4–10.5)
CALCIUM SERPL-MCNC: 8.3 MG/DL — LOW (ref 8.4–10.5)
CHLORIDE SERPL-SCNC: 101 MMOL/L — SIGNIFICANT CHANGE UP (ref 96–108)
CHLORIDE SERPL-SCNC: 103 MMOL/L — SIGNIFICANT CHANGE UP (ref 96–108)
CO2 SERPL-SCNC: 15 MMOL/L — LOW (ref 22–31)
CO2 SERPL-SCNC: 17 MMOL/L — LOW (ref 22–31)
CO2 SERPL-SCNC: 17 MMOL/L — LOW (ref 22–31)
CO2 SERPL-SCNC: 18 MMOL/L — LOW (ref 22–31)
CREAT SERPL-MCNC: 0.62 MG/DL — SIGNIFICANT CHANGE UP (ref 0.5–1.3)
CREAT SERPL-MCNC: 0.65 MG/DL — SIGNIFICANT CHANGE UP (ref 0.5–1.3)
CREAT SERPL-MCNC: 0.67 MG/DL — SIGNIFICANT CHANGE UP (ref 0.5–1.3)
CREAT SERPL-MCNC: 0.93 MG/DL — SIGNIFICANT CHANGE UP (ref 0.5–1.3)
EOSINOPHIL # BLD AUTO: 0 K/UL — SIGNIFICANT CHANGE UP (ref 0–0.5)
EOSINOPHIL # BLD AUTO: 0 K/UL — SIGNIFICANT CHANGE UP (ref 0–0.5)
EOSINOPHIL # BLD AUTO: 0.01 K/UL — SIGNIFICANT CHANGE UP (ref 0–0.5)
EOSINOPHIL # BLD AUTO: 0.01 K/UL — SIGNIFICANT CHANGE UP (ref 0–0.5)
EOSINOPHIL NFR BLD AUTO: 0 % — SIGNIFICANT CHANGE UP (ref 0–6)
EOSINOPHIL NFR BLD AUTO: 0 % — SIGNIFICANT CHANGE UP (ref 0–6)
EOSINOPHIL NFR BLD AUTO: 0.1 % — SIGNIFICANT CHANGE UP (ref 0–6)
EOSINOPHIL NFR BLD AUTO: 0.1 % — SIGNIFICANT CHANGE UP (ref 0–6)
FIBRINOGEN PPP-MCNC: 639 MG/DL — HIGH (ref 350–510)
FIBRINOGEN PPP-MCNC: 672 MG/DL — HIGH (ref 350–510)
FIBRINOGEN PPP-MCNC: 676 MG/DL — HIGH (ref 350–510)
FIBRINOGEN PPP-MCNC: 715 MG/DL — HIGH (ref 350–510)
GLUCOSE BLDC GLUCOMTR-MCNC: 129 MG/DL — HIGH (ref 70–99)
GLUCOSE BLDC GLUCOMTR-MCNC: 131 MG/DL — HIGH (ref 70–99)
GLUCOSE BLDC GLUCOMTR-MCNC: 140 MG/DL — HIGH (ref 70–99)
GLUCOSE BLDC GLUCOMTR-MCNC: 144 MG/DL — HIGH (ref 70–99)
GLUCOSE BLDC GLUCOMTR-MCNC: 150 MG/DL — HIGH (ref 70–99)
GLUCOSE SERPL-MCNC: 139 MG/DL — HIGH (ref 70–99)
GLUCOSE SERPL-MCNC: 146 MG/DL — HIGH (ref 70–99)
GLUCOSE SERPL-MCNC: 148 MG/DL — HIGH (ref 70–99)
GLUCOSE SERPL-MCNC: 149 MG/DL — HIGH (ref 70–99)
HBA1C BLD-MCNC: 7.1 % — HIGH (ref 4–5.6)
HCT VFR BLD CALC: 33.8 % — LOW (ref 34.5–45)
HCT VFR BLD CALC: 35.7 % — SIGNIFICANT CHANGE UP (ref 34.5–45)
HCT VFR BLD CALC: 35.9 % — SIGNIFICANT CHANGE UP (ref 34.5–45)
HCT VFR BLD CALC: 38.4 % — SIGNIFICANT CHANGE UP (ref 34.5–45)
HGB BLD-MCNC: 10.9 G/DL — LOW (ref 11.5–15.5)
HGB BLD-MCNC: 11.1 G/DL — LOW (ref 11.5–15.5)
HGB BLD-MCNC: 11.6 G/DL — SIGNIFICANT CHANGE UP (ref 11.5–15.5)
HGB BLD-MCNC: 12.5 G/DL — SIGNIFICANT CHANGE UP (ref 11.5–15.5)
IMM GRANULOCYTES NFR BLD AUTO: 0.4 % — SIGNIFICANT CHANGE UP (ref 0–1.5)
IMM GRANULOCYTES NFR BLD AUTO: 0.4 % — SIGNIFICANT CHANGE UP (ref 0–1.5)
IMM GRANULOCYTES NFR BLD AUTO: 0.5 % — SIGNIFICANT CHANGE UP (ref 0–1.5)
IMM GRANULOCYTES NFR BLD AUTO: 0.5 % — SIGNIFICANT CHANGE UP (ref 0–1.5)
INR BLD: 0.86 RATIO — LOW (ref 0.88–1.16)
INR BLD: 0.88 RATIO — SIGNIFICANT CHANGE UP (ref 0.88–1.16)
INR BLD: 0.91 RATIO — SIGNIFICANT CHANGE UP (ref 0.88–1.16)
INR BLD: 0.93 RATIO — SIGNIFICANT CHANGE UP (ref 0.88–1.16)
LDH SERPL L TO P-CCNC: 170 U/L — SIGNIFICANT CHANGE UP (ref 50–242)
LDH SERPL L TO P-CCNC: 180 U/L — SIGNIFICANT CHANGE UP (ref 50–242)
LDH SERPL L TO P-CCNC: 196 U/L — SIGNIFICANT CHANGE UP (ref 50–242)
LDH SERPL L TO P-CCNC: 208 U/L — SIGNIFICANT CHANGE UP (ref 50–242)
LYMPHOCYTES # BLD AUTO: 1.22 K/UL — SIGNIFICANT CHANGE UP (ref 1–3.3)
LYMPHOCYTES # BLD AUTO: 1.36 K/UL — SIGNIFICANT CHANGE UP (ref 1–3.3)
LYMPHOCYTES # BLD AUTO: 1.41 K/UL — SIGNIFICANT CHANGE UP (ref 1–3.3)
LYMPHOCYTES # BLD AUTO: 1.77 K/UL — SIGNIFICANT CHANGE UP (ref 1–3.3)
LYMPHOCYTES # BLD AUTO: 10 % — LOW (ref 13–44)
LYMPHOCYTES # BLD AUTO: 12.9 % — LOW (ref 13–44)
LYMPHOCYTES # BLD AUTO: 12.9 % — LOW (ref 13–44)
LYMPHOCYTES # BLD AUTO: 13 % — SIGNIFICANT CHANGE UP (ref 13–44)
MAGNESIUM SERPL-MCNC: 5.1 MG/DL — HIGH (ref 1.6–2.6)
MAGNESIUM SERPL-MCNC: 5.9 MG/DL — HIGH (ref 1.6–2.6)
MAGNESIUM SERPL-MCNC: 6 MG/DL — HIGH (ref 1.6–2.6)
MAGNESIUM SERPL-MCNC: 6.2 MG/DL — HIGH (ref 1.6–2.6)
MCHC RBC-ENTMCNC: 28.9 PG — SIGNIFICANT CHANGE UP (ref 27–34)
MCHC RBC-ENTMCNC: 29.3 PG — SIGNIFICANT CHANGE UP (ref 27–34)
MCHC RBC-ENTMCNC: 29.6 PG — SIGNIFICANT CHANGE UP (ref 27–34)
MCHC RBC-ENTMCNC: 29.7 PG — SIGNIFICANT CHANGE UP (ref 27–34)
MCHC RBC-ENTMCNC: 31.1 GM/DL — LOW (ref 32–36)
MCHC RBC-ENTMCNC: 32.2 GM/DL — SIGNIFICANT CHANGE UP (ref 32–36)
MCHC RBC-ENTMCNC: 32.3 GM/DL — SIGNIFICANT CHANGE UP (ref 32–36)
MCHC RBC-ENTMCNC: 32.6 GM/DL — SIGNIFICANT CHANGE UP (ref 32–36)
MCV RBC AUTO: 90.1 FL — SIGNIFICANT CHANGE UP (ref 80–100)
MCV RBC AUTO: 91.8 FL — SIGNIFICANT CHANGE UP (ref 80–100)
MCV RBC AUTO: 91.8 FL — SIGNIFICANT CHANGE UP (ref 80–100)
MCV RBC AUTO: 93 FL — SIGNIFICANT CHANGE UP (ref 80–100)
MONOCYTES # BLD AUTO: 0.17 K/UL — SIGNIFICANT CHANGE UP (ref 0–0.9)
MONOCYTES # BLD AUTO: 0.33 K/UL — SIGNIFICANT CHANGE UP (ref 0–0.9)
MONOCYTES # BLD AUTO: 0.61 K/UL — SIGNIFICANT CHANGE UP (ref 0–0.9)
MONOCYTES # BLD AUTO: 0.77 K/UL — SIGNIFICANT CHANGE UP (ref 0–0.9)
MONOCYTES NFR BLD AUTO: 1.4 % — LOW (ref 2–14)
MONOCYTES NFR BLD AUTO: 3.2 % — SIGNIFICANT CHANGE UP (ref 2–14)
MONOCYTES NFR BLD AUTO: 5.6 % — SIGNIFICANT CHANGE UP (ref 2–14)
MONOCYTES NFR BLD AUTO: 5.6 % — SIGNIFICANT CHANGE UP (ref 2–14)
NEUTROPHILS # BLD AUTO: 10.78 K/UL — HIGH (ref 1.8–7.4)
NEUTROPHILS # BLD AUTO: 11.05 K/UL — HIGH (ref 1.8–7.4)
NEUTROPHILS # BLD AUTO: 8.72 K/UL — HIGH (ref 1.8–7.4)
NEUTROPHILS # BLD AUTO: 8.83 K/UL — HIGH (ref 1.8–7.4)
NEUTROPHILS NFR BLD AUTO: 80.9 % — HIGH (ref 43–77)
NEUTROPHILS NFR BLD AUTO: 81 % — HIGH (ref 43–77)
NEUTROPHILS NFR BLD AUTO: 83.2 % — HIGH (ref 43–77)
NEUTROPHILS NFR BLD AUTO: 87.9 % — HIGH (ref 43–77)
NRBC # BLD: 0 /100 WBCS — SIGNIFICANT CHANGE UP (ref 0–0)
PLATELET # BLD AUTO: 396 K/UL — SIGNIFICANT CHANGE UP (ref 150–400)
PLATELET # BLD AUTO: 407 K/UL — HIGH (ref 150–400)
PLATELET # BLD AUTO: 414 K/UL — HIGH (ref 150–400)
PLATELET # BLD AUTO: 427 K/UL — HIGH (ref 150–400)
POTASSIUM SERPL-MCNC: 4.3 MMOL/L — SIGNIFICANT CHANGE UP (ref 3.5–5.3)
POTASSIUM SERPL-MCNC: 4.7 MMOL/L — SIGNIFICANT CHANGE UP (ref 3.5–5.3)
POTASSIUM SERPL-SCNC: 4.3 MMOL/L — SIGNIFICANT CHANGE UP (ref 3.5–5.3)
POTASSIUM SERPL-SCNC: 4.7 MMOL/L — SIGNIFICANT CHANGE UP (ref 3.5–5.3)
PROT SERPL-MCNC: 6.9 G/DL — SIGNIFICANT CHANGE UP (ref 6–8.3)
PROT SERPL-MCNC: 7.1 G/DL — SIGNIFICANT CHANGE UP (ref 6–8.3)
PROT SERPL-MCNC: 7.3 G/DL — SIGNIFICANT CHANGE UP (ref 6–8.3)
PROT SERPL-MCNC: 7.3 G/DL — SIGNIFICANT CHANGE UP (ref 6–8.3)
PROTHROM AB SERPL-ACNC: 10 SEC — SIGNIFICANT CHANGE UP (ref 10–12.9)
PROTHROM AB SERPL-ACNC: 10.4 SEC — SIGNIFICANT CHANGE UP (ref 10–12.9)
PROTHROM AB SERPL-ACNC: 10.7 SEC — SIGNIFICANT CHANGE UP (ref 10–12.9)
PROTHROM AB SERPL-ACNC: 9.8 SEC — LOW (ref 10–12.9)
RBC # BLD: 3.68 M/UL — LOW (ref 3.8–5.2)
RBC # BLD: 3.84 M/UL — SIGNIFICANT CHANGE UP (ref 3.8–5.2)
RBC # BLD: 3.91 M/UL — SIGNIFICANT CHANGE UP (ref 3.8–5.2)
RBC # BLD: 4.26 M/UL — SIGNIFICANT CHANGE UP (ref 3.8–5.2)
RBC # FLD: 15.8 % — HIGH (ref 10.3–14.5)
RBC # FLD: 15.9 % — HIGH (ref 10.3–14.5)
RBC # FLD: 15.9 % — HIGH (ref 10.3–14.5)
RBC # FLD: 16.3 % — HIGH (ref 10.3–14.5)
SODIUM SERPL-SCNC: 133 MMOL/L — LOW (ref 135–145)
SODIUM SERPL-SCNC: 134 MMOL/L — LOW (ref 135–145)
SODIUM SERPL-SCNC: 135 MMOL/L — SIGNIFICANT CHANGE UP (ref 135–145)
SODIUM SERPL-SCNC: 135 MMOL/L — SIGNIFICANT CHANGE UP (ref 135–145)
T PALLIDUM AB TITR SER: NEGATIVE — SIGNIFICANT CHANGE UP
URATE SERPL-MCNC: 5.9 MG/DL — SIGNIFICANT CHANGE UP (ref 2.5–7)
URATE SERPL-MCNC: 6.3 MG/DL — SIGNIFICANT CHANGE UP (ref 2.5–7)
URATE SERPL-MCNC: 6.9 MG/DL — SIGNIFICANT CHANGE UP (ref 2.5–7)
WBC # BLD: 10.47 K/UL — SIGNIFICANT CHANGE UP (ref 3.8–10.5)
WBC # BLD: 10.9 K/UL — HIGH (ref 3.8–10.5)
WBC # BLD: 12.25 K/UL — HIGH (ref 3.8–10.5)
WBC # BLD: 13.67 K/UL — HIGH (ref 3.8–10.5)
WBC # FLD AUTO: 10.47 K/UL — SIGNIFICANT CHANGE UP (ref 3.8–10.5)
WBC # FLD AUTO: 10.9 K/UL — HIGH (ref 3.8–10.5)
WBC # FLD AUTO: 12.25 K/UL — HIGH (ref 3.8–10.5)
WBC # FLD AUTO: 13.67 K/UL — HIGH (ref 3.8–10.5)

## 2020-02-08 PROCEDURE — 99233 SBSQ HOSP IP/OBS HIGH 50: CPT

## 2020-02-08 RX ORDER — LABETALOL HCL 100 MG
80 TABLET ORAL ONCE
Refills: 0 | Status: COMPLETED | OUTPATIENT
Start: 2020-02-08 | End: 2020-02-08

## 2020-02-08 RX ORDER — LABETALOL HCL 100 MG
100 TABLET ORAL ONCE
Refills: 0 | Status: COMPLETED | OUTPATIENT
Start: 2020-02-08 | End: 2020-02-08

## 2020-02-08 RX ORDER — LABETALOL HCL 100 MG
500 TABLET ORAL EVERY 8 HOURS
Refills: 0 | Status: DISCONTINUED | OUTPATIENT
Start: 2020-02-08 | End: 2020-02-09

## 2020-02-08 RX ORDER — LABETALOL HCL 100 MG
40 TABLET ORAL ONCE
Refills: 0 | Status: COMPLETED | OUTPATIENT
Start: 2020-02-08 | End: 2020-02-08

## 2020-02-08 RX ADMIN — Medication 400 MILLIGRAM(S): at 09:42

## 2020-02-08 RX ADMIN — Medication 400 MILLIGRAM(S): at 01:36

## 2020-02-08 RX ADMIN — Medication 100 MILLIGRAM(S): at 16:31

## 2020-02-08 RX ADMIN — Medication 40 MILLIGRAM(S): at 01:07

## 2020-02-08 RX ADMIN — Medication 90 MILLIGRAM(S): at 08:32

## 2020-02-08 RX ADMIN — Medication 12 MILLIGRAM(S): at 16:45

## 2020-02-08 RX ADMIN — Medication 80 MILLIGRAM(S): at 08:20

## 2020-02-08 RX ADMIN — Medication 500 MILLIGRAM(S): at 17:37

## 2020-02-08 NOTE — PATIENT PROFILE OB - TEACHING/LEARNING FACTORS INFLUENCE READINESS TO LEARN
[facility]NCOUN[facility]  INTERNAL MEDICINE/FAMILY PRACTICE DAILY PROGRESS NOTE    ADMISSION DATE:  9/5/2019  DATE:  9/6/2019  CURRENT HOSPITAL DAY:  Hospital Day: 2  ATTENDING PHYSICIAN:  Arthur Peterson MD  CODE STATUS:  Full Resuscitation    CHIEF COMPLAINT:  Elisabeth Starkey is a 75 year old female patient with Arthritis of right knee [M17.11] admitted following right TKA by Dr. Peterson. Dr. Mcdaniels was consulted for medical management.     INTERVAL HISTORY:    Patient is doing well post-op. Blood pressures were initially elevated in PACU but improved after she was admitted to the orthopedic unit. Her pain is controlled with oral medications. Her renal function is stable post-op. Her H/H has dropped from 12.7/39.9 pre-op to 9.1/28 post-op. She is asymptomatic. Daily Miralax added due to history of constipation. Therapy has recommended discharge home with outpatient therapy. Family available to check on patient throughout the day. Anticipate dc home when ok with orthopedics    REVIEW OF SYSTEMS:     Review of Systems   Constitutional: Positive for malaise/fatigue. Negative for chills and fever.   HENT: Negative for sore throat.    Respiratory: Negative for cough and shortness of breath.    Cardiovascular: Positive for leg swelling. Negative for chest pain and palpitations.   Gastrointestinal: Negative for abdominal pain and vomiting.        Mild nausea after taking pain meds. Feels mildly constipated. Passing flatus   Genitourinary: Positive for frequency. Negative for dysuria.   Musculoskeletal:        Right knee pain controlled with medications   Skin: Negative for itching and rash.   Neurological: Negative for dizziness, focal weakness and headaches.     A 10 point review of systems was completed and is negative unless stated above.       ACTIVE PROBLEMS:  Current active problems, past history, social history and family history reviewed.  Active Hospital Problems    Diagnosis Date Noted   • Acute blood  loss anemia 09/06/2019     Priority: Low   • Primary osteoarthritis of right knee 09/05/2019     Priority: Low     S/p R TKA 09/05/2019 Dr. Peterson     • Essential hypertension 09/24/2012     Priority: Low       MEDICATIONS/INFUSIONS:  reviewed  Current Facility-Administered Medications   Medication   • amLODIPine (NORVASC) tablet 5 mg   • vitamin - therapeutic multivitamins w/minerals 1 tablet   • omega-3 acid ethyl esters (LOVAZA) capsule 2 g   • enoxaparin (LOVENOX) injection 40 mg   • ondansetron (ZOFRAN ODT) disintegrating tablet 4 mg    Or   • ondansetron (ZOFRAN) injection 4 mg   • prochlorperazine (COMPAZINE) tablet 5 mg    Or   • prochlorperazine (COMPAZINE) injection 5 mg   • acetaminophen (TYLENOL) tablet 650 mg    Or   • acetaminophen (TYLENOL) suppository 650 mg   • ketorolac injection 15 mg    Followed by   • [START ON 9/7/2019] ketorolac injection 15 mg   • HYDROcodone-acetaminophen (NORCO) 5-325 MG per tablet 1-2 tablet   • morphine injection 2 mg   • oxyCODONE (IMM REL) (ROXICODONE) tablet 10 mg   • morphine SR (MS CONTIN) tablet 15 mg   • diphenhydrAMINE (BENADRYL) 12.5 MG/5ML liquid 12.5 mg   • calcium carbonate (TUMS) chewable tablet 500-1,000 mg   • lactated ringers infusion   • acetaminophen (OFIRMEV) IVPB 1,000 mg   • metoPROLOL tartrate (LOPRESSOR) tablet 50 mg   • metoPROLOL tartrate (LOPRESSOR) tablet 25 mg   • hydrALAZINE (APRESOLINE) injection 5 mg         OBJECTIVE:    VITAL SIGNS:     Vital Last Value 24 Hour Range   Temperature 98.9 °F (37.2 °C) (09/06/19 0810) Temp  Min: 97.1 °F (36.2 °C)  Max: 98.9 °F (37.2 °C)   Pulse 72 (09/06/19 0810) Pulse  Min: 54  Max: 72   Respiratory 16 (09/06/19 0810) Resp  Min: 11  Max: 20   Non-Invasive  Blood Pressure 125/58 (09/06/19 0810) BP  Min: 114/55  Max: 186/73   Pulse Oximetry 96 % (09/06/19 0810) SpO2  Min: 95 %  Max: 100 %     Vital Today Admitted   Weight 59.3 kg (09/05/19 1258) Weight: 59.3 kg (09/05/19 0603)   Height N/A Height: 5' 7\" (170.2  cm) (09/05/19 0603)   BMI N/A BMI (Calculated): 20.48 (09/05/19 0603)     PHYSICAL EXAMINATION:   VITAL SIGNS:    Visit Vitals  /58 (BP Location: [facility], Patient Position: Sitting)   Pulse 72   Temp 98.9 °F (37.2 °C) (Temporal)   Resp 16   Ht 5' 7\" (1.702 m)   Wt 59.3 kg   SpO2 96%   BMI 20.48 kg/m²     GENERAL: This is a 75 year old female in no acute distress  PSYCH: She is awake alert and oriented to time, place and person. Mood and affect are appropriate  HEAD: Appears to be atraumatic and normocephalic   EYES: Reveal no icterus, no scleral injection, no conjunctival pallor. Pupils equal, round and reactive to light and accommodation.  Extraocular movements appear to be intact  THROAT: Oropharyngeal exam reveals moist oral mucosa.   NECK: Supple and symmetric. There is no JVD     LUNGS: Reveals good effort and lungs are clear to auscultation bilaterally. There are no wheezes or rhonchi. On room air  HEART: Reveals presence of first and second heart sounds. Regular rate and rhythm. No murmurs, rubs or gallops  ABDOMEN:  Normoactive bowel sounds. Soft and nontender. There is slight distention.   : Voiding without difficulty  EXTREMITIES: Right knee/lower ext edema. compartments soft. Toes pink, warm.   NEUROLOGIC: Nonfocal  SKIN: Appears unremarkable and no rashes are present. Right knee dressing CDI      INTAKE/OUTPUT:    Last stool occurrence: PTA  Date 09/05/19 0700 - 09/06/19 0659 09/06/19 0700 - 09/07/19 0659   Shift 5287-4303 9399-5933 8316-0730 24 Hour Total 6417-4727 5596-8428 0789-3971 24 Hour Total   INTAKE   P.O.  360  360 240   240   I.V. 1750   1750       Shift Total 1750 360  2110 240   240   OUTPUT   Urine  1450  1450       Blood 100   100       Shift Total 100 1450  1550       Weight (kg) 59.3 59.3 59.3 59.3 59.3 59.3 59.3 59.3       LABORATORY DATA:    Recent Labs   Lab 09/06/19  0442   SODIUM 140   POTASSIUM 3.7   CHLORIDE 107   CO2 29   BUN 18   CREATININE 0.78   GLUCOSE 98   ALBUMIN 2.7*    AST 16   BILIRUBIN 0.3       WBC (K/mcL)   Date Value   09/06/2019 10.0     RBC (mil/mcL)   Date Value   09/06/2019 3.23 (L)     HCT (%)   Date Value   09/06/2019 28.0 (L)     HGB (g/dL)   Date Value   09/06/2019 9.1 (L)     PLT (K/mcL)   Date Value   09/06/2019 200   11/06/2013 261          IMAGING STUDIES:    XR KNEE RIGHT AP AND LATERAL   Final Result   IMPRESSION:    1. Right TKA.         XR Knee 1 or 2 View Right   Final Result   IMPRESSION:      Status post right knee arthroplasty.                                            ASSESSMENT/PLAN:     Severe right knee osteoarthritis s/p R TKA POD 1. Doing well  -Pain management per ortho   -Up with PT and OT, home with outpatient therapy recommended.   -DVT and PE prophylaxis per ortho  -Anticipate dc home later today     Acute blood loss anemia. H/H has dropped from 12.7/39.9 pre-op to 9.1/28 post-op. Asymptomatic   -encourage increased dietary iron  -monitor     Essential hypertension. Blood pressures well controlled.   -continue amlodipine 5 mg daily  -continue metoprolol 50 mg in am, 25 mg in pm     Hypercholesterolemia  -resume omega 3 at dc    Chronic constipation.   -schedule Miralax, instructed she can take BID if needed             Surgical Care Improvement Project:    Leighton in Place: No    DVT/VTE Prophylaxis:  VTE Pharmacologic Prophylaxis: Yes  VTE Mechanical Prophylaxis: Yes    Antibiotics D/C'd within 24 hours of surgery end: Yes    Central Line: No    Beta Blocker: Yes     Discussed with or notes reviewed : RN, Patient, PCP, Consultant(s) and case management    DISCHARGE:    Expected Discharge Date:  Expected Discharge Date: 09/06/19  Destination:  Home    Arlene Gautam NP/ Aric Mcdaniels MD  9/6/2019  8:38 AM  ______________________________________________________________________    Patient seen and evaluated.  Participated in the care of the patient.  Agree with above.  See discharge note   none

## 2020-02-09 ENCOUNTER — APPOINTMENT (OUTPATIENT)
Dept: ANTEPARTUM | Facility: CLINIC | Age: 40
End: 2020-02-09

## 2020-02-09 DIAGNOSIS — I10 ESSENTIAL (PRIMARY) HYPERTENSION: ICD-10-CM

## 2020-02-09 LAB
ALBUMIN SERPL ELPH-MCNC: 3.6 G/DL — SIGNIFICANT CHANGE UP (ref 3.3–5)
ALBUMIN SERPL ELPH-MCNC: 3.6 G/DL — SIGNIFICANT CHANGE UP (ref 3.3–5)
ALP SERPL-CCNC: 109 U/L — SIGNIFICANT CHANGE UP (ref 40–120)
ALP SERPL-CCNC: 114 U/L — SIGNIFICANT CHANGE UP (ref 40–120)
ALT FLD-CCNC: 14 U/L — SIGNIFICANT CHANGE UP (ref 10–45)
ALT FLD-CCNC: 15 U/L — SIGNIFICANT CHANGE UP (ref 10–45)
ANION GAP SERPL CALC-SCNC: 16 MMOL/L — SIGNIFICANT CHANGE UP (ref 5–17)
ANION GAP SERPL CALC-SCNC: 20 MMOL/L — HIGH (ref 5–17)
APTT BLD: 24.2 SEC — LOW (ref 27.5–36.3)
APTT BLD: 25 SEC — LOW (ref 27.5–36.3)
APTT BLD: 25.2 SEC — LOW (ref 27.5–36.3)
AST SERPL-CCNC: 12 U/L — SIGNIFICANT CHANGE UP (ref 10–40)
AST SERPL-CCNC: 13 U/L — SIGNIFICANT CHANGE UP (ref 10–40)
BASOPHILS # BLD AUTO: 0.01 K/UL — SIGNIFICANT CHANGE UP (ref 0–0.2)
BASOPHILS # BLD AUTO: 0.01 K/UL — SIGNIFICANT CHANGE UP (ref 0–0.2)
BASOPHILS NFR BLD AUTO: 0.1 % — SIGNIFICANT CHANGE UP (ref 0–2)
BASOPHILS NFR BLD AUTO: 0.1 % — SIGNIFICANT CHANGE UP (ref 0–2)
BILIRUB SERPL-MCNC: 0.1 MG/DL — LOW (ref 0.2–1.2)
BILIRUB SERPL-MCNC: 0.2 MG/DL — SIGNIFICANT CHANGE UP (ref 0.2–1.2)
BUN SERPL-MCNC: 10 MG/DL — SIGNIFICANT CHANGE UP (ref 7–23)
BUN SERPL-MCNC: 9 MG/DL — SIGNIFICANT CHANGE UP (ref 7–23)
CALCIUM SERPL-MCNC: 7.2 MG/DL — LOW (ref 8.4–10.5)
CALCIUM SERPL-MCNC: 7.5 MG/DL — LOW (ref 8.4–10.5)
CHLORIDE SERPL-SCNC: 101 MMOL/L — SIGNIFICANT CHANGE UP (ref 96–108)
CHLORIDE SERPL-SCNC: 104 MMOL/L — SIGNIFICANT CHANGE UP (ref 96–108)
CO2 SERPL-SCNC: 17 MMOL/L — LOW (ref 22–31)
CO2 SERPL-SCNC: 17 MMOL/L — LOW (ref 22–31)
COLLECT DURATION TIME UR: 24 HR — SIGNIFICANT CHANGE UP
CREAT SERPL-MCNC: 0.69 MG/DL — SIGNIFICANT CHANGE UP (ref 0.5–1.3)
CREAT SERPL-MCNC: 0.89 MG/DL — SIGNIFICANT CHANGE UP (ref 0.5–1.3)
EOSINOPHIL # BLD AUTO: 0 K/UL — SIGNIFICANT CHANGE UP (ref 0–0.5)
EOSINOPHIL # BLD AUTO: 0.02 K/UL — SIGNIFICANT CHANGE UP (ref 0–0.5)
EOSINOPHIL NFR BLD AUTO: 0 % — SIGNIFICANT CHANGE UP (ref 0–6)
EOSINOPHIL NFR BLD AUTO: 0.2 % — SIGNIFICANT CHANGE UP (ref 0–6)
FIBRINOGEN PPP-MCNC: 621 MG/DL — HIGH (ref 350–510)
FIBRINOGEN PPP-MCNC: 629 MG/DL — HIGH (ref 350–510)
GLUCOSE BLDC GLUCOMTR-MCNC: 138 MG/DL — HIGH (ref 70–99)
GLUCOSE BLDC GLUCOMTR-MCNC: 160 MG/DL — HIGH (ref 70–99)
GLUCOSE BLDC GLUCOMTR-MCNC: 179 MG/DL — HIGH (ref 70–99)
GLUCOSE BLDC GLUCOMTR-MCNC: 189 MG/DL — HIGH (ref 70–99)
GLUCOSE SERPL-MCNC: 159 MG/DL — HIGH (ref 70–99)
GLUCOSE SERPL-MCNC: 185 MG/DL — HIGH (ref 70–99)
HCT VFR BLD CALC: 33.6 % — LOW (ref 34.5–45)
HCT VFR BLD CALC: 36.1 % — SIGNIFICANT CHANGE UP (ref 34.5–45)
HGB BLD-MCNC: 10.9 G/DL — LOW (ref 11.5–15.5)
HGB BLD-MCNC: 11.5 G/DL — SIGNIFICANT CHANGE UP (ref 11.5–15.5)
IMM GRANULOCYTES NFR BLD AUTO: 0.3 % — SIGNIFICANT CHANGE UP (ref 0–1.5)
IMM GRANULOCYTES NFR BLD AUTO: 0.3 % — SIGNIFICANT CHANGE UP (ref 0–1.5)
INR BLD: 0.9 RATIO — SIGNIFICANT CHANGE UP (ref 0.88–1.16)
LDH SERPL L TO P-CCNC: 185 U/L — SIGNIFICANT CHANGE UP (ref 50–242)
LDH SERPL L TO P-CCNC: 189 U/L — SIGNIFICANT CHANGE UP (ref 50–242)
LYMPHOCYTES # BLD AUTO: 1.21 K/UL — SIGNIFICANT CHANGE UP (ref 1–3.3)
LYMPHOCYTES # BLD AUTO: 1.33 K/UL — SIGNIFICANT CHANGE UP (ref 1–3.3)
LYMPHOCYTES # BLD AUTO: 11.3 % — LOW (ref 13–44)
LYMPHOCYTES # BLD AUTO: 9.7 % — LOW (ref 13–44)
MAGNESIUM SERPL-MCNC: 6.2 MG/DL — HIGH (ref 1.6–2.6)
MAGNESIUM SERPL-MCNC: 6.2 MG/DL — HIGH (ref 1.6–2.6)
MCHC RBC-ENTMCNC: 29.3 PG — SIGNIFICANT CHANGE UP (ref 27–34)
MCHC RBC-ENTMCNC: 29.8 PG — SIGNIFICANT CHANGE UP (ref 27–34)
MCHC RBC-ENTMCNC: 31.9 GM/DL — LOW (ref 32–36)
MCHC RBC-ENTMCNC: 32.4 GM/DL — SIGNIFICANT CHANGE UP (ref 32–36)
MCV RBC AUTO: 91.8 FL — SIGNIFICANT CHANGE UP (ref 80–100)
MCV RBC AUTO: 92.1 FL — SIGNIFICANT CHANGE UP (ref 80–100)
MONOCYTES # BLD AUTO: 0.26 K/UL — SIGNIFICANT CHANGE UP (ref 0–0.9)
MONOCYTES # BLD AUTO: 0.48 K/UL — SIGNIFICANT CHANGE UP (ref 0–0.9)
MONOCYTES NFR BLD AUTO: 2.1 % — SIGNIFICANT CHANGE UP (ref 2–14)
MONOCYTES NFR BLD AUTO: 4.1 % — SIGNIFICANT CHANGE UP (ref 2–14)
NEUTROPHILS # BLD AUTO: 10.95 K/UL — HIGH (ref 1.8–7.4)
NEUTROPHILS # BLD AUTO: 9.91 K/UL — HIGH (ref 1.8–7.4)
NEUTROPHILS NFR BLD AUTO: 84 % — HIGH (ref 43–77)
NEUTROPHILS NFR BLD AUTO: 87.8 % — HIGH (ref 43–77)
NRBC # BLD: 0 /100 WBCS — SIGNIFICANT CHANGE UP (ref 0–0)
NRBC # BLD: 0 /100 WBCS — SIGNIFICANT CHANGE UP (ref 0–0)
PLATELET # BLD AUTO: 322 K/UL — SIGNIFICANT CHANGE UP (ref 150–400)
PLATELET # BLD AUTO: 416 K/UL — HIGH (ref 150–400)
POTASSIUM SERPL-MCNC: 4 MMOL/L — SIGNIFICANT CHANGE UP (ref 3.5–5.3)
POTASSIUM SERPL-MCNC: 4.6 MMOL/L — SIGNIFICANT CHANGE UP (ref 3.5–5.3)
POTASSIUM SERPL-SCNC: 4 MMOL/L — SIGNIFICANT CHANGE UP (ref 3.5–5.3)
POTASSIUM SERPL-SCNC: 4.6 MMOL/L — SIGNIFICANT CHANGE UP (ref 3.5–5.3)
PROT 24H UR-MRATE: 264 MG/24 H — HIGH (ref 50–100)
PROT SERPL-MCNC: 7.2 G/DL — SIGNIFICANT CHANGE UP (ref 6–8.3)
PROT SERPL-MCNC: 7.2 G/DL — SIGNIFICANT CHANGE UP (ref 6–8.3)
PROTHROM AB SERPL-ACNC: 10.3 SEC — SIGNIFICANT CHANGE UP (ref 10–12.9)
RBC # BLD: 3.66 M/UL — LOW (ref 3.8–5.2)
RBC # BLD: 3.92 M/UL — SIGNIFICANT CHANGE UP (ref 3.8–5.2)
RBC # FLD: 16 % — HIGH (ref 10.3–14.5)
RBC # FLD: 16.1 % — HIGH (ref 10.3–14.5)
SODIUM SERPL-SCNC: 134 MMOL/L — LOW (ref 135–145)
SODIUM SERPL-SCNC: 141 MMOL/L — SIGNIFICANT CHANGE UP (ref 135–145)
TOTAL VOLUME - 24 HOUR: 2200 ML — SIGNIFICANT CHANGE UP
URINE CREATININE CALCULATION: 1.7 G/24 H — SIGNIFICANT CHANGE UP (ref 0.8–1.8)
WBC # BLD: 11.79 K/UL — HIGH (ref 3.8–10.5)
WBC # BLD: 12.47 K/UL — HIGH (ref 3.8–10.5)
WBC # FLD AUTO: 11.79 K/UL — HIGH (ref 3.8–10.5)
WBC # FLD AUTO: 12.47 K/UL — HIGH (ref 3.8–10.5)

## 2020-02-09 PROCEDURE — 99223 1ST HOSP IP/OBS HIGH 75: CPT | Mod: GC

## 2020-02-09 PROCEDURE — 99233 SBSQ HOSP IP/OBS HIGH 50: CPT

## 2020-02-09 RX ORDER — LABETALOL HCL 100 MG
700 TABLET ORAL EVERY 8 HOURS
Refills: 0 | Status: DISCONTINUED | OUTPATIENT
Start: 2020-02-09 | End: 2020-02-13

## 2020-02-09 RX ORDER — LABETALOL HCL 100 MG
100 TABLET ORAL ONCE
Refills: 0 | Status: COMPLETED | OUTPATIENT
Start: 2020-02-09 | End: 2020-02-09

## 2020-02-09 RX ORDER — HEPARIN SODIUM 5000 [USP'U]/ML
5000 INJECTION INTRAVENOUS; SUBCUTANEOUS EVERY 8 HOURS
Refills: 0 | Status: DISCONTINUED | OUTPATIENT
Start: 2020-02-09 | End: 2020-02-16

## 2020-02-09 RX ORDER — LABETALOL HCL 100 MG
20 TABLET ORAL ONCE
Refills: 0 | Status: COMPLETED | OUTPATIENT
Start: 2020-02-09 | End: 2020-02-09

## 2020-02-09 RX ORDER — LABETALOL HCL 100 MG
600 TABLET ORAL ONCE
Refills: 0 | Status: COMPLETED | OUTPATIENT
Start: 2020-02-09 | End: 2020-02-09

## 2020-02-09 RX ADMIN — HEPARIN SODIUM 5000 UNIT(S): 5000 INJECTION INTRAVENOUS; SUBCUTANEOUS at 08:50

## 2020-02-09 RX ADMIN — Medication 2: at 15:25

## 2020-02-09 RX ADMIN — Medication 90 MILLIGRAM(S): at 07:31

## 2020-02-09 RX ADMIN — Medication 100 MILLIGRAM(S): at 07:43

## 2020-02-09 RX ADMIN — HEPARIN SODIUM 5000 UNIT(S): 5000 INJECTION INTRAVENOUS; SUBCUTANEOUS at 17:00

## 2020-02-09 RX ADMIN — Medication 600 MILLIGRAM(S): at 08:36

## 2020-02-09 RX ADMIN — Medication 700 MILLIGRAM(S): at 16:37

## 2020-02-09 RX ADMIN — Medication 20 MILLIGRAM(S): at 06:37

## 2020-02-09 RX ADMIN — Medication 500 MILLIGRAM(S): at 01:39

## 2020-02-09 RX ADMIN — Medication 2: at 20:40

## 2020-02-09 RX ADMIN — SODIUM CHLORIDE 50 MILLILITER(S): 9 INJECTION INTRAMUSCULAR; INTRAVENOUS; SUBCUTANEOUS at 02:18

## 2020-02-09 NOTE — PROGRESS NOTE ADULT - SUBJECTIVE AND OBJECTIVE BOX
mfm attending  See full documentation throughout admission in CPN.   pt seen and counseled on morning rounds with r3 Cory.    Overnight BPs better controlled, only requiring 1 push this AM of Labetalol.  Asymptomatic.   PEC labs wnl  24hr urine protein 264mg  urine P:C 0.2  Rh positive    Bedside sono (AS report to follow): transverse head maternal left, mvp BPP 8/10, MVP 5cm, MCA PSV normal, forward flow throughout diastole, posterior placenta

## 2020-02-09 NOTE — CONSULT NOTE ADULT - ASSESSMENT
Patient with uncontrolled HTN    #Uncontrolled HTN   - Patient with uncontrolled HTN on Labetalol 700mg every 8 hours and Nifedipine 90mg daily  - Currently serum creatinine stable, platelets WNL, no overt proteinuria in UA.  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   - Can increase Labetalol 800mg TID and Nifedipine 120 daily if BP still uncontrolled.   - Can add Hydralazine if needed  - Monitor for signs of TTP, TTP can recur during pregnancy. Closely monitor platelets, Hgb, LFTs and renal function        Vicenta Canseco  Nephrology Fellow  Pager: [phone]

## 2020-02-09 NOTE — PROGRESS NOTE ADULT - ASSESSMENT
a/p: 40yo  at 28 3/7 weeks with history of TTP and renal disease in 2019 improved with prednisone and plasmapheresis, now admitted with suspected chronic HTN exacerbation.    Labs and 24hr urine protein negative for preeclampsia. Pt has never had symptoms.     Expectant management for now; would need to deliver immediately if superimposed preeclampsia evolves with severe BPs that cannot be controlled on max dose of 2 meds, persistent symptoms, abnormal HELLP labs, eclampsia or pulm edema, or ominous fetal tracing.     Continue mag for now while anti-hypertensive medications are being titrated. Plan today to continue Nifedipine 90XL qam and Labetalol now 700mg po q8. If severe BPs later today would add a PM dose of 30XL Nifedipine. Goal mild range BPs.  Once BPs at goal will stop magnesium and plan for BID NSTs.  Will consult nephrology given her history of renal disease.   s/p BMZ -   Serial BPs and serial labs - can space to qdaily  NICU consult  Venodynes for DVT prophylaxis; add heparin SQ  FS q4 hours while NPO    Meme Mayen

## 2020-02-09 NOTE — CONSULT NOTE ADULT - SUBJECTIVE AND OBJECTIVE BOX
[facility] Division of Kidney Diseases & Hypertension  INITIAL CONSULT NOTE  [phone]--------------------------------------------------------------------------------  HPI:  Patient is a 39 year old Female  at 28 3/7 weeks with history of TTP and HTN 2019 presents to [facility] on 20 for uncontrolled HTN. Nephrology consulted for further management. Patient with recent history of TTP on 2019. Patient initially admitted on 4/6/10 with blurring of vision found to have sever thrombocytopenia, HTN and LISSETT. her peripheral smear at that time showed schistocytes and work-up showed elevated LDH and low haptoglobin. Her serum creatinine peaked to 5.15 on 2019. Patient adm       improved with prednisone and plasmapheresis, now admitted with suspected chronic HTN exacerbation.      no significant past medical history who presented to the Er on  with complaints of blurred vision. She was found to have severe thrombocytopenia with LISSETT and metabolic acidosis and hypertension. Hematology was consulted and she was started on PO prednisone. Peripheral smear showed schistocytes with elevated LDH and low haptoglobin with worsening renal failure and thrombocytopenia concerning for TTP. She was transferred to the ICU for initiation of plasmapheresis  Nephrology was consulted; recommended hydration with plasmapheresis with no indication for HD at this time. HIV and hepatitis panels were negative. CT chest/abdomen/pelvis showed perinephric stranding bilaterally. CT head and chest xray were negative. Renal bx consistent with TTP. Pt improved on plasma exchange and prednisone, normal platelet and renal function. Followed by Hematology clear to discharge home with PO prednisone taper over 2-3 weeks                 PAST HISTORY  --------------------------------------------------------------------------------  PAST MEDICAL & SURGICAL HISTORY:  TTP (thrombotic thrombocytopenic purpura)  No pertinent past medical history  No significant past surgical history    FAMILY HISTORY:  FHx: breast cancer    PAST SOCIAL HISTORY:    ALLERGIES & MEDICATIONS  --------------------------------------------------------------------------------  Allergies    penicillin (Hives)    Intolerances      Standing Inpatient Medications  dextrose 5%. 1000 milliLiter(s) IV Continuous <Continuous>  dextrose 50% Injectable 12.5 Gram(s) IV Push once  dextrose 50% Injectable 25 Gram(s) IV Push once  dextrose 50% Injectable 25 Gram(s) IV Push once  heparin  Injectable 5000 Unit(s) SubCutaneous every 8 hours  insulin lispro (HumaLOG) corrective regimen sliding scale   SubCutaneous three times a day before meals  labetalol 700 milliGRAM(s) Oral every 8 hours  NIFEdipine XL 90 milliGRAM(s) Oral daily  sodium chloride 0.9%. 1000 milliLiter(s) IV Continuous <Continuous>    PRN Inpatient Medications  dextrose 40% Gel 15 Gram(s) Oral once PRN  glucagon  Injectable 1 milliGRAM(s) IntraMuscular once PRN      REVIEW OF SYSTEMS  --------------------------------------------------------------------------------  Gen: No  fevers/chills, weakness  Skin: No rashes  Head/Eyes/Ears/Mouth: No headache; Normal hearing; Normal vision w/o blurriness;   Respiratory: No dyspnea, cough, wheezing, hemoptysis  CV: No chest pain,   GI: No abdominal pain, diarrhea, constipation, nausea, vomiting  : No increased frequency, dysuria, hematuria, nocturia  MSK: No joint pain/swelling;   Neuro: No dizziness/lightheadedness, weakness, seizures    All other systems were reviewed and are negative, except as noted.    VITALS/PHYSICAL EXAM  --------------------------------------------------------------------------------  T(C): --  HR: --  BP: --  RR: --  SpO2: --  Wt(kg): --  Height (cm): 160.02 (20 @ 01:48)      20 @ 07:01  -  20 @ 07:00  --------------------------------------------------------  IN: 2640 mL / OUT: 4885 mL / NET: -2245 mL    20 @ 07:01  -  20 @ 11:47  --------------------------------------------------------  IN: 450 mL / OUT: 590 mL / NET: -140 mL      Physical Exam:  	Gen: NAD, well-appearing  	HEENT: PERRL, supple neck  	Pulm: CTA B/L  	CV:  S1S2  	Abd: +BS, soft   	Ext: No B/L Lower ext edema  	Neuro: No focal deficits  	Skin: Warm, without rashes  	Vascular access:     LABS/STUDIES  --------------------------------------------------------------------------------              11.5   11.79 >-----------<  322      [20 @ 06:17]              36.1     134  |  101  |  9   ----------------------------<  159      [20 @ 06:17]  4.0   |  17  |  0.69        Ca     7.2     [20 06:17]      Mg     6.2     [20 06:17]    TPro  7.2  /  Alb  3.6  /  TBili  0.1  /  DBili  x   /  AST  12  /  ALT  15  /  AlkPhos  109  [20 @ 06:17]    PT/INR: PT 10.3 , INR 0.90       [20 06:17]  PTT: 25.2       [20 06:17]    Uric acid 6.9      [20 18:26]        [20 06:17]    Creatinine Trend:  SCr 0.69 [:17]  SCr 0.89 [ 00:19]  SCr 0.93 [ 18:26]  SCr 0.67 [ 12:12]  SCr 0.65 [ 06:05]    Urinalysis - [20 @ 16:42]      Color Colorless / Appearance Clear / SG 1.009 / pH 6.5      Gluc Negative / Ketone Negative  / Bili Negative / Urobili Negative       Blood Negative / Protein Negative / Leuk Est Negative / Nitrite Negative      RBC  / WBC  / Hyaline  / Gran  / Sq Epi  / Non Sq Epi  / Bacteria     Urine Creatinine 42      [20 @ 17:15]  Urine Protein 10      [20 @ 17:15]    HbA1c 7.1      [20 @ 10:12]      Syphilis Screen (Treponema Pallidum Ab) Negative      [20 @ 10:47] [facility] Division of Kidney Diseases & Hypertension  INITIAL CONSULT NOTE  [phone]--------------------------------------------------------------------------------  HPI:  Patient is a 39 year old Female  at 28 3/7 weeks with history of TTP and HTN 2019 presents to [facility] on 20 for uncontrolled HTN. Nephrology consulted for further management. Patient with recent history of TTP on 2019. Patient initially admitted on 4/6/10 with blurring of vision found to have sever thrombocytopenia, HTN and LISSETT. Her peripheral smear at that time showed schistocytes and work-up showed elevated LDH and low haptoglobin. A CT C/A/P showed perinephric stranding bilaterally. A renal biopsy on 19 showed severe obliterative arterial sclerosis, with mild segmental glomerular endothelial injury, suggesting chronic thrombotic angiopathy. Acute tubular injury with focal tubular necrosis and mild reactive interstitial inflammation. She was started on high dose prednisone with a fast taper and received PLEX x 10 sessions, after which her platelets recovered but kidney function plateaued at 1.4. Her prednisone was again increased to 1 mg/kg and she received another 3 sessions of plasma exchange starting 19 - 19. Creatinine improved to 1.1 - 1.2 . She received total 13 sessions of plasmapheresis. She was started on prednisone taper after kidney function normalized to 1.1and finished the taper on 5/3/19.    19 ADAMTS 13 activity <2 % (range >66%)  ADAMTS 13 antibody 82 (range <12)  On 19 her [id number] activity was normal at 71%    Patient now admitted to [facility] Labor and delivery room on 20. Patient with uncontrolled HTN. Was previously on Nifedipine 30mg during the start of her pregnacny and was started on Labetalol 200mg BID on 2020 by OB. Today patient presents with uncontrolled HTN with SBP>200mmHg. Was given multiple doses of Hydralzine, Labetalol and Nifedipine. Patient now on Labetalol 700mg every 8 hours and Nifedipine 90mg daily. Seen at bedside today without any subjective complaints.        PAST HISTORY  --------------------------------------------------------------------------------  PAST MEDICAL & SURGICAL HISTORY:  TTP (thrombotic thrombocytopenic purpura)  No pertinent past medical history  No significant past surgical history    FAMILY HISTORY:  FHx: breast cancer    PAST SOCIAL HISTORY:    ALLERGIES & MEDICATIONS  --------------------------------------------------------------------------------  Allergies    penicillin (Hives)    Intolerances      Standing Inpatient Medications  dextrose 5%. 1000 milliLiter(s) IV Continuous <Continuous>  dextrose 50% Injectable 12.5 Gram(s) IV Push once  dextrose 50% Injectable 25 Gram(s) IV Push once  dextrose 50% Injectable 25 Gram(s) IV Push once  heparin  Injectable 5000 Unit(s) SubCutaneous every 8 hours  insulin lispro (HumaLOG) corrective regimen sliding scale   SubCutaneous three times a day before meals  labetalol 700 milliGRAM(s) Oral every 8 hours  NIFEdipine XL 90 milliGRAM(s) Oral daily  sodium chloride 0.9%. 1000 milliLiter(s) IV Continuous <Continuous>    PRN Inpatient Medications  dextrose 40% Gel 15 Gram(s) Oral once PRN  glucagon  Injectable 1 milliGRAM(s) IntraMuscular once PRN      REVIEW OF SYSTEMS  --------------------------------------------------------------------------------  Gen: No  fevers/chills, weakness  Skin: No rashes  Head/Eyes/Ears/Mouth: No headache; Normal hearing; Normal vision w/o blurriness;   Respiratory: No dyspnea, cough, wheezing, hemoptysis  CV: No chest pain,   GI: No abdominal pain, diarrhea, constipation, nausea, vomiting  : No increased frequency, dysuria, hematuria, nocturia  MSK: No joint pain/swelling;   Neuro: No dizziness/lightheadedness, weakness, seizures    All other systems were reviewed and are negative, except as noted.    VITALS/PHYSICAL EXAM  --------------------------------------------------------------------------------  T(C): --  HR: --  BP: --  RR: --  SpO2: --  Wt(kg): --  Height (cm): 160.02 (20 @ 01:48)      20 @ 07:01  -  20 @ 07:00  --------------------------------------------------------  IN: 2640 mL / OUT: 4885 mL / NET: -2245 mL    20 @ 07:01  -  20 @ 11:47  --------------------------------------------------------  IN: 450 mL / OUT: 590 mL / NET: -140 mL      Physical Exam:  	Gen: NAD, obese female, well-appearing  	HEENT: PERRL, supple neck  	Pulm: CTA B/L  	CV:  S1S2  	Abd: +BS, soft, gravid  	Ext: No B/L Lower ext edema  	Neuro: No focal deficits  	Skin: Warm, without rashes  	Vascular access: none    LABS/STUDIES  --------------------------------------------------------------------------------              11.5   11.79 >-----------<  322      [20 06:17]              36.1     134  |  101  |  9   ----------------------------<  159      [20 06:17]  4.0   |  17  |  0.69        Ca     7.2     [20 06:17]      Mg     6.2     [20 06:17]    TPro  7.2  /  Alb  3.6  /  TBili  0.1  /  DBili  x   /  AST  12  /  ALT  15  /  AlkPhos  109  [20 06:17]    PT/INR: PT 10.3 , INR 0.90       [20 06:17]  PTT: 25.2       [20 06:17]    Uric acid 6.9      [20 18:26]        [20 06:17]    Creatinine Trend:  SCr 0.69 [:17]  SCr 0.89 [ 00:19]  SCr 0.93 [ 18:26]  SCr 0.67 [ 12:12]  SCr 0.65 [ 06:05]    Urinalysis - [20 @ 16:42]      Color Colorless / Appearance Clear / SG 1.009 / pH 6.5      Gluc Negative / Ketone Negative  / Bili Negative / Urobili Negative       Blood Negative / Protein Negative / Leuk Est Negative / Nitrite Negative      RBC  / WBC  / Hyaline  / Gran  / Sq Epi  / Non Sq Epi  / Bacteria     Urine Creatinine 42      [20 @ 17:15]  Urine Protein 10      [20 17:15]    HbA1c 7.1      [20 @ 10:12]      Syphilis Screen (Treponema Pallidum Ab) Negative      [20 @ 10:47] [facility] Division of Kidney Diseases & Hypertension  INITIAL CONSULT NOTE  [phone]--------------------------------------------------------------------------------  HPI:    Patient is a 39 year old Female  at 28 3/7 weeks with history of TTP and HTN 2019 presents to [facility] on 20 for uncontrolled HTN. Nephrology consulted for further management. Patient with recent history of TTP on 2019. Patient initially admitted on 4/6/10 with blurring of vision found to have sever thrombocytopenia, HTN and LISSETT. Her peripheral smear at that time showed schistocytes and work-up showed elevated LDH and low haptoglobin. A CT C/A/P showed perinephric stranding bilaterally. A renal biopsy on 19 showed severe obliterative arterial sclerosis, with mild segmental glomerular endothelial injury, suggesting chronic thrombotic angiopathy. Acute tubular injury with focal tubular necrosis and mild reactive interstitial inflammation. She was started on high dose prednisone with a fast taper and received PLEX x 10 sessions, after which her platelets recovered but kidney function plateaued at 1.4. Her prednisone was again increased to 1 mg/kg and she received another 3 sessions of plasma exchange starting 19 - 19. Creatinine improved to 1.1 - 1.2 . She received total 13 sessions of plasmapheresis. She was started on prednisone taper after kidney function normalized to 1.1and finished the taper on 5/3/19.    19 ADAMTS 13 activity <2 % (range >66%)  ADAMTS 13 antibody 82 (range <12)  On 19 her [id number] activity was normal at 71%    Patient now admitted to [facility] Labor and delivery room on 20. Patient with uncontrolled HTN. Was previously on Nifedipine 30mg during the start of her pregnacny and was started on Labetalol 200mg BID on 2020 by OB. Today patient presents with uncontrolled HTN with SBP>200mmHg. Was given multiple doses of Hydralzine, Labetalol and Nifedipine. Patient now on Labetalol 700mg every 8 hours and Nifedipine 90mg daily. Seen at bedside today without any subjective complaints.        PAST HISTORY  --------------------------------------------------------------------------------  PAST MEDICAL & SURGICAL HISTORY:  TTP (thrombotic thrombocytopenic purpura)  No pertinent past medical history  No significant past surgical history    FAMILY HISTORY:  FHx: breast cancer    PAST SOCIAL HISTORY:    ALLERGIES & MEDICATIONS  --------------------------------------------------------------------------------  Allergies    penicillin (Hives)    Intolerances      Standing Inpatient Medications  dextrose 5%. 1000 milliLiter(s) IV Continuous <Continuous>  dextrose 50% Injectable 12.5 Gram(s) IV Push once  dextrose 50% Injectable 25 Gram(s) IV Push once  dextrose 50% Injectable 25 Gram(s) IV Push once  heparin  Injectable 5000 Unit(s) SubCutaneous every 8 hours  insulin lispro (HumaLOG) corrective regimen sliding scale   SubCutaneous three times a day before meals  labetalol 700 milliGRAM(s) Oral every 8 hours  NIFEdipine XL 90 milliGRAM(s) Oral daily  sodium chloride 0.9%. 1000 milliLiter(s) IV Continuous <Continuous>    PRN Inpatient Medications  dextrose 40% Gel 15 Gram(s) Oral once PRN  glucagon  Injectable 1 milliGRAM(s) IntraMuscular once PRN      REVIEW OF SYSTEMS  --------------------------------------------------------------------------------  Gen: No  fevers/chills, weakness  Skin: No rashes  Head/Eyes/Ears/Mouth: No headache; Normal hearing; Normal vision w/o blurriness;   Respiratory: No dyspnea, cough, wheezing, hemoptysis  CV: No chest pain,   GI: No abdominal pain, diarrhea, constipation, nausea, vomiting  : No increased frequency, dysuria, hematuria, nocturia  MSK: No joint pain/swelling;   Neuro: No dizziness/lightheadedness, weakness, seizures    All other systems were reviewed and are negative, except as noted.    VITALS/PHYSICAL EXAM  --------------------------------------------------------------------------------  T(C): --  HR: --  BP: --  RR: --  SpO2: --  Wt(kg): --  Height (cm): 160.02 (20 @ 01:48)      20 @ 07:01  -  20 @ 07:00  --------------------------------------------------------  IN: 2640 mL / OUT: 4885 mL / NET: -2245 mL    20 @ 07:01  -  20 @ 11:47  --------------------------------------------------------  IN: 450 mL / OUT: 590 mL / NET: -140 mL      Physical Exam:  	Gen: NAD, obese female, well-appearing  	HEENT: PERRL, supple neck  	Pulm: CTA B/L  	CV:  S1S2  	Abd: +BS, soft, gravid  	Ext: No B/L Lower ext edema  	Neuro: No focal deficits  	Skin: Warm, without rashes  	Vascular access: none    LABS/STUDIES  --------------------------------------------------------------------------------              11.5   11.79 >-----------<  322      [20 06:17]              36.1     134  |  101  |  9   ----------------------------<  159      [20 06:17]  4.0   |  17  |  0.69        Ca     7.2     [20 06:17]      Mg     6.2     [20 06:17]    TPro  7.2  /  Alb  3.6  /  TBili  0.1  /  DBili  x   /  AST  12  /  ALT  15  /  AlkPhos  109  [20 06:17]    PT/INR: PT 10.3 , INR 0.90       [20 06:17]  PTT: 25.2       [20 06:17]    Uric acid 6.9      [20 18:26]        [20 06:17]    Creatinine Trend:  SCr 0.69 [:17]  SCr 0.89 [ 00:19]  SCr 0.93 [ 18:26]  SCr 0.67 [ 12:12]  SCr 0.65 [ 06:05]    Urinalysis - [20 @ 16:42]      Color Colorless / Appearance Clear / SG 1.009 / pH 6.5      Gluc Negative / Ketone Negative  / Bili Negative / Urobili Negative       Blood Negative / Protein Negative / Leuk Est Negative / Nitrite Negative      RBC  / WBC  / Hyaline  / Gran  / Sq Epi  / Non Sq Epi  / Bacteria     Urine Creatinine 42      [20 @ 17:15]  Urine Protein 10      [20 17:15]    HbA1c 7.1      [20 @ 10:12]      Syphilis Screen (Treponema Pallidum Ab) Negative      [20 @ 10:47]

## 2020-02-10 ENCOUNTER — APPOINTMENT (OUTPATIENT)
Dept: ANTEPARTUM | Facility: CLINIC | Age: 40
End: 2020-02-10

## 2020-02-10 ENCOUNTER — ASOB RESULT (OUTPATIENT)
Age: 40
End: 2020-02-10

## 2020-02-10 LAB
ALBUMIN SERPL ELPH-MCNC: 3.3 G/DL — SIGNIFICANT CHANGE UP (ref 3.3–5)
ALP SERPL-CCNC: 101 U/L — SIGNIFICANT CHANGE UP (ref 40–120)
ALT FLD-CCNC: 20 U/L — SIGNIFICANT CHANGE UP (ref 10–45)
ANION GAP SERPL CALC-SCNC: 13 MMOL/L — SIGNIFICANT CHANGE UP (ref 5–17)
APTT BLD: 23 SEC — LOW (ref 27.5–36.3)
AST SERPL-CCNC: 15 U/L — SIGNIFICANT CHANGE UP (ref 10–40)
BASOPHILS # BLD AUTO: 0.03 K/UL — SIGNIFICANT CHANGE UP (ref 0–0.2)
BASOPHILS NFR BLD AUTO: 0.2 % — SIGNIFICANT CHANGE UP (ref 0–2)
BILIRUB SERPL-MCNC: 0.1 MG/DL — LOW (ref 0.2–1.2)
BLD GP AB SCN SERPL QL: NEGATIVE — SIGNIFICANT CHANGE UP
BUN SERPL-MCNC: 18 MG/DL — SIGNIFICANT CHANGE UP (ref 7–23)
C3 SERPL-MCNC: 150 MG/DL — SIGNIFICANT CHANGE UP (ref 81–157)
C4 SERPL-MCNC: 29 MG/DL — SIGNIFICANT CHANGE UP (ref 13–39)
CALCIUM SERPL-MCNC: 8.7 MG/DL — SIGNIFICANT CHANGE UP (ref 8.4–10.5)
CHLORIDE SERPL-SCNC: 105 MMOL/L — SIGNIFICANT CHANGE UP (ref 96–108)
CO2 SERPL-SCNC: 19 MMOL/L — LOW (ref 22–31)
CREAT SERPL-MCNC: 0.78 MG/DL — SIGNIFICANT CHANGE UP (ref 0.5–1.3)
EOSINOPHIL # BLD AUTO: 0.15 K/UL — SIGNIFICANT CHANGE UP (ref 0–0.5)
EOSINOPHIL NFR BLD AUTO: 1.2 % — SIGNIFICANT CHANGE UP (ref 0–6)
FIBRINOGEN PPP-MCNC: 516 MG/DL — HIGH (ref 350–510)
GLUCOSE BLDC GLUCOMTR-MCNC: 108 MG/DL — HIGH (ref 70–99)
GLUCOSE BLDC GLUCOMTR-MCNC: 110 MG/DL — HIGH (ref 70–99)
GLUCOSE BLDC GLUCOMTR-MCNC: 116 MG/DL — HIGH (ref 70–99)
GLUCOSE BLDC GLUCOMTR-MCNC: 129 MG/DL — HIGH (ref 70–99)
GLUCOSE BLDC GLUCOMTR-MCNC: 145 MG/DL — HIGH (ref 70–99)
GLUCOSE BLDC GLUCOMTR-MCNC: 158 MG/DL — HIGH (ref 70–99)
GLUCOSE BLDC GLUCOMTR-MCNC: 159 MG/DL — HIGH (ref 70–99)
GLUCOSE SERPL-MCNC: 156 MG/DL — HIGH (ref 70–99)
HAPTOGLOB SERPL-MCNC: 192 MG/DL — SIGNIFICANT CHANGE UP (ref 34–200)
HAPTOGLOB SERPL-MCNC: 193 MG/DL — SIGNIFICANT CHANGE UP (ref 34–200)
HCT VFR BLD CALC: 33.6 % — LOW (ref 34.5–45)
HGB BLD-MCNC: 10.6 G/DL — LOW (ref 11.5–15.5)
IMM GRANULOCYTES NFR BLD AUTO: 0.4 % — SIGNIFICANT CHANGE UP (ref 0–1.5)
INR BLD: 0.86 RATIO — LOW (ref 0.88–1.16)
LDH SERPL L TO P-CCNC: 181 U/L — SIGNIFICANT CHANGE UP (ref 50–242)
LYMPHOCYTES # BLD AUTO: 2.72 K/UL — SIGNIFICANT CHANGE UP (ref 1–3.3)
LYMPHOCYTES # BLD AUTO: 22.4 % — SIGNIFICANT CHANGE UP (ref 13–44)
MCHC RBC-ENTMCNC: 29.3 PG — SIGNIFICANT CHANGE UP (ref 27–34)
MCHC RBC-ENTMCNC: 31.5 GM/DL — LOW (ref 32–36)
MCV RBC AUTO: 92.8 FL — SIGNIFICANT CHANGE UP (ref 80–100)
MONOCYTES # BLD AUTO: 1.13 K/UL — HIGH (ref 0–0.9)
MONOCYTES NFR BLD AUTO: 9.3 % — SIGNIFICANT CHANGE UP (ref 2–14)
NEUTROPHILS # BLD AUTO: 8.09 K/UL — HIGH (ref 1.8–7.4)
NEUTROPHILS NFR BLD AUTO: 66.5 % — SIGNIFICANT CHANGE UP (ref 43–77)
NRBC # BLD: 0 /100 WBCS — SIGNIFICANT CHANGE UP (ref 0–0)
PLATELET # BLD AUTO: 376 K/UL — SIGNIFICANT CHANGE UP (ref 150–400)
POTASSIUM SERPL-MCNC: 3.8 MMOL/L — SIGNIFICANT CHANGE UP (ref 3.5–5.3)
POTASSIUM SERPL-SCNC: 3.8 MMOL/L — SIGNIFICANT CHANGE UP (ref 3.5–5.3)
PROT SERPL-MCNC: 6.6 G/DL — SIGNIFICANT CHANGE UP (ref 6–8.3)
PROTHROM AB SERPL-ACNC: 9.8 SEC — LOW (ref 10–12.9)
RBC # BLD: 3.62 M/UL — LOW (ref 3.8–5.2)
RBC # FLD: 16.3 % — HIGH (ref 10.3–14.5)
RH IG SCN BLD-IMP: POSITIVE — SIGNIFICANT CHANGE UP
SODIUM SERPL-SCNC: 137 MMOL/L — SIGNIFICANT CHANGE UP (ref 135–145)
URATE SERPL-MCNC: 5.7 MG/DL — SIGNIFICANT CHANGE UP (ref 2.5–7)
WBC # BLD: 12.17 K/UL — HIGH (ref 3.8–10.5)
WBC # FLD AUTO: 12.17 K/UL — HIGH (ref 3.8–10.5)

## 2020-02-10 PROCEDURE — 76821 MIDDLE CEREBRAL ARTERY ECHO: CPT | Mod: 26

## 2020-02-10 PROCEDURE — 99232 SBSQ HOSP IP/OBS MODERATE 35: CPT | Mod: GC

## 2020-02-10 PROCEDURE — 93306 TTE W/DOPPLER COMPLETE: CPT | Mod: 26

## 2020-02-10 PROCEDURE — 99232 SBSQ HOSP IP/OBS MODERATE 35: CPT

## 2020-02-10 PROCEDURE — 76818 FETAL BIOPHYS PROFILE W/NST: CPT | Mod: 26

## 2020-02-10 PROCEDURE — 76820 UMBILICAL ARTERY ECHO: CPT | Mod: 26

## 2020-02-10 RX ORDER — FOLIC ACID 0.8 MG
1 TABLET ORAL DAILY
Refills: 0 | Status: DISCONTINUED | OUTPATIENT
Start: 2020-02-10 | End: 2020-02-23

## 2020-02-10 RX ORDER — HUMAN INSULIN 100 [IU]/ML
15 INJECTION, SUSPENSION SUBCUTANEOUS AT BEDTIME
Refills: 0 | Status: DISCONTINUED | OUTPATIENT
Start: 2020-02-10 | End: 2020-02-14

## 2020-02-10 RX ADMIN — Medication 700 MILLIGRAM(S): at 00:31

## 2020-02-10 RX ADMIN — HEPARIN SODIUM 5000 UNIT(S): 5000 INJECTION INTRAVENOUS; SUBCUTANEOUS at 00:31

## 2020-02-10 RX ADMIN — Medication 90 MILLIGRAM(S): at 06:18

## 2020-02-10 RX ADMIN — Medication 1 TABLET(S): at 11:54

## 2020-02-10 RX ADMIN — Medication 1 MILLIGRAM(S): at 11:54

## 2020-02-10 RX ADMIN — Medication 2: at 10:45

## 2020-02-10 RX ADMIN — HUMAN INSULIN 15 UNIT(S): 100 INJECTION, SUSPENSION SUBCUTANEOUS at 23:00

## 2020-02-10 RX ADMIN — Medication 700 MILLIGRAM(S): at 09:46

## 2020-02-10 RX ADMIN — Medication 700 MILLIGRAM(S): at 18:00

## 2020-02-10 RX ADMIN — HEPARIN SODIUM 5000 UNIT(S): 5000 INJECTION INTRAVENOUS; SUBCUTANEOUS at 18:00

## 2020-02-10 RX ADMIN — HEPARIN SODIUM 5000 UNIT(S): 5000 INJECTION INTRAVENOUS; SUBCUTANEOUS at 09:44

## 2020-02-10 NOTE — PROVIDER CONTACT NOTE (MEDICATION) - ACTION/TREATMENT ORDERED:
Provider acknowledged, No change to orders at this time.  Administer medication as ordered. Continue to monitor patient.

## 2020-02-10 NOTE — DIETITIAN INITIAL EVALUATION ADULT. - ADD RECOMMEND
Recommend continue current diet order to promote glycemic control. Recommend continue current diet order to promote glycemic control. Educated pt and spouse on GDM diet and management education, including goal FS ranges, CHO consistent diet with snacks, mixed macronutrient meals, CHO allotment per meals/snacks, CHO exchange counting, menu ordering procedure. Pt and spouse voiced understanding and accepted CHO Counting Handout, alternative snack list. Medical management of glucose per team. Weight loss education/counseling deferred in context of pregnancy.

## 2020-02-10 NOTE — DIETITIAN INITIAL EVALUATION ADULT. - ENERGY NEEDS
ht: 63 inches. pre-pregnancy wt: 250 pounds. pregnancy wt gain: 40 pounds. pre-pregnancy BMI: 44.3 kG/m2.

## 2020-02-10 NOTE — PROGRESS NOTE ADULT - ASSESSMENT
Patient with uncontrolled HTN    #Uncontrolled HTN   - Patient with uncontrolled HTN on Labetalol 700mg every 8 hours and Nifedipine 90mg daily  - Currently serum creatinine stable, platelets WNL, no overt proteinuria in UA.  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   - c/w current meds.   - Monitor for signs of TTP, TTP can recur during pregnancy. Closely monitor platelets, Hgb, LFTs and renal function. Send ADAMTS 13    Juaquin Strickland   Nephrology Fellow  Pager

## 2020-02-10 NOTE — PROVIDER CONTACT NOTE (MEDICATION) - SITUATION
Patient BP at 1708pm is 146/84 hr 73, rechecked at 1800pm BP is 145/80 hr 80. Labetalol due and administered as ordered.

## 2020-02-10 NOTE — CHART NOTE - NSCHARTNOTEFT_GEN_A_CORE
Upon Nutritional Assessment by the Registered Dietitian your patient was determined to meet criteria / has evidence of the following diagnosis/diagnoses:          [ ]  Mild Protein Calorie Malnutrition        [ ]  Moderate Protein Calorie Malnutrition        [ ] Severe Protein Calorie Malnutrition        [ ] Unspecified Protein Calorie Malnutrition        [ ] Underweight / BMI <19        [x] Morbid Obesity / BMI > 40      Findings as based on:  [ ] Comprehensive nutrition assessment   [ ] Nutrition Focused Physical Exam  [x] Other: pre-pregnancy BMI 44.3 kG/m2      Nutrition Plan/Recommendations:      Please see RD initial assessment for recommendations and interventions    PROVIDER Section:     By signing this assessment you are acknowledging and agree with the diagnosis/diagnoses assigned by the Registered Dietitian    Comments:

## 2020-02-10 NOTE — DIETITIAN INITIAL EVALUATION ADULT. - PERTINENT LABORATORY DATA
CAPILLARY BLOOD GLUCOSE  POCT Blood Glucose.: 116 mg/dL (10 Feb 2020 06:22)  POCT Blood Glucose.: 189 mg/dL (09 Feb 2020 20:32)  POCT Blood Glucose.: 179 mg/dL (09 Feb 2020 15:12)

## 2020-02-10 NOTE — PROGRESS NOTE ADULT - SUBJECTIVE AND OBJECTIVE BOX
[facility] DIVISION OF KIDNEY DISEASES AND HYPERTENSION -- FOLLOW UP NOTE  --------------------------------------------------------------------------------  HPI  39 year old Female  at 28 3/7 weeks with history of TTP and HTN 2019 presents to [facility] on 20 for uncontrolled HTN. Nephrology consulted for further management. Patient with recent history of TTP on 2019. A renal biopsy on 19 showed severe obliterative arterial sclerosis, with mild segmental glomerular endothelial injury, suggesting chronic thrombotic angiopathy. Acute tubular injury with focal tubular necrosis and mild reactive interstitial inflammation. Patient now admitted to [facility] Labor and delivery room on 20. Patient with uncontrolled HTN. Was previously on Nifedipine 30mg during the start of her pregnacny and was started on Labetalol 200mg BID on 2020 by OB. No on labetalol 700 mg BID and nifedipine 90 mg daily. Scr at 0.78 mg/dl. BP improved    24 hour events/subjective:  Pt examined at bed side, denies cp/sob/n/v/abdominal pain, no headaches, blurry vision or edema.       PAST HISTORY  --------------------------------------------------------------------------------  No significant changes to PMH, PSH, FHx, SHx, unless otherwise noted    ALLERGIES & MEDICATIONS  --------------------------------------------------------------------------------  Allergies    peanuts (Anaphylaxis)  penicillin (Hives)    Intolerances      Standing Inpatient Medications  dextrose 5%. 1000 milliLiter(s) IV Continuous <Continuous>  dextrose 50% Injectable 12.5 Gram(s) IV Push once  dextrose 50% Injectable 25 Gram(s) IV Push once  dextrose 50% Injectable 25 Gram(s) IV Push once  folic acid 1 milliGRAM(s) Oral daily  heparin  Injectable 5000 Unit(s) SubCutaneous every 8 hours  insulin NPH human recombinant 15 Unit(s) SubCutaneous at bedtime  labetalol 700 milliGRAM(s) Oral every 8 hours  NIFEdipine XL 90 milliGRAM(s) Oral daily  prenatal multivitamin 1 Tablet(s) Oral daily  sodium chloride 0.9%. 1000 milliLiter(s) IV Continuous <Continuous>    PRN Inpatient Medications  dextrose 40% Gel 15 Gram(s) Oral once PRN  glucagon  Injectable 1 milliGRAM(s) IntraMuscular once PRN      REVIEW OF SYSTEMS  --------------------------------------------------------------------------------  Gen: no lethargy, no  fatigue  Respiratory: No dyspnea  CV: No chest pain  GI: No abdominal pain  MSK: no LE edema  Neuro: No dizziness  Heme: No bleeding    All other systems were reviewed and are negative, except as noted.      VITALS/PHYSICAL EXAM  --------------------------------------------------------------------------------  T(C): 36.9 (02-10-20 @ 09:40), Max: 36.9 (20 @ 18:45)  HR: 76 (02-10-20 @ 11:30) (74 - 79)  BP: 134/79 (02-10-20 @ 09:40) (101/66 - 134/79)  RR: 18 (02-10-20 @ 09:40) (18 - 18)  SpO2: 99% (02-10-20 @ 09:40) (99% - 99%)  Wt(kg): --        20 @ 07:01  -  02-10-20 @ 07:00  --------------------------------------------------------  IN: 450 mL / OUT: 1940 mL / NET: -1490 mL      Physical Exam:  	Gen: NAD, obese female,   	HEENT: PERRL, supple neck  	Pulm: CTA B/L  	CV:  S1S2  	Abd: +BS, soft, gravid  	Ext: No B/L Lower ext edema  	Neuro: No focal deficits  	Skin: Warm, without rashes  	Vascular access: none    LABS/STUDIES  --------------------------------------------------------------------------------              10.6   12.17 >-----------<  376      [02-10-20 @ 11:15]              33.6     137  |  105  |  18  ----------------------------<  156      [02-10-20 @ 11:15]  3.8   |  19  |  0.78        Ca     8.7     [02-10-20 @ 11:15]      Mg     6.2     [20 @ 06:17]    TPro  6.6  /  Alb  3.3  /  TBili  0.1  /  DBili  x   /  AST  15  /  ALT  20  /  AlkPhos  101  [02-10-20 @ 11:15]    PT/INR: PT 9.8  , INR 0.86       [02-10-20 @ 11:15]  PTT: 23.0       [02-10-20 @ 11:15]    Uric acid 5.7      [02-10-20 @ 11:15]        [02-10-20 @ 11:15]    Creatinine Trend:  SCr 0.78 [02-10 @ 11:15]  SCr 0.69 [02-09 @ 06:17]  SCr 0.89 [ @ 00:19]  SCr 0.93 [ 18:26]  SCr 0.67 [ @ 12:12]    Urinalysis - [20 @ 16:42]      Color Colorless / Appearance Clear / SG 1.009 / pH 6.5      Gluc Negative / Ketone Negative  / Bili Negative / Urobili Negative       Blood Negative / Protein Negative / Leuk Est Negative / Nitrite Negative      RBC  / WBC  / Hyaline  / Gran  / Sq Epi  / Non Sq Epi  / Bacteria     Urine Creatinine 42      [20 @ 17:15]  Urine Protein 10      [20 @ 17:15]    PTH -- (Ca 8.1)      [19 @ 14:16]   285  Vitamin D (25OH) 23.0      [19 @ 14:18]  HbA1c 7.1      [20 @ 10:12]  TSH 0.70      [19 @ 06:25]      Syphilis Screen (Treponema Pallidum Ab) Negative      [20 @ 10:47]

## 2020-02-10 NOTE — DIETITIAN INITIAL EVALUATION ADULT. - OTHER INFO
Pt is a 39 year old  PMH TTP, renal dx and chronic HTN admit with uncontrolled HTN. Also noted with GDM uncontrolled. Pt is a 39 year old  PMH TTP, renal dx and chronic HTN admit with uncontrolled HTN. Also noted with GDM uncontrolled, per pt newly diagnosed as of last week but was also present in prior pregnancy ~13 years ago. Pt reports good po intake/appetite at this time, denies N/V, diarrhea or constipation. Reports allergy to peanuts. Pt is a 39 year old  PMH TTP, renal dx and chronic HTN admit with uncontrolled HTN. Also noted with GDM uncontrolled, per pt newly diagnosed as of last week therefore no formal diet education was provided thus far. Owns glucometer 2/2 hx of hyperglycemia with remote prednisione use (stopped using as of may 2019). She states hx of GDM in prior pregnancy but that was ~13 years ago. Pt and spouse at bedside amenable to education at this time. Pt reports good po intake/appetite, denies N/V, diarrhea or constipation. Reports allergy to peanuts. States  pounds with total weight gain in pregnancy 40 pounds thus far.

## 2020-02-11 ENCOUNTER — ASOB RESULT (OUTPATIENT)
Age: 40
End: 2020-02-11

## 2020-02-11 ENCOUNTER — APPOINTMENT (OUTPATIENT)
Dept: ANTEPARTUM | Facility: CLINIC | Age: 40
End: 2020-02-11

## 2020-02-11 LAB
ALBUMIN SERPL ELPH-MCNC: 3.2 G/DL — LOW (ref 3.3–5)
ALP SERPL-CCNC: 104 U/L — SIGNIFICANT CHANGE UP (ref 40–120)
ALT FLD-CCNC: 18 U/L — SIGNIFICANT CHANGE UP (ref 10–45)
ANION GAP SERPL CALC-SCNC: 16 MMOL/L — SIGNIFICANT CHANGE UP (ref 5–17)
ANTI-RIBONUCLEAR PROTEIN: <0.2 AI — SIGNIFICANT CHANGE UP
APTT BLD: 24.8 SEC — LOW (ref 27.5–36.3)
AST SERPL-CCNC: 13 U/L — SIGNIFICANT CHANGE UP (ref 10–40)
BASOPHILS # BLD AUTO: 0.04 K/UL — SIGNIFICANT CHANGE UP (ref 0–0.2)
BASOPHILS NFR BLD AUTO: 0.3 % — SIGNIFICANT CHANGE UP (ref 0–2)
BILIRUB SERPL-MCNC: 0.1 MG/DL — LOW (ref 0.2–1.2)
BUN SERPL-MCNC: 12 MG/DL — SIGNIFICANT CHANGE UP (ref 7–23)
CALCIUM SERPL-MCNC: 8.7 MG/DL — SIGNIFICANT CHANGE UP (ref 8.4–10.5)
CHLORIDE SERPL-SCNC: 102 MMOL/L — SIGNIFICANT CHANGE UP (ref 96–108)
CO2 SERPL-SCNC: 18 MMOL/L — LOW (ref 22–31)
CREAT SERPL-MCNC: 0.63 MG/DL — SIGNIFICANT CHANGE UP (ref 0.5–1.3)
DSDNA AB SER-ACNC: <12 IU/ML — SIGNIFICANT CHANGE UP
ENA SM AB FLD QL: <0.2 AI — SIGNIFICANT CHANGE UP
EOSINOPHIL # BLD AUTO: 0.41 K/UL — SIGNIFICANT CHANGE UP (ref 0–0.5)
EOSINOPHIL NFR BLD AUTO: 3.3 % — SIGNIFICANT CHANGE UP (ref 0–6)
FIBRINOGEN PPP-MCNC: 545 MG/DL — HIGH (ref 350–510)
GLUCOSE BLDC GLUCOMTR-MCNC: 116 MG/DL — HIGH (ref 70–99)
GLUCOSE BLDC GLUCOMTR-MCNC: 124 MG/DL — HIGH (ref 70–99)
GLUCOSE BLDC GLUCOMTR-MCNC: 130 MG/DL — HIGH (ref 70–99)
GLUCOSE BLDC GLUCOMTR-MCNC: 133 MG/DL — HIGH (ref 70–99)
GLUCOSE BLDC GLUCOMTR-MCNC: 92 MG/DL — SIGNIFICANT CHANGE UP (ref 70–99)
GLUCOSE BLDC GLUCOMTR-MCNC: 92 MG/DL — SIGNIFICANT CHANGE UP (ref 70–99)
GLUCOSE BLDC GLUCOMTR-MCNC: 98 MG/DL — SIGNIFICANT CHANGE UP (ref 70–99)
GLUCOSE SERPL-MCNC: 89 MG/DL — SIGNIFICANT CHANGE UP (ref 70–99)
HAPTOGLOB SERPL-MCNC: 194 MG/DL — SIGNIFICANT CHANGE UP (ref 34–200)
HCT VFR BLD CALC: 34.8 % — SIGNIFICANT CHANGE UP (ref 34.5–45)
HGB BLD-MCNC: 11.2 G/DL — LOW (ref 11.5–15.5)
IMM GRANULOCYTES NFR BLD AUTO: 0.4 % — SIGNIFICANT CHANGE UP (ref 0–1.5)
INR BLD: 0.86 RATIO — LOW (ref 0.88–1.16)
LDH SERPL L TO P-CCNC: 174 U/L — SIGNIFICANT CHANGE UP (ref 50–242)
LYMPHOCYTES # BLD AUTO: 24.5 % — SIGNIFICANT CHANGE UP (ref 13–44)
LYMPHOCYTES # BLD AUTO: 3.09 K/UL — SIGNIFICANT CHANGE UP (ref 1–3.3)
MCHC RBC-ENTMCNC: 29.6 PG — SIGNIFICANT CHANGE UP (ref 27–34)
MCHC RBC-ENTMCNC: 32.2 GM/DL — SIGNIFICANT CHANGE UP (ref 32–36)
MCV RBC AUTO: 92.1 FL — SIGNIFICANT CHANGE UP (ref 80–100)
MONOCYTES # BLD AUTO: 1.26 K/UL — HIGH (ref 0–0.9)
MONOCYTES NFR BLD AUTO: 10 % — SIGNIFICANT CHANGE UP (ref 2–14)
NEUTROPHILS # BLD AUTO: 7.76 K/UL — HIGH (ref 1.8–7.4)
NEUTROPHILS NFR BLD AUTO: 61.5 % — SIGNIFICANT CHANGE UP (ref 43–77)
NRBC # BLD: 0 /100 WBCS — SIGNIFICANT CHANGE UP (ref 0–0)
PLATELET # BLD AUTO: 375 K/UL — SIGNIFICANT CHANGE UP (ref 150–400)
POTASSIUM SERPL-MCNC: 4 MMOL/L — SIGNIFICANT CHANGE UP (ref 3.5–5.3)
POTASSIUM SERPL-SCNC: 4 MMOL/L — SIGNIFICANT CHANGE UP (ref 3.5–5.3)
PROT SERPL-MCNC: 6.5 G/DL — SIGNIFICANT CHANGE UP (ref 6–8.3)
PROTHROM AB SERPL-ACNC: 9.9 SEC — LOW (ref 10–12.9)
RBC # BLD: 3.78 M/UL — LOW (ref 3.8–5.2)
RBC # FLD: 15.9 % — HIGH (ref 10.3–14.5)
SODIUM SERPL-SCNC: 136 MMOL/L — SIGNIFICANT CHANGE UP (ref 135–145)
URATE SERPL-MCNC: 4.2 MG/DL — SIGNIFICANT CHANGE UP (ref 2.5–7)
WBC # BLD: 12.61 K/UL — HIGH (ref 3.8–10.5)
WBC # FLD AUTO: 12.61 K/UL — HIGH (ref 3.8–10.5)

## 2020-02-11 PROCEDURE — 76820 UMBILICAL ARTERY ECHO: CPT | Mod: 26

## 2020-02-11 PROCEDURE — 99232 SBSQ HOSP IP/OBS MODERATE 35: CPT | Mod: GC

## 2020-02-11 PROCEDURE — 76818 FETAL BIOPHYS PROFILE W/NST: CPT | Mod: 26

## 2020-02-11 PROCEDURE — 99232 SBSQ HOSP IP/OBS MODERATE 35: CPT

## 2020-02-11 RX ORDER — NIFEDIPINE 30 MG
30 TABLET, EXTENDED RELEASE 24 HR ORAL ONCE
Refills: 0 | Status: COMPLETED | OUTPATIENT
Start: 2020-02-11 | End: 2020-02-11

## 2020-02-11 RX ORDER — NIFEDIPINE 30 MG
30 TABLET, EXTENDED RELEASE 24 HR ORAL ONCE
Refills: 0 | Status: DISCONTINUED | OUTPATIENT
Start: 2020-02-11 | End: 2020-02-11

## 2020-02-11 RX ORDER — HYDRALAZINE HCL 50 MG
10 TABLET ORAL ONCE
Refills: 0 | Status: COMPLETED | OUTPATIENT
Start: 2020-02-11 | End: 2020-02-11

## 2020-02-11 RX ORDER — NIFEDIPINE 30 MG
60 TABLET, EXTENDED RELEASE 24 HR ORAL
Refills: 0 | Status: DISCONTINUED | OUTPATIENT
Start: 2020-02-11 | End: 2020-02-23

## 2020-02-11 RX ADMIN — HEPARIN SODIUM 5000 UNIT(S): 5000 INJECTION INTRAVENOUS; SUBCUTANEOUS at 08:47

## 2020-02-11 RX ADMIN — Medication 700 MILLIGRAM(S): at 17:32

## 2020-02-11 RX ADMIN — HEPARIN SODIUM 5000 UNIT(S): 5000 INJECTION INTRAVENOUS; SUBCUTANEOUS at 17:32

## 2020-02-11 RX ADMIN — HEPARIN SODIUM 5000 UNIT(S): 5000 INJECTION INTRAVENOUS; SUBCUTANEOUS at 01:02

## 2020-02-11 RX ADMIN — Medication 30 MILLIGRAM(S): at 17:38

## 2020-02-11 RX ADMIN — Medication 700 MILLIGRAM(S): at 09:13

## 2020-02-11 RX ADMIN — Medication 10 MILLIGRAM(S): at 21:44

## 2020-02-11 RX ADMIN — Medication 700 MILLIGRAM(S): at 01:03

## 2020-02-11 RX ADMIN — Medication 90 MILLIGRAM(S): at 05:09

## 2020-02-11 RX ADMIN — Medication 1 TABLET(S): at 12:56

## 2020-02-11 RX ADMIN — Medication 1 MILLIGRAM(S): at 12:56

## 2020-02-11 NOTE — PROGRESS NOTE ADULT - SUBJECTIVE AND OBJECTIVE BOX
[facility] Division of Kidney Diseases & Hypertension  FOLLOW UP NOTE  [phone]--------------------------------------------------------------------------------  Chief Complaint:Supervision of other normal pregnancy, antepartum  Other pregnancy-related conditions, antepartum  Weeks of gestation of pregnancy not specified  Other pregnancy-related conditions, antepartum  Weeks of gestation of pregnancy not specified      24 hour events/subjective:    Patient seen today without significant subjective complaints  Blood pressure still remains high with SBP: 140-160mmHg  No acute events overnight  Electrolytes stable         PAST HISTORY  --------------------------------------------------------------------------------  No significant changes to PMH, PSH, FHx, SHx, unless otherwise noted    ALLERGIES & MEDICATIONS  --------------------------------------------------------------------------------  Allergies    peanuts (Anaphylaxis)  penicillin (Hives)    Intolerances      Standing Inpatient Medications  dextrose 5%. 1000 milliLiter(s) IV Continuous <Continuous>  dextrose 50% Injectable 12.5 Gram(s) IV Push once  dextrose 50% Injectable 25 Gram(s) IV Push once  dextrose 50% Injectable 25 Gram(s) IV Push once  folic acid 1 milliGRAM(s) Oral daily  heparin  Injectable 5000 Unit(s) SubCutaneous every 8 hours  insulin NPH human recombinant 15 Unit(s) SubCutaneous at bedtime  labetalol 700 milliGRAM(s) Oral every 8 hours  NIFEdipine XL 60 milliGRAM(s) Oral <User Schedule>  prenatal multivitamin 1 Tablet(s) Oral daily  sodium chloride 0.9%. 1000 milliLiter(s) IV Continuous <Continuous>    PRN Inpatient Medications  dextrose 40% Gel 15 Gram(s) Oral once PRN  glucagon  Injectable 1 milliGRAM(s) IntraMuscular once PRN        REVIEW OF SYSTEMS  --------------------------------------------------------------------------------  Gen: no lethargy, no  fatigue  Respiratory: No dyspnea  CV: No chest pain  GI: No abdominal pain  MSK: no LE edema  Neuro: No dizziness  Heme: No bleeding    All other systems were reviewed and are negative, except as noted.    VITALS/PHYSICAL EXAM  --------------------------------------------------------------------------------  T(C): 36.8 (02-11-20 @ 08:50), Max: 36.9 (02-11-20 @ 05:04)  HR: 74 (02-11-20 @ 08:50) (70 - 80)  BP: 149/84 (02-11-20 @ 09:08) (122/76 - 168/93)  RR: 17 (02-11-20 @ 08:50) (17 - 18)  SpO2: 99% (02-11-20 @ 08:50) (95% - 99%)  Wt(kg): --        Physical Exam:  	Gen: NAD, obese female,   	HEENT: PERRL, supple neck  	Pulm: CTA B/L  	CV:  S1S2  	Abd: +BS, soft, gravid  	Ext: No B/L Lower ext edema  	Neuro: No focal deficits  	Skin: Warm, without rashes  	Vascular access: none    LABS/STUDIES  --------------------------------------------------------------------------------              11.2   12.61 >-----------<  375      [02-11-20 @ 07:25]              34.8     136  |  102  |  12  ----------------------------<  89      [02-11-20 @ 07:21]  4.0   |  18  |  0.63        Ca     8.7     [02-11-20 @ 07:21]    TPro  6.5  /  Alb  3.2  /  TBili  0.1  /  DBili  x   /  AST  13  /  ALT  18  /  AlkPhos  104  [02-11-20 @ 07:21]    PT/INR: PT 9.9  , INR 0.86       [02-11-20 @ 07:25]  PTT: 24.8       [02-11-20 @ 07:25]    Uric acid 4.2      [02-11-20 @ 07:21]        [02-11-20 @ 07:21]    Creatinine Trend:  SCr 0.63 [02-11 @ 07:21]  SCr 0.78 [02-10 @ 11:15]  SCr 0.69 [02-09 @ 06:17]  SCr 0.89 [02-09 @ 00:19]  SCr 0.93 [02-08 @ 18:26]    Urinalysis - [02-07-20 @ 16:42]      Color Colorless / Appearance Clear / SG 1.009 / pH 6.5      Gluc Negative / Ketone Negative  / Bili Negative / Urobili Negative       Blood Negative / Protein Negative / Leuk Est Negative / Nitrite Negative      RBC  / WBC  / Hyaline  / Gran  / Sq Epi  / Non Sq Epi  / Bacteria     Urine Creatinine 42      [02-07-20 @ 17:15]  Urine Protein 10      [02-07-20 @ 17:15]    HbA1c 7.1      [02-08-20 @ 10:12]      C3 Complement 150      [02-10-20 @ 21:05]  C4 Complement 29      [02-10-20 @ 21:05]  Syphilis Screen (Treponema Pallidum Ab) Negative      [02-08-20 @ 10:47]

## 2020-02-11 NOTE — CHART NOTE - NSCHARTNOTEFT_GEN_A_CORE
Pt seen for request to speak with RD re: removing peanut allergy from [place]    Pt originally reported "peanut allergy" upon initial assessment however now renounces. States she had stomach upset following peanut consumption therefore typically avoids them, however she requests I remove peanut allergy as it is not a true allergy and she may consume foods that contain peanut or traces of peanut.    Peanut allergy removed from [place].    RD remains available: Niharika Solano MS, RDN, CDN, CDE, CSOWM. #[phone]

## 2020-02-11 NOTE — PROGRESS NOTE ADULT - ASSESSMENT
Patient with uncontrolled HTN    #Uncontrolled HTN   - Patient with uncontrolled HTN on Labetalol 700mg every 8 hours and Nifedipine 90mg daily  - Currently serum creatinine stable, platelets WNL, no overt proteinuria in UA.  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   - Monitor for signs of TTP, TTP can recur during pregnancy.   - Closely monitor platelets, Hgb, LFTs and renal function.  - DAILY LDH and Hapto levels  - Continue Labetalol 700mg every 8hours  - Increase Nifedipine 60mg  BID      Vicenta Canseco  Nephrology Fellow  Pager: [phone]

## 2020-02-12 ENCOUNTER — ASOB RESULT (OUTPATIENT)
Age: 40
End: 2020-02-12

## 2020-02-12 ENCOUNTER — APPOINTMENT (OUTPATIENT)
Dept: ANTEPARTUM | Facility: CLINIC | Age: 40
End: 2020-02-12

## 2020-02-12 LAB
ALBUMIN SERPL ELPH-MCNC: 3.2 G/DL — LOW (ref 3.3–5)
ALP SERPL-CCNC: 107 U/L — SIGNIFICANT CHANGE UP (ref 40–120)
ALT FLD-CCNC: 18 U/L — SIGNIFICANT CHANGE UP (ref 10–45)
ANION GAP SERPL CALC-SCNC: 14 MMOL/L — SIGNIFICANT CHANGE UP (ref 5–17)
APTT BLD: 26.1 SEC — LOW (ref 27.5–36.3)
AST SERPL-CCNC: 13 U/L — SIGNIFICANT CHANGE UP (ref 10–40)
BASOPHILS # BLD AUTO: 0.04 K/UL — SIGNIFICANT CHANGE UP (ref 0–0.2)
BASOPHILS NFR BLD AUTO: 0.3 % — SIGNIFICANT CHANGE UP (ref 0–2)
BILIRUB SERPL-MCNC: 0.1 MG/DL — LOW (ref 0.2–1.2)
BUN SERPL-MCNC: 13 MG/DL — SIGNIFICANT CHANGE UP (ref 7–23)
CALCIUM SERPL-MCNC: 9.1 MG/DL — SIGNIFICANT CHANGE UP (ref 8.4–10.5)
CHLORIDE SERPL-SCNC: 102 MMOL/L — SIGNIFICANT CHANGE UP (ref 96–108)
CO2 SERPL-SCNC: 19 MMOL/L — LOW (ref 22–31)
CREAT SERPL-MCNC: 0.69 MG/DL — SIGNIFICANT CHANGE UP (ref 0.5–1.3)
EOSINOPHIL # BLD AUTO: 0.51 K/UL — HIGH (ref 0–0.5)
EOSINOPHIL NFR BLD AUTO: 3.6 % — SIGNIFICANT CHANGE UP (ref 0–6)
FIBRINOGEN PPP-MCNC: 609 MG/DL — HIGH (ref 350–510)
GLUCOSE BLDC GLUCOMTR-MCNC: 110 MG/DL — HIGH (ref 70–99)
GLUCOSE BLDC GLUCOMTR-MCNC: 113 MG/DL — HIGH (ref 70–99)
GLUCOSE BLDC GLUCOMTR-MCNC: 137 MG/DL — HIGH (ref 70–99)
GLUCOSE BLDC GLUCOMTR-MCNC: 146 MG/DL — HIGH (ref 70–99)
GLUCOSE BLDC GLUCOMTR-MCNC: 149 MG/DL — HIGH (ref 70–99)
GLUCOSE BLDC GLUCOMTR-MCNC: 179 MG/DL — HIGH (ref 70–99)
GLUCOSE BLDC GLUCOMTR-MCNC: 92 MG/DL — SIGNIFICANT CHANGE UP (ref 70–99)
GLUCOSE BLDC GLUCOMTR-MCNC: 99 MG/DL — SIGNIFICANT CHANGE UP (ref 70–99)
GLUCOSE SERPL-MCNC: 101 MG/DL — HIGH (ref 70–99)
HAPTOGLOB SERPL-MCNC: 190 MG/DL — SIGNIFICANT CHANGE UP (ref 34–200)
HCT VFR BLD CALC: 36 % — SIGNIFICANT CHANGE UP (ref 34.5–45)
HGB BLD-MCNC: 11.4 G/DL — LOW (ref 11.5–15.5)
IMM GRANULOCYTES NFR BLD AUTO: 0.4 % — SIGNIFICANT CHANGE UP (ref 0–1.5)
INR BLD: 0.89 RATIO — SIGNIFICANT CHANGE UP (ref 0.88–1.16)
LDH SERPL L TO P-CCNC: 176 U/L — SIGNIFICANT CHANGE UP (ref 50–242)
LYMPHOCYTES # BLD AUTO: 21.9 % — SIGNIFICANT CHANGE UP (ref 13–44)
LYMPHOCYTES # BLD AUTO: 3.13 K/UL — SIGNIFICANT CHANGE UP (ref 1–3.3)
MCHC RBC-ENTMCNC: 29.4 PG — SIGNIFICANT CHANGE UP (ref 27–34)
MCHC RBC-ENTMCNC: 31.7 GM/DL — LOW (ref 32–36)
MCV RBC AUTO: 92.8 FL — SIGNIFICANT CHANGE UP (ref 80–100)
MONOCYTES # BLD AUTO: 1.23 K/UL — HIGH (ref 0–0.9)
MONOCYTES NFR BLD AUTO: 8.6 % — SIGNIFICANT CHANGE UP (ref 2–14)
NEUTROPHILS # BLD AUTO: 9.3 K/UL — HIGH (ref 1.8–7.4)
NEUTROPHILS NFR BLD AUTO: 65.2 % — SIGNIFICANT CHANGE UP (ref 43–77)
NRBC # BLD: 0 /100 WBCS — SIGNIFICANT CHANGE UP (ref 0–0)
PLATELET # BLD AUTO: 393 K/UL — SIGNIFICANT CHANGE UP (ref 150–400)
POTASSIUM SERPL-MCNC: 4.2 MMOL/L — SIGNIFICANT CHANGE UP (ref 3.5–5.3)
POTASSIUM SERPL-SCNC: 4.2 MMOL/L — SIGNIFICANT CHANGE UP (ref 3.5–5.3)
PROT SERPL-MCNC: 6.6 G/DL — SIGNIFICANT CHANGE UP (ref 6–8.3)
PROTHROM AB SERPL-ACNC: 10.1 SEC — SIGNIFICANT CHANGE UP (ref 10–12.9)
RBC # BLD: 3.88 M/UL — SIGNIFICANT CHANGE UP (ref 3.8–5.2)
RBC # FLD: 15.7 % — HIGH (ref 10.3–14.5)
SODIUM SERPL-SCNC: 135 MMOL/L — SIGNIFICANT CHANGE UP (ref 135–145)
URATE SERPL-MCNC: 4.3 MG/DL — SIGNIFICANT CHANGE UP (ref 2.5–7)
WBC # BLD: 14.26 K/UL — HIGH (ref 3.8–10.5)
WBC # FLD AUTO: 14.26 K/UL — HIGH (ref 3.8–10.5)

## 2020-02-12 PROCEDURE — 76819 FETAL BIOPHYS PROFIL W/O NST: CPT | Mod: 26

## 2020-02-12 PROCEDURE — 99232 SBSQ HOSP IP/OBS MODERATE 35: CPT

## 2020-02-12 PROCEDURE — 76820 UMBILICAL ARTERY ECHO: CPT | Mod: 26

## 2020-02-12 RX ADMIN — Medication 700 MILLIGRAM(S): at 08:40

## 2020-02-12 RX ADMIN — HUMAN INSULIN 15 UNIT(S): 100 INJECTION, SUSPENSION SUBCUTANEOUS at 22:16

## 2020-02-12 RX ADMIN — Medication 1 TABLET(S): at 12:17

## 2020-02-12 RX ADMIN — HEPARIN SODIUM 5000 UNIT(S): 5000 INJECTION INTRAVENOUS; SUBCUTANEOUS at 00:56

## 2020-02-12 RX ADMIN — Medication 60 MILLIGRAM(S): at 17:15

## 2020-02-12 RX ADMIN — Medication 1 MILLIGRAM(S): at 12:17

## 2020-02-12 RX ADMIN — Medication 700 MILLIGRAM(S): at 17:15

## 2020-02-12 RX ADMIN — Medication 700 MILLIGRAM(S): at 00:48

## 2020-02-12 RX ADMIN — HEPARIN SODIUM 5000 UNIT(S): 5000 INJECTION INTRAVENOUS; SUBCUTANEOUS at 08:40

## 2020-02-12 RX ADMIN — Medication 60 MILLIGRAM(S): at 05:24

## 2020-02-12 RX ADMIN — HUMAN INSULIN 15 UNIT(S): 100 INJECTION, SUSPENSION SUBCUTANEOUS at 00:57

## 2020-02-12 RX ADMIN — HEPARIN SODIUM 5000 UNIT(S): 5000 INJECTION INTRAVENOUS; SUBCUTANEOUS at 17:15

## 2020-02-13 LAB
ADAMTS13 ACTIVITY: 52.1 % — LOW
ADAMTS13-COMMENT: SIGNIFICANT CHANGE UP
ALBUMIN SERPL ELPH-MCNC: 3.1 G/DL — LOW (ref 3.3–5)
ALP SERPL-CCNC: 118 U/L — SIGNIFICANT CHANGE UP (ref 40–120)
ALT FLD-CCNC: 16 U/L — SIGNIFICANT CHANGE UP (ref 10–45)
ANA TITR SER: NEGATIVE — SIGNIFICANT CHANGE UP
ANION GAP SERPL CALC-SCNC: 14 MMOL/L — SIGNIFICANT CHANGE UP (ref 5–17)
APTT BLD: 25.1 SEC — LOW (ref 27.5–36.3)
AST SERPL-CCNC: 14 U/L — SIGNIFICANT CHANGE UP (ref 10–40)
BASOPHILS # BLD AUTO: 0.01 K/UL — SIGNIFICANT CHANGE UP (ref 0–0.2)
BASOPHILS NFR BLD AUTO: 0.1 % — SIGNIFICANT CHANGE UP (ref 0–2)
BILIRUB SERPL-MCNC: 0.2 MG/DL — SIGNIFICANT CHANGE UP (ref 0.2–1.2)
BLD GP AB SCN SERPL QL: NEGATIVE — SIGNIFICANT CHANGE UP
BUN SERPL-MCNC: 11 MG/DL — SIGNIFICANT CHANGE UP (ref 7–23)
CALCIUM SERPL-MCNC: 8.8 MG/DL — SIGNIFICANT CHANGE UP (ref 8.4–10.5)
CHLORIDE SERPL-SCNC: 102 MMOL/L — SIGNIFICANT CHANGE UP (ref 96–108)
CO2 SERPL-SCNC: 17 MMOL/L — LOW (ref 22–31)
CREAT SERPL-MCNC: 0.58 MG/DL — SIGNIFICANT CHANGE UP (ref 0.5–1.3)
EOSINOPHIL # BLD AUTO: 0.42 K/UL — SIGNIFICANT CHANGE UP (ref 0–0.5)
EOSINOPHIL NFR BLD AUTO: 3.2 % — SIGNIFICANT CHANGE UP (ref 0–6)
FIBRINOGEN PPP-MCNC: 672 MG/DL — HIGH (ref 350–510)
GLUCOSE BLDC GLUCOMTR-MCNC: 112 MG/DL — HIGH (ref 70–99)
GLUCOSE BLDC GLUCOMTR-MCNC: 128 MG/DL — HIGH (ref 70–99)
GLUCOSE BLDC GLUCOMTR-MCNC: 153 MG/DL — HIGH (ref 70–99)
GLUCOSE BLDC GLUCOMTR-MCNC: 198 MG/DL — HIGH (ref 70–99)
GLUCOSE BLDC GLUCOMTR-MCNC: 91 MG/DL — SIGNIFICANT CHANGE UP (ref 70–99)
GLUCOSE SERPL-MCNC: 89 MG/DL — SIGNIFICANT CHANGE UP (ref 70–99)
HCT VFR BLD CALC: 35.7 % — SIGNIFICANT CHANGE UP (ref 34.5–45)
HGB BLD-MCNC: 11.3 G/DL — LOW (ref 11.5–15.5)
IMM GRANULOCYTES NFR BLD AUTO: 0.5 % — SIGNIFICANT CHANGE UP (ref 0–1.5)
INR BLD: 0.88 RATIO — SIGNIFICANT CHANGE UP (ref 0.88–1.16)
LDH SERPL L TO P-CCNC: 245 U/L — HIGH (ref 50–242)
LYMPHOCYTES # BLD AUTO: 24.5 % — SIGNIFICANT CHANGE UP (ref 13–44)
LYMPHOCYTES # BLD AUTO: 3.22 K/UL — SIGNIFICANT CHANGE UP (ref 1–3.3)
MCHC RBC-ENTMCNC: 29.1 PG — SIGNIFICANT CHANGE UP (ref 27–34)
MCHC RBC-ENTMCNC: 31.7 GM/DL — LOW (ref 32–36)
MCV RBC AUTO: 92 FL — SIGNIFICANT CHANGE UP (ref 80–100)
MONOCYTES # BLD AUTO: 0.94 K/UL — HIGH (ref 0–0.9)
MONOCYTES NFR BLD AUTO: 7.1 % — SIGNIFICANT CHANGE UP (ref 2–14)
NEUTROPHILS # BLD AUTO: 8.51 K/UL — HIGH (ref 1.8–7.4)
NEUTROPHILS NFR BLD AUTO: 64.6 % — SIGNIFICANT CHANGE UP (ref 43–77)
NRBC # BLD: 0 /100 WBCS — SIGNIFICANT CHANGE UP (ref 0–0)
PLATELET # BLD AUTO: 376 K/UL — SIGNIFICANT CHANGE UP (ref 150–400)
POTASSIUM SERPL-MCNC: 4.2 MMOL/L — SIGNIFICANT CHANGE UP (ref 3.5–5.3)
POTASSIUM SERPL-SCNC: 4.2 MMOL/L — SIGNIFICANT CHANGE UP (ref 3.5–5.3)
PROT SERPL-MCNC: 6.7 G/DL — SIGNIFICANT CHANGE UP (ref 6–8.3)
PROTHROM AB SERPL-ACNC: 10.1 SEC — SIGNIFICANT CHANGE UP (ref 10–12.9)
RBC # BLD: 3.88 M/UL — SIGNIFICANT CHANGE UP (ref 3.8–5.2)
RBC # FLD: 15.8 % — HIGH (ref 10.3–14.5)
RH IG SCN BLD-IMP: POSITIVE — SIGNIFICANT CHANGE UP
SODIUM SERPL-SCNC: 133 MMOL/L — LOW (ref 135–145)
URATE SERPL-MCNC: 4.2 MG/DL — SIGNIFICANT CHANGE UP (ref 2.5–7)
WBC # BLD: 13.16 K/UL — HIGH (ref 3.8–10.5)
WBC # FLD AUTO: 13.16 K/UL — HIGH (ref 3.8–10.5)

## 2020-02-13 PROCEDURE — 99232 SBSQ HOSP IP/OBS MODERATE 35: CPT

## 2020-02-13 RX ORDER — LABETALOL HCL 100 MG
800 TABLET ORAL
Refills: 0 | Status: DISCONTINUED | OUTPATIENT
Start: 2020-02-13 | End: 2020-02-19

## 2020-02-13 RX ADMIN — Medication 700 MILLIGRAM(S): at 09:24

## 2020-02-13 RX ADMIN — Medication 700 MILLIGRAM(S): at 00:39

## 2020-02-13 RX ADMIN — HEPARIN SODIUM 5000 UNIT(S): 5000 INJECTION INTRAVENOUS; SUBCUTANEOUS at 05:34

## 2020-02-13 RX ADMIN — Medication 1 TABLET(S): at 12:30

## 2020-02-13 RX ADMIN — Medication 800 MILLIGRAM(S): at 16:48

## 2020-02-13 RX ADMIN — HUMAN INSULIN 15 UNIT(S): 100 INJECTION, SUSPENSION SUBCUTANEOUS at 23:15

## 2020-02-13 RX ADMIN — HEPARIN SODIUM 5000 UNIT(S): 5000 INJECTION INTRAVENOUS; SUBCUTANEOUS at 23:15

## 2020-02-13 RX ADMIN — Medication 60 MILLIGRAM(S): at 05:08

## 2020-02-13 RX ADMIN — Medication 60 MILLIGRAM(S): at 18:19

## 2020-02-13 RX ADMIN — Medication 1 MILLIGRAM(S): at 12:30

## 2020-02-13 RX ADMIN — HEPARIN SODIUM 5000 UNIT(S): 5000 INJECTION INTRAVENOUS; SUBCUTANEOUS at 12:30

## 2020-02-14 ENCOUNTER — APPOINTMENT (OUTPATIENT)
Dept: ANTEPARTUM | Facility: CLINIC | Age: 40
End: 2020-02-14

## 2020-02-14 ENCOUNTER — ASOB RESULT (OUTPATIENT)
Age: 40
End: 2020-02-14

## 2020-02-14 LAB
GLUCOSE BLDC GLUCOMTR-MCNC: 140 MG/DL — HIGH (ref 70–99)
GLUCOSE BLDC GLUCOMTR-MCNC: 149 MG/DL — HIGH (ref 70–99)
GLUCOSE BLDC GLUCOMTR-MCNC: 173 MG/DL — HIGH (ref 70–99)
GLUCOSE BLDC GLUCOMTR-MCNC: 96 MG/DL — SIGNIFICANT CHANGE UP (ref 70–99)
GROUP B BETA STREP DNA (PCR): DETECTED
GROUP B BETA STREP INTERPRETATION: SIGNIFICANT CHANGE UP
SOURCE GROUP B STREP: SIGNIFICANT CHANGE UP

## 2020-02-14 PROCEDURE — 76819 FETAL BIOPHYS PROFIL W/O NST: CPT | Mod: 26

## 2020-02-14 PROCEDURE — 99232 SBSQ HOSP IP/OBS MODERATE 35: CPT | Mod: GC

## 2020-02-14 PROCEDURE — 76820 UMBILICAL ARTERY ECHO: CPT | Mod: 26

## 2020-02-14 PROCEDURE — 99231 SBSQ HOSP IP/OBS SF/LOW 25: CPT

## 2020-02-14 RX ORDER — INSULIN HUMAN 100 [IU]/ML
17 INJECTION, SOLUTION SUBCUTANEOUS AT BEDTIME
Refills: 0 | Status: DISCONTINUED | OUTPATIENT
Start: 2020-02-14 | End: 2020-02-14

## 2020-02-14 RX ORDER — INSULIN LISPRO 100/ML
5 VIAL (ML) SUBCUTANEOUS
Refills: 0 | Status: DISCONTINUED | OUTPATIENT
Start: 2020-02-14 | End: 2020-02-17

## 2020-02-14 RX ORDER — HUMAN INSULIN 100 [IU]/ML
17 INJECTION, SUSPENSION SUBCUTANEOUS AT BEDTIME
Refills: 0 | Status: DISCONTINUED | OUTPATIENT
Start: 2020-02-14 | End: 2020-02-17

## 2020-02-14 RX ADMIN — Medication 60 MILLIGRAM(S): at 18:19

## 2020-02-14 RX ADMIN — HUMAN INSULIN 17 UNIT(S): 100 INJECTION, SUSPENSION SUBCUTANEOUS at 23:17

## 2020-02-14 RX ADMIN — HEPARIN SODIUM 5000 UNIT(S): 5000 INJECTION INTRAVENOUS; SUBCUTANEOUS at 18:18

## 2020-02-14 RX ADMIN — Medication 800 MILLIGRAM(S): at 18:20

## 2020-02-14 RX ADMIN — Medication 800 MILLIGRAM(S): at 10:01

## 2020-02-14 RX ADMIN — HEPARIN SODIUM 5000 UNIT(S): 5000 INJECTION INTRAVENOUS; SUBCUTANEOUS at 10:01

## 2020-02-14 RX ADMIN — Medication 800 MILLIGRAM(S): at 00:25

## 2020-02-14 RX ADMIN — Medication 5 UNIT(S): at 18:20

## 2020-02-14 RX ADMIN — Medication 60 MILLIGRAM(S): at 06:23

## 2020-02-14 RX ADMIN — Medication 1 MILLIGRAM(S): at 13:31

## 2020-02-14 RX ADMIN — Medication 1 TABLET(S): at 13:32

## 2020-02-14 NOTE — PROGRESS NOTE ADULT - SUBJECTIVE AND OBJECTIVE BOX
[facility] Division of Kidney Diseases & Hypertension  FOLLOW UP NOTE  [phone]--------------------------------------------------------------------------------  Chief Complaint:Supervision of other normal pregnancy, antepartum  Other pregnancy-related conditions, antepartum  Weeks of gestation of pregnancy not specified  Other pregnancy-related conditions, antepartum  Weeks of gestation of pregnancy not specified      24 hour events/subjective:        PAST HISTORY  --------------------------------------------------------------------------------  No significant changes to PMH, PSH, FHx, SHx, unless otherwise noted    ALLERGIES & MEDICATIONS  --------------------------------------------------------------------------------  Allergies    penicillin (Hives)    Intolerances      Standing Inpatient Medications  dextrose 5%. 1000 milliLiter(s) IV Continuous <Continuous>  dextrose 50% Injectable 12.5 Gram(s) IV Push once  dextrose 50% Injectable 25 Gram(s) IV Push once  dextrose 50% Injectable 25 Gram(s) IV Push once  folic acid 1 milliGRAM(s) Oral daily  heparin  Injectable 5000 Unit(s) SubCutaneous every 8 hours  insulin NPH human recombinant 15 Unit(s) SubCutaneous at bedtime  labetalol 800 milliGRAM(s) Oral <User Schedule>  NIFEdipine XL 60 milliGRAM(s) Oral <User Schedule>  prenatal multivitamin 1 Tablet(s) Oral daily  sodium chloride 0.9%. 1000 milliLiter(s) IV Continuous <Continuous>    PRN Inpatient Medications  dextrose 40% Gel 15 Gram(s) Oral once PRN  glucagon  Injectable 1 milliGRAM(s) IntraMuscular once PRN      REVIEW OF SYSTEMS  --------------------------------------------------------------------------------  Gen: no lethargy, no  fatigue  Respiratory: No dyspnea  CV: No chest pain  GI: No abdominal pain  MSK: no LE edema  Neuro: No dizziness  Heme: No bleeding    All other systems were reviewed and are negative, except as noted.    VITALS/PHYSICAL EXAM  --------------------------------------------------------------------------------  T(C): 36.8 (02-14-20 @ 06:03), Max: 36.8 (02-14-20 @ 06:03)  HR: 76 (02-14-20 @ 06:03) (72 - 82)  BP: 134/81 (02-14-20 @ 06:03) (120/81 - 134/86)  RR: 18 (02-14-20 @ 06:03) (17 - 18)  SpO2: 98% (02-14-20 @ 06:03) (96% - 98%)  Wt(kg): --      Physical Exam:  	Gen: NAD, obese female,   	HEENT: PERRL, supple neck  	Pulm: CTA B/L  	CV:  S1S2  	Abd: +BS, soft, gravid  	Ext: No B/L Lower ext edema  	Neuro: No focal deficits  	Skin: Warm, without rashes  	Vascular access: none    LABS/STUDIES  --------------------------------------------------------------------------------              11.3   13.16 >-----------<  376      [02-13-20 @ 07:28]              35.7     133  |  102  |  11  ----------------------------<  89      [02-13-20 @ 07:28]  4.2   |  17  |  0.58        Ca     8.8     [02-13-20 @ 07:28]    TPro  6.7  /  Alb  3.1  /  TBili  0.2  /  DBili  x   /  AST  14  /  ALT  16  /  AlkPhos  118  [02-13-20 @ 07:28]    PT/INR: PT 10.1 , INR 0.88       [02-13-20 @ 07:28]  PTT: 25.1       [02-13-20 @ 07:28]    Uric acid 4.2      [02-13-20 @ 07:28]        [02-13-20 @ 07:28]    Creatinine Trend:  SCr 0.58 [02-13 @ 07:28]  SCr 0.69 [02-12 @ 06:35]  SCr 0.63 [02-11 @ 07:21]  SCr 0.78 [02-10 @ 11:15]  SCr 0.69 [02-09 @ 06:17]    Urinalysis - [02-07-20 @ 16:42]      Color Colorless / Appearance Clear / SG 1.009 / pH 6.5      Gluc Negative / Ketone Negative  / Bili Negative / Urobili Negative       Blood Negative / Protein Negative / Leuk Est Negative / Nitrite Negative      RBC  / WBC  / Hyaline  / Gran  / Sq Epi  / Non Sq Epi  / Bacteria     Urine Creatinine 42      [02-07-20 @ 17:15]  Urine Protein 10      [02-07-20 @ 17:15]    HbA1c 7.1      [02-08-20 @ 10:12]      LAURA: titer Negative, pattern --      [02-10-20 @ 21:04]  dsDNA <12      [02-10-20 @ 21:04]  C3 Complement 150      [02-10-20 @ 21:05]  C4 Complement 29      [02-10-20 @ 21:05]  Syphilis Screen (Treponema Pallidum Ab) Negative      [02-08-20 @ 10:47] [facility] Division of Kidney Diseases & Hypertension  FOLLOW UP NOTE  [phone]--------------------------------------------------------------------------------  Chief Complaint:Supervision of other normal pregnancy, antepartum  Other pregnancy-related conditions, antepartum  Weeks of gestation of pregnancy not specified  Other pregnancy-related conditions, antepartum  Weeks of gestation of pregnancy not specified      24 hour events/subjective:    Patient seen without significant complaints  BP still uncontrolled increased Labetalol  Current BP appears stable on max Labetalol and Nifedipine    PAST HISTORY  --------------------------------------------------------------------------------  No significant changes to PMH, PSH, FHx, SHx, unless otherwise noted    ALLERGIES & MEDICATIONS  --------------------------------------------------------------------------------  Allergies    penicillin (Hives)    Intolerances      Standing Inpatient Medications  dextrose 5%. 1000 milliLiter(s) IV Continuous <Continuous>  dextrose 50% Injectable 12.5 Gram(s) IV Push once  dextrose 50% Injectable 25 Gram(s) IV Push once  dextrose 50% Injectable 25 Gram(s) IV Push once  folic acid 1 milliGRAM(s) Oral daily  heparin  Injectable 5000 Unit(s) SubCutaneous every 8 hours  insulin NPH human recombinant 15 Unit(s) SubCutaneous at bedtime  labetalol 800 milliGRAM(s) Oral <User Schedule>  NIFEdipine XL 60 milliGRAM(s) Oral <User Schedule>  prenatal multivitamin 1 Tablet(s) Oral daily  sodium chloride 0.9%. 1000 milliLiter(s) IV Continuous <Continuous>    PRN Inpatient Medications  dextrose 40% Gel 15 Gram(s) Oral once PRN  glucagon  Injectable 1 milliGRAM(s) IntraMuscular once PRN      REVIEW OF SYSTEMS  --------------------------------------------------------------------------------  Gen: no lethargy, no  fatigue  Respiratory: No dyspnea  CV: No chest pain  GI: No abdominal pain  MSK: no LE edema  Neuro: No dizziness  Heme: No bleeding    All other systems were reviewed and are negative, except as noted.    VITALS/PHYSICAL EXAM  --------------------------------------------------------------------------------  T(C): 36.8 (02-14-20 @ 06:03), Max: 36.8 (02-14-20 @ 06:03)  HR: 76 (02-14-20 @ 06:03) (72 - 82)  BP: 134/81 (02-14-20 @ 06:03) (120/81 - 134/86)  RR: 18 (02-14-20 @ 06:03) (17 - 18)  SpO2: 98% (02-14-20 @ 06:03) (96% - 98%)  Wt(kg): --      Physical Exam:  	Gen: NAD, obese female,   	HEENT: PERRL, supple neck  	Pulm: CTA B/L  	CV:  S1S2  	Abd: +BS, soft, gravid  	Ext: No B/L Lower ext edema  	Neuro: No focal deficits  	Skin: Warm, without rashes  	Vascular access: none    LABS/STUDIES  --------------------------------------------------------------------------------              11.3   13.16 >-----------<  376      [02-13-20 @ 07:28]              35.7     133  |  102  |  11  ----------------------------<  89      [02-13-20 @ 07:28]  4.2   |  17  |  0.58        Ca     8.8     [02-13-20 @ 07:28]    TPro  6.7  /  Alb  3.1  /  TBili  0.2  /  DBili  x   /  AST  14  /  ALT  16  /  AlkPhos  118  [02-13-20 @ 07:28]    PT/INR: PT 10.1 , INR 0.88       [02-13-20 @ 07:28]  PTT: 25.1       [02-13-20 @ 07:28]    Uric acid 4.2      [02-13-20 @ 07:28]        [02-13-20 @ 07:28]    Creatinine Trend:  SCr 0.58 [02-13 @ 07:28]  SCr 0.69 [02-12 @ 06:35]  SCr 0.63 [02-11 @ 07:21]  SCr 0.78 [02-10 @ 11:15]  SCr 0.69 [02-09 @ 06:17]    Urinalysis - [02-07-20 @ 16:42]      Color Colorless / Appearance Clear / SG 1.009 / pH 6.5      Gluc Negative / Ketone Negative  / Bili Negative / Urobili Negative       Blood Negative / Protein Negative / Leuk Est Negative / Nitrite Negative      RBC  / WBC  / Hyaline  / Gran  / Sq Epi  / Non Sq Epi  / Bacteria     Urine Creatinine 42      [02-07-20 @ 17:15]  Urine Protein 10      [02-07-20 @ 17:15]    HbA1c 7.1      [02-08-20 @ 10:12]      LAURA: titer Negative, pattern --      [02-10-20 @ 21:04]  dsDNA <12      [02-10-20 @ 21:04]  C3 Complement 150      [02-10-20 @ 21:05]  C4 Complement 29      [02-10-20 @ 21:05]  Syphilis Screen (Treponema Pallidum Ab) Negative      [02-08-20 @ 10:47]

## 2020-02-14 NOTE — PROGRESS NOTE ADULT - ASSESSMENT
Patient with uncontrolled HTN    #Uncontrolled HTN   - Patient with uncontrolled HTN on Labetalol 700mg every 8 hours and Nifedipine 90mg daily  - Currently serum creatinine stable, platelets WNL, no overt proteinuria in UA.  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   - Monitor for signs of TTP, TTP can recur during pregnancy.   - Closely monitor platelets, Hgb, LFTs and renal function.  - DAILY LDH and Hapto levels  - On Labetalol 800mg every 8hours and Nifedipine 60mg  BID  - If BP still uncontrolled need to consider adding Hydralazine      Vicenta Canseco  Nephrology Fellow  Pager: [phone] Patient with uncontrolled HTN    #Uncontrolled HTN   - Patient with uncontrolled HTN on Labetalol 700mg every 8 hours and Nifedipine 90mg daily  - Currently serum creatinine stable, platelets WNL, no overt proteinuria in UA.  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   - Monitor for signs of TTP, TTP can recur during pregnancy.   - Closely monitor platelets, Hgb, LFTs and renal function.  - every other day LDH and Hapto levels  - On Labetalol 800mg every 8hours and Nifedipine 60mg  BID  - If BP still uncontrolled need to consider adding Hydralazine      Vicenta Canseco  Nephrology Fellow  Pager: [phone]

## 2020-02-14 NOTE — CHART NOTE - NSCHARTNOTEFT_GEN_A_CORE
Nutrition Follow Up Note  Patient seen for: Consult to discuss menu choices with pt     Source: Pt and comprehensive chart review     Diet : Regular    Patient reports:    PO intake : Good (>75%)     Source for PO intake:  Pt     Weights:     No new wts as noted per chart. Last wt taken 290lbs (02-08)      Pertinent Medications: N/A  Pertinent Labs:     CAPILLARY BLOOD GLUCOSE    POCT Blood Glucose.: 96 mg/dL (14 Feb 2020 06:03)  POCT Blood Glucose.: 112 mg/dL (13 Feb 2020 22:46)  POCT Blood Glucose.: 153 mg/dL (13 Feb 2020 19:45)  POCT Blood Glucose.: 128 mg/dL (13 Feb 2020 14:50)      Skin per nursing documentation: No pressure injuries per nurse's flow sheets  Edema: None noted per nurse's flow sheets    Estimated Needs:   Estimated Energy Needs (30-35 calories/kg): 9444-4636  Estimated Protein Needs (1.4-1.6 g/kg): 81-92 g      Previous Nutrition Diagnosis:   1. Food & Nutrition Related Knowledge Deficit.  2. Overweight/Obesity.  Nutrition Diagnosis is: Ongoing, addressed c nutrition education        Interventions:     Recommend  1)     Monitoring and Evaluation:     Continue to monitor Nutritional intake, Tolerance to diet prescription, weights, labs, skin integrity    RD remains available upon request and will follow up per protocol  Ruthie Proctor, Dietetic Intern x [phone] Nutrition Follow Up Note  Patient seen for: Consult to discuss menu choices with pt     Source: Pt and comprehensive chart review     Reviewed chart. Interim events noted. Per chart, Pt is a 39 year old  PMH TTP, renal dx and chronic HTN admit with uncontrolled HTN. Also noted with GDM uncontrolled, per pt newly diagnosed as of last week therefore no formal diet education was provided thus far.     Diet : Regular    Patient reports: Good appetite and PO intake. No c/o N+V, constipation or diarrhea. Pt reports  brings her outside food (wraps) when she becomes tired of hospital food. Reports eating night snack very late, ~11:30PM.     PO intake : Good (>75%)     Source for PO intake:  Pt     Weights:     No new wts as noted per chart. Last wt taken 290lbs ()      Pertinent Medications: N/A  Pertinent Labs:     CAPILLARY BLOOD GLUCOSE    POCT Blood Glucose.: 96 mg/dL (2020 06:03)  POCT Blood Glucose.: 112 mg/dL (2020 22:46)  POCT Blood Glucose.: 153 mg/dL (2020 19:45)  POCT Blood Glucose.: 128 mg/dL (2020 14:50)      Skin per nursing documentation: No pressure injuries per nurse's flow sheets  Edema: None noted per nurse's flow sheets    Estimated Needs:   Estimated Energy Needs (30-35 calories/kg): 5157-9076  Estimated Protein Needs (1.4-1.6 g/kg): 81-92 g      Previous Nutrition Diagnosis:   1. Food & Nutrition Related Knowledge Deficit.  2. Overweight/Obesity.  Nutrition Diagnosis is: Ongoing, addressed c nutrition education        Interventions:     Recommend  1) Provided pt with Kosher menu to increase order options throughout the day.   2) Reinforced GDM nutrition management with the pt. Encouraged pt to consume night snack earlier to better assess true fasting blood glucose. Reinforced carbohydrate counting, provided appropriate carbohydrate portion sizes for breakfast, lunch, and snacks. Encouraged pt to continue carbohydrate counting once d/c. Discussed how to identify carbohydrate content on nutrition labels. Pt was able to verbalize 1 teach back point.     Monitoring and Evaluation:     Continue to monitor Nutritional intake, Tolerance to diet prescription, weights, labs, skin integrity    RD remains available upon request and will follow up per protocol  Ruthie Proctor, Dietetic Intern x [phone] Nutrition Follow Up Note  Patient seen for: Consult to discuss menu choices with pt     Source: Pt and comprehensive chart review     Reviewed chart. Interim events noted. Per chart, Pt is a 39 year old  PMH TTP, renal dx and chronic HTN admit with uncontrolled HTN. Also noted with GDM uncontrolled, per pt newly diagnosed as of last week therefore no formal diet education was provided thus far.     Diet : Regular    Patient reports: Good appetite and PO intake. No c/o N+V, constipation or diarrhea. Pt reports  brings her outside food (wraps) when she wants more food options. Reports eating night snack very late, ~11:30PM.     PO intake : Good (>75%)     Source for PO intake:  Pt     Weights:  No new wts as noted per chart. Last wt taken 290lbs ()      Pertinent Medications: N/A  Pertinent Labs:     CAPILLARY BLOOD GLUCOSE    POCT Blood Glucose.: 96 mg/dL (2020 06:03)  POCT Blood Glucose.: 112 mg/dL (2020 22:46)  POCT Blood Glucose.: 153 mg/dL (2020 19:45)  POCT Blood Glucose.: 128 mg/dL (2020 14:50)      Skin per nursing documentation: No pressure injuries per nurse's flow sheets  Edema: None noted per nurse's flow sheets    Estimated Needs:   Estimated Energy Needs (30-35 calories/kg): 7960-3897  Estimated Protein Needs (1.4-1.6 g/kg): 81-92 g      Previous Nutrition Diagnosis:   1. Food & Nutrition Related Knowledge Deficit.  2. Overweight/Obesity.  Nutrition Diagnosis is: Ongoing, addressed c nutrition education        Interventions:     Recommend  1) Provided pt with Kosher menu to increase order options throughout the day.   2) Reinforced GDM nutrition management with the pt. Encouraged pt to consume night snack earlier to better control and assess true fasting blood glucose. Reinforced carbohydrate counting, provided appropriate carbohydrate portion sizes for breakfast, lunch, and snacks. Encouraged pt to continue carbohydrate counting once d/c. Discussed how to identify carbohydrate content on nutrition labels. Pt was able to verbalize 1 teach back point.     Monitoring and Evaluation:     Continue to monitor Nutritional intake, Tolerance to diet prescription, weights, labs, skin integrity    RD remains available upon request and will follow up per protocol  Ruthie Proctor, Dietetic Intern x [phone] Nutrition Follow Up Note  Patient seen for: Consult to discuss menu choices with pt     Source: Pt and comprehensive chart review     Reviewed chart. Interim events noted. Per chart, Pt is a 39 year old  PMH TTP, renal dx and chronic HTN admit with uncontrolled HTN. Also noted with GDM uncontrolled, per pt newly diagnosed as of last week. Visited pt on (02-10) to initiate GDM education. Pt still exhibits incomplete understanding of carbohydrate counting. Requires     Diet : Regular    Patient reports: Good appetite and PO intake. No c/o N+V, constipation or diarrhea. Fingers sticks reviewed, noticed occasional post prandial hyperglycemia. Yesterday's finger stick values; 198mg/dl (9:11AM), 128mg/dl (2:50PM), 153mg/dl (7:45PM), 112mg/dl (10:46PM). Pt admits consuming outside food and having a high carbohydrate breakfast yesterday including [place] toast and potatoes. Today's fasting finger stick revealed elevated blood glucose; 96mg/dl. Pt reports she consumed her night snack at ~ 11:30PM.     PO intake : Good (>75%)     Source for PO intake:  Pt     Weights:  No new wts as noted per chart. Last wt taken 290lbs ()      Pertinent Medications: N/A  Pertinent Labs:     CAPILLARY BLOOD GLUCOSE    POCT Blood Glucose.: 96 mg/dL (2020 06:03)  POCT Blood Glucose.: 112 mg/dL (2020 22:46)  POCT Blood Glucose.: 153 mg/dL (2020 19:45)  POCT Blood Glucose.: 128 mg/dL (2020 14:50)      Skin per nursing documentation: No pressure injuries per nurse's flow sheets  Edema: None noted per nurse's flow sheets    Estimated Needs:   Estimated Energy Needs (30-35 calories/kg): 5980-7214  Estimated Protein Needs (1.4-1.6 g/kg): 81-92 g      Previous Nutrition Diagnosis:   1. Food & Nutrition Related Knowledge Deficit.  2. Overweight/Obesity.  Nutrition Diagnosis is: Ongoing, addressed c nutrition education        Interventions:     Recommend  1) Provided pt with Kosher menu to increase order options throughout the day.   2) Reinforced GDM nutrition management with the pt. Encouraged pt to consume night snack earlier to better control and assess true fasting blood glucose. Reinforced carbohydrate counting, provided appropriate carbohydrate portion sizes for breakfast, lunch, and snacks. Encouraged pt to continue carbohydrate counting once d/c. Discussed how to identify carbohydrate content on nutrition labels. Pt was able to verbalize 1 teach back point.     Monitoring and Evaluation:     Continue to monitor Nutritional intake, Tolerance to diet prescription, weights, labs, skin integrity    RD remains available upon request and will follow up per protocol  Ruthie Proctor, Dietetic Intern x [phone] Nutrition Follow Up Note  Patient seen for: Consult to discuss menu choices with pt     Source: Pt and comprehensive chart review     Reviewed chart. Interim events noted. Per chart, Pt is a 39 year old  PMH TTP, renal dx and chronic HTN admit with uncontrolled HTN. Also noted with GDM uncontrolled, per pt newly diagnosed as of last week. Visited pt on (02-10) to initiate GDM education. Pt still exhibits incomplete understanding of carbohydrate counting. Requires additional follow up education and to be able to verbalize teach back points.     Diet : Regular    Patient reports: Good appetite and PO intake. No c/o N+V, constipation or diarrhea. Fingers sticks reviewed, noticed occasional post prandial hyperglycemia. Yesterday's finger stick values; 198mg/dl (9:11AM), 128mg/dl (2:50PM), 153mg/dl (7:45PM), 112mg/dl (10:46PM). Pt admits consuming outside food and having a high carbohydrate breakfast yesterday including [place] toast and potatoes. Today's fasting finger stick revealed elevated blood glucose; 96mg/dl. Pt reports she consumed her night snack at ~ 11:30PM.     PO intake : Good (>75%)     Source for PO intake:  Pt     Weights:  No new wts as noted per chart. Last wt taken 290lbs ()      Pertinent Medications: N/A  Pertinent Labs:     CAPILLARY BLOOD GLUCOSE    POCT Blood Glucose.: 96 mg/dL (2020 06:03)  POCT Blood Glucose.: 112 mg/dL (2020 22:46)  POCT Blood Glucose.: 153 mg/dL (2020 19:45)  POCT Blood Glucose.: 128 mg/dL (2020 14:50)      Skin per nursing documentation: No pressure injuries per nurse's flow sheets  Edema: None noted per nurse's flow sheets    Estimated Needs:   Estimated Energy Needs (30-35 calories/kg): 6779-8513  Estimated Protein Needs (1.4-1.6 g/kg): 81-92 g      Previous Nutrition Diagnosis:   1. Food & Nutrition Related Knowledge Deficit.  2. Overweight/Obesity.  Nutrition Diagnosis is: Ongoing, addressed c nutrition education        Interventions:     Recommend  1) Provided pt with Kosher menu to increase order options throughout the day.   2) Reinforced GDM nutrition management with the pt. Encouraged pt to consume night snack earlier to better control and assess true fasting blood glucose. Reinforced carbohydrate counting, provided appropriate carbohydrate portion sizes for breakfast, lunch, and snacks. Encouraged pt to continue carbohydrate counting once d/c. Discussed how to identify carbohydrate content on nutrition labels. Pt was able to verbalize 1 teach back point.     Monitoring and Evaluation:     Continue to monitor Nutritional intake, Tolerance to diet prescription, weights, labs, skin integrity    RD remains available upon request and will follow up per protocol  Ruthie Proctor, Dietetic Intern x [phone] Nutrition Follow Up Note  Patient seen for: Consult to discuss menu choices with pt     Source: Pt and comprehensive chart review     Reviewed chart. Interim events noted. Per chart, Pt is a 39 year old  PMH TTP, renal dx and chronic HTN admit with uncontrolled HTN. Also noted with GDM uncontrolled, per pt newly diagnosed as of last week. Visited pt on (02-10) to initiate GDM education. Pt still exhibits incomplete understanding of carbohydrate counting. Requires additional follow up education and to be able to verbalize teach back points.     Diet : Regular    Patient reports: Good appetite and PO intake. No c/o N+V, constipation or diarrhea. Fingers sticks reviewed, noticed occasional post prandial hyperglycemia. Yesterday's finger stick values; 198mg/dl (9:11AM), 128mg/dl (2:50PM), 153mg/dl (7:45PM), 112mg/dl (10:46PM). Pt admits consuming outside food and having a high carbohydrate breakfast yesterday including [place] toast and potatoes. Today's fasting finger stick revealed elevated blood glucose; 96mg/dl. Pt reports she consumed her night snack at ~ 11:30PM.     PO intake : Good (>75%)     Source for PO intake:  Pt     Weights:  No new wts as noted per chart. Last wt taken 290lbs ()      Pertinent Medications: N/A  Pertinent Labs:     CAPILLARY BLOOD GLUCOSE    POCT Blood Glucose.: 96 mg/dL (2020 06:03)  POCT Blood Glucose.: 112 mg/dL (2020 22:46)  POCT Blood Glucose.: 153 mg/dL (2020 19:45)  POCT Blood Glucose.: 128 mg/dL (2020 14:50)      Skin per nursing documentation: No pressure injuries per nurse's flow sheets  Edema: None noted per nurse's flow sheets    Estimated Needs:   Estimated Energy Needs (30-35 calories/kg): 9606-5083  Estimated Protein Needs (1.4-1.6 g/kg): 81-92 g      Previous Nutrition Diagnosis:   1. Food & Nutrition Related Knowledge Deficit.  2. Overweight/Obesity.  Nutrition Diagnosis is: Ongoing, addressed c nutrition education        Interventions:     Recommend  1) Provided pt with Kosher menu to increase order options throughout the day.   2) Reinforced GDM nutrition management with the pt. Encouraged pt to consume night snack earlier to better control and assess true fasting blood glucose. Reinforced carbohydrate counting, provided appropriate carbohydrate portion sizes for breakfast, lunch, and snacks. Encouraged pt to continue carbohydrate counting once d/c. Discussed how to identify carbohydrate content on nutrition labels. Pt was able to verbalize 1 teach back point.   3) Medical management per team      Monitoring and Evaluation:     Continue to monitor Nutritional intake, Tolerance to diet prescription, weights, labs, skin integrity    RD remains available upon request and will follow up per protocol  Ruthie Proctor, Dietetic Intern x [phone]

## 2020-02-15 LAB
GLUCOSE BLDC GLUCOMTR-MCNC: 114 MG/DL — HIGH (ref 70–99)
GLUCOSE BLDC GLUCOMTR-MCNC: 127 MG/DL — HIGH (ref 70–99)
GLUCOSE BLDC GLUCOMTR-MCNC: 155 MG/DL — HIGH (ref 70–99)
GLUCOSE BLDC GLUCOMTR-MCNC: 166 MG/DL — HIGH (ref 70–99)
GLUCOSE BLDC GLUCOMTR-MCNC: 92 MG/DL — SIGNIFICANT CHANGE UP (ref 70–99)

## 2020-02-15 PROCEDURE — 99232 SBSQ HOSP IP/OBS MODERATE 35: CPT | Mod: GC

## 2020-02-15 PROCEDURE — 99231 SBSQ HOSP IP/OBS SF/LOW 25: CPT

## 2020-02-15 RX ADMIN — HEPARIN SODIUM 5000 UNIT(S): 5000 INJECTION INTRAVENOUS; SUBCUTANEOUS at 09:59

## 2020-02-15 RX ADMIN — Medication 1 TABLET(S): at 13:21

## 2020-02-15 RX ADMIN — HEPARIN SODIUM 5000 UNIT(S): 5000 INJECTION INTRAVENOUS; SUBCUTANEOUS at 17:50

## 2020-02-15 RX ADMIN — Medication 60 MILLIGRAM(S): at 06:41

## 2020-02-15 RX ADMIN — Medication 5 UNIT(S): at 15:01

## 2020-02-15 RX ADMIN — Medication 5 UNIT(S): at 19:29

## 2020-02-15 RX ADMIN — Medication 1 MILLIGRAM(S): at 13:21

## 2020-02-15 RX ADMIN — Medication 800 MILLIGRAM(S): at 17:51

## 2020-02-15 RX ADMIN — HUMAN INSULIN 17 UNIT(S): 100 INJECTION, SUSPENSION SUBCUTANEOUS at 22:11

## 2020-02-15 RX ADMIN — Medication 800 MILLIGRAM(S): at 00:20

## 2020-02-15 RX ADMIN — Medication 60 MILLIGRAM(S): at 18:59

## 2020-02-15 RX ADMIN — HEPARIN SODIUM 5000 UNIT(S): 5000 INJECTION INTRAVENOUS; SUBCUTANEOUS at 00:20

## 2020-02-15 RX ADMIN — Medication 800 MILLIGRAM(S): at 10:00

## 2020-02-15 RX ADMIN — Medication 5 UNIT(S): at 10:04

## 2020-02-15 NOTE — PROGRESS NOTE ADULT - ASSESSMENT
Patient with uncontrolled HTN    #Uncontrolled HTN   - Patient with uncontrolled HTN on Labetalol 700mg every 8 hours and Nifedipine 90mg daily  - Currently serum creatinine stable, platelets WNL, no overt proteinuria in UA.  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   - Monitor for signs of TTP, TTP can recur during pregnancy.   - Closely monitor platelets, Hgb, LFTs and renal function.  - every other day LDH and Hapto levels  - On Labetalol 800mg every 8hours and Nifedipine 60mg  BID, suggest to increase nifedipine to 90 mg daily.  If BP still uncontrolled need to consider adding Hydralazine    Juaquin Strickland   Nephrology Fellow  Pager  Patient with uncontrolled HTN    #Uncontrolled HTN   - Patient with uncontrolled HTN on Labetalol 700mg every 8 hours and Nifedipine 90mg daily  - Currently serum creatinine stable, platelets WNL, no overt proteinuria in UA.  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   - Monitor for signs of TTP, TTP can recur during pregnancy.   - Closely monitor platelets, Hgb, LFTs and renal function.  - every other day LDH and Hapto levels  - On Labetalol 800mg every 8hours and Nifedipine 60mg  BID.  If BP still uncontrolled need to consider adding Hydralazine    Juaquin Strickland   Nephrology Fellow  Pager

## 2020-02-15 NOTE — PROGRESS NOTE ADULT - SUBJECTIVE AND OBJECTIVE BOX
[facility] DIVISION OF KIDNEY DISEASES AND HYPERTENSION -- FOLLOW UP NOTE  --------------------------------------------------------------------------------  Chief Complaint: HTN.       24 hour events/subjective:    Patient seen without significant complaints  BP elevated this morning ~140 systolic. Denies abdominal pain, n/v/ edema, headaches, blurry vision.     PAST HISTORY  --------------------------------------------------------------------------------  No significant changes to PMH, PSH, FHx, SHx, unless otherwise noted    ALLERGIES & MEDICATIONS  --------------------------------------------------------------------------------  Allergies    penicillin (Hives)    Intolerances      Standing Inpatient Medications  dextrose 5%. 1000 milliLiter(s) IV Continuous <Continuous>  dextrose 50% Injectable 12.5 Gram(s) IV Push once  dextrose 50% Injectable 25 Gram(s) IV Push once  dextrose 50% Injectable 25 Gram(s) IV Push once  folic acid 1 milliGRAM(s) Oral daily  heparin  Injectable 5000 Unit(s) SubCutaneous every 8 hours  insulin lispro Injectable (HumaLOG) 5 Unit(s) SubCutaneous three times a day before meals  insulin NPH human recombinant 17 Unit(s) SubCutaneous at bedtime  labetalol 800 milliGRAM(s) Oral <User Schedule>  NIFEdipine XL 60 milliGRAM(s) Oral <User Schedule>  prenatal multivitamin 1 Tablet(s) Oral daily  sodium chloride 0.9%. 1000 milliLiter(s) IV Continuous <Continuous>    PRN Inpatient Medications  dextrose 40% Gel 15 Gram(s) Oral once PRN  glucagon  Injectable 1 milliGRAM(s) IntraMuscular once PRN      REVIEW OF SYSTEMS  --------------------------------------------------------------------------------  Gen: no lethargy, no  fatigue  Respiratory: No dyspnea  CV: No chest pain  GI: No abdominal pain  MSK: no LE edema  Neuro: No dizziness  Heme: No bleeding    All other systems were reviewed and are negative, except as noted.    VITALS/PHYSICAL EXAM  --------------------------------------------------------------------------------  T(C): 36.8 (02-15-20 @ 09:07), Max: 37.1 (02-15-20 @ 00:25)  HR: 80 (02-15-20 @ 09:07) (71 - 91)  BP: 124/83 (02-15-20 @ 09:07) (124/83 - 143/85)  RR: 18 (02-15-20 @ 09:07) (18 - 18)  SpO2: 96% (02-15-20 @ 05:46) (96% - 100%)  Wt(kg): --        Physical Exam:    	Gen: NAD, obese female,   	HEENT: PERRL, supple neck  	Pulm: CTA B/L  	CV:  S1S2  	Abd: +BS, soft, gravid  	Ext: No B/L Lower ext edema  	Neuro: No focal deficits  	Skin: Warm, without rashes  	Vascular access: none    LABS/STUDIES  --------------------------------------------------------------------------------      Creatinine Trend:  SCr 0.58 [02-13 @ 07:28]  SCr 0.69 [02-12 @ 06:35]  SCr 0.63 [02-11 @ 07:21]  SCr 0.78 [02-10 @ 11:15]  SCr 0.69 [02-09 @ 06:17]    Urinalysis - [02-07-20 @ 16:42]      Color Colorless / Appearance Clear / SG 1.009 / pH 6.5      Gluc Negative / Ketone Negative  / Bili Negative / Urobili Negative       Blood Negative / Protein Negative / Leuk Est Negative / Nitrite Negative      RBC  / WBC  / Hyaline  / Gran  / Sq Epi  / Non Sq Epi  / Bacteria       PTH -- (Ca 8.1)      [04-14-19 @ 14:16]   285  Vitamin D (25OH) 23.0      [04-14-19 @ 14:18]  HbA1c 7.1      [02-08-20 @ 10:12]  TSH 0.70      [04-06-19 @ 06:25]      LAURA: titer Negative, pattern --      [02-10-20 @ 21:04]  dsDNA <12      [02-10-20 @ 21:04]  C3 Complement 150      [02-10-20 @ 21:05]  C4 Complement 29      [02-10-20 @ 21:05]  Syphilis Screen (Treponema Pallidum Ab) Negative      [02-08-20 @ 10:47]

## 2020-02-16 LAB
ALBUMIN SERPL ELPH-MCNC: 3.6 G/DL — SIGNIFICANT CHANGE UP (ref 3.3–5)
ALP SERPL-CCNC: 112 U/L — SIGNIFICANT CHANGE UP (ref 40–120)
ALT FLD-CCNC: 19 U/L — SIGNIFICANT CHANGE UP (ref 10–45)
ANION GAP SERPL CALC-SCNC: 14 MMOL/L — SIGNIFICANT CHANGE UP (ref 5–17)
APTT BLD: 31.8 SEC — SIGNIFICANT CHANGE UP (ref 27.5–36.3)
AST SERPL-CCNC: 14 U/L — SIGNIFICANT CHANGE UP (ref 10–40)
BASOPHILS # BLD AUTO: 0.02 K/UL — SIGNIFICANT CHANGE UP (ref 0–0.2)
BASOPHILS NFR BLD AUTO: 0.2 % — SIGNIFICANT CHANGE UP (ref 0–2)
BILIRUB SERPL-MCNC: 0.2 MG/DL — SIGNIFICANT CHANGE UP (ref 0.2–1.2)
BLD GP AB SCN SERPL QL: NEGATIVE — SIGNIFICANT CHANGE UP
BUN SERPL-MCNC: 16 MG/DL — SIGNIFICANT CHANGE UP (ref 7–23)
CALCIUM SERPL-MCNC: 9.9 MG/DL — SIGNIFICANT CHANGE UP (ref 8.4–10.5)
CHLORIDE SERPL-SCNC: 99 MMOL/L — SIGNIFICANT CHANGE UP (ref 96–108)
CO2 SERPL-SCNC: 19 MMOL/L — LOW (ref 22–31)
CREAT SERPL-MCNC: 0.76 MG/DL — SIGNIFICANT CHANGE UP (ref 0.5–1.3)
EOSINOPHIL # BLD AUTO: 0.36 K/UL — SIGNIFICANT CHANGE UP (ref 0–0.5)
EOSINOPHIL NFR BLD AUTO: 3 % — SIGNIFICANT CHANGE UP (ref 0–6)
FIBRINOGEN PPP-MCNC: 549 MG/DL — HIGH (ref 350–510)
GLUCOSE BLDC GLUCOMTR-MCNC: 126 MG/DL — HIGH (ref 70–99)
GLUCOSE BLDC GLUCOMTR-MCNC: 131 MG/DL — HIGH (ref 70–99)
GLUCOSE BLDC GLUCOMTR-MCNC: 237 MG/DL — HIGH (ref 70–99)
GLUCOSE BLDC GLUCOMTR-MCNC: 92 MG/DL — SIGNIFICANT CHANGE UP (ref 70–99)
GLUCOSE SERPL-MCNC: 102 MG/DL — HIGH (ref 70–99)
HAPTOGLOB SERPL-MCNC: 202 MG/DL — HIGH (ref 34–200)
HCT VFR BLD CALC: 36.9 % — SIGNIFICANT CHANGE UP (ref 34.5–45)
HGB BLD-MCNC: 12 G/DL — SIGNIFICANT CHANGE UP (ref 11.5–15.5)
IMM GRANULOCYTES NFR BLD AUTO: 0.3 % — SIGNIFICANT CHANGE UP (ref 0–1.5)
INR BLD: 0.83 RATIO — LOW (ref 0.88–1.16)
LDH SERPL L TO P-CCNC: 165 U/L — SIGNIFICANT CHANGE UP (ref 50–242)
LYMPHOCYTES # BLD AUTO: 2.95 K/UL — SIGNIFICANT CHANGE UP (ref 1–3.3)
LYMPHOCYTES # BLD AUTO: 24.5 % — SIGNIFICANT CHANGE UP (ref 13–44)
MAGNESIUM SERPL-MCNC: 4.9 MG/DL — HIGH (ref 1.6–2.6)
MCHC RBC-ENTMCNC: 29.7 PG — SIGNIFICANT CHANGE UP (ref 27–34)
MCHC RBC-ENTMCNC: 32.5 GM/DL — SIGNIFICANT CHANGE UP (ref 32–36)
MCV RBC AUTO: 91.3 FL — SIGNIFICANT CHANGE UP (ref 80–100)
MONOCYTES # BLD AUTO: 0.82 K/UL — SIGNIFICANT CHANGE UP (ref 0–0.9)
MONOCYTES NFR BLD AUTO: 6.8 % — SIGNIFICANT CHANGE UP (ref 2–14)
NEUTROPHILS # BLD AUTO: 7.87 K/UL — HIGH (ref 1.8–7.4)
NEUTROPHILS NFR BLD AUTO: 65.2 % — SIGNIFICANT CHANGE UP (ref 43–77)
NRBC # BLD: 0 /100 WBCS — SIGNIFICANT CHANGE UP (ref 0–0)
PLATELET # BLD AUTO: 425 K/UL — HIGH (ref 150–400)
POTASSIUM SERPL-MCNC: 4.2 MMOL/L — SIGNIFICANT CHANGE UP (ref 3.5–5.3)
POTASSIUM SERPL-SCNC: 4.2 MMOL/L — SIGNIFICANT CHANGE UP (ref 3.5–5.3)
PROT SERPL-MCNC: 7.4 G/DL — SIGNIFICANT CHANGE UP (ref 6–8.3)
PROTHROM AB SERPL-ACNC: 9.4 SEC — LOW (ref 10–12.9)
RBC # BLD: 4.04 M/UL — SIGNIFICANT CHANGE UP (ref 3.8–5.2)
RBC # FLD: 15.7 % — HIGH (ref 10.3–14.5)
RH IG SCN BLD-IMP: POSITIVE — SIGNIFICANT CHANGE UP
SODIUM SERPL-SCNC: 132 MMOL/L — LOW (ref 135–145)
URATE SERPL-MCNC: 5.2 MG/DL — SIGNIFICANT CHANGE UP (ref 2.5–7)
WBC # BLD: 12.06 K/UL — HIGH (ref 3.8–10.5)
WBC # FLD AUTO: 12.06 K/UL — HIGH (ref 3.8–10.5)

## 2020-02-16 PROCEDURE — 99232 SBSQ HOSP IP/OBS MODERATE 35: CPT

## 2020-02-16 PROCEDURE — 99232 SBSQ HOSP IP/OBS MODERATE 35: CPT | Mod: GC

## 2020-02-16 RX ORDER — MAGNESIUM SULFATE 500 MG/ML
2 VIAL (ML) INJECTION
Qty: 40 | Refills: 0 | Status: DISCONTINUED | OUTPATIENT
Start: 2020-02-16 | End: 2020-02-16

## 2020-02-16 RX ORDER — SODIUM CHLORIDE 9 MG/ML
1000 INJECTION, SOLUTION INTRAVENOUS
Refills: 0 | Status: DISCONTINUED | OUTPATIENT
Start: 2020-02-16 | End: 2020-02-23

## 2020-02-16 RX ORDER — INSULIN LISPRO 100/ML
4 VIAL (ML) SUBCUTANEOUS ONCE
Refills: 0 | Status: COMPLETED | OUTPATIENT
Start: 2020-02-16 | End: 2020-02-16

## 2020-02-16 RX ORDER — INSULIN LISPRO 100/ML
VIAL (ML) SUBCUTANEOUS
Refills: 0 | Status: DISCONTINUED | OUTPATIENT
Start: 2020-02-16 | End: 2020-02-19

## 2020-02-16 RX ORDER — HEPARIN SODIUM 5000 [USP'U]/ML
5000 INJECTION INTRAVENOUS; SUBCUTANEOUS EVERY 8 HOURS
Refills: 0 | Status: DISCONTINUED | OUTPATIENT
Start: 2020-02-16 | End: 2020-02-18

## 2020-02-16 RX ORDER — MAGNESIUM SULFATE 500 MG/ML
4 VIAL (ML) INJECTION ONCE
Refills: 0 | Status: COMPLETED | OUTPATIENT
Start: 2020-02-16 | End: 2020-02-16

## 2020-02-16 RX ADMIN — HUMAN INSULIN 17 UNIT(S): 100 INJECTION, SUSPENSION SUBCUTANEOUS at 23:54

## 2020-02-16 RX ADMIN — HEPARIN SODIUM 5000 UNIT(S): 5000 INJECTION INTRAVENOUS; SUBCUTANEOUS at 23:40

## 2020-02-16 RX ADMIN — Medication 200 GRAM(S): at 06:26

## 2020-02-16 RX ADMIN — Medication 60 MILLIGRAM(S): at 06:25

## 2020-02-16 RX ADMIN — SODIUM CHLORIDE 50 MILLILITER(S): 9 INJECTION, SOLUTION INTRAVENOUS at 11:00

## 2020-02-16 RX ADMIN — Medication 800 MILLIGRAM(S): at 23:57

## 2020-02-16 RX ADMIN — Medication 60 MILLIGRAM(S): at 18:17

## 2020-02-16 RX ADMIN — HEPARIN SODIUM 5000 UNIT(S): 5000 INJECTION INTRAVENOUS; SUBCUTANEOUS at 00:07

## 2020-02-16 RX ADMIN — Medication 800 MILLIGRAM(S): at 00:07

## 2020-02-16 RX ADMIN — Medication 5 UNIT(S): at 21:57

## 2020-02-16 RX ADMIN — Medication 4 UNIT(S): at 23:52

## 2020-02-16 RX ADMIN — Medication 800 MILLIGRAM(S): at 08:00

## 2020-02-16 RX ADMIN — Medication 800 MILLIGRAM(S): at 16:02

## 2020-02-16 RX ADMIN — Medication 12 MILLIGRAM(S): at 10:30

## 2020-02-16 RX ADMIN — Medication 50 GM/HR: at 06:50

## 2020-02-16 NOTE — PROGRESS NOTE ADULT - ASSESSMENT
Patient with uncontrolled HTN    #Uncontrolled HTN   - Patient with uncontrolled HTN on Labetalol 700mg every 8 hours and Nifedipine 90mg daily  - Currently serum creatinine stable, platelets WNL, no overt proteinuria in UA.  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   - Monitor for signs of TTP, TTP can recur during pregnancy.   - Closely monitor platelets, Hgb, LFTs and renal function.  - every other day LDH and Hapto levels  - On Labetalol 800mg every 8hours and Nifedipine 60mg  BID.  If BP still uncontrolled need to consider adding Hydralazine    Juaquin Strickland   Nephrology Fellow  Pager

## 2020-02-16 NOTE — PROGRESS NOTE ADULT - SUBJECTIVE AND OBJECTIVE BOX
[facility] DIVISION OF KIDNEY DISEASES AND HYPERTENSION -- FOLLOW UP NOTE  --------------------------------------------------------------------------------  Chief Complaint: HTN.       24 hour events/subjective:    Patient seen without significant complaints, BP stable. Denies abdominal pain, n/v/cp/sob, edema, hematuria.         PAST HISTORY  --------------------------------------------------------------------------------  No significant changes to PMH, PSH, FHx, SHx, unless otherwise noted    ALLERGIES & MEDICATIONS  --------------------------------------------------------------------------------  Allergies    penicillin (Hives)    Intolerances      Standing Inpatient Medications  betamethasone Injectable 12 milliGRAM(s) IntraMuscular every 24 hours  dextrose 5% + sodium chloride 0.9%. 1000 milliLiter(s) IV Continuous <Continuous>  dextrose 5%. 1000 milliLiter(s) IV Continuous <Continuous>  dextrose 50% Injectable 12.5 Gram(s) IV Push once  dextrose 50% Injectable 25 Gram(s) IV Push once  dextrose 50% Injectable 25 Gram(s) IV Push once  folic acid 1 milliGRAM(s) Oral daily  insulin lispro Injectable (HumaLOG) 5 Unit(s) SubCutaneous three times a day before meals  insulin NPH human recombinant 17 Unit(s) SubCutaneous at bedtime  labetalol 800 milliGRAM(s) Oral <User Schedule>  magnesium sulfate Infusion 2 Gm/Hr IV Continuous <Continuous>  NIFEdipine XL 60 milliGRAM(s) Oral <User Schedule>  prenatal multivitamin 1 Tablet(s) Oral daily  sodium chloride 0.9%. 1000 milliLiter(s) IV Continuous <Continuous>    PRN Inpatient Medications  dextrose 40% Gel 15 Gram(s) Oral once PRN  glucagon  Injectable 1 milliGRAM(s) IntraMuscular once PRN      REVIEW OF SYSTEMS  --------------------------------------------------------------------------------  Gen: no lethargy, no  fatigue  Respiratory: No dyspnea  CV: No chest pain  GI: No abdominal pain  MSK: no LE edema  Neuro: No dizziness  Heme: No bleeding    All other systems were reviewed and are negative, except as noted.      VITALS/PHYSICAL EXAM  --------------------------------------------------------------------------------  T(C): 36.6 (02-15-20 @ 17:36), Max: 36.6 (02-15-20 @ 13:47)  HR: 73 (02-15-20 @ 17:36) (71 - 73)  BP: 131/85 (02-15-20 @ 17:36) (131/85 - 131/88)  RR: 16 (02-15-20 @ 17:36) (16 - 18)  SpO2: 99% (02-15-20 @ 17:36) (99% - 99%)  Wt(kg): --        02-15-20 @ 07:01  -  02-16-20 @ 07:00  --------------------------------------------------------  IN: 100 mL / OUT: 0 mL / NET: 100 mL    02-16-20 @ 07:01  -  02-16-20 @ 11:46  --------------------------------------------------------  IN: 0 mL / OUT: 325 mL / NET: -325 mL      Physical Exam:    	Gen: NAD, obese female,   	HEENT: PERRL, supple neck  	Pulm: CTA B/L  	CV:  S1S2  	Abd: +BS, soft, gravid  	Ext: No B/L Lower ext edema  	Neuro: No focal deficits  	Skin: Warm, without rashes  	Vascular access: none      LABS/STUDIES  --------------------------------------------------------------------------------              12.0   12.06 >-----------<  425      [02-16-20 @ 05:43]              36.9     132  |  99  |  16  ----------------------------<  102      [02-16-20 @ 05:43]  4.2   |  19  |  0.76        Ca     9.9     [02-16-20 @ 05:43]      Mg     4.9     [02-16-20 @ 10:54]    TPro  7.4  /  Alb  3.6  /  TBili  0.2  /  DBili  x   /  AST  14  /  ALT  19  /  AlkPhos  112  [02-16-20 @ 05:43]    PT/INR: PT 9.4  , INR 0.83       [02-16-20 @ 05:43]  PTT: 31.8       [02-16-20 @ 05:43]    Uric acid 5.2      [02-16-20 @ 05:43]        [02-16-20 @ 05:43]    Creatinine Trend:  SCr 0.76 [02-16 @ 05:43]  SCr 0.58 [02-13 @ 07:28]  SCr 0.69 [02-12 @ 06:35]  SCr 0.63 [02-11 @ 07:21]  SCr 0.78 [02-10 @ 11:15]    Urinalysis - [02-07-20 @ 16:42]      Color Colorless / Appearance Clear / SG 1.009 / pH 6.5      Gluc Negative / Ketone Negative  / Bili Negative / Urobili Negative       Blood Negative / Protein Negative / Leuk Est Negative / Nitrite Negative      RBC  / WBC  / Hyaline  / Gran  / Sq Epi  / Non Sq Epi  / Bacteria       PTH -- (Ca 8.1)      [04-14-19 @ 14:16]   285  Vitamin D (25OH) 23.0      [04-14-19 @ 14:18]  HbA1c 7.1      [02-08-20 @ 10:12]  TSH 0.70      [04-06-19 @ 06:25]      LAURA: titer Negative, pattern --      [02-10-20 @ 21:04]  dsDNA <12      [02-10-20 @ 21:04]  C3 Complement 150      [02-10-20 @ 21:05]  C4 Complement 29      [02-10-20 @ 21:05]  Syphilis Screen (Treponema Pallidum Ab) Negative      [02-08-20 @ 10:47]

## 2020-02-17 LAB
ALBUMIN SERPL ELPH-MCNC: 3.3 G/DL — SIGNIFICANT CHANGE UP (ref 3.3–5)
ALP SERPL-CCNC: 108 U/L — SIGNIFICANT CHANGE UP (ref 40–120)
ALT FLD-CCNC: 16 U/L — SIGNIFICANT CHANGE UP (ref 10–45)
ANION GAP SERPL CALC-SCNC: 12 MMOL/L — SIGNIFICANT CHANGE UP (ref 5–17)
APTT BLD: 25.8 SEC — LOW (ref 27.5–36.3)
AST SERPL-CCNC: 14 U/L — SIGNIFICANT CHANGE UP (ref 10–40)
BASOPHILS # BLD AUTO: 0.01 K/UL — SIGNIFICANT CHANGE UP (ref 0–0.2)
BASOPHILS NFR BLD AUTO: 0.1 % — SIGNIFICANT CHANGE UP (ref 0–2)
BILIRUB SERPL-MCNC: <0.1 MG/DL — LOW (ref 0.2–1.2)
BUN SERPL-MCNC: 14 MG/DL — SIGNIFICANT CHANGE UP (ref 7–23)
CALCIUM SERPL-MCNC: 9 MG/DL — SIGNIFICANT CHANGE UP (ref 8.4–10.5)
CHLORIDE SERPL-SCNC: 105 MMOL/L — SIGNIFICANT CHANGE UP (ref 96–108)
CO2 SERPL-SCNC: 18 MMOL/L — LOW (ref 22–31)
CREAT SERPL-MCNC: 0.71 MG/DL — SIGNIFICANT CHANGE UP (ref 0.5–1.3)
EOSINOPHIL # BLD AUTO: 0 K/UL — SIGNIFICANT CHANGE UP (ref 0–0.5)
EOSINOPHIL NFR BLD AUTO: 0 % — SIGNIFICANT CHANGE UP (ref 0–6)
FIBRINOGEN PPP-MCNC: 766 MG/DL — HIGH (ref 350–510)
GLUCOSE BLDC GLUCOMTR-MCNC: 109 MG/DL — HIGH (ref 70–99)
GLUCOSE BLDC GLUCOMTR-MCNC: 175 MG/DL — HIGH (ref 70–99)
GLUCOSE BLDC GLUCOMTR-MCNC: 182 MG/DL — HIGH (ref 70–99)
GLUCOSE BLDC GLUCOMTR-MCNC: 190 MG/DL — HIGH (ref 70–99)
GLUCOSE BLDC GLUCOMTR-MCNC: 191 MG/DL — HIGH (ref 70–99)
GLUCOSE BLDC GLUCOMTR-MCNC: 193 MG/DL — HIGH (ref 70–99)
GLUCOSE BLDC GLUCOMTR-MCNC: 208 MG/DL — HIGH (ref 70–99)
GLUCOSE BLDC GLUCOMTR-MCNC: 216 MG/DL — HIGH (ref 70–99)
GLUCOSE BLDC GLUCOMTR-MCNC: 227 MG/DL — HIGH (ref 70–99)
GLUCOSE SERPL-MCNC: 141 MG/DL — HIGH (ref 70–99)
HCT VFR BLD CALC: 33.6 % — LOW (ref 34.5–45)
HGB BLD-MCNC: 11 G/DL — LOW (ref 11.5–15.5)
IMM GRANULOCYTES NFR BLD AUTO: 0.4 % — SIGNIFICANT CHANGE UP (ref 0–1.5)
INR BLD: 0.92 RATIO — SIGNIFICANT CHANGE UP (ref 0.88–1.16)
LDH SERPL L TO P-CCNC: 156 U/L — SIGNIFICANT CHANGE UP (ref 50–242)
LYMPHOCYTES # BLD AUTO: 1.95 K/UL — SIGNIFICANT CHANGE UP (ref 1–3.3)
LYMPHOCYTES # BLD AUTO: 14.6 % — SIGNIFICANT CHANGE UP (ref 13–44)
MCHC RBC-ENTMCNC: 30.1 PG — SIGNIFICANT CHANGE UP (ref 27–34)
MCHC RBC-ENTMCNC: 32.7 GM/DL — SIGNIFICANT CHANGE UP (ref 32–36)
MCV RBC AUTO: 92.1 FL — SIGNIFICANT CHANGE UP (ref 80–100)
MONOCYTES # BLD AUTO: 0.94 K/UL — HIGH (ref 0–0.9)
MONOCYTES NFR BLD AUTO: 7 % — SIGNIFICANT CHANGE UP (ref 2–14)
NEUTROPHILS # BLD AUTO: 10.43 K/UL — HIGH (ref 1.8–7.4)
NEUTROPHILS NFR BLD AUTO: 77.9 % — HIGH (ref 43–77)
NRBC # BLD: 0 /100 WBCS — SIGNIFICANT CHANGE UP (ref 0–0)
PLATELET # BLD AUTO: 395 K/UL — SIGNIFICANT CHANGE UP (ref 150–400)
POTASSIUM SERPL-MCNC: 4.3 MMOL/L — SIGNIFICANT CHANGE UP (ref 3.5–5.3)
POTASSIUM SERPL-SCNC: 4.3 MMOL/L — SIGNIFICANT CHANGE UP (ref 3.5–5.3)
PROT SERPL-MCNC: 6.7 G/DL — SIGNIFICANT CHANGE UP (ref 6–8.3)
PROTHROM AB SERPL-ACNC: 10.6 SEC — SIGNIFICANT CHANGE UP (ref 10–12.9)
RBC # BLD: 3.65 M/UL — LOW (ref 3.8–5.2)
RBC # FLD: 15.6 % — HIGH (ref 10.3–14.5)
SODIUM SERPL-SCNC: 135 MMOL/L — SIGNIFICANT CHANGE UP (ref 135–145)
URATE SERPL-MCNC: 4.9 MG/DL — SIGNIFICANT CHANGE UP (ref 2.5–7)
WBC # BLD: 13.38 K/UL — HIGH (ref 3.8–10.5)
WBC # FLD AUTO: 13.38 K/UL — HIGH (ref 3.8–10.5)

## 2020-02-17 PROCEDURE — 99232 SBSQ HOSP IP/OBS MODERATE 35: CPT

## 2020-02-17 RX ORDER — INSULIN LISPRO 100/ML
4 VIAL (ML) SUBCUTANEOUS ONCE
Refills: 0 | Status: COMPLETED | OUTPATIENT
Start: 2020-02-17 | End: 2020-02-17

## 2020-02-17 RX ORDER — HUMAN INSULIN 100 [IU]/ML
20 INJECTION, SUSPENSION SUBCUTANEOUS AT BEDTIME
Refills: 0 | Status: DISCONTINUED | OUTPATIENT
Start: 2020-02-17 | End: 2020-02-19

## 2020-02-17 RX ORDER — INSULIN LISPRO 100/ML
10 VIAL (ML) SUBCUTANEOUS
Refills: 0 | Status: DISCONTINUED | OUTPATIENT
Start: 2020-02-17 | End: 2020-02-19

## 2020-02-17 RX ADMIN — Medication 10 UNIT(S): at 18:34

## 2020-02-17 RX ADMIN — Medication 60 MILLIGRAM(S): at 17:39

## 2020-02-17 RX ADMIN — HUMAN INSULIN 20 UNIT(S): 100 INJECTION, SUSPENSION SUBCUTANEOUS at 22:06

## 2020-02-17 RX ADMIN — Medication 1 MILLIGRAM(S): at 08:52

## 2020-02-17 RX ADMIN — Medication 4 UNIT(S): at 01:56

## 2020-02-17 RX ADMIN — Medication 4 UNIT(S): at 00:50

## 2020-02-17 RX ADMIN — HEPARIN SODIUM 5000 UNIT(S): 5000 INJECTION INTRAVENOUS; SUBCUTANEOUS at 07:52

## 2020-02-17 RX ADMIN — Medication 2: at 11:40

## 2020-02-17 RX ADMIN — Medication 4: at 20:16

## 2020-02-17 RX ADMIN — Medication 800 MILLIGRAM(S): at 07:56

## 2020-02-17 RX ADMIN — Medication 10 UNIT(S): at 14:58

## 2020-02-17 RX ADMIN — Medication 12 MILLIGRAM(S): at 11:00

## 2020-02-17 RX ADMIN — Medication 2: at 16:32

## 2020-02-17 RX ADMIN — Medication 1 TABLET(S): at 08:06

## 2020-02-17 RX ADMIN — Medication 800 MILLIGRAM(S): at 16:05

## 2020-02-17 RX ADMIN — Medication 5 UNIT(S): at 09:18

## 2020-02-17 RX ADMIN — Medication 60 MILLIGRAM(S): at 05:02

## 2020-02-17 RX ADMIN — HEPARIN SODIUM 5000 UNIT(S): 5000 INJECTION INTRAVENOUS; SUBCUTANEOUS at 16:04

## 2020-02-18 ENCOUNTER — APPOINTMENT (OUTPATIENT)
Dept: ANTEPARTUM | Facility: CLINIC | Age: 40
End: 2020-02-18

## 2020-02-18 ENCOUNTER — ASOB RESULT (OUTPATIENT)
Age: 40
End: 2020-02-18

## 2020-02-18 ENCOUNTER — TRANSCRIPTION ENCOUNTER (OUTPATIENT)
Age: 40
End: 2020-02-18

## 2020-02-18 ENCOUNTER — APPOINTMENT (OUTPATIENT)
Dept: NEPHROLOGY | Facility: CLINIC | Age: 40
End: 2020-02-18

## 2020-02-18 LAB
ALBUMIN SERPL ELPH-MCNC: 3.7 G/DL — SIGNIFICANT CHANGE UP (ref 3.3–5)
ALP SERPL-CCNC: 123 U/L — HIGH (ref 40–120)
ALT FLD-CCNC: 20 U/L — SIGNIFICANT CHANGE UP (ref 10–45)
ANION GAP SERPL CALC-SCNC: 18 MMOL/L — HIGH (ref 5–17)
APTT BLD: 27.1 SEC — LOW (ref 27.5–36.3)
AST SERPL-CCNC: 15 U/L — SIGNIFICANT CHANGE UP (ref 10–40)
BASOPHILS # BLD AUTO: 0.01 K/UL — SIGNIFICANT CHANGE UP (ref 0–0.2)
BASOPHILS NFR BLD AUTO: 0.1 % — SIGNIFICANT CHANGE UP (ref 0–2)
BILIRUB SERPL-MCNC: <0.1 MG/DL — LOW (ref 0.2–1.2)
BUN SERPL-MCNC: 19 MG/DL — SIGNIFICANT CHANGE UP (ref 7–23)
CALCIUM SERPL-MCNC: 9.8 MG/DL — SIGNIFICANT CHANGE UP (ref 8.4–10.5)
CHLORIDE SERPL-SCNC: 101 MMOL/L — SIGNIFICANT CHANGE UP (ref 96–108)
CO2 SERPL-SCNC: 16 MMOL/L — LOW (ref 22–31)
CREAT SERPL-MCNC: 0.8 MG/DL — SIGNIFICANT CHANGE UP (ref 0.5–1.3)
EOSINOPHIL # BLD AUTO: 0.01 K/UL — SIGNIFICANT CHANGE UP (ref 0–0.5)
EOSINOPHIL NFR BLD AUTO: 0.1 % — SIGNIFICANT CHANGE UP (ref 0–6)
FIBRINOGEN PPP-MCNC: 697 MG/DL — HIGH (ref 350–510)
GLUCOSE BLDC GLUCOMTR-MCNC: 108 MG/DL — HIGH (ref 70–99)
GLUCOSE BLDC GLUCOMTR-MCNC: 116 MG/DL — HIGH (ref 70–99)
GLUCOSE BLDC GLUCOMTR-MCNC: 137 MG/DL — HIGH (ref 70–99)
GLUCOSE BLDC GLUCOMTR-MCNC: 148 MG/DL — HIGH (ref 70–99)
GLUCOSE BLDC GLUCOMTR-MCNC: 162 MG/DL — HIGH (ref 70–99)
GLUCOSE BLDC GLUCOMTR-MCNC: 170 MG/DL — HIGH (ref 70–99)
GLUCOSE SERPL-MCNC: 149 MG/DL — HIGH (ref 70–99)
HCT VFR BLD CALC: 36.4 % — SIGNIFICANT CHANGE UP (ref 34.5–45)
HGB BLD-MCNC: 11.9 G/DL — SIGNIFICANT CHANGE UP (ref 11.5–15.5)
IMM GRANULOCYTES NFR BLD AUTO: 0.8 % — SIGNIFICANT CHANGE UP (ref 0–1.5)
INR BLD: 0.89 RATIO — SIGNIFICANT CHANGE UP (ref 0.88–1.16)
LDH SERPL L TO P-CCNC: 166 U/L — SIGNIFICANT CHANGE UP (ref 50–242)
LYMPHOCYTES # BLD AUTO: 14.8 % — SIGNIFICANT CHANGE UP (ref 13–44)
LYMPHOCYTES # BLD AUTO: 2.11 K/UL — SIGNIFICANT CHANGE UP (ref 1–3.3)
MAGNESIUM SERPL-MCNC: 4.7 MG/DL — HIGH (ref 1.6–2.6)
MCHC RBC-ENTMCNC: 30.2 PG — SIGNIFICANT CHANGE UP (ref 27–34)
MCHC RBC-ENTMCNC: 32.7 GM/DL — SIGNIFICANT CHANGE UP (ref 32–36)
MCV RBC AUTO: 92.4 FL — SIGNIFICANT CHANGE UP (ref 80–100)
MONOCYTES # BLD AUTO: 0.92 K/UL — HIGH (ref 0–0.9)
MONOCYTES NFR BLD AUTO: 6.5 % — SIGNIFICANT CHANGE UP (ref 2–14)
NEUTROPHILS # BLD AUTO: 11.05 K/UL — HIGH (ref 1.8–7.4)
NEUTROPHILS NFR BLD AUTO: 77.7 % — HIGH (ref 43–77)
NRBC # BLD: 0 /100 WBCS — SIGNIFICANT CHANGE UP (ref 0–0)
PLATELET # BLD AUTO: 420 K/UL — HIGH (ref 150–400)
POTASSIUM SERPL-MCNC: 4.3 MMOL/L — SIGNIFICANT CHANGE UP (ref 3.5–5.3)
POTASSIUM SERPL-SCNC: 4.3 MMOL/L — SIGNIFICANT CHANGE UP (ref 3.5–5.3)
PROT SERPL-MCNC: 7.7 G/DL — SIGNIFICANT CHANGE UP (ref 6–8.3)
PROTHROM AB SERPL-ACNC: 10.1 SEC — SIGNIFICANT CHANGE UP (ref 10–12.9)
RBC # BLD: 3.94 M/UL — SIGNIFICANT CHANGE UP (ref 3.8–5.2)
RBC # FLD: 15.5 % — HIGH (ref 10.3–14.5)
SODIUM SERPL-SCNC: 135 MMOL/L — SIGNIFICANT CHANGE UP (ref 135–145)
URATE SERPL-MCNC: 5.1 MG/DL — SIGNIFICANT CHANGE UP (ref 2.5–7)
WBC # BLD: 14.21 K/UL — HIGH (ref 3.8–10.5)
WBC # FLD AUTO: 14.21 K/UL — HIGH (ref 3.8–10.5)

## 2020-02-18 PROCEDURE — 76820 UMBILICAL ARTERY ECHO: CPT | Mod: 26

## 2020-02-18 PROCEDURE — 76818 FETAL BIOPHYS PROFILE W/NST: CPT | Mod: 26

## 2020-02-18 RX ORDER — MAGNESIUM SULFATE 500 MG/ML
4 VIAL (ML) INJECTION ONCE
Refills: 0 | Status: COMPLETED | OUTPATIENT
Start: 2020-02-18 | End: 2020-02-18

## 2020-02-18 RX ORDER — HEPARIN SODIUM 5000 [USP'U]/ML
5000 INJECTION INTRAVENOUS; SUBCUTANEOUS EVERY 8 HOURS
Refills: 0 | Status: DISCONTINUED | OUTPATIENT
Start: 2020-02-18 | End: 2020-02-19

## 2020-02-18 RX ORDER — SODIUM CHLORIDE 9 MG/ML
1000 INJECTION, SOLUTION INTRAVENOUS
Refills: 0 | Status: DISCONTINUED | OUTPATIENT
Start: 2020-02-18 | End: 2020-02-23

## 2020-02-18 RX ORDER — SODIUM CHLORIDE 9 MG/ML
1000 INJECTION INTRAMUSCULAR; INTRAVENOUS; SUBCUTANEOUS
Refills: 0 | Status: DISCONTINUED | OUTPATIENT
Start: 2020-02-18 | End: 2020-02-18

## 2020-02-18 RX ORDER — HYDRALAZINE HCL 50 MG
5 TABLET ORAL ONCE
Refills: 0 | Status: COMPLETED | OUTPATIENT
Start: 2020-02-18 | End: 2020-02-18

## 2020-02-18 RX ORDER — MAGNESIUM SULFATE 500 MG/ML
2 VIAL (ML) INJECTION
Qty: 40 | Refills: 0 | Status: DISCONTINUED | OUTPATIENT
Start: 2020-02-18 | End: 2020-02-18

## 2020-02-18 RX ADMIN — HEPARIN SODIUM 5000 UNIT(S): 5000 INJECTION INTRAVENOUS; SUBCUTANEOUS at 15:16

## 2020-02-18 RX ADMIN — Medication 800 MILLIGRAM(S): at 08:03

## 2020-02-18 RX ADMIN — Medication 200 GRAM(S): at 02:23

## 2020-02-18 RX ADMIN — Medication 1 TABLET(S): at 12:42

## 2020-02-18 RX ADMIN — HEPARIN SODIUM 5000 UNIT(S): 5000 INJECTION INTRAVENOUS; SUBCUTANEOUS at 22:06

## 2020-02-18 RX ADMIN — HEPARIN SODIUM 5000 UNIT(S): 5000 INJECTION INTRAVENOUS; SUBCUTANEOUS at 00:23

## 2020-02-18 RX ADMIN — Medication 10 UNIT(S): at 19:48

## 2020-02-18 RX ADMIN — HUMAN INSULIN 20 UNIT(S): 100 INJECTION, SUSPENSION SUBCUTANEOUS at 22:06

## 2020-02-18 RX ADMIN — Medication 60 MILLIGRAM(S): at 18:55

## 2020-02-18 RX ADMIN — Medication 800 MILLIGRAM(S): at 00:23

## 2020-02-18 RX ADMIN — Medication 10 UNIT(S): at 11:54

## 2020-02-18 RX ADMIN — Medication 2: at 14:20

## 2020-02-18 RX ADMIN — Medication 800 MILLIGRAM(S): at 17:23

## 2020-02-18 RX ADMIN — Medication 1 MILLIGRAM(S): at 12:41

## 2020-02-18 RX ADMIN — Medication 60 MILLIGRAM(S): at 05:35

## 2020-02-18 RX ADMIN — Medication 50 GM/HR: at 02:57

## 2020-02-18 RX ADMIN — Medication 5 MILLIGRAM(S): at 01:25

## 2020-02-19 ENCOUNTER — APPOINTMENT (OUTPATIENT)
Dept: ANTEPARTUM | Facility: CLINIC | Age: 40
End: 2020-02-19

## 2020-02-19 ENCOUNTER — ASOB RESULT (OUTPATIENT)
Age: 40
End: 2020-02-19

## 2020-02-19 LAB
ALBUMIN SERPL ELPH-MCNC: 3.2 G/DL — LOW (ref 3.3–5)
ALBUMIN SERPL ELPH-MCNC: 3.5 G/DL — SIGNIFICANT CHANGE UP (ref 3.3–5)
ALP SERPL-CCNC: 105 U/L — SIGNIFICANT CHANGE UP (ref 40–120)
ALP SERPL-CCNC: 95 U/L — SIGNIFICANT CHANGE UP (ref 40–120)
ALT FLD-CCNC: 17 U/L — SIGNIFICANT CHANGE UP (ref 10–45)
ALT FLD-CCNC: 20 U/L — SIGNIFICANT CHANGE UP (ref 10–45)
ANION GAP SERPL CALC-SCNC: 11 MMOL/L — SIGNIFICANT CHANGE UP (ref 5–17)
ANION GAP SERPL CALC-SCNC: 15 MMOL/L — SIGNIFICANT CHANGE UP (ref 5–17)
APTT BLD: 23.8 SEC — LOW (ref 27.5–36.3)
APTT BLD: 28.7 SEC — SIGNIFICANT CHANGE UP (ref 27.5–36.3)
AST SERPL-CCNC: 16 U/L — SIGNIFICANT CHANGE UP (ref 10–40)
AST SERPL-CCNC: 17 U/L — SIGNIFICANT CHANGE UP (ref 10–40)
BASOPHILS # BLD AUTO: 0.02 K/UL — SIGNIFICANT CHANGE UP (ref 0–0.2)
BASOPHILS # BLD AUTO: 0.03 K/UL — SIGNIFICANT CHANGE UP (ref 0–0.2)
BASOPHILS NFR BLD AUTO: 0.1 % — SIGNIFICANT CHANGE UP (ref 0–2)
BASOPHILS NFR BLD AUTO: 0.2 % — SIGNIFICANT CHANGE UP (ref 0–2)
BILIRUB SERPL-MCNC: 0.2 MG/DL — SIGNIFICANT CHANGE UP (ref 0.2–1.2)
BILIRUB SERPL-MCNC: 0.2 MG/DL — SIGNIFICANT CHANGE UP (ref 0.2–1.2)
BLD GP AB SCN SERPL QL: NEGATIVE — SIGNIFICANT CHANGE UP
BUN SERPL-MCNC: 12 MG/DL — SIGNIFICANT CHANGE UP (ref 7–23)
BUN SERPL-MCNC: 8 MG/DL — SIGNIFICANT CHANGE UP (ref 7–23)
CALCIUM SERPL-MCNC: 8.1 MG/DL — LOW (ref 8.4–10.5)
CALCIUM SERPL-MCNC: 9.1 MG/DL — SIGNIFICANT CHANGE UP (ref 8.4–10.5)
CHLORIDE SERPL-SCNC: 100 MMOL/L — SIGNIFICANT CHANGE UP (ref 96–108)
CHLORIDE SERPL-SCNC: 101 MMOL/L — SIGNIFICANT CHANGE UP (ref 96–108)
CO2 SERPL-SCNC: 20 MMOL/L — LOW (ref 22–31)
CO2 SERPL-SCNC: 21 MMOL/L — LOW (ref 22–31)
CREAT SERPL-MCNC: 0.66 MG/DL — SIGNIFICANT CHANGE UP (ref 0.5–1.3)
CREAT SERPL-MCNC: 0.68 MG/DL — SIGNIFICANT CHANGE UP (ref 0.5–1.3)
EOSINOPHIL # BLD AUTO: 0.04 K/UL — SIGNIFICANT CHANGE UP (ref 0–0.5)
EOSINOPHIL # BLD AUTO: 0.04 K/UL — SIGNIFICANT CHANGE UP (ref 0–0.5)
EOSINOPHIL NFR BLD AUTO: 0.2 % — SIGNIFICANT CHANGE UP (ref 0–6)
EOSINOPHIL NFR BLD AUTO: 0.3 % — SIGNIFICANT CHANGE UP (ref 0–6)
FIBRINOGEN PPP-MCNC: 516 MG/DL — HIGH (ref 350–510)
FIBRINOGEN PPP-MCNC: 564 MG/DL — HIGH (ref 350–510)
FIBRINOGEN PPP-MCNC: 572 MG/DL — HIGH (ref 350–510)
GLUCOSE BLDC GLUCOMTR-MCNC: 94 MG/DL — SIGNIFICANT CHANGE UP (ref 70–99)
GLUCOSE SERPL-MCNC: 154 MG/DL — HIGH (ref 70–99)
GLUCOSE SERPL-MCNC: 97 MG/DL — SIGNIFICANT CHANGE UP (ref 70–99)
HAPTOGLOB SERPL-MCNC: 207 MG/DL — HIGH (ref 34–200)
HCT VFR BLD CALC: 32.8 % — LOW (ref 34.5–45)
HCT VFR BLD CALC: 35.7 % — SIGNIFICANT CHANGE UP (ref 34.5–45)
HGB BLD-MCNC: 10.7 G/DL — LOW (ref 11.5–15.5)
HGB BLD-MCNC: 11.2 G/DL — LOW (ref 11.5–15.5)
IMM GRANULOCYTES NFR BLD AUTO: 0.3 % — SIGNIFICANT CHANGE UP (ref 0–1.5)
IMM GRANULOCYTES NFR BLD AUTO: 0.5 % — SIGNIFICANT CHANGE UP (ref 0–1.5)
INR BLD: 0.89 RATIO — SIGNIFICANT CHANGE UP (ref 0.88–1.16)
INR BLD: 0.91 RATIO — SIGNIFICANT CHANGE UP (ref 0.88–1.16)
LDH SERPL L TO P-CCNC: 186 U/L — SIGNIFICANT CHANGE UP (ref 50–242)
LDH SERPL L TO P-CCNC: 201 U/L — SIGNIFICANT CHANGE UP (ref 50–242)
LYMPHOCYTES # BLD AUTO: 19.9 % — SIGNIFICANT CHANGE UP (ref 13–44)
LYMPHOCYTES # BLD AUTO: 28 % — SIGNIFICANT CHANGE UP (ref 13–44)
LYMPHOCYTES # BLD AUTO: 3.43 K/UL — HIGH (ref 1–3.3)
LYMPHOCYTES # BLD AUTO: 3.97 K/UL — HIGH (ref 1–3.3)
MAGNESIUM SERPL-MCNC: 2.2 MG/DL — SIGNIFICANT CHANGE UP (ref 1.6–2.6)
MCHC RBC-ENTMCNC: 28.9 PG — SIGNIFICANT CHANGE UP (ref 27–34)
MCHC RBC-ENTMCNC: 29.9 PG — SIGNIFICANT CHANGE UP (ref 27–34)
MCHC RBC-ENTMCNC: 31.4 GM/DL — LOW (ref 32–36)
MCHC RBC-ENTMCNC: 32.6 GM/DL — SIGNIFICANT CHANGE UP (ref 32–36)
MCV RBC AUTO: 91.6 FL — SIGNIFICANT CHANGE UP (ref 80–100)
MCV RBC AUTO: 92.2 FL — SIGNIFICANT CHANGE UP (ref 80–100)
MONOCYTES # BLD AUTO: 1.15 K/UL — HIGH (ref 0–0.9)
MONOCYTES # BLD AUTO: 1.19 K/UL — HIGH (ref 0–0.9)
MONOCYTES NFR BLD AUTO: 6.9 % — SIGNIFICANT CHANGE UP (ref 2–14)
MONOCYTES NFR BLD AUTO: 8.1 % — SIGNIFICANT CHANGE UP (ref 2–14)
NEUTROPHILS # BLD AUTO: 12.51 K/UL — HIGH (ref 1.8–7.4)
NEUTROPHILS # BLD AUTO: 8.95 K/UL — HIGH (ref 1.8–7.4)
NEUTROPHILS NFR BLD AUTO: 63 % — SIGNIFICANT CHANGE UP (ref 43–77)
NEUTROPHILS NFR BLD AUTO: 72.5 % — SIGNIFICANT CHANGE UP (ref 43–77)
NRBC # BLD: 0 /100 WBCS — SIGNIFICANT CHANGE UP (ref 0–0)
NRBC # BLD: 0 /100 WBCS — SIGNIFICANT CHANGE UP (ref 0–0)
PLATELET # BLD AUTO: 401 K/UL — HIGH (ref 150–400)
PLATELET # BLD AUTO: 420 K/UL — HIGH (ref 150–400)
POTASSIUM SERPL-MCNC: 4.3 MMOL/L — SIGNIFICANT CHANGE UP (ref 3.5–5.3)
POTASSIUM SERPL-MCNC: 4.4 MMOL/L — SIGNIFICANT CHANGE UP (ref 3.5–5.3)
POTASSIUM SERPL-SCNC: 4.3 MMOL/L — SIGNIFICANT CHANGE UP (ref 3.5–5.3)
POTASSIUM SERPL-SCNC: 4.4 MMOL/L — SIGNIFICANT CHANGE UP (ref 3.5–5.3)
PROT SERPL-MCNC: 6.3 G/DL — SIGNIFICANT CHANGE UP (ref 6–8.3)
PROT SERPL-MCNC: 7.3 G/DL — SIGNIFICANT CHANGE UP (ref 6–8.3)
PROTHROM AB SERPL-ACNC: 10.1 SEC — SIGNIFICANT CHANGE UP (ref 10–12.9)
PROTHROM AB SERPL-ACNC: 10.3 SEC — SIGNIFICANT CHANGE UP (ref 10–12.9)
RBC # BLD: 3.58 M/UL — LOW (ref 3.8–5.2)
RBC # BLD: 3.87 M/UL — SIGNIFICANT CHANGE UP (ref 3.8–5.2)
RBC # FLD: 15.6 % — HIGH (ref 10.3–14.5)
RBC # FLD: 15.8 % — HIGH (ref 10.3–14.5)
RH IG SCN BLD-IMP: POSITIVE — SIGNIFICANT CHANGE UP
SODIUM SERPL-SCNC: 132 MMOL/L — LOW (ref 135–145)
SODIUM SERPL-SCNC: 136 MMOL/L — SIGNIFICANT CHANGE UP (ref 135–145)
URATE SERPL-MCNC: 4.4 MG/DL — SIGNIFICANT CHANGE UP (ref 2.5–7)
URATE SERPL-MCNC: 4.5 MG/DL — SIGNIFICANT CHANGE UP (ref 2.5–7)
WBC # BLD: 14.2 K/UL — HIGH (ref 3.8–10.5)
WBC # BLD: 17.26 K/UL — HIGH (ref 3.8–10.5)
WBC # FLD AUTO: 14.2 K/UL — HIGH (ref 3.8–10.5)
WBC # FLD AUTO: 17.26 K/UL — HIGH (ref 3.8–10.5)

## 2020-02-19 PROCEDURE — 88307 TISSUE EXAM BY PATHOLOGIST: CPT | Mod: 26

## 2020-02-19 PROCEDURE — 74018 RADEX ABDOMEN 1 VIEW: CPT | Mod: 26

## 2020-02-19 PROCEDURE — 76816 OB US FOLLOW-UP PER FETUS: CPT | Mod: 26

## 2020-02-19 PROCEDURE — 59514 CESAREAN DELIVERY ONLY: CPT | Mod: AS,U7

## 2020-02-19 PROCEDURE — 76820 UMBILICAL ARTERY ECHO: CPT | Mod: 26

## 2020-02-19 PROCEDURE — 76818 FETAL BIOPHYS PROFILE W/NST: CPT | Mod: 26

## 2020-02-19 PROCEDURE — 59514 CESAREAN DELIVERY ONLY: CPT

## 2020-02-19 RX ORDER — OXYCODONE HYDROCHLORIDE 5 MG/1
5 TABLET ORAL
Refills: 0 | Status: COMPLETED | OUTPATIENT
Start: 2020-02-19 | End: 2020-02-26

## 2020-02-19 RX ORDER — DIPHENHYDRAMINE HCL 50 MG
25 CAPSULE ORAL EVERY 6 HOURS
Refills: 0 | Status: DISCONTINUED | OUTPATIENT
Start: 2020-02-19 | End: 2020-02-23

## 2020-02-19 RX ORDER — CITRIC ACID/SODIUM CITRATE 300-500 MG
30 SOLUTION, ORAL ORAL ONCE
Refills: 0 | Status: DISCONTINUED | OUTPATIENT
Start: 2020-02-19 | End: 2020-02-19

## 2020-02-19 RX ORDER — OXYTOCIN 10 UNIT/ML
333.33 VIAL (ML) INJECTION
Qty: 20 | Refills: 0 | Status: DISCONTINUED | OUTPATIENT
Start: 2020-02-19 | End: 2020-02-23

## 2020-02-19 RX ORDER — HEPARIN SODIUM 5000 [USP'U]/ML
10000 INJECTION INTRAVENOUS; SUBCUTANEOUS
Refills: 0 | Status: DISCONTINUED | OUTPATIENT
Start: 2020-02-19 | End: 2020-02-23

## 2020-02-19 RX ORDER — KETOROLAC TROMETHAMINE 30 MG/ML
30 SYRINGE (ML) INJECTION EVERY 6 HOURS
Refills: 0 | Status: DISCONTINUED | OUTPATIENT
Start: 2020-02-19 | End: 2020-02-21

## 2020-02-19 RX ORDER — MAGNESIUM SULFATE 500 MG/ML
4 VIAL (ML) INJECTION ONCE
Refills: 0 | Status: COMPLETED | OUTPATIENT
Start: 2020-02-19 | End: 2020-02-19

## 2020-02-19 RX ORDER — FENTANYL/BUPIVACAINE/NS/PF 2MCG/ML-.1
250 PLASTIC BAG, INJECTION (ML) INJECTION
Refills: 0 | Status: DISCONTINUED | OUTPATIENT
Start: 2020-02-19 | End: 2020-02-20

## 2020-02-19 RX ORDER — IBUPROFEN 200 MG
600 TABLET ORAL EVERY 6 HOURS
Refills: 0 | Status: COMPLETED | OUTPATIENT
Start: 2020-02-19 | End: 2021-01-17

## 2020-02-19 RX ORDER — FAMOTIDINE 10 MG/ML
20 INJECTION INTRAVENOUS ONCE
Refills: 0 | Status: DISCONTINUED | OUTPATIENT
Start: 2020-02-19 | End: 2020-02-19

## 2020-02-19 RX ORDER — HYDRALAZINE HCL 50 MG
25 TABLET ORAL EVERY 8 HOURS
Refills: 0 | Status: DISCONTINUED | OUTPATIENT
Start: 2020-02-19 | End: 2020-02-19

## 2020-02-19 RX ORDER — MAGNESIUM SULFATE 500 MG/ML
2 VIAL (ML) INJECTION
Qty: 40 | Refills: 0 | Status: DISCONTINUED | OUTPATIENT
Start: 2020-02-19 | End: 2020-02-19

## 2020-02-19 RX ORDER — ACETAMINOPHEN 500 MG
975 TABLET ORAL
Refills: 0 | Status: DISCONTINUED | OUTPATIENT
Start: 2020-02-19 | End: 2020-02-23

## 2020-02-19 RX ORDER — METOCLOPRAMIDE HCL 10 MG
10 TABLET ORAL ONCE
Refills: 0 | Status: DISCONTINUED | OUTPATIENT
Start: 2020-02-19 | End: 2020-02-19

## 2020-02-19 RX ORDER — GENTAMICIN SULFATE 40 MG/ML
650 VIAL (ML) INJECTION ONCE
Refills: 0 | Status: DISCONTINUED | OUTPATIENT
Start: 2020-02-19 | End: 2020-02-19

## 2020-02-19 RX ORDER — TETANUS TOXOID, REDUCED DIPHTHERIA TOXOID AND ACELLULAR PERTUSSIS VACCINE, ADSORBED 5; 2.5; 8; 8; 2.5 [IU]/.5ML; [IU]/.5ML; UG/.5ML; UG/.5ML; UG/.5ML
0.5 SUSPENSION INTRAMUSCULAR ONCE
Refills: 0 | Status: COMPLETED | OUTPATIENT
Start: 2020-02-19

## 2020-02-19 RX ORDER — GLYCERIN ADULT
1 SUPPOSITORY, RECTAL RECTAL AT BEDTIME
Refills: 0 | Status: DISCONTINUED | OUTPATIENT
Start: 2020-02-19 | End: 2020-02-23

## 2020-02-19 RX ORDER — DEXAMETHASONE 0.5 MG/5ML
4 ELIXIR ORAL EVERY 6 HOURS
Refills: 0 | Status: DISCONTINUED | OUTPATIENT
Start: 2020-02-19 | End: 2020-02-23

## 2020-02-19 RX ORDER — MAGNESIUM HYDROXIDE 400 MG/1
30 TABLET, CHEWABLE ORAL
Refills: 0 | Status: DISCONTINUED | OUTPATIENT
Start: 2020-02-19 | End: 2020-02-23

## 2020-02-19 RX ORDER — SODIUM CHLORIDE 9 MG/ML
1000 INJECTION, SOLUTION INTRAVENOUS ONCE
Refills: 0 | Status: DISCONTINUED | OUTPATIENT
Start: 2020-02-19 | End: 2020-02-19

## 2020-02-19 RX ORDER — LABETALOL HCL 100 MG
400 TABLET ORAL EVERY 8 HOURS
Refills: 0 | Status: DISCONTINUED | OUTPATIENT
Start: 2020-02-20 | End: 2020-02-20

## 2020-02-19 RX ORDER — FENTANYL/BUPIVACAINE/NS/PF 2MCG/ML-.1
5 PLASTIC BAG, INJECTION (ML) INJECTION
Refills: 0 | Status: DISCONTINUED | OUTPATIENT
Start: 2020-02-20 | End: 2020-02-20

## 2020-02-19 RX ORDER — NALOXONE HYDROCHLORIDE 4 MG/.1ML
0.1 SPRAY NASAL
Refills: 0 | Status: DISCONTINUED | OUTPATIENT
Start: 2020-02-19 | End: 2020-02-23

## 2020-02-19 RX ORDER — ONDANSETRON 8 MG/1
4 TABLET, FILM COATED ORAL EVERY 6 HOURS
Refills: 0 | Status: DISCONTINUED | OUTPATIENT
Start: 2020-02-19 | End: 2020-02-23

## 2020-02-19 RX ORDER — OXYCODONE HYDROCHLORIDE 5 MG/1
5 TABLET ORAL ONCE
Refills: 0 | Status: DISCONTINUED | OUTPATIENT
Start: 2020-02-19 | End: 2020-02-23

## 2020-02-19 RX ORDER — NALOXONE HYDROCHLORIDE 4 MG/.1ML
0.1 SPRAY NASAL
Refills: 0 | Status: DISCONTINUED | OUTPATIENT
Start: 2020-02-20 | End: 2020-02-23

## 2020-02-19 RX ORDER — HYDRALAZINE HCL 50 MG
25 TABLET ORAL EVERY 8 HOURS
Refills: 0 | Status: DISCONTINUED | OUTPATIENT
Start: 2020-02-19 | End: 2020-02-23

## 2020-02-19 RX ORDER — SODIUM CHLORIDE 9 MG/ML
1000 INJECTION, SOLUTION INTRAVENOUS
Refills: 0 | Status: DISCONTINUED | OUTPATIENT
Start: 2020-02-19 | End: 2020-02-23

## 2020-02-19 RX ORDER — LABETALOL HCL 100 MG
200 TABLET ORAL ONCE
Refills: 0 | Status: COMPLETED | OUTPATIENT
Start: 2020-02-19 | End: 2020-02-19

## 2020-02-19 RX ORDER — SIMETHICONE 80 MG/1
80 TABLET, CHEWABLE ORAL EVERY 4 HOURS
Refills: 0 | Status: DISCONTINUED | OUTPATIENT
Start: 2020-02-19 | End: 2020-02-23

## 2020-02-19 RX ORDER — ONDANSETRON 8 MG/1
4 TABLET, FILM COATED ORAL EVERY 6 HOURS
Refills: 0 | Status: DISCONTINUED | OUTPATIENT
Start: 2020-02-20 | End: 2020-02-23

## 2020-02-19 RX ORDER — HYDRALAZINE HCL 50 MG
5 TABLET ORAL ONCE
Refills: 0 | Status: COMPLETED | OUTPATIENT
Start: 2020-02-19 | End: 2020-02-19

## 2020-02-19 RX ORDER — LABETALOL HCL 100 MG
200 TABLET ORAL EVERY 8 HOURS
Refills: 0 | Status: DISCONTINUED | OUTPATIENT
Start: 2020-02-19 | End: 2020-02-19

## 2020-02-19 RX ORDER — LANOLIN
1 OINTMENT (GRAM) TOPICAL EVERY 6 HOURS
Refills: 0 | Status: DISCONTINUED | OUTPATIENT
Start: 2020-02-19 | End: 2020-02-23

## 2020-02-19 RX ORDER — DEXAMETHASONE 0.5 MG/5ML
4 ELIXIR ORAL EVERY 6 HOURS
Refills: 0 | Status: DISCONTINUED | OUTPATIENT
Start: 2020-02-20 | End: 2020-02-23

## 2020-02-19 RX ADMIN — Medication 60 MILLIGRAM(S): at 05:24

## 2020-02-19 RX ADMIN — Medication 30 MILLIGRAM(S): at 22:50

## 2020-02-19 RX ADMIN — Medication 200 MILLIGRAM(S): at 23:10

## 2020-02-19 RX ADMIN — Medication 30 MILLIGRAM(S): at 22:35

## 2020-02-19 RX ADMIN — Medication 60 MILLIGRAM(S): at 19:08

## 2020-02-19 RX ADMIN — Medication 200 GRAM(S): at 17:43

## 2020-02-19 RX ADMIN — Medication 800 MILLIGRAM(S): at 08:58

## 2020-02-19 RX ADMIN — Medication 5 MILLIGRAM(S): at 15:00

## 2020-02-19 RX ADMIN — Medication 50 GM/HR: at 18:08

## 2020-02-19 RX ADMIN — Medication 250 MILLILITER(S): at 14:35

## 2020-02-19 RX ADMIN — Medication 1000 MILLIUNIT(S)/MIN: at 13:55

## 2020-02-19 RX ADMIN — HEPARIN SODIUM 5000 UNIT(S): 5000 INJECTION INTRAVENOUS; SUBCUTANEOUS at 06:16

## 2020-02-19 RX ADMIN — Medication 250 MILLILITER(S): at 20:25

## 2020-02-19 RX ADMIN — Medication 800 MILLIGRAM(S): at 00:08

## 2020-02-19 RX ADMIN — Medication 200 MILLIGRAM(S): at 22:35

## 2020-02-19 RX ADMIN — Medication 25 MILLIGRAM(S): at 16:11

## 2020-02-19 RX ADMIN — Medication 200 GRAM(S): at 11:17

## 2020-02-19 RX ADMIN — Medication 30 MILLIGRAM(S): at 14:30

## 2020-02-19 NOTE — PROVIDER CONTACT NOTE (OTHER) - ACTION/TREATMENT ORDERED:
MD made aware. No interventions needed at this time as per MD. Will continue to monitor.
MD made aware. No interventions needed at this time, repeat VS routinely as per MD. Will continue to monitor.
Procardia 60xl administered as ordered. MD made aware. Repeat BP in 15 minutes as per MD.
hydralazine 10mg IVP, Repeat blood pressure in 15 minutes
Hydrallazine 5mg,and hel labs.
Repeat blood pressure in 15 minutes
physician did not want insulin given at this time. fasting glucose finger stick will be performed 06:30 am

## 2020-02-19 NOTE — PRE-ANESTHESIA EVALUATION ADULT - NSANTHADDINFOFT_GEN_ALL_CORE
CSE explained in detail; risks include but not limited to HA, failure, nv injury.  All questions answered.

## 2020-02-19 NOTE — CHART NOTE - NSCHARTNOTEFT_GEN_A_CORE
R3 Backnote from 3:10p  Patient is 39y  status post pMTCS at 29w6d for cat II tracing. prenatal course complicated by exacerbation of cHTN and IUGR with REDV.    Patient seen and examined at bedside after 2 severe range BPs (167/73, 168/89). Patient ordered for hydralazine 5mg due to HR <60. At bedside evaluation patient laying comfortably without complaints. Denies HA, blurry vision, RUQ/epigastric pain, new onset swelling, nausea/vomiting. Denies fever/chills, cp/sob, dizziness/lightheadedness. Since admission to PACU UOP adequate (350cc UOP noted in [place]).      Vital Signs Last 24 Hrs  T(C): 36.1 (2020 13:40), Max: 37.1 (2020 00:10)  T(F): 97 (2020 13:40), Max: 98.8 (2020 00:10)  HR: 58 (2020 14:40) (54 - 78)  BP: 168/89 (2020 14:40) (130/70 - 168/89)  BP(mean): 119 (2020 14:40) (105 - 119)  RR: 21 (2020 14:40) (18 - 41)  SpO2: 98% (2020 14:40) (97% - 100%)    I&O's Detail    2020 07:01  -  2020 07:00  --------------------------------------------------------  IN:    magnesium sulfate  Infusion: 288 mL    sodium chloride 0.9%: 200 mL  Total IN: 488 mL    OUT:    Voided: 500 mL  Total OUT: 500 mL    Total NET: -12 mL      2020 07:01  -  2020 15:46  --------------------------------------------------------  IN:    Lactated Ringers IV Bolus: 1000 mL    oxytocin Infusion: 1000 mL  Total IN: 2000 mL    OUT:    Estimated Blood Loss: 1000 mL    Indwelling Catheter - Urethral: 300 mL  Total OUT: 1300 mL    Total NET: 700 mL    PE:  Gen: Appears comfortable  CV: RR s1s2  Pulm: bilateral expiratory wheeze, no crackles/ronchi  Abd: Soft, appropriately tender, no rebound. Fundus firm  Incision: Pfannenstiel c/d/i.  Pelvic: Lochia wnl. pad approx 30%saturated  Ext: NT bilateral. B/l mild LE edema.    Labs:                        11.2   14.20 )-----------( 420      ( 2020 09:32 )             35.7                         11.9   14.21 )-----------( 420      ( 2020 01:59 )              36.4         136  |  101  |  12  ----------------------------<  97  4.3   |  20<L>  |  0.66    Ca    9.1      2020 09:32  Mg     2.2         TPro  7.3  /  Alb  3.5  /  TBili  0.2  /  DBili  x   /  AST  16  /  ALT  20  /  AlkPhos  105      PT/INR - ( 2020 09:32 )   PT: 10.3 sec;   INR: 0.91 ratio         PTT - ( 2020 09:31 )  PTT:28.7 sec    Fibrinogen: Fibrinogen Assay: 572 mg/dL ( @ 09:31)    Assessment/Plan:  31yo  status post pMTCS for cat II tracing at 29w6d with course complicated by exacerbation of cHTN vs siPEC.   -patient with severe range BPs in PACU requiring IVP of hydralazine 5mg. Repeat BPs   -Given HR <60 patient's scheduled labetalol was held prior to severe range BP. will add hydralazine 25mg TID for improved BP control given pt unable to receive labetalol. C/w procardia 60mg bid.   -stat CBC/CMP/Coags sent. Will repeat HELLP labs q6h  -UOP adequate (350cc/2h)  -No symptoms of preeclampsia (HA, blurry vision, RUQ/epigastric pain, new onset swelling) and no lab abnormalities.   -Continue with close monitoring to assess for development of severe features   d/w Dr. Stephan Pederson PGY3  ------------------------------------------------------------------------------------------------------------- R3 Backnote from 3:10p  Patient is 39y  status post pMTCS at 29w6d for cat II tracing. prenatal course complicated by exacerbation of cHTN and IUGR with REDV.    Patient seen and examined at bedside after 2 severe range BPs (167/73, 168/89). Patient ordered for hydralazine 5mg due to HR <60. At bedside evaluation patient laying comfortably without complaints. Denies HA, blurry vision, RUQ/epigastric pain, new onset swelling, nausea/vomiting. Denies fever/chills, cp/sob, dizziness/lightheadedness. Since admission to PACU UOP adequate (350cc UOP noted in [place]).      Vital Signs Last 24 Hrs  T(C): 36.1 (2020 13:40), Max: 37.1 (2020 00:10)  T(F): 97 (2020 13:40), Max: 98.8 (2020 00:10)  HR: 58 (2020 14:40) (54 - 78)  BP: 168/89 (2020 14:40) (130/70 - 168/89)  BP(mean): 119 (2020 14:40) (105 - 119)  RR: 21 (2020 14:40) (18 - 41)  SpO2: 98% (2020 14:40) (97% - 100%)    I&O's Detail    2020 07:01  -  2020 07:00  --------------------------------------------------------  IN:    magnesium sulfate  Infusion: 288 mL    sodium chloride 0.9%: 200 mL  Total IN: 488 mL    OUT:    Voided: 500 mL  Total OUT: 500 mL    Total NET: -12 mL      2020 07:01  -  2020 15:46  --------------------------------------------------------  IN:    Lactated Ringers IV Bolus: 1000 mL    oxytocin Infusion: 1000 mL  Total IN: 2000 mL    OUT:    Estimated Blood Loss: 1000 mL    Indwelling Catheter - Urethral: 300 mL  Total OUT: 1300 mL    Total NET: 700 mL    PE:  Gen: Appears comfortable  CV: RR s1s2  Pulm: bilateral expiratory wheeze, no crackles/ronchi  Abd: Soft, appropriately tender, no rebound. Fundus firm  Incision: Pfannenstiel c/d/i.  Pelvic: Lochia wnl. pad approx 30%saturated  Ext: NT bilateral. B/l mild LE edema.    Labs:                        11.2   14.20 )-----------( 420      ( 2020 09:32 )             35.7                         11.9   14.21 )-----------( 420      ( 2020 01:59 )              36.4         136  |  101  |  12  ----------------------------<  97  4.3   |  20<L>  |  0.66    Ca    9.1      2020 09:32  Mg     2.2         TPro  7.3  /  Alb  3.5  /  TBili  0.2  /  DBili  x   /  AST  16  /  ALT  20  /  AlkPhos  105      PT/INR - ( 2020 09:32 )   PT: 10.3 sec;   INR: 0.91 ratio         PTT - ( 2020 09:31 )  PTT:28.7 sec    Fibrinogen: Fibrinogen Assay: 572 mg/dL ( @ 09:31)    Assessment/Plan:  29yo  status post pMTCS for cat II tracing at 29w6d with course complicated by exacerbation of cHTN vs siPEC.   -patient with severe range BPs in PACU requiring IVP of hydralazine 5mg. Repeat BPs   -Given HR <60 patient's scheduled labetalol was held prior to severe range BP. will add hydralazine 25mg TID for improved BP control given pt unable to receive labetalol. C/w procardia 60mg bid.   -stat CBC/CMP/Coags sent. Will repeat HELLP labs q6h to monitor for development of severe features of preeclampsia by lab abnormalities  -UOP adequate (350cc/2h)  -No symptoms of preeclampsia (HA, blurry vision, RUQ/epigastric pain, new onset swelling) and no lab abnormalities a this time  -Continue with close monitoring to assess for development of severe features (labs, symptoms, BPs). If development of severe features will initiate magnesium sulfate for seizure ppx    d/w Dr. Stephan Pederson PGY3  -------------------------------------------------------------------------------------------------------------

## 2020-02-19 NOTE — PROVIDER CONTACT NOTE (OTHER) - BACKGROUND
pt is a post operative c/section in RR,pt is a chronic hypertensive takes labetalol 800 mg three times daily

## 2020-02-20 LAB
ALBUMIN SERPL ELPH-MCNC: 3.1 G/DL — LOW (ref 3.3–5)
ALP SERPL-CCNC: 101 U/L — SIGNIFICANT CHANGE UP (ref 40–120)
ALT FLD-CCNC: 18 U/L — SIGNIFICANT CHANGE UP (ref 10–45)
ANION GAP SERPL CALC-SCNC: 13 MMOL/L — SIGNIFICANT CHANGE UP (ref 5–17)
APTT BLD: 25.7 SEC — LOW (ref 27.5–36.3)
AST SERPL-CCNC: 16 U/L — SIGNIFICANT CHANGE UP (ref 10–40)
BASOPHILS # BLD AUTO: 0.03 K/UL — SIGNIFICANT CHANGE UP (ref 0–0.2)
BASOPHILS NFR BLD AUTO: 0.2 % — SIGNIFICANT CHANGE UP (ref 0–2)
BILIRUB SERPL-MCNC: 0.1 MG/DL — LOW (ref 0.2–1.2)
BUN SERPL-MCNC: 10 MG/DL — SIGNIFICANT CHANGE UP (ref 7–23)
CALCIUM SERPL-MCNC: 7.5 MG/DL — LOW (ref 8.4–10.5)
CHLORIDE SERPL-SCNC: 99 MMOL/L — SIGNIFICANT CHANGE UP (ref 96–108)
CO2 SERPL-SCNC: 21 MMOL/L — LOW (ref 22–31)
CREAT SERPL-MCNC: 0.74 MG/DL — SIGNIFICANT CHANGE UP (ref 0.5–1.3)
EOSINOPHIL # BLD AUTO: 0.08 K/UL — SIGNIFICANT CHANGE UP (ref 0–0.5)
EOSINOPHIL NFR BLD AUTO: 0.5 % — SIGNIFICANT CHANGE UP (ref 0–6)
FIBRINOGEN PPP-MCNC: 605 MG/DL — HIGH (ref 350–510)
GLUCOSE SERPL-MCNC: 160 MG/DL — HIGH (ref 70–99)
HCT VFR BLD CALC: 32 % — LOW (ref 34.5–45)
HGB BLD-MCNC: 10 G/DL — LOW (ref 11.5–15.5)
IMM GRANULOCYTES NFR BLD AUTO: 0.4 % — SIGNIFICANT CHANGE UP (ref 0–1.5)
INR BLD: 0.84 RATIO — LOW (ref 0.88–1.16)
LDH SERPL L TO P-CCNC: 196 U/L — SIGNIFICANT CHANGE UP (ref 50–242)
LYMPHOCYTES # BLD AUTO: 16.5 % — SIGNIFICANT CHANGE UP (ref 13–44)
LYMPHOCYTES # BLD AUTO: 2.7 K/UL — SIGNIFICANT CHANGE UP (ref 1–3.3)
MAGNESIUM SERPL-MCNC: 4.7 MG/DL — HIGH (ref 1.6–2.6)
MAGNESIUM SERPL-MCNC: 5.3 MG/DL — HIGH (ref 1.6–2.6)
MCHC RBC-ENTMCNC: 29.4 PG — SIGNIFICANT CHANGE UP (ref 27–34)
MCHC RBC-ENTMCNC: 31.3 GM/DL — LOW (ref 32–36)
MCV RBC AUTO: 94.1 FL — SIGNIFICANT CHANGE UP (ref 80–100)
MONOCYTES # BLD AUTO: 1.47 K/UL — HIGH (ref 0–0.9)
MONOCYTES NFR BLD AUTO: 9 % — SIGNIFICANT CHANGE UP (ref 2–14)
NEUTROPHILS # BLD AUTO: 12.03 K/UL — HIGH (ref 1.8–7.4)
NEUTROPHILS NFR BLD AUTO: 73.4 % — SIGNIFICANT CHANGE UP (ref 43–77)
NRBC # BLD: 0 /100 WBCS — SIGNIFICANT CHANGE UP (ref 0–0)
PLATELET # BLD AUTO: 377 K/UL — SIGNIFICANT CHANGE UP (ref 150–400)
POTASSIUM SERPL-MCNC: 4.5 MMOL/L — SIGNIFICANT CHANGE UP (ref 3.5–5.3)
POTASSIUM SERPL-SCNC: 4.5 MMOL/L — SIGNIFICANT CHANGE UP (ref 3.5–5.3)
PROT SERPL-MCNC: 6.1 G/DL — SIGNIFICANT CHANGE UP (ref 6–8.3)
PROTHROM AB SERPL-ACNC: 9.5 SEC — LOW (ref 10–12.9)
RBC # BLD: 3.4 M/UL — LOW (ref 3.8–5.2)
RBC # FLD: 15.7 % — HIGH (ref 10.3–14.5)
SODIUM SERPL-SCNC: 133 MMOL/L — LOW (ref 135–145)
URATE SERPL-MCNC: 4.5 MG/DL — SIGNIFICANT CHANGE UP (ref 2.5–7)
WBC # BLD: 16.37 K/UL — HIGH (ref 3.8–10.5)
WBC # FLD AUTO: 16.37 K/UL — HIGH (ref 3.8–10.5)

## 2020-02-20 PROCEDURE — 99254 IP/OBS CNSLTJ NEW/EST MOD 60: CPT

## 2020-02-20 RX ORDER — LABETALOL HCL 100 MG
200 TABLET ORAL EVERY 8 HOURS
Refills: 0 | Status: DISCONTINUED | OUTPATIENT
Start: 2020-02-20 | End: 2020-02-21

## 2020-02-20 RX ORDER — IBUPROFEN 200 MG
600 TABLET ORAL EVERY 6 HOURS
Refills: 0 | Status: DISCONTINUED | OUTPATIENT
Start: 2020-02-20 | End: 2020-02-23

## 2020-02-20 RX ORDER — OXYCODONE HYDROCHLORIDE 5 MG/1
5 TABLET ORAL
Refills: 0 | Status: DISCONTINUED | OUTPATIENT
Start: 2020-02-20 | End: 2020-02-23

## 2020-02-20 RX ADMIN — HEPARIN SODIUM 10000 UNIT(S): 5000 INJECTION INTRAVENOUS; SUBCUTANEOUS at 16:00

## 2020-02-20 RX ADMIN — Medication 200 MILLIGRAM(S): at 19:04

## 2020-02-20 RX ADMIN — Medication 975 MILLIGRAM(S): at 01:10

## 2020-02-20 RX ADMIN — Medication 30 MILLIGRAM(S): at 12:50

## 2020-02-20 RX ADMIN — HEPARIN SODIUM 10000 UNIT(S): 5000 INJECTION INTRAVENOUS; SUBCUTANEOUS at 00:37

## 2020-02-20 RX ADMIN — Medication 975 MILLIGRAM(S): at 16:00

## 2020-02-20 RX ADMIN — Medication 60 MILLIGRAM(S): at 19:04

## 2020-02-20 RX ADMIN — Medication 60 MILLIGRAM(S): at 06:52

## 2020-02-20 RX ADMIN — Medication 975 MILLIGRAM(S): at 15:33

## 2020-02-20 RX ADMIN — Medication 250 MILLILITER(S): at 10:20

## 2020-02-20 RX ADMIN — Medication 30 MILLIGRAM(S): at 05:15

## 2020-02-20 RX ADMIN — Medication 975 MILLIGRAM(S): at 12:48

## 2020-02-20 RX ADMIN — Medication 1 MILLIGRAM(S): at 12:40

## 2020-02-20 RX ADMIN — OXYCODONE HYDROCHLORIDE 5 MILLIGRAM(S): 5 TABLET ORAL at 20:27

## 2020-02-20 RX ADMIN — OXYCODONE HYDROCHLORIDE 5 MILLIGRAM(S): 5 TABLET ORAL at 21:10

## 2020-02-20 RX ADMIN — Medication 30 MILLIGRAM(S): at 19:03

## 2020-02-20 RX ADMIN — Medication 975 MILLIGRAM(S): at 20:27

## 2020-02-20 RX ADMIN — Medication 25 MILLIGRAM(S): at 22:15

## 2020-02-20 RX ADMIN — Medication 975 MILLIGRAM(S): at 00:37

## 2020-02-20 RX ADMIN — Medication 30 MILLIGRAM(S): at 10:04

## 2020-02-20 RX ADMIN — Medication 30 MILLIGRAM(S): at 15:33

## 2020-02-20 RX ADMIN — Medication 975 MILLIGRAM(S): at 21:10

## 2020-02-20 RX ADMIN — Medication 1 TABLET(S): at 12:39

## 2020-02-20 RX ADMIN — Medication 30 MILLIGRAM(S): at 04:53

## 2020-02-20 RX ADMIN — Medication 975 MILLIGRAM(S): at 10:04

## 2020-02-20 RX ADMIN — Medication 400 MILLIGRAM(S): at 10:03

## 2020-02-20 NOTE — CONSULT NOTE ADULT - SUBJECTIVE AND OBJECTIVE BOX
Patient seen and evaluated at bedside    Chief Complaint:    HPI:   Patient is a 39 year old  F, TTP, HTN originally admitted at 30w0d with uncontrolled HTN with abnormal UA dopplers, now s/p pLTCS due to non reassuring fetal heart tracing. 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 She was started on high dose prednisone and plasmapheresis w/ normalization of kidney function  Prior to admission, pt was on Nifedipine 30mg during the start of her pregnancy and was started on Labetalol 200mg BID on 2020 by OB. At presentation, SBP>200mmHg. Was given multiple doses of Hydralzine, Labetalol and Nifedipine. TTE from 2/10 w/ hyperdynamic LVEF, normal RV size and function, borderline pulm htn (RVSP 35 mm Hg). Post , patient now on Labetalol 400 mg every 8 hours and Nifedipine 60 mg daily.     On assessment, /74.         PMH:   TTP (thrombotic thrombocytopenic purpura)  No pertinent past medical history      PSH:   No significant past surgical history  Breast cancer  No significant past surgical history      Medications:   acetaminophen   Tablet .. 975 milliGRAM(s) Oral <User Schedule>  dexAMETHasone  Injectable 4 milliGRAM(s) IV Push every 6 hours PRN  dexAMETHasone  Injectable 4 milliGRAM(s) IV Push every 6 hours PRN  dextrose 5% + sodium chloride 0.9%. 1000 milliLiter(s) IV Continuous <Continuous>  dextrose 5%. 1000 milliLiter(s) IV Continuous <Continuous>  diphenhydrAMINE 25 milliGRAM(s) Oral every 6 hours PRN  diphtheria/tetanus/pertussis (acellular) Vaccine (ADAcel) 0.5 milliLiter(s) IntraMuscular once  fentanyl (3 MICROgram(s)/mL) + bupivacaine 0.01% in 0.9% Sodium Chloride PCEA 250 milliLiter(s) Epidural PCA Continuous  fentanyl (3 MICROgram(s)/mL) + bupivacaine 0.01% in 0.9% Sodium Chloride PCEA Rescue Clinician Bolus 5 milliLiter(s) Epidural every 15 minutes PRN  folic acid 1 milliGRAM(s) Oral daily  glucagon  Injectable 1 milliGRAM(s) IntraMuscular once PRN  glycerin Suppository - Adult 1 Suppository(s) Rectal at bedtime PRN  heparin  Injectable 70314 Unit(s) SubCutaneous two times a day  hydrALAZINE 25 milliGRAM(s) Oral every 8 hours  ibuprofen  Tablet. 600 milliGRAM(s) Oral every 6 hours  ketorolac   Injectable 30 milliGRAM(s) IV Push every 6 hours  labetalol 400 milliGRAM(s) Oral every 8 hours  lactated ringers. 1000 milliLiter(s) IV Continuous <Continuous>  lactated ringers. 1000 milliLiter(s) IV Continuous <Continuous>  lanolin Ointment 1 Application(s) Topical every 6 hours PRN  magnesium hydroxide Suspension 30 milliLiter(s) Oral two times a day PRN  naloxone Injectable 0.1 milliGRAM(s) IV Push every 3 minutes PRN  naloxone Injectable 0.1 milliGRAM(s) IV Push every 3 minutes PRN  NIFEdipine XL 60 milliGRAM(s) Oral <User Schedule>  ondansetron Injectable 4 milliGRAM(s) IV Push every 6 hours PRN  ondansetron Injectable 4 milliGRAM(s) IV Push every 6 hours PRN  oxyCODONE    IR 5 milliGRAM(s) Oral every 3 hours PRN  oxyCODONE    IR 5 milliGRAM(s) Oral once PRN  oxytocin Infusion 333.333 milliUNIT(s)/Min IV Continuous <Continuous>  oxytocin Infusion 333.333 milliUNIT(s)/Min IV Continuous <Continuous>  prenatal multivitamin 1 Tablet(s) Oral daily  simethicone 80 milliGRAM(s) Chew every 4 hours PRN      Allergies:  penicillin (Hives)      FAMILY HISTORY:  FHx: breast cancer      Social History:  Smoking History:  Alcohol Use:  Drug Use:    Review of Systems:  REVIEW OF SYSTEMS:  CONSTITUTIONAL: No weakness, fevers or chills  EYES/ENT: No visual changes;  No dysphagia  NECK: No pain or stiffness  RESPIRATORY: No cough, wheezing, hemoptysis; No shortness of breath  CARDIOVASCULAR: No chest pain or palpitations; No lower extremity edema  GASTROINTESTINAL: No abdominal or epigastric pain. No nausea, vomiting, or hematemesis; No diarrhea or constipation. No melena or hematochezia.  BACK: No back pain  GENITOURINARY: No dysuria, frequency or hematuria  NEUROLOGICAL: No numbness or weakness  SKIN: No itching, burning, rashes, or lesions   All other review of systems is negative unless indicated above.    Physical Exam:  T(F): 98.1 (02-), Max: 98.6 (-)  HR: 86 () (60 - 86)  BP: 110/74 (-) (106/63 - 172/81)  RR: 18 (-)  SpO2: 98% (-)  GENERAL: No acute distress, well-developed  HEAD:  Atraumatic, Normocephalic  ENT: EOMI, PERRLA, conjunctiva and sclera clear, Neck supple, No JVD, moist mucosa  CHEST/LUNG: Clear to auscultation bilaterally; No wheeze, equal breath sounds bilaterally   BACK: No spinal tenderness  HEART: Regular rate and rhythm; No murmurs, rubs, or gallops  ABDOMEN: Soft, Nontender, Nondistended; Bowel sounds present  EXTREMITIES:  No clubbing, cyanosis, or edema  PSYCH: Nl behavior, nl affect  NEUROLOGY: AAOx3, non-focal, cranial nerves intact  SKIN: Normal color, No rashes or lesions  LINES:    Cardiovascular Diagnostic Testing:    ECG: Personally reviewed    Echo:    Stress Testing:    Cath:    Interpretation of Telemetry:    Imaging:    Labs: Personally reviewed                        10.0   16.37 )-----------( 377      ( 2020 05:34 )             32.0     02-20    133<L>  |  99  |  10  ----------------------------<  160<H>  4.5   |  21<L>  |  0.74    Ca    7.5<L>      2020 05:34  Mg     5.3     02-20    TPro  6.1  /  Alb  3.1<L>  /  TBili  0.1<L>  /  DBili  x   /  AST  16  /  ALT  18  /  AlkPhos  101  02-20    PT/INR - ( 2020 05:34 )   PT: 9.5 sec;   INR: 0.84 ratio         PTT - ( 2020 05:34 )  PTT:25.7 sec Patient seen and evaluated at bedside    Chief Complaint: uncontrolled HTN    HPI: Patient is a 39 year old  F, TTP, HTN originally admitted at 30w0d with uncontrolled HTN, now s/p pLTCS due to non reassuring fetal heart tracing. 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 She was started on high dose prednisone and plasmapheresis w/ normalization of kidney function. Prior to admission, pt was on Nifedipine 30mg during the start of her pregnancy and was started on Labetalol 200mg BID on 2020 by OB. At presentation, SBP>200mmHg. Was given multiple doses of Hydralazine, Labetalol and Nifedipine. TTE from 2/10/20 w/ hyperdynamic LVEF, normal RV size and function, borderline pulm htn (RVSP 35 mm Hg). Post , patient now on Labetalol 400 mg every 8 hours and Nifedipine 60 mg daily.     On assessment, /74. Pt denies any symptoms. No chest pain, SOB, MILLER, LE edema, orthopnea.    PMH:   TTP (thrombotic thrombocytopenic purpura)  No pertinent past medical history      PSH:   No significant past surgical history  Breast cancer  No significant past surgical history      Medications:   acetaminophen   Tablet .. 975 milliGRAM(s) Oral <User Schedule>  dexAMETHasone  Injectable 4 milliGRAM(s) IV Push every 6 hours PRN  dexAMETHasone  Injectable 4 milliGRAM(s) IV Push every 6 hours PRN  dextrose 5% + sodium chloride 0.9%. 1000 milliLiter(s) IV Continuous <Continuous>  dextrose 5%. 1000 milliLiter(s) IV Continuous <Continuous>  diphenhydrAMINE 25 milliGRAM(s) Oral every 6 hours PRN  diphtheria/tetanus/pertussis (acellular) Vaccine (ADAcel) 0.5 milliLiter(s) IntraMuscular once  fentanyl (3 MICROgram(s)/mL) + bupivacaine 0.01% in 0.9% Sodium Chloride PCEA 250 milliLiter(s) Epidural PCA Continuous  fentanyl (3 MICROgram(s)/mL) + bupivacaine 0.01% in 0.9% Sodium Chloride PCEA Rescue Clinician Bolus 5 milliLiter(s) Epidural every 15 minutes PRN  folic acid 1 milliGRAM(s) Oral daily  glucagon  Injectable 1 milliGRAM(s) IntraMuscular once PRN  glycerin Suppository - Adult 1 Suppository(s) Rectal at bedtime PRN  heparin  Injectable 88502 Unit(s) SubCutaneous two times a day  hydrALAZINE 25 milliGRAM(s) Oral every 8 hours  ibuprofen  Tablet. 600 milliGRAM(s) Oral every 6 hours  ketorolac   Injectable 30 milliGRAM(s) IV Push every 6 hours  labetalol 400 milliGRAM(s) Oral every 8 hours  lactated ringers. 1000 milliLiter(s) IV Continuous <Continuous>  lactated ringers. 1000 milliLiter(s) IV Continuous <Continuous>  lanolin Ointment 1 Application(s) Topical every 6 hours PRN  magnesium hydroxide Suspension 30 milliLiter(s) Oral two times a day PRN  naloxone Injectable 0.1 milliGRAM(s) IV Push every 3 minutes PRN  naloxone Injectable 0.1 milliGRAM(s) IV Push every 3 minutes PRN  NIFEdipine XL 60 milliGRAM(s) Oral <User Schedule>  ondansetron Injectable 4 milliGRAM(s) IV Push every 6 hours PRN  ondansetron Injectable 4 milliGRAM(s) IV Push every 6 hours PRN  oxyCODONE    IR 5 milliGRAM(s) Oral every 3 hours PRN  oxyCODONE    IR 5 milliGRAM(s) Oral once PRN  oxytocin Infusion 333.333 milliUNIT(s)/Min IV Continuous <Continuous>  oxytocin Infusion 333.333 milliUNIT(s)/Min IV Continuous <Continuous>  prenatal multivitamin 1 Tablet(s) Oral daily  simethicone 80 milliGRAM(s) Chew every 4 hours PRN      Allergies:  penicillin (Hives)      FAMILY HISTORY:  FHx: breast cancer      Social History:  Smoking History: Denies  Alcohol Use: Denies  Drug Use: Denies    Review of Systems:  REVIEW OF SYSTEMS:  CONSTITUTIONAL: No weakness, fevers or chills  EYES/ENT: No visual changes;  No dysphagia  NECK: No pain or stiffness  RESPIRATORY: No cough, wheezing, hemoptysis; No shortness of breath  CARDIOVASCULAR: No chest pain or palpitations; No lower extremity edema  GASTROINTESTINAL: No abdominal or epigastric pain. No nausea, vomiting, or hematemesis; No diarrhea or constipation. No melena or hematochezia.  BACK: No back pain  GENITOURINARY: No dysuria, frequency or hematuria  NEUROLOGICAL: No numbness or weakness  SKIN: No itching, burning, rashes, or lesions   All other review of systems is negative unless indicated above.    Physical Exam:  T(F): 98.1 (-), Max: 98.6 (-)  HR: 86 (-) (60 - 86)  BP: 110/74 (-) (106/63 - 172/81)  RR: 18 (-)  SpO2: 98% (-)  GENERAL: No acute distress, well-developed  HEAD:  Atraumatic, Normocephalic  ENT: EOMI, PERRLA, conjunctiva and sclera clear, Neck supple, No JVD, moist mucosa  CHEST/LUNG: Clear to auscultation bilaterally; No wheeze, equal breath sounds bilaterally   HEART: Regular rate and rhythm; No murmurs, rubs, or gallops  ABDOMEN: Soft, Nontender, Nondistended; Bowel sounds present  EXTREMITIES:  No clubbing, cyanosis, or edema  PSYCH: Nl behavior, nl affect  NEUROLOGY: AAOx3, non-focal, cranial nerves intact  SKIN: Normal color, No rashes or lesions  LINES:    Cardiovascular Diagnostic Testing:    Echo:  Dimensions:    Normal Values:  LA:     4.0    2.0 - 4.0 cm  Ao:     2.5    2.0 - 3.8 cm  SEPTUM: 0.9    0.6 - 1.2 cm  PWT:    1.0    0.6 - 1.1 cm  LVIDd:  4.7    3.0 - 5.6 cm  LVIDs:  3.0    1.8 - 4.0 cm  Derived variables:  LVMI: 79 g/m2  RWT: 0.42  Fractional short: 36 %  EF (Visual Estimate): 65-70 %  Doppler Peak Velocity (m/sec): AoV=1.2  ------------------------------------------------------------------------  Observations:  Mitral Valve: Normal mitral valve. Mild mitral  regurgitation.  Aortic Valve/Aorta: Aortic valve not well visualized;  probably normal. Peak transaortic valve gradient equals 6  mm Hg. Minimal aortic regurgitation.  Peak left ventricular  outflow tract gradient equals 3 mm Hg.  Aortic Root: 2.5 cm.  Left Atrium: Normal left atrium.  LA volume index = 30  cc/m2.  Left Ventricle: Hyperdynamic left ventricular systolic  function. Normal left ventricular internal dimensions and  wall thicknesses. Normal diastolic function  Right Heart: Normal right atrium. Normal right ventricular  size and function. Normal tricuspid valve. Minimal  tricuspid regurgitation. Pulmonic valve not well  visualized, probably normal. Minimal pulmonic  regurgitation.  Pericardium/Pleura: Normal pericardium with no pericardial  effusion.  Hemodynamic: Estimated right atrial pressure is 8 mm Hg.  Estimated right ventricular systolic pressureequals 35 mm  Hg, assuming right atrial pressure equals 8 mm Hg,  consistent with borderline pulmonary hypertension.  ------------------------------------------------------------------------  Conclusions:  1. Normal left ventricular internal dimensions and wall  thicknesses.  2. Hyperdynamic left ventricular systolic function.  3. Normal diastolic function  4. Normal right ventricular size and function.  5. Estimated right ventricular systolic pressure equals 35  mm Hg, assuming right atrial pressure equals 8 mm Hg,  consistent with borderline pulmonary hypertension.  *** No previous Echo exam.  ------------------------------------------------------------------------  Confirmed on  2/10/2020 - 19:00:50 by Denny Farnsworth M.D.  ------------------------------------------------------------------------    Labs: Personally reviewed                        10.0   16.37 )-----------( 377      ( 2020 05:34 )             32.0     02-20    133<L>  |  99  |  10  ----------------------------<  160<H>  4.5   |  21<L>  |  0.74    Ca    7.5<L>      2020 05:34  Mg     5.3     02-20    TPro  6.1  /  Alb  3.1<L>  /  TBili  0.1<L>  /  DBili  x   /  AST  16  /  ALT  18  /  AlkPhos  101  02-20    PT/INR - ( 2020 05:34 )   PT: 9.5 sec;   INR: 0.84 ratio         PTT - ( 2020 05:34 )  PTT:25.7 sec

## 2020-02-20 NOTE — PROGRESS NOTE ADULT - PROBLEM SELECTOR PLAN 1
Neuro: PO pain meds + PCEA. MgSO4 for seizure ppx for 24 hours postpartum  CV: antepartum she was originally was on the maximum anti-HTN medication (labetalol 800 TID, procardia 60 BID). now postpartum BPs on POD#1 have been better controlled, 106-144/63-84, HR 69-81.  Decision made to decrease labetalol to 400 TID. c/w labetalol 400 TID, procardia 60 BID, hydral 20 TID  Pulm: Saturating well on room air, encourage oob/amb, incentive spirometer   GI: Continue regular diet  : UOP adequate, 1100/shift   Heme: HSQ and SCDs for DVT ppx  Dispo: Continue routine inpt care    Corinna Beltrán PGY-3

## 2020-02-20 NOTE — PROGRESS NOTE ADULT - SUBJECTIVE AND OBJECTIVE BOX
Day 1 of Anesthesia Pain Management Service    SUBJECTIVE: I'm okay  Pain Scale Score:    [X] Refer to charted pain scores    THERAPY: Epidural Bupivacaine 0.01 % and Fentanyl 3 micrograms/mL     Demand Dose: 3 mL  Lockout: 15 minutes   Continuous Rate:  10 mL    MEDICATIONS  (STANDING):  acetaminophen   Tablet .. 975 milliGRAM(s) Oral <User Schedule>  dextrose 5% + sodium chloride 0.9%. 1000 milliLiter(s) (50 mL/Hr) IV Continuous <Continuous>  dextrose 5%. 1000 milliLiter(s) (50 mL/Hr) IV Continuous <Continuous>  diphtheria/tetanus/pertussis (acellular) Vaccine (ADAcel) 0.5 milliLiter(s) IntraMuscular once  fentanyl (3 MICROgram(s)/mL) + bupivacaine 0.01% in 0.9% Sodium Chloride PCEA 250 milliLiter(s) Epidural PCA Continuous  folic acid 1 milliGRAM(s) Oral daily  heparin  Injectable 06058 Unit(s) SubCutaneous two times a day  hydrALAZINE 25 milliGRAM(s) Oral every 8 hours  ibuprofen  Tablet. 600 milliGRAM(s) Oral every 6 hours  ketorolac   Injectable 30 milliGRAM(s) IV Push every 6 hours  labetalol 400 milliGRAM(s) Oral every 8 hours  lactated ringers. 1000 milliLiter(s) (125 mL/Hr) IV Continuous <Continuous>  lactated ringers. 1000 milliLiter(s) (125 mL/Hr) IV Continuous <Continuous>  NIFEdipine XL 60 milliGRAM(s) Oral <User Schedule>  oxytocin Infusion 333.333 milliUNIT(s)/Min (1000 mL/Hr) IV Continuous <Continuous>  oxytocin Infusion 333.333 milliUNIT(s)/Min (1000 mL/Hr) IV Continuous <Continuous>  prenatal multivitamin 1 Tablet(s) Oral daily    MEDICATIONS  (PRN):  dexAMETHasone  Injectable 4 milliGRAM(s) IV Push every 6 hours PRN Nausea  dexAMETHasone  Injectable 4 milliGRAM(s) IV Push every 6 hours PRN Nausea  diphenhydrAMINE 25 milliGRAM(s) Oral every 6 hours PRN Itching  fentanyl (3 MICROgram(s)/mL) + bupivacaine 0.01% in 0.9% Sodium Chloride PCEA Rescue Clinician Bolus 5 milliLiter(s) Epidural every 15 minutes PRN Moderate Pain (4 - 6)  glucagon  Injectable 1 milliGRAM(s) IntraMuscular once PRN Glucose LESS THAN 70 milligrams/deciliter  glycerin Suppository - Adult 1 Suppository(s) Rectal at bedtime PRN Constipation  lanolin Ointment 1 Application(s) Topical every 6 hours PRN Sore Nipples  magnesium hydroxide Suspension 30 milliLiter(s) Oral two times a day PRN Constipation  naloxone Injectable 0.1 milliGRAM(s) IV Push every 3 minutes PRN For ANY of the following changes in patient status:  A. RR LESS THAN 10 breaths per minute, B. Oxygen saturation LESS THAN 90%, C. Sedation score of 6  naloxone Injectable 0.1 milliGRAM(s) IV Push every 3 minutes PRN For ANY of the following changes in patient status:  A. RR LESS THAN 10 breaths per minute, B. Oxygen saturation LESS THAN 90%, C. Sedation score of 6  ondansetron Injectable 4 milliGRAM(s) IV Push every 6 hours PRN Nausea  ondansetron Injectable 4 milliGRAM(s) IV Push every 6 hours PRN Nausea  oxyCODONE    IR 5 milliGRAM(s) Oral every 3 hours PRN Moderate to Severe Pain (4-10)  oxyCODONE    IR 5 milliGRAM(s) Oral once PRN Moderate to Severe Pain (4-10)  simethicone 80 milliGRAM(s) Chew every 4 hours PRN Gas      OBJECTIVE:    Assessment of Epidural Catheter Site: 	    [X] Dressing intact	[X] Site non-tender	[X] Site without erythema, discharge, edema  [X] Epidural tubing and connection checked	[X] Gross neurological exam within normal limits  [ ] Catheter removed – tip intact		    PT/INR - ( 20 Feb 2020 05:34 )   PT: 9.5 sec;   INR: 0.84 ratio         PTT - ( 20 Feb 2020 05:34 )  PTT:25.7 sec                      10.0   16.37 )-----------( 377      ( 20 Feb 2020 05:34 )             32.0     Vital Signs Last 24 Hrs  T(C): 36.9 (02-20-20 @ 08:16), Max: 37 (02-19-20 @ 22:00)  T(F): 98.4 (02-20-20 @ 08:16), Max: 98.6 (02-19-20 @ 22:00)  HR: 69 (02-20-20 @ 08:16) (54 - 81)  BP: 131/80 (02-20-20 @ 08:16) (106/63 - 172/81)  BP(mean): 101 (02-19-20 @ 19:40) (89 - 119)  RR: 18 (02-20-20 @ 08:16) (17 - 52)  SpO2: 98% (02-20-20 @ 08:16) (92% - 100%)      Sedation Score:	[X] Alert	[ ] Drowsy	[ ] Arousable  [ ] Asleep  [ ] Unresponsive    Side Effects:	[X] None	[ ] Nausea	[ ] Vomiting  [ ] Pruritus  		[ ] Weakness  [ ] Numbness  [ ] Other:    ASSESSMENT/ PLAN:    Therapy:                         [X] Continue   [ ] Discontinue   [ ] Change to PRN Analgesics    Documentation and Verification of current medications:  [X] Done	[ ] Not done, not eligible, reason:    Comments:

## 2020-02-20 NOTE — CONSULT NOTE ADULT - ATTENDING COMMENTS
39 year old  F, TTP, HTN originally admitted at 30w0d with pre-eclampsia complicated by uncontrolled HTN s/p  section due to non reassuring fetal heart tracing with significant improvement in her blood pressures leading to a reduction in medication doses. There are two primary contributing factors is that systelically elevated blood pressure with influence the pHTN. The second contributing factor is the 30-50% increase in plasma volume in pregnant women which will mildly increase pressures across the entire body. That being said, her PA pressures remain in the upper limit of normal range;simply maintain well controlled blood pressures whoich have drastically improved s/p  and can be prescribed PO furosemide 40 mg daily for 7 days. Continue to monitor blood pressure and wean medicines as tolerated. Signing off.     Jodie Feliciano MD, MPH, AMY, RPVI, FACC  Cardiovascular Specialist   Lana Mejias [facility]  c: [phone]  e: [email or web address]
Consult for a 38 yo lady h/o TTP in April 2019 with uncontrolled HTN at 28 weeks of pregnancy.    Admitted to [facility] 4/5/19-4/20-/2019 with TTP ( Severe thrombocytopenia/ renal failure/ uncontrolled hypertension)- renal biopsy showed chronic TMA. She received 13 sessions of plasmapheresis and prednisone ( last PLEX was 4/19/2019). she finished the prednisone taper on 5/13/19 and recovered in terms of platelet count/ renal failure. Subsequently she continued to follow with Heme/Onc and ADAMTS-13 activity was 65%. However she remained hypertensive. Subsequently became pregnant and BP continued to be in the 150s/90s and she was on Nifedipine XL that was being uptitrated. She is now 28 weeks pregnant with uncontrolled hypertension on Labetalol 700 mg tid + Nifedipine XL 90 mg daily.     Creat is 0.69/ urinalysis with no proteinuria or hematuria/ normal platelet count and hemoglobin     Assessment and Plan     1) Chronic hypertension likely sec to chronic TMA that is now worse during pregnancy. Likely not pre-ecclampsia. No proteinuria.   2) Patients with a h/o TTP have a higher risk of relapse during pregnancy but she has no signs of TTP at this time- monitor CBC/LDH/Haptoglobin closely   3) Increase Labetalol to 800 mg TID and Nifedipine XL to 120 mg daily. Next step would be to add hydralazine- decision upto Ob whether delivery would be indicated at that time.   4) Low ADAMTS 13 levels during pregnancy are associated with poor outcomes, however in the absence of clinical/ lab evidence of TTP, would not check ADAMTS 13 activity or antibody.     Yolanda Garcia   Nephrology Attending   cellphone- [phone]

## 2020-02-20 NOTE — PROVIDER CONTACT NOTE (MEDICATION) - ACTION/TREATMENT ORDERED:
Provider acknowledged BP.  Administer labetalol 200 mg per new order now, and procardia 60 mg as ordered now.

## 2020-02-20 NOTE — PROVIDER CONTACT NOTE (MEDICATION) - SITUATION
Patient Bp at 1852pm is 126/78 hr 80. Labetalol and Procardia both due to be administered at this time.

## 2020-02-20 NOTE — PROGRESS NOTE ADULT - ASSESSMENT
A/P: 39y  originally admitted at 30w0d with uncontrolled cHTN with abnormal UA dopplers(AEDV, intermittent REVD). Now s/p pLTCS due to non reassuring fetal heart tracing.

## 2020-02-20 NOTE — PROGRESS NOTE ADULT - SUBJECTIVE AND OBJECTIVE BOX
LOY SAMUEL [id number]    R3 Antepartum Progress Note    POD#1   HD#14    Patient seen and examined at bedside.  No acute events overnight. No acute complaints.  Pain well controlled.  Denies headache, shortness of breath, RUQ pain, epigastric pain.    Vital Signs Last 24 Hours  T(C): 36.8 (02-20-20 @ 00:09), Max: 37 (02-19-20 @ 05:27)  HR: 81 (02-20-20 @ 00:09) (54 - 81)  BP: 106/63 (02-20-20 @ 00:09) (106/63 - 172/81)  RR: 18 (02-20-20 @ 02:06) (17 - 52)  SpO2: 97% (02-20-20 @ 02:06) (92% - 100%)    I&O's Summary    18 Feb 2020 07:01  -  19 Feb 2020 07:00  --------------------------------------------------------  IN: 488 mL / OUT: 500 mL / NET: -12 mL    19 Feb 2020 07:01  -  20 Feb 2020 04:01  --------------------------------------------------------  IN: 2100 mL / OUT: 3200 mL / NET: -1100 mL        Physical Exam:  General: NAD  CV: RR, S1, S2, no M/R/G  Lungs: CTA b/l  Abdomen: Soft, non tender  Ext: No pain or swelling     Labs:                        10.7   17.26 )-----------( 401      ( 19 Feb 2020 23:05 )             32.8   baso 0.2    eos 0.2    imm gran 0.3    lymph 19.9   mono 6.9    poly 72.5                         11.2   14.20 )-----------( 420      ( 19 Feb 2020 09:32 )             35.7   baso 0.1    eos 0.3    imm gran 0.5    lymph 28.0   mono 8.1    poly 63.0                         11.9   14.21 )-----------( 420      ( 18 Feb 2020 01:59 )             36.4   baso 0.1    eos 0.1    imm gran 0.8    lymph 14.8   mono 6.5    poly 77.7                         11.0   13.38 )-----------( 395      ( 17 Feb 2020 05:20 )             33.6   baso 0.1    eos 0.0    imm gran 0.4    lymph 14.6   mono 7.0    poly 77.9       MEDICATIONS  (STANDING):  acetaminophen   Tablet .. 975 milliGRAM(s) Oral <User Schedule>  dextrose 5% + sodium chloride 0.9%. 1000 milliLiter(s) (50 mL/Hr) IV Continuous <Continuous>  dextrose 5%. 1000 milliLiter(s) (50 mL/Hr) IV Continuous <Continuous>  diphtheria/tetanus/pertussis (acellular) Vaccine (ADAcel) 0.5 milliLiter(s) IntraMuscular once  fentanyl (3 MICROgram(s)/mL) + bupivacaine 0.01% in 0.9% Sodium Chloride PCEA 250 milliLiter(s) Epidural PCA Continuous  folic acid 1 milliGRAM(s) Oral daily  heparin  Injectable 80821 Unit(s) SubCutaneous two times a day  hydrALAZINE 25 milliGRAM(s) Oral every 8 hours  ibuprofen  Tablet. 600 milliGRAM(s) Oral every 6 hours  ketorolac   Injectable 30 milliGRAM(s) IV Push every 6 hours  labetalol 400 milliGRAM(s) Oral every 8 hours  lactated ringers. 1000 milliLiter(s) (125 mL/Hr) IV Continuous <Continuous>  lactated ringers. 1000 milliLiter(s) (125 mL/Hr) IV Continuous <Continuous>  NIFEdipine XL 60 milliGRAM(s) Oral <User Schedule>  oxytocin Infusion 333.333 milliUNIT(s)/Min (1000 mL/Hr) IV Continuous <Continuous>  oxytocin Infusion 333.333 milliUNIT(s)/Min (1000 mL/Hr) IV Continuous <Continuous>  prenatal multivitamin 1 Tablet(s) Oral daily    MEDICATIONS  (PRN):  dexAMETHasone  Injectable 4 milliGRAM(s) IV Push every 6 hours PRN Nausea  dexAMETHasone  Injectable 4 milliGRAM(s) IV Push every 6 hours PRN Nausea  diphenhydrAMINE 25 milliGRAM(s) Oral every 6 hours PRN Itching  fentanyl (3 MICROgram(s)/mL) + bupivacaine 0.01% in 0.9% Sodium Chloride PCEA Rescue Clinician Bolus 5 milliLiter(s) Epidural every 15 minutes PRN Moderate Pain (4 - 6)  glucagon  Injectable 1 milliGRAM(s) IntraMuscular once PRN Glucose LESS THAN 70 milligrams/deciliter  glycerin Suppository - Adult 1 Suppository(s) Rectal at bedtime PRN Constipation  lanolin Ointment 1 Application(s) Topical every 6 hours PRN Sore Nipples  magnesium hydroxide Suspension 30 milliLiter(s) Oral two times a day PRN Constipation  naloxone Injectable 0.1 milliGRAM(s) IV Push every 3 minutes PRN For ANY of the following changes in patient status:  A. RR LESS THAN 10 breaths per minute, B. Oxygen saturation LESS THAN 90%, C. Sedation score of 6  naloxone Injectable 0.1 milliGRAM(s) IV Push every 3 minutes PRN For ANY of the following changes in patient status:  A. RR LESS THAN 10 breaths per minute, B. Oxygen saturation LESS THAN 90%, C. Sedation score of 6  ondansetron Injectable 4 milliGRAM(s) IV Push every 6 hours PRN Nausea  ondansetron Injectable 4 milliGRAM(s) IV Push every 6 hours PRN Nausea  oxyCODONE    IR 5 milliGRAM(s) Oral every 3 hours PRN Moderate to Severe Pain (4-10)  oxyCODONE    IR 5 milliGRAM(s) Oral once PRN Moderate to Severe Pain (4-10)  simethicone 80 milliGRAM(s) Chew every 4 hours PRN Gas LOY SAMUEL [id number]    R3 Antepartum Progress Note    POD#1   HD#14    Patient seen and examined at bedside.  No acute events overnight. No acute complaints.  Pain well controlled. Pt just returned from the NICU, she has been out of wheelchair twice today.   Denies headache, shortness of breath, RUQ pain, epigastric pain.    Vital Signs Last 24 Hours  T(C): 36.8 (02-20-20 @ 00:09), Max: 37 (02-19-20 @ 05:27)  HR: 81 (02-20-20 @ 00:09) (54 - 81)  BP: 106/63 (02-20-20 @ 00:09) (106/63 - 172/81)  RR: 18 (02-20-20 @ 02:06) (17 - 52)  SpO2: 97% (02-20-20 @ 02:06) (92% - 100%)    I&O's Summary    18 Feb 2020 07:01  -  19 Feb 2020 07:00  --------------------------------------------------------  IN: 488 mL / OUT: 500 mL / NET: -12 mL    19 Feb 2020 07:01  -  20 Feb 2020 04:01  --------------------------------------------------------  IN: 2100 mL / OUT: 3200 mL / NET: -1100 mL        Physical Exam:  General: NAD  CV: RR, S1, S2, no M/R/G  Lungs: CTA b/l  Abdomen: Soft, non tender  Ext: No pain or swelling     Labs:                        10.7   17.26 )-----------( 401      ( 19 Feb 2020 23:05 )             32.8   baso 0.2    eos 0.2    imm gran 0.3    lymph 19.9   mono 6.9    poly 72.5                         11.2   14.20 )-----------( 420      ( 19 Feb 2020 09:32 )             35.7   baso 0.1    eos 0.3    imm gran 0.5    lymph 28.0   mono 8.1    poly 63.0                         11.9   14.21 )-----------( 420      ( 18 Feb 2020 01:59 )             36.4   baso 0.1    eos 0.1    imm gran 0.8    lymph 14.8   mono 6.5    poly 77.7                         11.0   13.38 )-----------( 395      ( 17 Feb 2020 05:20 )             33.6   baso 0.1    eos 0.0    imm gran 0.4    lymph 14.6   mono 7.0    poly 77.9       MEDICATIONS  (STANDING):  acetaminophen   Tablet .. 975 milliGRAM(s) Oral <User Schedule>  dextrose 5% + sodium chloride 0.9%. 1000 milliLiter(s) (50 mL/Hr) IV Continuous <Continuous>  dextrose 5%. 1000 milliLiter(s) (50 mL/Hr) IV Continuous <Continuous>  diphtheria/tetanus/pertussis (acellular) Vaccine (ADAcel) 0.5 milliLiter(s) IntraMuscular once  fentanyl (3 MICROgram(s)/mL) + bupivacaine 0.01% in 0.9% Sodium Chloride PCEA 250 milliLiter(s) Epidural PCA Continuous  folic acid 1 milliGRAM(s) Oral daily  heparin  Injectable 03748 Unit(s) SubCutaneous two times a day  hydrALAZINE 25 milliGRAM(s) Oral every 8 hours  ibuprofen  Tablet. 600 milliGRAM(s) Oral every 6 hours  ketorolac   Injectable 30 milliGRAM(s) IV Push every 6 hours  labetalol 400 milliGRAM(s) Oral every 8 hours  lactated ringers. 1000 milliLiter(s) (125 mL/Hr) IV Continuous <Continuous>  lactated ringers. 1000 milliLiter(s) (125 mL/Hr) IV Continuous <Continuous>  NIFEdipine XL 60 milliGRAM(s) Oral <User Schedule>  oxytocin Infusion 333.333 milliUNIT(s)/Min (1000 mL/Hr) IV Continuous <Continuous>  oxytocin Infusion 333.333 milliUNIT(s)/Min (1000 mL/Hr) IV Continuous <Continuous>  prenatal multivitamin 1 Tablet(s) Oral daily    MEDICATIONS  (PRN):  dexAMETHasone  Injectable 4 milliGRAM(s) IV Push every 6 hours PRN Nausea  dexAMETHasone  Injectable 4 milliGRAM(s) IV Push every 6 hours PRN Nausea  diphenhydrAMINE 25 milliGRAM(s) Oral every 6 hours PRN Itching  fentanyl (3 MICROgram(s)/mL) + bupivacaine 0.01% in 0.9% Sodium Chloride PCEA Rescue Clinician Bolus 5 milliLiter(s) Epidural every 15 minutes PRN Moderate Pain (4 - 6)  glucagon  Injectable 1 milliGRAM(s) IntraMuscular once PRN Glucose LESS THAN 70 milligrams/deciliter  glycerin Suppository - Adult 1 Suppository(s) Rectal at bedtime PRN Constipation  lanolin Ointment 1 Application(s) Topical every 6 hours PRN Sore Nipples  magnesium hydroxide Suspension 30 milliLiter(s) Oral two times a day PRN Constipation  naloxone Injectable 0.1 milliGRAM(s) IV Push every 3 minutes PRN For ANY of the following changes in patient status:  A. RR LESS THAN 10 breaths per minute, B. Oxygen saturation LESS THAN 90%, C. Sedation score of 6  naloxone Injectable 0.1 milliGRAM(s) IV Push every 3 minutes PRN For ANY of the following changes in patient status:  A. RR LESS THAN 10 breaths per minute, B. Oxygen saturation LESS THAN 90%, C. Sedation score of 6  ondansetron Injectable 4 milliGRAM(s) IV Push every 6 hours PRN Nausea  ondansetron Injectable 4 milliGRAM(s) IV Push every 6 hours PRN Nausea  oxyCODONE    IR 5 milliGRAM(s) Oral every 3 hours PRN Moderate to Severe Pain (4-10)  oxyCODONE    IR 5 milliGRAM(s) Oral once PRN Moderate to Severe Pain (4-10)  simethicone 80 milliGRAM(s) Chew every 4 hours PRN Gas

## 2020-02-20 NOTE — CHART NOTE - NSCHARTNOTEFT_GEN_A_CORE
Pt seen for post-partum GDM education. Pt reports good po intake/appetite at this time. pt and spouse at bedside amenable to post-partum GDM education.    Pt is a 39 year old  now POD#1 s/p pLTCS at 29 and 6/7 weeks due to non reassuring fetal heart tracing. Antepartum course significant for GDMA2 (Insulin). Pt plans on breast and formula feeding infant.    Source: Patient [x]    Family [ ]     other [x] EMR    Diet: Regular PO Diet    Patient reports [ ] nausea  [ ] vomiting [ ] diarrhea [ ] constipation  [ ]chewing problems [ ] swallowing issues  [x] other: Denies GI distress    PO intake:  < 50% [ ] 50-75% [ ]   % [x]  other:    Source for PO intake [x] Patient [ ] family [ ] chart [ ] staff [ ] other    Enteral/Parenteral Nutrition: n/a    Current Weight: 288 pounds . Pre-pregnancy wt: 250 pounds. Total pregnancy wt gain: 38 pounds. Pre-pregnancy BMI: 44.3 kG/m2.    Pertinent Medications: MEDICATIONS  (STANDING):  acetaminophen   Tablet .. 975 milliGRAM(s) Oral <User Schedule>  dextrose 5% + sodium chloride 0.9%. 1000 milliLiter(s) (50 mL/Hr) IV Continuous <Continuous>  dextrose 5%. 1000 milliLiter(s) (50 mL/Hr) IV Continuous <Continuous>  diphtheria/tetanus/pertussis (acellular) Vaccine (ADAcel) 0.5 milliLiter(s) IntraMuscular once  fentanyl (3 MICROgram(s)/mL) + bupivacaine 0.01% in 0.9% Sodium Chloride PCEA 250 milliLiter(s) Epidural PCA Continuous  folic acid 1 milliGRAM(s) Oral daily  heparin  Injectable 82271 Unit(s) SubCutaneous two times a day  hydrALAZINE 25 milliGRAM(s) Oral every 8 hours  ibuprofen  Tablet. 600 milliGRAM(s) Oral every 6 hours  ketorolac   Injectable 30 milliGRAM(s) IV Push every 6 hours  labetalol 400 milliGRAM(s) Oral every 8 hours  lactated ringers. 1000 milliLiter(s) (125 mL/Hr) IV Continuous <Continuous>  lactated ringers. 1000 milliLiter(s) (125 mL/Hr) IV Continuous <Continuous>  NIFEdipine XL 60 milliGRAM(s) Oral <User Schedule>  oxytocin Infusion 333.333 milliUNIT(s)/Min (1000 mL/Hr) IV Continuous <Continuous>  oxytocin Infusion 333.333 milliUNIT(s)/Min (1000 mL/Hr) IV Continuous <Continuous>  prenatal multivitamin 1 Tablet(s) Oral daily    MEDICATIONS  (PRN):  dexAMETHasone  Injectable 4 milliGRAM(s) IV Push every 6 hours PRN Nausea  dexAMETHasone  Injectable 4 milliGRAM(s) IV Push every 6 hours PRN Nausea  diphenhydrAMINE 25 milliGRAM(s) Oral every 6 hours PRN Itching  fentanyl (3 MICROgram(s)/mL) + bupivacaine 0.01% in 0.9% Sodium Chloride PCEA Rescue Clinician Bolus 5 milliLiter(s) Epidural every 15 minutes PRN Moderate Pain (4 - 6)  glucagon  Injectable 1 milliGRAM(s) IntraMuscular once PRN Glucose LESS THAN 70 milligrams/deciliter  glycerin Suppository - Adult 1 Suppository(s) Rectal at bedtime PRN Constipation  lanolin Ointment 1 Application(s) Topical every 6 hours PRN Sore Nipples  magnesium hydroxide Suspension 30 milliLiter(s) Oral two times a day PRN Constipation  naloxone Injectable 0.1 milliGRAM(s) IV Push every 3 minutes PRN For ANY of the following changes in patient status:  A. RR LESS THAN 10 breaths per minute, B. Oxygen saturation LESS THAN 90%, C. Sedation score of 6  naloxone Injectable 0.1 milliGRAM(s) IV Push every 3 minutes PRN For ANY of the following changes in patient status:  A. RR LESS THAN 10 breaths per minute, B. Oxygen saturation LESS THAN 90%, C. Sedation score of 6  ondansetron Injectable 4 milliGRAM(s) IV Push every 6 hours PRN Nausea  ondansetron Injectable 4 milliGRAM(s) IV Push every 6 hours PRN Nausea  oxyCODONE    IR 5 milliGRAM(s) Oral every 3 hours PRN Moderate to Severe Pain (4-10)  oxyCODONE    IR 5 milliGRAM(s) Oral once PRN Moderate to Severe Pain (4-10)  simethicone 80 milliGRAM(s) Chew every 4 hours PRN Gas    Pertinent Labs:   Na133 mmol/L<L> Glu 160 mg/dL<H> K+ 4.5 mmol/L Cr  0.74 mg/dL BUN 10 mg/dL 02-20 Alb 3.1 g/dL<L> 02-08 [id number] 7.1 %<H>      Skin: No pressure injuries      Estimated Needs:   [x] no change since previous assessment  [ ] recalculated:       Previous Nutrition Diagnosis: Overweight/Obesity; Food & Nutrition Related Knowledge Deficit        Nutrition Diagnosis is [x] ongoing  [ ] resolved [ ] not applicable          New Nutrition Diagnosis: [x] not applicable      Interventions:     1) Recommend continue current regular general healthful diet  2) Post-partum GDM diet education provided:   -Increased risk of developing T2DM with GDM and ways to help prevent development  -4-12 week fasting oral glucose tolerance test follow-up as outpatient  -General healthful diet and physical activity recommendations discussed  -Pre-conception counseling/planning for future pregnancies  -Increased energy needs with breastfeeding.   Pt and spouse voice understanding.  -After-delivery GDM education handout provided       Monitoring and Evaluation:     [x] PO intake [x] Tolerance to diet prescription [x] weights [x] follow up per protocol    [x] other: Niharika Solano MS, RDN, CDN, CDE, CSOW[facility]. #[phone] Pt seen for post-partum GDM education. Pt reports good po intake/appetite at this time. pt and spouse at bedside amenable to post-partum GDM education.    Pt is a 39 year old  now POD#1 s/p pLTCS at 29 and 6/7 weeks due to non reassuring fetal heart tracing. Antepartum course significant for GDMA2 (Insulin). Pt plans on breast and formula feeding infant.    Source: Patient [x]    Family [ ]     other [x] EMR    Diet: Regular PO Diet    Patient reports [ ] nausea  [ ] vomiting [ ] diarrhea [ ] constipation  [ ]chewing problems [ ] swallowing issues  [x] other: Denies GI distress    PO intake:  < 50% [ ] 50-75% [ ]   % [x]  other:    Source for PO intake [x] Patient [ ] family [ ] chart [ ] staff [ ] other    Enteral/Parenteral Nutrition: n/a    Current Weight: 288 pounds . Pre-pregnancy wt: 250 pounds. Total pregnancy wt gain: 38 pounds. Pre-pregnancy BMI: 44.3 kG/m2.    Pertinent Medications: MEDICATIONS  (STANDING):  acetaminophen   Tablet .. 975 milliGRAM(s) Oral <User Schedule>  dextrose 5% + sodium chloride 0.9%. 1000 milliLiter(s) (50 mL/Hr) IV Continuous <Continuous>  dextrose 5%. 1000 milliLiter(s) (50 mL/Hr) IV Continuous <Continuous>  diphtheria/tetanus/pertussis (acellular) Vaccine (ADAcel) 0.5 milliLiter(s) IntraMuscular once  fentanyl (3 MICROgram(s)/mL) + bupivacaine 0.01% in 0.9% Sodium Chloride PCEA 250 milliLiter(s) Epidural PCA Continuous  folic acid 1 milliGRAM(s) Oral daily  heparin  Injectable 54618 Unit(s) SubCutaneous two times a day  hydrALAZINE 25 milliGRAM(s) Oral every 8 hours  ibuprofen  Tablet. 600 milliGRAM(s) Oral every 6 hours  ketorolac   Injectable 30 milliGRAM(s) IV Push every 6 hours  labetalol 400 milliGRAM(s) Oral every 8 hours  lactated ringers. 1000 milliLiter(s) (125 mL/Hr) IV Continuous <Continuous>  lactated ringers. 1000 milliLiter(s) (125 mL/Hr) IV Continuous <Continuous>  NIFEdipine XL 60 milliGRAM(s) Oral <User Schedule>  oxytocin Infusion 333.333 milliUNIT(s)/Min (1000 mL/Hr) IV Continuous <Continuous>  oxytocin Infusion 333.333 milliUNIT(s)/Min (1000 mL/Hr) IV Continuous <Continuous>  prenatal multivitamin 1 Tablet(s) Oral daily    MEDICATIONS  (PRN):  dexAMETHasone  Injectable 4 milliGRAM(s) IV Push every 6 hours PRN Nausea  dexAMETHasone  Injectable 4 milliGRAM(s) IV Push every 6 hours PRN Nausea  diphenhydrAMINE 25 milliGRAM(s) Oral every 6 hours PRN Itching  fentanyl (3 MICROgram(s)/mL) + bupivacaine 0.01% in 0.9% Sodium Chloride PCEA Rescue Clinician Bolus 5 milliLiter(s) Epidural every 15 minutes PRN Moderate Pain (4 - 6)  glucagon  Injectable 1 milliGRAM(s) IntraMuscular once PRN Glucose LESS THAN 70 milligrams/deciliter  glycerin Suppository - Adult 1 Suppository(s) Rectal at bedtime PRN Constipation  lanolin Ointment 1 Application(s) Topical every 6 hours PRN Sore Nipples  magnesium hydroxide Suspension 30 milliLiter(s) Oral two times a day PRN Constipation  naloxone Injectable 0.1 milliGRAM(s) IV Push every 3 minutes PRN For ANY of the following changes in patient status:  A. RR LESS THAN 10 breaths per minute, B. Oxygen saturation LESS THAN 90%, C. Sedation score of 6  naloxone Injectable 0.1 milliGRAM(s) IV Push every 3 minutes PRN For ANY of the following changes in patient status:  A. RR LESS THAN 10 breaths per minute, B. Oxygen saturation LESS THAN 90%, C. Sedation score of 6  ondansetron Injectable 4 milliGRAM(s) IV Push every 6 hours PRN Nausea  ondansetron Injectable 4 milliGRAM(s) IV Push every 6 hours PRN Nausea  oxyCODONE    IR 5 milliGRAM(s) Oral every 3 hours PRN Moderate to Severe Pain (4-10)  oxyCODONE    IR 5 milliGRAM(s) Oral once PRN Moderate to Severe Pain (4-10)  simethicone 80 milliGRAM(s) Chew every 4 hours PRN Gas    Pertinent Labs:   Na133 mmol/L<L> Glu 160 mg/dL<H> K+ 4.5 mmol/L Cr  0.74 mg/dL BUN 10 mg/dL 02-20 Alb 3.1 g/dL<L> 02-08 [id number] 7.1 %<H>      Skin: No pressure injuries      Estimated Needs:   [x] no change since previous assessment  [ ] recalculated:       Previous Nutrition Diagnosis: Overweight/Obesity; Food & Nutrition Related Knowledge Deficit        Nutrition Diagnosis is [x] ongoing  [ ] resolved [ ] not applicable          New Nutrition Diagnosis: [x] not applicable      Interventions:     1) Recommend continue current regular general healthful diet  2) Post-partum GDM diet education provided:   -Increased risk of developing T2DM with GDM and ways to help prevent development  -4-12 week fasting oral glucose tolerance test follow-up as outpatient  -General healthful diet and physical activity recommendations discussed  -Pre-conception counseling/planning for future pregnancies  -Increased energy needs with breastfeeding.   Pt and spouse voice understanding.  -After-delivery GDM education handout provided  3) Weight loss education/counseling deferred in context of increased nutrient needs with active breastfeeding     Monitoring and Evaluation:     [x] PO intake [x] Tolerance to diet prescription [x] weights [x] follow up per protocol    [x] other: Niharika Solano MS, RDN, CDN, CDE, [facility]. #[phone]

## 2020-02-20 NOTE — PROVIDER CONTACT NOTE (MEDICATION) - RECOMMENDATIONS
Please advise on administration of BP meds as both labetalol and procardia are due at the same time.

## 2020-02-20 NOTE — CONSULT NOTE ADULT - ASSESSMENT
Full note to follow Patient is a 39 year old  F, TTP, HTN originally admitted at 30w0d with uncontrolled HTN, now s/p pLTCS due to non reassuring fetal heart tracing. Cardiology consulted for help w/ BP management and to review TTE.    Recs:  -TTE from 2/10 prior to delivery w/ borderline pulm htn (RVSP 35 mm Hg) likely related to normal hemodynamic changes of pregnancy. Can repeat as OP in 1 month.  -Pt's blood pressure is being closely followed by nephrology. Regimen has been downtitrated after delivery. Currently well controlled  -Would continue hydralazine, labetalol  -Can consider switching to longer acting agents for ease of administration on discharge if ok w/ nephrology    Raimundo Minaya MD  Cardiology Fellow  All cardiology service information can be found  on [email or web address], password: [email or web address]

## 2020-02-21 ENCOUNTER — TRANSCRIPTION ENCOUNTER (OUTPATIENT)
Age: 40
End: 2020-02-21

## 2020-02-21 LAB
ALBUMIN SERPL ELPH-MCNC: 3.3 G/DL — SIGNIFICANT CHANGE UP (ref 3.3–5)
ALP SERPL-CCNC: 97 U/L — SIGNIFICANT CHANGE UP (ref 40–120)
ALT FLD-CCNC: 15 U/L — SIGNIFICANT CHANGE UP (ref 10–45)
ANION GAP SERPL CALC-SCNC: 13 MMOL/L — SIGNIFICANT CHANGE UP (ref 5–17)
APTT BLD: 29.5 SEC — SIGNIFICANT CHANGE UP (ref 27.5–36.3)
AST SERPL-CCNC: 9 U/L — LOW (ref 10–40)
BASOPHILS # BLD AUTO: 0.04 K/UL — SIGNIFICANT CHANGE UP (ref 0–0.2)
BASOPHILS NFR BLD AUTO: 0.2 % — SIGNIFICANT CHANGE UP (ref 0–2)
BILIRUB SERPL-MCNC: 0.2 MG/DL — SIGNIFICANT CHANGE UP (ref 0.2–1.2)
BUN SERPL-MCNC: 12 MG/DL — SIGNIFICANT CHANGE UP (ref 7–23)
CALCIUM SERPL-MCNC: 8.9 MG/DL — SIGNIFICANT CHANGE UP (ref 8.4–10.5)
CHLORIDE SERPL-SCNC: 98 MMOL/L — SIGNIFICANT CHANGE UP (ref 96–108)
CO2 SERPL-SCNC: 23 MMOL/L — SIGNIFICANT CHANGE UP (ref 22–31)
CREAT SERPL-MCNC: 0.69 MG/DL — SIGNIFICANT CHANGE UP (ref 0.5–1.3)
EOSINOPHIL # BLD AUTO: 0.29 K/UL — SIGNIFICANT CHANGE UP (ref 0–0.5)
EOSINOPHIL NFR BLD AUTO: 1.4 % — SIGNIFICANT CHANGE UP (ref 0–6)
FIBRINOGEN PPP-MCNC: 746 MG/DL — HIGH (ref 350–510)
GLUCOSE SERPL-MCNC: 109 MG/DL — HIGH (ref 70–99)
HCT VFR BLD CALC: 30.6 % — LOW (ref 34.5–45)
HGB BLD-MCNC: 9.3 G/DL — LOW (ref 11.5–15.5)
IMM GRANULOCYTES NFR BLD AUTO: 0.5 % — SIGNIFICANT CHANGE UP (ref 0–1.5)
INR BLD: 0.86 RATIO — LOW (ref 0.88–1.16)
LDH SERPL L TO P-CCNC: 205 U/L — SIGNIFICANT CHANGE UP (ref 50–242)
LYMPHOCYTES # BLD AUTO: 17.7 % — SIGNIFICANT CHANGE UP (ref 13–44)
LYMPHOCYTES # BLD AUTO: 3.65 K/UL — HIGH (ref 1–3.3)
MCHC RBC-ENTMCNC: 29.1 PG — SIGNIFICANT CHANGE UP (ref 27–34)
MCHC RBC-ENTMCNC: 30.4 GM/DL — LOW (ref 32–36)
MCV RBC AUTO: 95.6 FL — SIGNIFICANT CHANGE UP (ref 80–100)
MONOCYTES # BLD AUTO: 1.41 K/UL — HIGH (ref 0–0.9)
MONOCYTES NFR BLD AUTO: 6.8 % — SIGNIFICANT CHANGE UP (ref 2–14)
NEUTROPHILS # BLD AUTO: 15.15 K/UL — HIGH (ref 1.8–7.4)
NEUTROPHILS NFR BLD AUTO: 73.4 % — SIGNIFICANT CHANGE UP (ref 43–77)
NRBC # BLD: 0 /100 WBCS — SIGNIFICANT CHANGE UP (ref 0–0)
PLATELET # BLD AUTO: 342 K/UL — SIGNIFICANT CHANGE UP (ref 150–400)
POTASSIUM SERPL-MCNC: 4.1 MMOL/L — SIGNIFICANT CHANGE UP (ref 3.5–5.3)
POTASSIUM SERPL-SCNC: 4.1 MMOL/L — SIGNIFICANT CHANGE UP (ref 3.5–5.3)
PROT SERPL-MCNC: 6.8 G/DL — SIGNIFICANT CHANGE UP (ref 6–8.3)
PROTHROM AB SERPL-ACNC: 9.9 SEC — LOW (ref 10–12.9)
RBC # BLD: 3.2 M/UL — LOW (ref 3.8–5.2)
RBC # FLD: 16 % — HIGH (ref 10.3–14.5)
SODIUM SERPL-SCNC: 134 MMOL/L — LOW (ref 135–145)
URATE SERPL-MCNC: 3.8 MG/DL — SIGNIFICANT CHANGE UP (ref 2.5–7)
WBC # BLD: 20.65 K/UL — HIGH (ref 3.8–10.5)
WBC # FLD AUTO: 20.65 K/UL — HIGH (ref 3.8–10.5)

## 2020-02-21 RX ORDER — LABETALOL HCL 100 MG
100 TABLET ORAL EVERY 8 HOURS
Refills: 0 | Status: DISCONTINUED | OUTPATIENT
Start: 2020-02-21 | End: 2020-02-22

## 2020-02-21 RX ORDER — OXYCODONE HYDROCHLORIDE 5 MG/1
5 TABLET ORAL ONCE
Refills: 0 | Status: DISCONTINUED | OUTPATIENT
Start: 2020-02-21 | End: 2020-02-21

## 2020-02-21 RX ORDER — IBUPROFEN 200 MG
1 TABLET ORAL
Qty: 0 | Refills: 0 | DISCHARGE
Start: 2020-02-21

## 2020-02-21 RX ORDER — ACETAMINOPHEN 500 MG
3 TABLET ORAL
Qty: 0 | Refills: 0 | DISCHARGE
Start: 2020-02-21

## 2020-02-21 RX ADMIN — OXYCODONE HYDROCHLORIDE 5 MILLIGRAM(S): 5 TABLET ORAL at 11:15

## 2020-02-21 RX ADMIN — Medication 600 MILLIGRAM(S): at 18:59

## 2020-02-21 RX ADMIN — Medication 1 MILLIGRAM(S): at 11:07

## 2020-02-21 RX ADMIN — Medication 25 MILLIGRAM(S): at 06:38

## 2020-02-21 RX ADMIN — Medication 25 MILLIGRAM(S): at 14:05

## 2020-02-21 RX ADMIN — Medication 200 MILLIGRAM(S): at 18:13

## 2020-02-21 RX ADMIN — Medication 60 MILLIGRAM(S): at 06:37

## 2020-02-21 RX ADMIN — OXYCODONE HYDROCHLORIDE 5 MILLIGRAM(S): 5 TABLET ORAL at 22:05

## 2020-02-21 RX ADMIN — Medication 975 MILLIGRAM(S): at 09:58

## 2020-02-21 RX ADMIN — Medication 25 MILLIGRAM(S): at 22:05

## 2020-02-21 RX ADMIN — Medication 600 MILLIGRAM(S): at 12:21

## 2020-02-21 RX ADMIN — OXYCODONE HYDROCHLORIDE 5 MILLIGRAM(S): 5 TABLET ORAL at 00:20

## 2020-02-21 RX ADMIN — Medication 975 MILLIGRAM(S): at 14:05

## 2020-02-21 RX ADMIN — Medication 600 MILLIGRAM(S): at 18:09

## 2020-02-21 RX ADMIN — Medication 1 TABLET(S): at 11:07

## 2020-02-21 RX ADMIN — Medication 600 MILLIGRAM(S): at 06:37

## 2020-02-21 RX ADMIN — Medication 200 MILLIGRAM(S): at 11:07

## 2020-02-21 RX ADMIN — Medication 975 MILLIGRAM(S): at 20:30

## 2020-02-21 RX ADMIN — HEPARIN SODIUM 10000 UNIT(S): 5000 INJECTION INTRAVENOUS; SUBCUTANEOUS at 06:38

## 2020-02-21 RX ADMIN — OXYCODONE HYDROCHLORIDE 5 MILLIGRAM(S): 5 TABLET ORAL at 21:00

## 2020-02-21 RX ADMIN — Medication 975 MILLIGRAM(S): at 03:19

## 2020-02-21 RX ADMIN — Medication 60 MILLIGRAM(S): at 18:13

## 2020-02-21 RX ADMIN — HEPARIN SODIUM 10000 UNIT(S): 5000 INJECTION INTRAVENOUS; SUBCUTANEOUS at 18:08

## 2020-02-21 RX ADMIN — OXYCODONE HYDROCHLORIDE 5 MILLIGRAM(S): 5 TABLET ORAL at 19:15

## 2020-02-21 RX ADMIN — OXYCODONE HYDROCHLORIDE 5 MILLIGRAM(S): 5 TABLET ORAL at 20:30

## 2020-02-21 RX ADMIN — OXYCODONE HYDROCHLORIDE 5 MILLIGRAM(S): 5 TABLET ORAL at 22:35

## 2020-02-21 RX ADMIN — Medication 975 MILLIGRAM(S): at 21:00

## 2020-02-21 RX ADMIN — OXYCODONE HYDROCHLORIDE 5 MILLIGRAM(S): 5 TABLET ORAL at 03:20

## 2020-02-21 RX ADMIN — OXYCODONE HYDROCHLORIDE 5 MILLIGRAM(S): 5 TABLET ORAL at 12:22

## 2020-02-21 RX ADMIN — Medication 200 MILLIGRAM(S): at 03:20

## 2020-02-21 RX ADMIN — Medication 975 MILLIGRAM(S): at 10:30

## 2020-02-21 RX ADMIN — Medication 975 MILLIGRAM(S): at 14:45

## 2020-02-21 RX ADMIN — Medication 600 MILLIGRAM(S): at 01:10

## 2020-02-21 RX ADMIN — Medication 600 MILLIGRAM(S): at 00:20

## 2020-02-21 RX ADMIN — Medication 600 MILLIGRAM(S): at 11:07

## 2020-02-21 NOTE — PROGRESS NOTE ADULT - SUBJECTIVE AND OBJECTIVE BOX
LOY SAMUEL [id number]    R3 Antepartum Progress Note    POD#2   HD#15    Patient seen and examined at bedside.  No acute events overnight. No acute complaints.  Pain well controlled. Pt has been ambulating around room today and using wheelchair to go to the NICU  Denies headache, shortness of breath, RUQ pain, epigastric pain.      Vital Signs Last 24 Hrs  T(C): 37 (21 Feb 2020 00:47), Max: 37 (20 Feb 2020 10:10)  T(F): 98.6 (21 Feb 2020 00:47), Max: 98.6 (20 Feb 2020 10:10)  HR: 81 (21 Feb 2020 00:47) (65 - 89)  BP: 125/81 (21 Feb 2020 00:47) (105/73 - 142/85)  BP(mean): --  RR: 18 (21 Feb 2020 00:47) (16 - 18)  SpO2: 100% (21 Feb 2020 00:47) (96% - 100%)      Physical Exam:  General: NAD  CV: RR, S1, S2, no M/R/G  Lungs: CTA b/l  Abdomen: Soft, non tender  Ext: No pain or swelling     Labs:                                  10.0   16.37 )-----------( 377      ( 20 Feb 2020 05:34 )             32.0       02-20    133<L>  |  99  |  10  ----------------------------<  160<H>  4.5   |  21<L>  |  0.74    Ca    7.5<L>      20 Feb 2020 05:34  Mg     5.3     02-20    TPro  6.1  /  Alb  3.1<L>  /  TBili  0.1<L>  /  DBili  x   /  AST  16  /  ALT  18  /  AlkPhos  101  02-20               10.7   17.26 )-----------( 401      ( 19 Feb 2020 23:05 )             32.8   baso 0.2    eos 0.2    imm gran 0.3    lymph 19.9   mono 6.9    poly 72.5                         11.2   14.20 )-----------( 420      ( 19 Feb 2020 09:32 )             35.7   baso 0.1    eos 0.3    imm gran 0.5    lymph 28.0   mono 8.1    poly 63.0                         11.9   14.21 )-----------( 420      ( 18 Feb 2020 01:59 )             36.4   baso 0.1    eos 0.1    imm gran 0.8    lymph 14.8   mono 6.5    poly 77.7                         11.0   13.38 )-----------( 395      ( 17 Feb 2020 05:20 )             33.6   baso 0.1    eos 0.0    imm gran 0.4    lymph 14.6   mono 7.0    poly 77.9

## 2020-02-21 NOTE — LACTATION INITIAL EVALUATION - INTERVENTION OUTCOME
good return demonstration/Obtained colostrum with hand expression/pump about 3 ml;s. Assisted with requesting pump from insurance company/needs met/verbalizes understanding/demonstrates understanding of teaching

## 2020-02-21 NOTE — DISCHARGE NOTE OB - CARE PROVIDER_API CALL
[facility],   [facility]   [address]  [place]  Phone: [phone]  Fax: (   )    -  Follow Up Time:

## 2020-02-21 NOTE — LACTATION INITIAL EVALUATION - LACTATION INTERVENTIONS
initiate hand expression routine/initiate dual electric pump routine/initiate skin to skin/Taught hand expression, pumping guidelines, safe storage and handling.

## 2020-02-21 NOTE — DISCHARGE NOTE OB - PROVIDER TOKENS
FREE:[LAST:[[facility]],PHONE:[phone]],FAX:[(   [phone]    -],ADDRESS:[[facility]   [address] [place] [address]]]

## 2020-02-21 NOTE — DISCHARGE NOTE OB - HOSPITAL COURSE
40yo  status post pMTCS was admitted to [facility] 2020 for BP control. Patient has cHTN and was admitted to the antepartum service for BP control in setting of pregnancy complicated by abnormal fetal testing (fetus with AEDV and then developed REDV in setting of IUGR during hospitalization). During antepartum course, nephrology was consulted for patient's h/o TTP and for BP recommendations. Additionally, cardiology was consulted given findings of echo concerning for possible pulmonary HTN - per cardiology nothing to do for patient at this time. Cardiology recommended patient to have outpatient repeat echo 1month postpartum.     During antepartum course patient was on maximum anti-HTN medications  (labetalol 800 TID, procardia 60 BID) and due to NRFHT patient underwent pMTCS on . She then developed severe preeclampsia and received magnesium sulfate for seizure ppx for 24h postpartum. Postpartum patient initially required addition of hydralazine to antihypertensive regimen. POD#2 patient with improved BP control and labetalol was decreased and then stopped on POD#3. Patient with adequate BP control on procardia and hydralazine. Patient meeting all postoperative milestones.

## 2020-02-21 NOTE — DISCHARGE NOTE OB - PLAN OF CARE
return to normal state -routine postpartum care  -incision check and 6wk follow up with Dr. Napoles.     After discharge, please stay on pelvic rest for 6 weeks, meaning no sexual intercourse, no tampons and no douching.  No driving for 2 weeks as women can loose a lot of blood during delivery and there is a possibility of being lightheaded/fainting.  No lifting objects heavier than baby for two weeks.  Expect to have vaginal bleeding/spotting for up to six weeks.  The bleeding should get lighter and more white/light brown with time.  For bleeding soaking more than a pad an hour or passing clots greater than the size of your fist, come in to the emergency department.    Follow up in clinic in 2 weeks for incision check.  Call clinic for noticeable increase in redness or swelling at incision, discharge from incision, or opening of skin at incision site. continue with care with cardiology and nephrology for management of hypertension - Follow up with clinic in 1-2 days for a BP check.  Check your BP at home 2x/day.  Take your BP medication as prescribed.  Call your OBGYN with any BP >150/100.  Call OBGYN with any severe headache, blurry vision or inability to tolerate PO intake.   -f/u with cardiology for repeat echocardiogram 6wk postpartum -routine postpartum care  -incision check and 6wk follow up with Dr. Katz.     After discharge, please stay on pelvic rest for 6 weeks, meaning no sexual intercourse, no tampons and no douching.  No driving for 2 weeks as women can loose a lot of blood during delivery and there is a possibility of being lightheaded/fainting.  No lifting objects heavier than baby for two weeks.  Expect to have vaginal bleeding/spotting for up to six weeks.  The bleeding should get lighter and more white/light brown with time.  For bleeding soaking more than a pad an hour or passing clots greater than the size of your fist, come in to the emergency department.    Follow up in clinic in 2 weeks for incision check.  Call clinic for noticeable increase in redness or swelling at incision, discharge from incision, or opening of skin at incision site.

## 2020-02-21 NOTE — PROGRESS NOTE ADULT - PROBLEM SELECTOR PLAN 1
Neuro: PO pain meds. s/p MgSO4 for seizure ppx for 24 hours postpartum  CV: antepartum she was originally was on the maximum anti-HTN medication (labetalol 800 TID, procardia 60 BID). now postpartum BPs on POD#2 have been better controlled,113-142/74-81 .  Decision made to decrease labetalol to 200 TID. c/w labetalol 200 TID, procardia 60 BID, hydral 20 TID  -appreciate cardiology consult  -repeat echo 1 mo postpartum  Pulm: Saturating well on room air, encourage oob/amb, incentive spirometer   GI: Continue regular diet  : voiding spontaneously  Heme: HSQ and SCDs for DVT ppx  Dispo: Continue routine inpt care    Corinna Beltrán PGY-3

## 2020-02-21 NOTE — DISCHARGE NOTE OB - CARE PLAN
Principal Discharge DX:	 delivery delivered  Goal:	return to normal state  Assessment and plan of treatment:	-routine postpartum care  -incision check and 6wk follow up with Dr. Napoles.     After discharge, please stay on pelvic rest for 6 weeks, meaning no sexual intercourse, no tampons and no douching.  No driving for 2 weeks as women can loose a lot of blood during delivery and there is a possibility of being lightheaded/fainting.  No lifting objects heavier than baby for two weeks.  Expect to have vaginal bleeding/spotting for up to six weeks.  The bleeding should get lighter and more white/light brown with time.  For bleeding soaking more than a pad an hour or passing clots greater than the size of your fist, come in to the emergency department.    Follow up in clinic in 2 weeks for incision check.  Call clinic for noticeable increase in redness or swelling at incision, discharge from incision, or opening of skin at incision site.  Secondary Diagnosis:	Hypertension  Goal:	continue with care with cardiology and nephrology for management of hypertension  Assessment and plan of treatment:	- Follow up with clinic in 1-2 days for a BP check.  Check your BP at home 2x/day.  Take your BP medication as prescribed.  Call your OBGYN with any BP >150/100.  Call OBGYN with any severe headache, blurry vision or inability to tolerate PO intake.   -f/u with cardiology for repeat echocardiogram 6wk postpartum Principal Discharge DX:	 delivery delivered  Goal:	return to normal state  Assessment and plan of treatment:	-routine postpartum care  -incision check and 6wk follow up with Dr. Katz.     After discharge, please stay on pelvic rest for 6 weeks, meaning no sexual intercourse, no tampons and no douching.  No driving for 2 weeks as women can loose a lot of blood during delivery and there is a possibility of being lightheaded/fainting.  No lifting objects heavier than baby for two weeks.  Expect to have vaginal bleeding/spotting for up to six weeks.  The bleeding should get lighter and more white/light brown with time.  For bleeding soaking more than a pad an hour or passing clots greater than the size of your fist, come in to the emergency department.    Follow up in clinic in 2 weeks for incision check.  Call clinic for noticeable increase in redness or swelling at incision, discharge from incision, or opening of skin at incision site.  Secondary Diagnosis:	Hypertension  Goal:	continue with care with cardiology and nephrology for management of hypertension  Assessment and plan of treatment:	- Follow up with clinic in 1-2 days for a BP check.  Check your BP at home 2x/day.  Take your BP medication as prescribed.  Call your OBGYN with any BP >150/100.  Call OBGYN with any severe headache, blurry vision or inability to tolerate PO intake.   -f/u with cardiology for repeat echocardiogram 6wk postpartum

## 2020-02-21 NOTE — DISCHARGE NOTE OB - MEDICATION SUMMARY - MEDICATIONS TO TAKE
I will START or STAY ON the medications listed below when I get home from the hospital:    acetaminophen 325 mg oral tablet  -- 3 tab(s) by mouth   -- Indication: For pain    ibuprofen 600 mg oral tablet  -- 1 tab(s) by mouth every 6 hours  -- Indication: For pain    NIFEdipine 60 mg oral tablet, extended release  -- 1 tab(s) by mouth 2 times a day   -- Indication: For antihypertensive    hydrALAZINE 25 mg oral tablet  -- 1 tab(s) by mouth every 8 hours  -- Indication: For antihypertensive    Multiple Vitamins oral tablet  -- 1 tab(s) by mouth once a day  -- Indication: For Supplement

## 2020-02-21 NOTE — DISCHARGE NOTE OB - ADDITIONAL INSTRUCTIONS
PLEASE FOLLOW UP AT [facility] FOR YOUR BLOOD PRESSURE CHECK NEXT WEEK.  YOUR APPOINTMENT IS SCHEDULED FOR TUESDAY 2/25 AT 11AM.   PLEASE FOLLOW UP WITH [facility] FOR YOUR INCISION CHECK IN 2 WEEKS AND POSTPARTUM CHECK IN 6 WEEKS.  PLEASE SCHEDULE AT YOUR OWN CONVENIENCE.  [address] [place] [address], [address]  [facility]  [phone]

## 2020-02-21 NOTE — DISCHARGE NOTE OB - PATIENT PORTAL LINK FT
You can access the FollowMyHealth Patient Portal offered by [facility] by registering at the following website: [email or web address]. By joining [facility]’s FollowMyHealth portal, you will also be able to view your health information using other applications (apps) compatible with our system.

## 2020-02-22 LAB
ALBUMIN SERPL ELPH-MCNC: 3.3 G/DL — SIGNIFICANT CHANGE UP (ref 3.3–5)
ALP SERPL-CCNC: 116 U/L — SIGNIFICANT CHANGE UP (ref 40–120)
ALT FLD-CCNC: 21 U/L — SIGNIFICANT CHANGE UP (ref 10–45)
ANION GAP SERPL CALC-SCNC: 18 MMOL/L — HIGH (ref 5–17)
APTT BLD: 28.2 SEC — SIGNIFICANT CHANGE UP (ref 27.5–36.3)
AST SERPL-CCNC: 18 U/L — SIGNIFICANT CHANGE UP (ref 10–40)
BASOPHILS # BLD AUTO: 0.05 K/UL — SIGNIFICANT CHANGE UP (ref 0–0.2)
BASOPHILS NFR BLD AUTO: 0.2 % — SIGNIFICANT CHANGE UP (ref 0–2)
BILIRUB SERPL-MCNC: 0.1 MG/DL — LOW (ref 0.2–1.2)
BUN SERPL-MCNC: 13 MG/DL — SIGNIFICANT CHANGE UP (ref 7–23)
CALCIUM SERPL-MCNC: 9.3 MG/DL — SIGNIFICANT CHANGE UP (ref 8.4–10.5)
CHLORIDE SERPL-SCNC: 98 MMOL/L — SIGNIFICANT CHANGE UP (ref 96–108)
CO2 SERPL-SCNC: 20 MMOL/L — LOW (ref 22–31)
CREAT SERPL-MCNC: 0.68 MG/DL — SIGNIFICANT CHANGE UP (ref 0.5–1.3)
EOSINOPHIL # BLD AUTO: 0.4 K/UL — SIGNIFICANT CHANGE UP (ref 0–0.5)
EOSINOPHIL NFR BLD AUTO: 1.6 % — SIGNIFICANT CHANGE UP (ref 0–6)
FIBRINOGEN PPP-MCNC: 942 MG/DL — HIGH (ref 350–510)
GLUCOSE SERPL-MCNC: 132 MG/DL — HIGH (ref 70–99)
HCT VFR BLD CALC: 30.4 % — LOW (ref 34.5–45)
HGB BLD-MCNC: 9.6 G/DL — LOW (ref 11.5–15.5)
IMM GRANULOCYTES NFR BLD AUTO: 0.8 % — SIGNIFICANT CHANGE UP (ref 0–1.5)
INR BLD: 0.9 RATIO — SIGNIFICANT CHANGE UP (ref 0.88–1.16)
LDH SERPL L TO P-CCNC: 211 U/L — SIGNIFICANT CHANGE UP (ref 50–242)
LYMPHOCYTES # BLD AUTO: 13.3 % — SIGNIFICANT CHANGE UP (ref 13–44)
LYMPHOCYTES # BLD AUTO: 3.25 K/UL — SIGNIFICANT CHANGE UP (ref 1–3.3)
MCHC RBC-ENTMCNC: 30 PG — SIGNIFICANT CHANGE UP (ref 27–34)
MCHC RBC-ENTMCNC: 31.6 GM/DL — LOW (ref 32–36)
MCV RBC AUTO: 95 FL — SIGNIFICANT CHANGE UP (ref 80–100)
MONOCYTES # BLD AUTO: 1.45 K/UL — HIGH (ref 0–0.9)
MONOCYTES NFR BLD AUTO: 5.9 % — SIGNIFICANT CHANGE UP (ref 2–14)
NEUTROPHILS # BLD AUTO: 19.05 K/UL — HIGH (ref 1.8–7.4)
NEUTROPHILS NFR BLD AUTO: 78.2 % — HIGH (ref 43–77)
NRBC # BLD: 0 /100 WBCS — SIGNIFICANT CHANGE UP (ref 0–0)
PLATELET # BLD AUTO: 340 K/UL — SIGNIFICANT CHANGE UP (ref 150–400)
POTASSIUM SERPL-MCNC: 4.2 MMOL/L — SIGNIFICANT CHANGE UP (ref 3.5–5.3)
POTASSIUM SERPL-SCNC: 4.2 MMOL/L — SIGNIFICANT CHANGE UP (ref 3.5–5.3)
PROT SERPL-MCNC: 6.9 G/DL — SIGNIFICANT CHANGE UP (ref 6–8.3)
PROTHROM AB SERPL-ACNC: 10.2 SEC — SIGNIFICANT CHANGE UP (ref 10–12.9)
RBC # BLD: 3.2 M/UL — LOW (ref 3.8–5.2)
RBC # FLD: 16.1 % — HIGH (ref 10.3–14.5)
SODIUM SERPL-SCNC: 136 MMOL/L — SIGNIFICANT CHANGE UP (ref 135–145)
URATE SERPL-MCNC: 3.9 MG/DL — SIGNIFICANT CHANGE UP (ref 2.5–7)
WBC # BLD: 24.39 K/UL — HIGH (ref 3.8–10.5)
WBC # FLD AUTO: 24.39 K/UL — HIGH (ref 3.8–10.5)

## 2020-02-22 RX ADMIN — Medication 600 MILLIGRAM(S): at 13:13

## 2020-02-22 RX ADMIN — Medication 975 MILLIGRAM(S): at 22:35

## 2020-02-22 RX ADMIN — OXYCODONE HYDROCHLORIDE 5 MILLIGRAM(S): 5 TABLET ORAL at 07:14

## 2020-02-22 RX ADMIN — Medication 975 MILLIGRAM(S): at 09:27

## 2020-02-22 RX ADMIN — OXYCODONE HYDROCHLORIDE 5 MILLIGRAM(S): 5 TABLET ORAL at 09:27

## 2020-02-22 RX ADMIN — OXYCODONE HYDROCHLORIDE 5 MILLIGRAM(S): 5 TABLET ORAL at 08:49

## 2020-02-22 RX ADMIN — Medication 25 MILLIGRAM(S): at 06:44

## 2020-02-22 RX ADMIN — OXYCODONE HYDROCHLORIDE 5 MILLIGRAM(S): 5 TABLET ORAL at 18:13

## 2020-02-22 RX ADMIN — OXYCODONE HYDROCHLORIDE 5 MILLIGRAM(S): 5 TABLET ORAL at 22:35

## 2020-02-22 RX ADMIN — Medication 60 MILLIGRAM(S): at 05:15

## 2020-02-22 RX ADMIN — HEPARIN SODIUM 10000 UNIT(S): 5000 INJECTION INTRAVENOUS; SUBCUTANEOUS at 18:18

## 2020-02-22 RX ADMIN — Medication 25 MILLIGRAM(S): at 13:48

## 2020-02-22 RX ADMIN — Medication 600 MILLIGRAM(S): at 06:44

## 2020-02-22 RX ADMIN — Medication 975 MILLIGRAM(S): at 15:23

## 2020-02-22 RX ADMIN — OXYCODONE HYDROCHLORIDE 5 MILLIGRAM(S): 5 TABLET ORAL at 06:44

## 2020-02-22 RX ADMIN — HEPARIN SODIUM 10000 UNIT(S): 5000 INJECTION INTRAVENOUS; SUBCUTANEOUS at 06:45

## 2020-02-22 RX ADMIN — Medication 600 MILLIGRAM(S): at 00:50

## 2020-02-22 RX ADMIN — Medication 975 MILLIGRAM(S): at 04:11

## 2020-02-22 RX ADMIN — Medication 975 MILLIGRAM(S): at 15:53

## 2020-02-22 RX ADMIN — Medication 600 MILLIGRAM(S): at 18:15

## 2020-02-22 RX ADMIN — OXYCODONE HYDROCHLORIDE 5 MILLIGRAM(S): 5 TABLET ORAL at 22:05

## 2020-02-22 RX ADMIN — Medication 25 MILLIGRAM(S): at 22:05

## 2020-02-22 RX ADMIN — OXYCODONE HYDROCHLORIDE 5 MILLIGRAM(S): 5 TABLET ORAL at 00:20

## 2020-02-22 RX ADMIN — OXYCODONE HYDROCHLORIDE 5 MILLIGRAM(S): 5 TABLET ORAL at 03:41

## 2020-02-22 RX ADMIN — Medication 600 MILLIGRAM(S): at 19:12

## 2020-02-22 RX ADMIN — Medication 60 MILLIGRAM(S): at 18:16

## 2020-02-22 RX ADMIN — OXYCODONE HYDROCHLORIDE 5 MILLIGRAM(S): 5 TABLET ORAL at 19:14

## 2020-02-22 RX ADMIN — Medication 600 MILLIGRAM(S): at 13:55

## 2020-02-22 RX ADMIN — Medication 975 MILLIGRAM(S): at 03:41

## 2020-02-22 RX ADMIN — Medication 600 MILLIGRAM(S): at 07:15

## 2020-02-22 RX ADMIN — OXYCODONE HYDROCHLORIDE 5 MILLIGRAM(S): 5 TABLET ORAL at 04:11

## 2020-02-22 RX ADMIN — OXYCODONE HYDROCHLORIDE 5 MILLIGRAM(S): 5 TABLET ORAL at 00:50

## 2020-02-22 RX ADMIN — Medication 600 MILLIGRAM(S): at 00:20

## 2020-02-22 RX ADMIN — Medication 975 MILLIGRAM(S): at 08:50

## 2020-02-22 RX ADMIN — Medication 100 MILLIGRAM(S): at 01:50

## 2020-02-22 RX ADMIN — OXYCODONE HYDROCHLORIDE 5 MILLIGRAM(S): 5 TABLET ORAL at 15:26

## 2020-02-22 RX ADMIN — Medication 1 TABLET(S): at 13:14

## 2020-02-22 RX ADMIN — Medication 975 MILLIGRAM(S): at 22:05

## 2020-02-22 NOTE — PROGRESS NOTE ADULT - PROBLEM SELECTOR PLAN 1
Neuro: PO pain meds. s/p MgSO4 for seizure ppx for 24 hours postpartum  CV: antepartum she was originally was on the maximum anti-HTN medication (labetalol 800 TID, procardia 60 BID). now postpartum BPs on POD#2 have been better controlled,117-141/68-85 .  Decision made to d/c labetalol 200 TID and to monitor BPs today.  Would rather not sent patient home on 3 different antihypertensives. c/w labetalol 100 TID, procardia 60 BID, hydral 25 TID  -repeat echo 1 mo postpartum  - Cardiology consult with new recommendation to possibly add lasix 40mg po for 7d, will speak with cards about when to add   Pulm: Saturating well on room air, encourage oob/amb, incentive spirometer   GI: Continue regular diet  : voiding spontaneously  Heme: HSQ and SCDs for DVT ppx  Dispo: Continue routine inpt care    Corinna Beltrán PGY-3

## 2020-02-22 NOTE — PROGRESS NOTE ADULT - SUBJECTIVE AND OBJECTIVE BOX
LOY SAMUEL [id number]    R3 Antepartum Progress Note    POD#3  HD#16    Patient seen and examined at bedside.  No acute events overnight. No acute complaints.  Pain well controlled. Pt has been ambulating in hallway today.  Denies headache, shortness of breath, RUQ pain, epigastric pain.      Vital Signs Last 24 Hrs  T(C): 37.1 (22 Feb 2020 01:10), Max: 37.1 (22 Feb 2020 01:10)  T(F): 98.8 (22 Feb 2020 01:10), Max: 98.8 (22 Feb 2020 01:10)  HR: 93 (22 Feb 2020 01:10) (81 - 93)  BP: 139/82 (22 Feb 2020 01:10) (113/72 - 141/85)  BP(mean): --  RR: 18 (22 Feb 2020 01:10) (16 - 18)  SpO2: 99% (22 Feb 2020 01:10) (98% - 100%)    Physical Exam:  General: NAD  CV: RR, S1, S2, no M/R/G  Lungs: CTA b/l  Abdomen: Soft, non tender  Ext: No pain or swelling     Labs:                                  9.3    20.65 )-----------( 342      ( 21 Feb 2020 07:22 )             30.6     02-21    134<L>  |  98  |  12  ----------------------------<  109<H>  4.1   |  23  |  0.69    Ca    8.9      21 Feb 2020 07:23  Mg     5.3     02-20    TPro  6.8  /  Alb  3.3  /  TBili  0.2  /  DBili  x   /  AST  9<L>  /  ALT  15  /  AlkPhos  97  02-21

## 2020-02-23 VITALS
DIASTOLIC BLOOD PRESSURE: 84 MMHG | HEART RATE: 93 BPM | TEMPERATURE: 98 F | SYSTOLIC BLOOD PRESSURE: 152 MMHG | OXYGEN SATURATION: 96 % | RESPIRATION RATE: 18 BRPM

## 2020-02-23 LAB
ALBUMIN SERPL ELPH-MCNC: 3.1 G/DL — LOW (ref 3.3–5)
ALP SERPL-CCNC: 176 U/L — HIGH (ref 40–120)
ALT FLD-CCNC: 66 U/L — HIGH (ref 10–45)
ANION GAP SERPL CALC-SCNC: 17 MMOL/L — SIGNIFICANT CHANGE UP (ref 5–17)
APTT BLD: 26.6 SEC — LOW (ref 27.5–36.3)
AST SERPL-CCNC: 46 U/L — HIGH (ref 10–40)
BASOPHILS # BLD AUTO: 0.04 K/UL — SIGNIFICANT CHANGE UP (ref 0–0.2)
BASOPHILS NFR BLD AUTO: 0.2 % — SIGNIFICANT CHANGE UP (ref 0–2)
BILIRUB SERPL-MCNC: 0.1 MG/DL — LOW (ref 0.2–1.2)
BUN SERPL-MCNC: 14 MG/DL — SIGNIFICANT CHANGE UP (ref 7–23)
CALCIUM SERPL-MCNC: 9.1 MG/DL — SIGNIFICANT CHANGE UP (ref 8.4–10.5)
CHLORIDE SERPL-SCNC: 97 MMOL/L — SIGNIFICANT CHANGE UP (ref 96–108)
CO2 SERPL-SCNC: 23 MMOL/L — SIGNIFICANT CHANGE UP (ref 22–31)
CREAT SERPL-MCNC: 0.68 MG/DL — SIGNIFICANT CHANGE UP (ref 0.5–1.3)
EOSINOPHIL # BLD AUTO: 0.49 K/UL — SIGNIFICANT CHANGE UP (ref 0–0.5)
EOSINOPHIL NFR BLD AUTO: 2.7 % — SIGNIFICANT CHANGE UP (ref 0–6)
FIBRINOGEN PPP-MCNC: 998 MG/DL — HIGH (ref 350–510)
GLUCOSE SERPL-MCNC: 189 MG/DL — HIGH (ref 70–99)
HCT VFR BLD CALC: 29.5 % — LOW (ref 34.5–45)
HGB BLD-MCNC: 9 G/DL — LOW (ref 11.5–15.5)
IMM GRANULOCYTES NFR BLD AUTO: 0.8 % — SIGNIFICANT CHANGE UP (ref 0–1.5)
INR BLD: 0.93 RATIO — SIGNIFICANT CHANGE UP (ref 0.88–1.16)
LDH SERPL L TO P-CCNC: 238 U/L — SIGNIFICANT CHANGE UP (ref 50–242)
LYMPHOCYTES # BLD AUTO: 15.9 % — SIGNIFICANT CHANGE UP (ref 13–44)
LYMPHOCYTES # BLD AUTO: 2.87 K/UL — SIGNIFICANT CHANGE UP (ref 1–3.3)
MCHC RBC-ENTMCNC: 29 PG — SIGNIFICANT CHANGE UP (ref 27–34)
MCHC RBC-ENTMCNC: 30.5 GM/DL — LOW (ref 32–36)
MCV RBC AUTO: 95.2 FL — SIGNIFICANT CHANGE UP (ref 80–100)
MONOCYTES # BLD AUTO: 1.04 K/UL — HIGH (ref 0–0.9)
MONOCYTES NFR BLD AUTO: 5.7 % — SIGNIFICANT CHANGE UP (ref 2–14)
NEUTROPHILS # BLD AUTO: 13.52 K/UL — HIGH (ref 1.8–7.4)
NEUTROPHILS NFR BLD AUTO: 74.7 % — SIGNIFICANT CHANGE UP (ref 43–77)
NRBC # BLD: 0 /100 WBCS — SIGNIFICANT CHANGE UP (ref 0–0)
PLATELET # BLD AUTO: 385 K/UL — SIGNIFICANT CHANGE UP (ref 150–400)
POTASSIUM SERPL-MCNC: 4.3 MMOL/L — SIGNIFICANT CHANGE UP (ref 3.5–5.3)
POTASSIUM SERPL-SCNC: 4.3 MMOL/L — SIGNIFICANT CHANGE UP (ref 3.5–5.3)
PROT SERPL-MCNC: 7 G/DL — SIGNIFICANT CHANGE UP (ref 6–8.3)
PROTHROM AB SERPL-ACNC: 10.7 SEC — SIGNIFICANT CHANGE UP (ref 10–12.9)
RBC # BLD: 3.1 M/UL — LOW (ref 3.8–5.2)
RBC # FLD: 16 % — HIGH (ref 10.3–14.5)
SODIUM SERPL-SCNC: 137 MMOL/L — SIGNIFICANT CHANGE UP (ref 135–145)
URATE SERPL-MCNC: 4.2 MG/DL — SIGNIFICANT CHANGE UP (ref 2.5–7)
WBC # BLD: 18.1 K/UL — HIGH (ref 3.8–10.5)
WBC # FLD AUTO: 18.1 K/UL — HIGH (ref 3.8–10.5)

## 2020-02-23 PROCEDURE — 86038 ANTINUCLEAR ANTIBODIES: CPT

## 2020-02-23 PROCEDURE — 76820 UMBILICAL ARTERY ECHO: CPT

## 2020-02-23 PROCEDURE — 84156 ASSAY OF PROTEIN URINE: CPT

## 2020-02-23 PROCEDURE — 86900 BLOOD TYPING SEROLOGIC ABO: CPT

## 2020-02-23 PROCEDURE — 82570 ASSAY OF URINE CREATININE: CPT

## 2020-02-23 PROCEDURE — 87653 STREP B DNA AMP PROBE: CPT

## 2020-02-23 PROCEDURE — 85027 COMPLETE CBC AUTOMATED: CPT

## 2020-02-23 PROCEDURE — 76818 FETAL BIOPHYS PROFILE W/NST: CPT

## 2020-02-23 PROCEDURE — 86850 RBC ANTIBODY SCREEN: CPT

## 2020-02-23 PROCEDURE — 76819 FETAL BIOPHYS PROFIL W/O NST: CPT

## 2020-02-23 PROCEDURE — 83036 HEMOGLOBIN GLYCOSYLATED A1C: CPT

## 2020-02-23 PROCEDURE — G0463: CPT

## 2020-02-23 PROCEDURE — 86225 DNA ANTIBODY NATIVE: CPT

## 2020-02-23 PROCEDURE — 80053 COMPREHEN METABOLIC PANEL: CPT

## 2020-02-23 PROCEDURE — 85397 CLOTTING FUNCT ACTIVITY: CPT

## 2020-02-23 PROCEDURE — 85610 PROTHROMBIN TIME: CPT

## 2020-02-23 PROCEDURE — 83010 ASSAY OF HAPTOGLOBIN QUANT: CPT

## 2020-02-23 PROCEDURE — 84550 ASSAY OF BLOOD/URIC ACID: CPT

## 2020-02-23 PROCEDURE — 93306 TTE W/DOPPLER COMPLETE: CPT

## 2020-02-23 PROCEDURE — 86160 COMPLEMENT ANTIGEN: CPT

## 2020-02-23 PROCEDURE — 90715 TDAP VACCINE 7 YRS/> IM: CPT

## 2020-02-23 PROCEDURE — 86780 TREPONEMA PALLIDUM: CPT

## 2020-02-23 PROCEDURE — 85384 FIBRINOGEN ACTIVITY: CPT

## 2020-02-23 PROCEDURE — 88307 TISSUE EXAM BY PATHOLOGIST: CPT

## 2020-02-23 PROCEDURE — 83735 ASSAY OF MAGNESIUM: CPT

## 2020-02-23 PROCEDURE — 83615 LACTATE (LD) (LDH) ENZYME: CPT

## 2020-02-23 PROCEDURE — 86235 NUCLEAR ANTIGEN ANTIBODY: CPT

## 2020-02-23 PROCEDURE — 85730 THROMBOPLASTIN TIME PARTIAL: CPT

## 2020-02-23 PROCEDURE — 86901 BLOOD TYPING SEROLOGIC RH(D): CPT

## 2020-02-23 PROCEDURE — 74018 RADEX ABDOMEN 1 VIEW: CPT

## 2020-02-23 PROCEDURE — 76821 MIDDLE CEREBRAL ARTERY ECHO: CPT

## 2020-02-23 PROCEDURE — 59025 FETAL NON-STRESS TEST: CPT

## 2020-02-23 PROCEDURE — 82962 GLUCOSE BLOOD TEST: CPT

## 2020-02-23 PROCEDURE — 59050 FETAL MONITOR W/REPORT: CPT

## 2020-02-23 PROCEDURE — 81003 URINALYSIS AUTO W/O SCOPE: CPT

## 2020-02-23 RX ORDER — OXYCODONE HYDROCHLORIDE 5 MG/1
5 TABLET ORAL ONCE
Refills: 0 | Status: DISCONTINUED | OUTPATIENT
Start: 2020-02-23 | End: 2020-02-23

## 2020-02-23 RX ORDER — OXYCODONE HYDROCHLORIDE 5 MG/1
1 TABLET ORAL
Qty: 10 | Refills: 0
Start: 2020-02-23

## 2020-02-23 RX ORDER — TETANUS TOXOID, REDUCED DIPHTHERIA TOXOID AND ACELLULAR PERTUSSIS VACCINE, ADSORBED 5; 2.5; 8; 8; 2.5 [IU]/.5ML; [IU]/.5ML; UG/.5ML; UG/.5ML; UG/.5ML
0.5 SUSPENSION INTRAMUSCULAR ONCE
Refills: 0 | Status: COMPLETED | OUTPATIENT
Start: 2020-02-23 | End: 2020-02-23

## 2020-02-23 RX ORDER — HYDRALAZINE HCL 50 MG
1 TABLET ORAL
Qty: 42 | Refills: 0
Start: 2020-02-23 | End: 2020-03-07

## 2020-02-23 RX ORDER — NIFEDIPINE 30 MG
1 TABLET, EXTENDED RELEASE 24 HR ORAL
Qty: 28 | Refills: 0
Start: 2020-02-23 | End: 2020-03-07

## 2020-02-23 RX ADMIN — Medication 25 MILLIGRAM(S): at 14:05

## 2020-02-23 RX ADMIN — OXYCODONE HYDROCHLORIDE 5 MILLIGRAM(S): 5 TABLET ORAL at 10:17

## 2020-02-23 RX ADMIN — Medication 600 MILLIGRAM(S): at 01:00

## 2020-02-23 RX ADMIN — OXYCODONE HYDROCHLORIDE 5 MILLIGRAM(S): 5 TABLET ORAL at 02:49

## 2020-02-23 RX ADMIN — OXYCODONE HYDROCHLORIDE 5 MILLIGRAM(S): 5 TABLET ORAL at 02:19

## 2020-02-23 RX ADMIN — Medication 600 MILLIGRAM(S): at 11:38

## 2020-02-23 RX ADMIN — TETANUS TOXOID, REDUCED DIPHTHERIA TOXOID AND ACELLULAR PERTUSSIS VACCINE, ADSORBED 0.5 MILLILITER(S): 5; 2.5; 8; 8; 2.5 SUSPENSION INTRAMUSCULAR at 10:17

## 2020-02-23 RX ADMIN — Medication 975 MILLIGRAM(S): at 14:05

## 2020-02-23 RX ADMIN — OXYCODONE HYDROCHLORIDE 5 MILLIGRAM(S): 5 TABLET ORAL at 04:19

## 2020-02-23 RX ADMIN — Medication 975 MILLIGRAM(S): at 08:57

## 2020-02-23 RX ADMIN — Medication 60 MILLIGRAM(S): at 05:13

## 2020-02-23 RX ADMIN — Medication 600 MILLIGRAM(S): at 07:11

## 2020-02-23 RX ADMIN — Medication 975 MILLIGRAM(S): at 04:19

## 2020-02-23 RX ADMIN — Medication 600 MILLIGRAM(S): at 00:32

## 2020-02-23 RX ADMIN — Medication 1 TABLET(S): at 11:38

## 2020-02-23 RX ADMIN — HEPARIN SODIUM 10000 UNIT(S): 5000 INJECTION INTRAVENOUS; SUBCUTANEOUS at 06:41

## 2020-02-23 RX ADMIN — OXYCODONE HYDROCHLORIDE 5 MILLIGRAM(S): 5 TABLET ORAL at 14:09

## 2020-02-23 RX ADMIN — Medication 975 MILLIGRAM(S): at 09:27

## 2020-02-23 RX ADMIN — OXYCODONE HYDROCHLORIDE 5 MILLIGRAM(S): 5 TABLET ORAL at 01:00

## 2020-02-23 RX ADMIN — OXYCODONE HYDROCHLORIDE 5 MILLIGRAM(S): 5 TABLET ORAL at 03:50

## 2020-02-23 RX ADMIN — OXYCODONE HYDROCHLORIDE 5 MILLIGRAM(S): 5 TABLET ORAL at 00:32

## 2020-02-23 RX ADMIN — OXYCODONE HYDROCHLORIDE 5 MILLIGRAM(S): 5 TABLET ORAL at 06:41

## 2020-02-23 RX ADMIN — Medication 25 MILLIGRAM(S): at 06:41

## 2020-02-23 RX ADMIN — Medication 975 MILLIGRAM(S): at 03:49

## 2020-02-23 RX ADMIN — OXYCODONE HYDROCHLORIDE 5 MILLIGRAM(S): 5 TABLET ORAL at 07:11

## 2020-02-23 RX ADMIN — Medication 975 MILLIGRAM(S): at 14:35

## 2020-02-23 RX ADMIN — Medication 600 MILLIGRAM(S): at 12:08

## 2020-02-23 RX ADMIN — Medication 600 MILLIGRAM(S): at 06:41

## 2020-02-23 NOTE — PROGRESS NOTE ADULT - PROBLEM SELECTOR PROBLEM 1
delivery delivered
Hypertension

## 2020-02-23 NOTE — PROGRESS NOTE ADULT - SUBJECTIVE AND OBJECTIVE BOX
R3 Antepartum Progress Note    POD#4  HD#17    Patient seen and examined at bedside.  No acute events overnight. No acute complaints.  Pain well controlled. Pt is tolerating PO, passing flatus, voiding spontaneously and ambulating.   Denies HA, blurry vision, RUQ/epigastric pain, new onset swelling.   Denies fever/chills, nausea/vomiting, CP/SOB, dizziness/lightheadedness.    Vital Signs Last 24 Hours  T(C): 36.7 (02-23-20 @ 00:16), Max: 37 (02-22-20 @ 22:14)  HR: 95 (02-23-20 @ 00:16) (74 - 114)  BP: 125/83 (02-23-20 @ 00:16) (117/78 - 139/86)  RR: 18 (02-23-20 @ 00:16) (18 - 18)  SpO2: 97% (02-23-20 @ 00:16) (97% - 99%)    I&O's Summary      Physical Exam:  General: NAD  CV: NR, RR, S1, S2, no M/R/G  Lungs: CTA-B  Abdomen: Soft, non-tender, non-distended, +BS  Incision: _ CDI  Ext: No pain or swelling    Labs:                        9.6    24.39 )-----------( 340      ( 22 Feb 2020 07:10 )             30.4   baso 0.2    eos 1.6    imm gran 0.8    lymph 13.3   mono 5.9    poly 78.2                         9.3    20.65 )-----------( 342      ( 21 Feb 2020 07:22 )             30.6   baso 0.2    eos 1.4    imm gran 0.5    lymph 17.7   mono 6.8    poly 73.4                         10.0   16.37 )-----------( 377      ( 20 Feb 2020 05:34 )             32.0   baso 0.2    eos 0.5    imm gran 0.4    lymph 16.5   mono 9.0    poly 73.4     02-22    136  |  98  |  13  ----------------------------<  132<H>  4.2   |  20<L>  |  0.68    Ca    9.3      22 Feb 2020 07:09    TPro  6.9  /  Alb  3.3  /  TBili  0.1<L>  /  DBili  x   /  AST  18  /  ALT  21  /  AlkPhos  116  02-22    PT/INR - ( 22 Feb 2020 07:10 )   PT: 10.2 sec;   INR: 0.90 ratio         PTT - ( 22 Feb 2020 07:10 )  PTT:28.2 sec      Blood Type: O Positive      MEDICATIONS  (STANDING):  acetaminophen   Tablet .. 975 milliGRAM(s) Oral <User Schedule>  dextrose 5% + sodium chloride 0.9%. 1000 milliLiter(s) (50 mL/Hr) IV Continuous <Continuous>  dextrose 5%. 1000 milliLiter(s) (50 mL/Hr) IV Continuous <Continuous>  diphtheria/tetanus/pertussis (acellular) Vaccine (ADAcel) 0.5 milliLiter(s) IntraMuscular once  folic acid 1 milliGRAM(s) Oral daily  heparin  Injectable 73677 Unit(s) SubCutaneous two times a day  hydrALAZINE 25 milliGRAM(s) Oral every 8 hours  ibuprofen  Tablet. 600 milliGRAM(s) Oral every 6 hours  lactated ringers. 1000 milliLiter(s) (125 mL/Hr) IV Continuous <Continuous>  lactated ringers. 1000 milliLiter(s) (125 mL/Hr) IV Continuous <Continuous>  NIFEdipine XL 60 milliGRAM(s) Oral <User Schedule>  oxytocin Infusion 333.333 milliUNIT(s)/Min (1000 mL/Hr) IV Continuous <Continuous>  oxytocin Infusion 333.333 milliUNIT(s)/Min (1000 mL/Hr) IV Continuous <Continuous>  prenatal multivitamin 1 Tablet(s) Oral daily    MEDICATIONS  (PRN):  dexAMETHasone  Injectable 4 milliGRAM(s) IV Push every 6 hours PRN Nausea  dexAMETHasone  Injectable 4 milliGRAM(s) IV Push every 6 hours PRN Nausea  diphenhydrAMINE 25 milliGRAM(s) Oral every 6 hours PRN Itching  glucagon  Injectable 1 milliGRAM(s) IntraMuscular once PRN Glucose LESS THAN 70 milligrams/deciliter  glycerin Suppository - Adult 1 Suppository(s) Rectal at bedtime PRN Constipation  lanolin Ointment 1 Application(s) Topical every 6 hours PRN Sore Nipples  magnesium hydroxide Suspension 30 milliLiter(s) Oral two times a day PRN Constipation  naloxone Injectable 0.1 milliGRAM(s) IV Push every 3 minutes PRN For ANY of the following changes in patient status:  A. RR LESS THAN 10 breaths per minute, B. Oxygen saturation LESS THAN 90%, C. Sedation score of 6  naloxone Injectable 0.1 milliGRAM(s) IV Push every 3 minutes PRN For ANY of the following changes in patient status:  A. RR LESS THAN 10 breaths per minute, B. Oxygen saturation LESS THAN 90%, C. Sedation score of 6  ondansetron Injectable 4 milliGRAM(s) IV Push every 6 hours PRN Nausea  ondansetron Injectable 4 milliGRAM(s) IV Push every 6 hours PRN Nausea  oxyCODONE    IR 5 milliGRAM(s) Oral once PRN Moderate to Severe Pain (4-10)  oxyCODONE    IR 5 milliGRAM(s) Oral every 3 hours PRN Moderate to Severe Pain (4-10)  simethicone 80 milliGRAM(s) Chew every 4 hours PRN Gas

## 2020-02-23 NOTE — PROGRESS NOTE ADULT - ATTENDING COMMENTS
I have seen this patient with the fellow and agree with their assessment and plan. In addition, no signs of TMA yet  HTN worse  Can increase CCB dosing as above  Cont monitor    Neisha Epperson MD  Cell   Pager   Office 
I have seen this patient with the fellow and agree with their assessment and plan. In addition, worsening essential HTN vs PEC  Currently serum creatinine stable, platelets WNL, no overt proteinuria in UA.  Pt with recent history of TMA in the past on April 2019 and has been having uncontrolled HTN since April 2019  Patient with uncontrolled HTN during the start of pregnancy which may be a result from her history of TMA.   Monitor for signs of TTP, TTP can recur during pregnancy. Closely monitor platelets, Hgb, LFTs and renal function. Send ADAMTS 13( ordered) and monitor LDH and hapto daily till pregnancy  Also check LAURA, dsDNA, and VICKIE incase we are missing alternative cause    Neisha Epperson MD  Cell   Pager   Office 
I have seen this patient on 2/16/20 with the fellow and agree with their assessment and plan. In addition,    # Uncontrolled HTN - Currently BP controlled on nifedipine 60mg BID and labetalol. If BP worsens, to start hydralazine.     The rest of the recommendations as per fellow's note.    Ana Rosa Jose MD  Attending Nephrologist  [phone]
I have seen this patient with the fellow and agree with their assessment and plan. In addition,    # Uncontrolled HTN - Currently BP better on nifedipine 60mg BID and labetalol. If BP is uncontrolled, to start hydralazine.     The rest of the recommendations as per fellow's note.    Ana Rosa Jose MD  Attending Nephrologist  [phone]
/Attendiny/o, now P4, POD#4 s/p primary mid transverse C/S at 30wks 2nd to CHTN w/ siPEC and IUGR w/ abnormal dopplers.    Pt doing well and admits to constipation, but otherwise doing well; alessandra PO; passing flatus and had a BM; pain controlled; ambulating; pumping.    VSS; Bp's in the normal range currently on Procardia 60mg BID and Hydralazine 25mg TID  On exam: breasts soft and NT; fundus firm; incision c/d/i; minimal nl lochia; ext with no edema, neg Suhail's    H/H: 9/29.5; Plt: 385    A/P  -PPD#4 s/p primary mid transverse C/S  -Pt with CHTN/siPEC with Bps in nl range and no s/sx of worsening.  Will continue with current antihypertensive regimen and pt to keep BP log at home and parameters given.  -Pt stable for D/C home today  -Bleeding/fever/wound care/pre-eclampsia precautions given.  -Pt to f/u in clinic in 1wk for BP check; 2wks for incision check; 6wk for routine PP visit  -Pt to f/u with cards and to have echo 1month PP    Dr. Osborn
I have seen this patient with the fellow and agree with their assessment and plan. In addition, pt with hx of TMA in 2019 now with 30weeks pregnancy with HTN that is worsening but currently under control with 2 maxed out meds.    If BP worsens, can add hydralazine 25mg TID ( category C) and alphamethydopa if need be  Platelets trends are downtrending, Monitor LDH trends and Hgb trends, if continues to drop, would consider delivery and also this might be early TMA signs.   D/w with OB resident    Neisha Epperson MD  Cell   Pager   Office     For weekend please contact Dr Juaquin Strickland Stein fellow) and Dr Ana Rosa Jose( attending)
OB attg note    40yo  now POD2 from primary mid transverse CS at 30 weeks for worsening fetal status with NRFHT in setting of IUGR w/ REDF and poor interval growth. Pregnancy also c/b worsening cHTN maxed out on antiHTN prior to delivery, s/p Mg for seizure ppx postpartum, PEC labs wnl. Postpartum BPs improved, now BP regimen decreased to labetalol 200mg TID, procardia 60BID and hdyral 25 TID. Appreciate cardiology recs for BP management, echo also pending for 1 mo pp. Patient with hx of TTP 2019, being managed by nephrology, as well as GDMA2. Doing well postop, pain well controlled, tolerating PO without n/v. Ambulating, passing flatus.    Vital Signs Last 24 Hrs  T(C): 36.9 (2020 09:01), Max: 37 (2020 00:47)  T(F): 98.4 (2020 09:01), Max: 98.6 (2020 00:47)  HR: 86 (2020 09:01) (80 - 89)  BP: 113/72 (2020 09:01) (105/73 - 142/85)  BP(mean): --  RR: 18 (2020 09:01) (16 - 18)  SpO2: 100% (2020 09:01) (96% - 100%)                          9.3    20.65 )-----------( 342      ( 2020 07:22 )             30.6     02-21    134<L>  |  98  |  12  ----------------------------<  109<H>  4.1   |  23  |  0.69    Ca    8.9      2020 07:23  Mg     5.3     02-20    TPro  6.8  /  Alb  3.3  /  TBili  0.2  /  DBili  x   /  AST  9<L>  /  ALT  15  /  AlkPhos  97  02-21    Plan  - f/u cards recs re: clarification of BP medications (start lasix?)  - continue to monitor BPs  - outpt f/u for GDMA2  - otherwise routine care    Ilia FRIAS
Seen and evaluated. POD#3 s/p CS, cHTN, GDMA2, TTP earlier in pregnancy.   Recovering well, if slowly. Difficulty walking, but is ambulating, passing gas, no BM yet. Мария PO well. Pain managed but requiring oxycodone.     LABS             9.6    24.39 )-----------( 340      ( 22 Feb 2020 07:10 )             30.4   136    |  98     |  13     ----------------------------<  132    4.2     |  20     |  0.68   Ca    9.3        22 Feb 2020 07:09  TPro  6.9    /  Alb  3.3    /  TBili  0.1    /  DBili  x      /  AST  18     /  ALT  21     /  AlkPhos  116    22 Feb 2020 07:09  PT/INR - ( 22 Feb 2020 07:10 )   PT: 10.2 sec;   INR: 0.90 ratio    PTT - ( 22 Feb 2020 07:10 )  PTT:28.2 sec    WBCs up, left shift, incr in fibrinogen. Acute phase reactant increase without change in vitals or any symptoms of infection. Cont routine postpartum care, watching for signs of infection, will consider discharge tomorrow.     MD Emma  service attg.
Patient doing well  No acute issues   tolerating po  131/80  Encourage ambulation

## 2020-02-23 NOTE — PROGRESS NOTE ADULT - PROBLEM SELECTOR PLAN 1
Neuro: PO pain meds. s/p MgSO4 for seizure ppx for 24 hours postpartum  CV: antepartum she was originally was on the maximum anti-HTN medication (labetalol 800 TID, procardia 60 BID). Now postpartum BPs on POD#3-4 have been better controlled, (117/78 - 139/86).  Decision made to d/c labetalol 200 TID yesterday and to monitor BPs.  Would rather not sent patient home on 3 different antihypertensives. C/w procardia 60 BID, hydral 25 TID.   -Consider reconsulting nephrology and cardiology for recommendations for antihypertensive regimen. Cardiology had recommended 7d course of furosemide however per discussion with cards fellow on 2/21, recommended holding off on starting furosemide. Awaiting final recs for antihypertensive regimen from cardiology.   -repeat echo 1 mo postpartum  Pulm: Saturating well on room air, encourage oob/amb, incentive spirometer   GI: Continue regular diet  : voiding spontaneously  Heme: HSQ and SCDs for DVT ppx  Dispo: Continue routine inpt care    Ja PGY3 Neuro: PO pain meds. s/p MgSO4 for seizure ppx for 24 hours postpartum  CV: antepartum she was originally was on the maximum anti-HTN medication (labetalol 800 TID, procardia 60 BID). Now postpartum BPs on POD#3-4 have been better controlled, (117/78 - 139/86).  Decision made to d/c labetalol 200 TID yesterday and to monitor BPs. C/w procardia 60 BID, hydral 25 TID.   -Cardiology had recommended 7d course of furosemide however per discussion with cards fellow on 2/21, recommended holding off on starting furosemide.  -repeat echo 1 mo postpartum  Pulm: Saturating well on room air, encourage oob/amb, incentive spirometer   GI: Continue regular diet  : voiding spontaneously  Heme: HSQ and SCDs for DVT ppx  Dispo: discharge home    Ja PGY3  Addended by Shashank PGY3

## 2020-02-23 NOTE — PROGRESS NOTE ADULT - PROBLEM/PLAN-1
DISPLAY PLAN FREE TEXT
Detail Level: Detailed
DISPLAY PLAN FREE TEXT

## 2020-02-25 ENCOUNTER — APPOINTMENT (OUTPATIENT)
Dept: MATERNAL FETAL MEDICINE | Facility: CLINIC | Age: 40
End: 2020-02-25
Payer: COMMERCIAL

## 2020-02-25 VITALS
HEIGHT: 63 IN | TEMPERATURE: 98.2 F | SYSTOLIC BLOOD PRESSURE: 132 MMHG | HEART RATE: 102 BPM | DIASTOLIC BLOOD PRESSURE: 98 MMHG | OXYGEN SATURATION: 98 %

## 2020-02-25 VITALS — OXYGEN SATURATION: 98 % | DIASTOLIC BLOOD PRESSURE: 102 MMHG | HEART RATE: 102 BPM | SYSTOLIC BLOOD PRESSURE: 150 MMHG

## 2020-02-25 PROCEDURE — 99213 OFFICE O/P EST LOW 20 MIN: CPT | Mod: GE

## 2020-02-25 NOTE — HISTORY OF PRESENT ILLNESS
[Postpartum Follow Up] : postpartum follow up [Complications:___] : complications include: [unfilled] [Last Pap Date: ___] : Last Pap Date: [unfilled] [Delivery Date: ___] : on [unfilled] [Primary C/S] : delivered by  section [Breastfeeding] : currently nursing [Intended Contraception] : Intended Contraception: [Condoms] : condoms [None] : The patient is currently asymptomatic [Clean/Dry/Intact] : clean, dry and intact [Mild] : mild vaginal bleeding [Examination Of The Breasts] : breasts are normal [Doing Well] : is doing well

## 2020-02-27 DIAGNOSIS — Z01.818 ENCOUNTER FOR OTHER PREPROCEDURAL EXAMINATION: ICD-10-CM

## 2020-03-03 ENCOUNTER — OUTPATIENT (OUTPATIENT)
Dept: OUTPATIENT SERVICES | Facility: HOSPITAL | Age: 40
LOS: 1 days | Discharge: HOME | End: 2020-03-03

## 2020-03-03 DIAGNOSIS — D64.9 ANEMIA, UNSPECIFIED: ICD-10-CM

## 2020-03-05 ENCOUNTER — NON-APPOINTMENT (OUTPATIENT)
Age: 40
End: 2020-03-05

## 2020-03-05 ENCOUNTER — APPOINTMENT (OUTPATIENT)
Dept: CARDIOLOGY | Facility: CLINIC | Age: 40
End: 2020-03-05
Payer: COMMERCIAL

## 2020-03-05 ENCOUNTER — APPOINTMENT (OUTPATIENT)
Dept: HEMATOLOGY ONCOLOGY | Facility: CLINIC | Age: 40
End: 2020-03-05

## 2020-03-05 VITALS
HEIGHT: 63 IN | OXYGEN SATURATION: 97 % | DIASTOLIC BLOOD PRESSURE: 84 MMHG | HEART RATE: 96 BPM | BODY MASS INDEX: 50.68 KG/M2 | SYSTOLIC BLOOD PRESSURE: 135 MMHG | WEIGHT: 286 LBS

## 2020-03-05 DIAGNOSIS — O14.92 UNSPECIFIED PRE-ECLAMPSIA, SECOND TRIMESTER: ICD-10-CM

## 2020-03-05 PROCEDURE — 93000 ELECTROCARDIOGRAM COMPLETE: CPT

## 2020-03-05 PROCEDURE — 99244 OFF/OP CNSLTJ NEW/EST MOD 40: CPT

## 2020-03-05 RX ORDER — NIFEDIPINE 90 MG/1
90 TABLET, EXTENDED RELEASE ORAL DAILY
Qty: 90 | Refills: 0 | Status: DISCONTINUED | COMMUNITY
Start: 2019-12-18 | End: 2020-03-05

## 2020-03-05 RX ORDER — ASPIRIN ENTERIC COATED TABLETS 81 MG 81 MG/1
81 TABLET, DELAYED RELEASE ORAL
Refills: 0 | Status: DISCONTINUED | COMMUNITY
Start: 2020-01-07 | End: 2020-03-05

## 2020-03-05 NOTE — HISTORY OF PRESENT ILLNESS
[FreeTextEntry1] : 39 year old AA female with family history of hypertension, heart disease and cerebral aneurysm. \par She carries a personal history of 4 pregnancies. First three pregnancies were full term and uncomplicated. \par Most recent pregnancy, patient developed pre eclampsia requiring an emergency C- section at 29 weeks gestation. Delivery date: February 19, 2020\par Additionally, baby demonstrated IUGR. Now child is in the NICU and being weaned from oxygen support. \par \par Of note. back in April of 2019, patient developed blurry vision and nausea.She was taken to [facility] and was diagnosed with severe thrombocytopenia with LISSETT and metabolic acidosis and HTN. 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 \par She was started on high dose prednisone with a fast taper and received PLEX X 10 sessions.after which her platelets recovered. After several more sessions of plasmapheresis ( total 13 sessions) kidney function returned to normal. \par \par She presents today for a cardiac evaluation and management of her hypertension.

## 2020-03-05 NOTE — PHYSICAL EXAM
[Normal Conjunctiva] : the conjunctiva exhibited no abnormalities [Eyelids - No Xanthelasma] : the eyelids demonstrated no xanthelasmas [Normal Oral Mucosa] : normal oral mucosa [No Oral Pallor] : no oral pallor [No Oral Cyanosis] : no oral cyanosis [Normal Jugular Venous A Waves Present] : normal jugular venous A waves present [Normal Jugular Venous V Waves Present] : normal jugular venous V waves present [No Jugular Venous Rodriguez A Waves] : no jugular venous rodriguez A waves [Normal] : normal [Normal Rate] : normal [Rhythm Regular] : regular [Normal S1] : normal S1 [Normal S2] : normal S2 [No Murmur] : no murmurs heard [2+] : left 2+ [Respiration, Rhythm And Depth] : normal respiratory rhythm and effort [Exaggerated Use Of Accessory Muscles For Inspiration] : no accessory muscle use [Auscultation Breath Sounds / Voice Sounds] : lungs were clear to auscultation bilaterally [Abdomen Soft] : soft [Abdomen Tenderness] : non-tender [Abdomen Mass (___ Cm)] : no abdominal mass palpated [Abnormal Walk] : normal gait [Gait - Sufficient For Exercise Testing] : the gait was sufficient for exercise testing [Nail Clubbing] : no clubbing of the fingernails [Cyanosis, Localized] : no localized cyanosis [Petechial Hemorrhages (___cm)] : no petechial hemorrhages [Skin Color & Pigmentation] : normal skin color and pigmentation [] : no rash [No Venous Stasis] : no venous stasis [Skin Lesions] : no skin lesions [No Skin Ulcers] : no skin ulcer [No Xanthoma] : no  xanthoma was observed [Oriented To Time, Place, And Person] : oriented to person, place, and time [Affect] : the affect was normal [Mood] : the mood was normal [No Anxiety] : not feeling anxious [FreeTextEntry1] : BMI 50.66

## 2020-03-05 NOTE — REVIEW OF SYSTEMS
[Feeling Fatigued] : feeling fatigued [Eyeglasses] : currently wearing eyeglasses [Negative] : Heme/Lymph

## 2020-03-05 NOTE — REASON FOR VISIT
[Initial Evaluation] : an initial evaluation of [FreeTextEntry2] : preeclampsia @ 29 weeks, IUGR, gestational hypertension, history of TTP

## 2020-03-05 NOTE — ASSESSMENT
[FreeTextEntry1] : 39 year old AA female with family history of hypertension, heart disease and cerebral aneurysm. \par She carries a personal history of 4 pregnancies. \par Most recent pregnancy, patient developed pre eclampsia requiring an emergency C- section at 29 weeks gestation. Baby is currently in the NICU, mother is still nursing.\par \par Additional past medical history: \par TTP exacerbation requiring high dose steroids and plasmapheresis to treat LISSETT.\par \par HTN\par Blood pressure today under acceptable control\par Continue with Nifedipine ER 60 mg BID/ Hydralazine 25 mg TID\par \par *echocardiogram to reassess cardiac function\par \par Follow up in one month.. \par

## 2020-03-24 ENCOUNTER — APPOINTMENT (OUTPATIENT)
Dept: MATERNAL FETAL MEDICINE | Facility: CLINIC | Age: 40
End: 2020-03-24

## 2020-03-31 ENCOUNTER — APPOINTMENT (OUTPATIENT)
Dept: MATERNAL FETAL MEDICINE | Facility: CLINIC | Age: 40
End: 2020-03-31

## 2020-04-27 ENCOUNTER — RX CHANGE (OUTPATIENT)
Age: 40
End: 2020-04-27

## 2020-05-11 NOTE — DISCHARGE NOTE OB - TEMPERATURE GREATER THAN 100.0  F ORALLY
XU contacted pt via phone to discuss discharge to SNF. Pt adamantly refused stating she wants to go home. She states she her  and adult son that live with her can provided 24/7 assistance and that two of her family members are RN's who live within 2 blocks of patient. IMM obtained during this conversation. MD notified of above.    Statement Selected

## 2020-05-20 NOTE — LACTATION INITIAL EVALUATION - HIGH RISK PREGNANCY
History & Physical  Gynecology      SUBJECTIVE:     Chief Complaint: Urinary Tract Infection       History of Present Illness:  Pt is a 15 y/o who presents with complaints of burning with urination.  Pt reports symptoms have been going on for approximately 1 week.  Pt is sexually active.  On OCPs for contraception.  Denies any new partners.  Hx of chlamydia.  Pt notes that the pain is near the urethra, opposed to a deep pelvic pain.  No pain with intercourse.  Denies any vaginal discharge.  No fever/chills.  Pt denies any vaginal odor or odor to her urine.       Review of patient's allergies indicates:  No Known Allergies    History reviewed. No pertinent past medical history.  Past Surgical History:   Procedure Laterality Date     SECTION N/A 2018    Procedure:  SECTION;  Surgeon: Meghna Madsen MD;  Location: [facility] L&[facility];  Service: OB/GYN;  Laterality: N/A;    NOSE SURGERY      TRIGGER FINGER RELEASE Right 2020    Procedure: RELEASE, TRIGGER FINGER - RRF;  Surgeon: Puneet Pulido MD;  Location: [facility];  Service: Orthopedics;  Laterality: Right;     OB History        1    Para   1    Term   1       0    AB   0    Living   1       SAB   0    TAB   0    Ectopic   0    Multiple   0    Live Births   1               Family History   Problem Relation Age of Onset    No Known Problems Father     Hypertension Mother     Hypertension Paternal Grandmother     Diabetes Maternal Grandmother     Hypertension Maternal Grandmother     Breast cancer Neg Hx     Colon cancer Neg Hx     Ovarian cancer Neg Hx     Stroke Neg Hx      Social History     Tobacco Use    Smoking status: Never Smoker    Smokeless tobacco: Never Used   Substance Use Topics    Alcohol use: No    Drug use: No       Current Outpatient Medications   Medication Sig    levonorgestrel-ethinyl estradiol (AVIANE,ALESSE,LESSINA) 0.1-20 mg-mcg per tablet Take 1 tablet by mouth once daily.     No current  facility-administered medications for this visit.          Review of Systems:  Review of Systems   Constitutional: Negative for activity change, appetite change, chills, fatigue, fever and unexpected weight change.   Respiratory: Negative for cough, shortness of breath and wheezing.    Cardiovascular: Negative for chest pain and leg swelling.   Gastrointestinal: Negative for abdominal pain, constipation, diarrhea, nausea and vomiting.   Endocrine: Negative for hair loss and hot flashes.   Genitourinary: Positive for dysuria. Negative for decreased libido, dyspareunia, flank pain, frequency, menstrual problem, pelvic pain, vaginal bleeding, vaginal discharge and vaginal pain.   Integumentary:  Negative for acne, hair changes, nipple discharge and breast skin changes.   Neurological: Negative for headaches.   Psychiatric/Behavioral: Negative for sleep disturbance.   Breast: Negative for mastodynia, nipple discharge and skin changes       OBJECTIVE:     Physical Exam:  Physical Exam   Constitutional: She is oriented to person, place, and time. She appears well-developed and well-nourished.   HENT:   Head: Normocephalic and atraumatic.   Eyes: Conjunctivae are normal. Right eye exhibits no discharge. Left eye exhibits no discharge. No scleral icterus.   Pulmonary/Chest: Effort normal. No stridor.   Abdominal: Soft. She exhibits no distension. There is no tenderness.   Genitourinary: Vaginal discharge found.   Genitourinary Comments: Normal external genitalia.  Normal hair distribution.  Urethral meatus normal. .No cervical lesions or masses.  No vaginal bleeding noted.  No adnexal or uterine tenderness.  No palpable adnexal masses. + vaginal discharge on exam   Musculoskeletal: Normal range of motion.   Neurological: She is alert and oriented to person, place, and time.   Skin: Skin is warm and dry.   Psychiatric: She has a normal mood and affect. Her behavior is normal. Judgment and thought content normal.          ASSESSMENT:       ICD-10-CM ICD-9-CM    1. Dysuria R30.0 788.1 Urine culture   2. Vaginal discharge N89.8 623.5 Vaginosis Screen by DNA Probe      C. trachomatis/N. gonorrhoeae by AMP DNA          Plan:      Magalis was seen today for urinary tract infection.    Diagnoses and all orders for this visit:    Dysuria  - Udip in clinic + protein but otherwise negative  - Discussed this with patient.  Will still get UCx to check for UTI  -     Urine culture    Vaginal discharge  - Given that patient having pain/burning near urethra, discussed that if she has a vaginal infection the urine may be further irritating the tissue.    - On spec exam, + vaginal discharge.  GC/CT and affirm done  - Explained to patient.  All questions answered  -     Vaginosis Screen by DNA Probe  -     C. trachomatis/N. gonorrhoeae by AMP DNA        Orders Placed This Encounter   Procedures    Vaginosis Screen by DNA Probe    C. trachomatis/N. gonorrhoeae by AMP DNA    Urine culture       Follow up if symptoms worsen or fail to improve.     Counseling time: 20 min    Kasia James    labor/pre-eclampsia/gestational diabetes

## 2020-06-30 ENCOUNTER — OUTPATIENT (OUTPATIENT)
Dept: OUTPATIENT SERVICES | Facility: HOSPITAL | Age: 40
LOS: 1 days | Discharge: HOME | End: 2020-06-30

## 2020-06-30 DIAGNOSIS — D64.9 ANEMIA, UNSPECIFIED: ICD-10-CM

## 2020-07-06 ENCOUNTER — RESULT REVIEW (OUTPATIENT)
Age: 40
End: 2020-07-06

## 2020-07-06 ENCOUNTER — APPOINTMENT (OUTPATIENT)
Dept: HEMATOLOGY ONCOLOGY | Facility: CLINIC | Age: 40
End: 2020-07-06
Payer: COMMERCIAL

## 2020-07-06 VITALS — HEART RATE: 106 BPM | SYSTOLIC BLOOD PRESSURE: 134 MMHG | DIASTOLIC BLOOD PRESSURE: 88 MMHG

## 2020-07-06 LAB
BASOPHILS # BLD AUTO: 0.1 K/UL — SIGNIFICANT CHANGE UP (ref 0–0.2)
BASOPHILS NFR BLD AUTO: 0.9 % — SIGNIFICANT CHANGE UP (ref 0–2)
EOSINOPHIL # BLD AUTO: 0.3 K/UL — SIGNIFICANT CHANGE UP (ref 0–0.5)
EOSINOPHIL NFR BLD AUTO: 2.4 % — SIGNIFICANT CHANGE UP (ref 0–6)
HCT VFR BLD CALC: 42 % — SIGNIFICANT CHANGE UP (ref 34.5–45)
HGB BLD-MCNC: 13.1 G/DL — SIGNIFICANT CHANGE UP (ref 11.5–15.5)
LYMPHOCYTES # BLD AUTO: 3.7 K/UL — HIGH (ref 1–3.3)
LYMPHOCYTES # BLD AUTO: 33.8 % — SIGNIFICANT CHANGE UP (ref 13–44)
MCHC RBC-ENTMCNC: 26.1 PG — LOW (ref 27–34)
MCHC RBC-ENTMCNC: 31.3 G/DL — LOW (ref 32–36)
MCV RBC AUTO: 83.6 FL — SIGNIFICANT CHANGE UP (ref 80–100)
MONOCYTES # BLD AUTO: 0.9 K/UL — SIGNIFICANT CHANGE UP (ref 0–0.9)
MONOCYTES NFR BLD AUTO: 8.6 % — SIGNIFICANT CHANGE UP (ref 2–14)
NEUTROPHILS # BLD AUTO: 6 K/UL — SIGNIFICANT CHANGE UP (ref 1.8–7.4)
NEUTROPHILS NFR BLD AUTO: 54.4 % — SIGNIFICANT CHANGE UP (ref 43–77)
PLATELET # BLD AUTO: 376 K/UL — SIGNIFICANT CHANGE UP (ref 150–400)
RBC # BLD: 5.02 M/UL — SIGNIFICANT CHANGE UP (ref 3.8–5.2)
RBC # FLD: 15.5 % — HIGH (ref 10.3–14.5)
WBC # BLD: 11 K/UL — HIGH (ref 3.8–10.5)
WBC # FLD AUTO: 11 K/UL — HIGH (ref 3.8–10.5)

## 2020-07-06 PROCEDURE — 99214 OFFICE O/P EST MOD 30 MIN: CPT

## 2020-07-06 RX ORDER — MUPIROCIN 20 MG/G
2 OINTMENT TOPICAL
Qty: 22 | Refills: 0 | Status: DISCONTINUED | COMMUNITY
Start: 2020-03-06

## 2020-07-06 RX ORDER — ELECTROLYTES/DEXTROSE
SOLUTION, ORAL ORAL
Refills: 0 | Status: DISCONTINUED | COMMUNITY
Start: 2019-04-25 | End: 2020-07-06

## 2020-07-06 RX ORDER — OXYCODONE AND ACETAMINOPHEN 5; 325 MG/1; MG/1
5-325 TABLET ORAL
Qty: 30 | Refills: 0 | Status: DISCONTINUED | COMMUNITY
Start: 2020-03-06

## 2020-07-06 RX ORDER — PRENATAL VIT NO.130/IRON/FOLIC 27MG-0.8MG
28-0.8 TABLET ORAL
Qty: 30 | Refills: 0 | Status: DISCONTINUED | COMMUNITY
Start: 2020-03-05

## 2020-07-06 NOTE — HISTORY OF PRESENT ILLNESS
[de-identified] : Ms. Gutierres presents with TTP at the age of 38.\par \par The patient was admitted to [facility] 4/5/19-4/20-/19 for blurry vision and nausea/vomiting. She was found to have severe thrombocytopenia with LISSETT and metabolic acidosis and HTN. Peripheral smear showed schistocytes with elevated LDH and low haptoglobin with worsening renal failure and thrombocytopenia concerning for TTP. A CT C/A/P showed perinephric stranding bilaterally. \par \par A renal biopsy showed severe obliterative arterial sclerosis, with mild segmental glomerular endothelial injury, suggesting chronic thrombotic angiopathy. Acute tubular injury with focal tubular necrosis and mild reactive interstitial inflammation. \par \par She was started on high dose prednisone with a fast taper and received PLEX x 10 sessions, after which her platelets recovered but kidney function plateaued at 1.4.\par Her prednisone was again increased to 1 mg/kg and she received another 3 sessions of plasma exchange starting 4/17/19 - 4/19/19.  Creatinine improved to 1.1 - 1.2 \par She received total 13 sessions of plasmapheresis. She was started on prednisone taper after kidney function normalized to 1.1 and to taper over 2-3 weeks. \par \par 4/6/19 ADAMTS 13 activity <2 % (range >66%)\par ADAMTS 13 antibody 82 (range <12)\par 4/5/19 WBC 15.3, HGB 11.6, PLT 9 K,  BUN/Cr 2.80\par 4/6/19 WBC 15.6, HGB 11.3, PLT 6 K, LDH 1923, Haptoglobin <20\par 4/7/19 WBC 20.1, HGB 10.1, PLT 11 K\par 4/8/19 WBC 16.6, HGB 9.5, PLT 54 K \par 4/13/19 WBC 18.0, HGB 8.6,  K, \par 4/17/19 \par 4/20/19 WBC 25.0, HGB 8.7,  K\par 4/23/19 WBC 26.5, HGB 10.3,  K\par \par She is currently on Prednisone 60 mg a day and scheduled to finish the taper on 5/3/19. No dysuria. No headaches or blurred vision. No abdominal pain. Bowel movements are normal. \par \par PCP: Dr. Bobby Saunders, [facility] [de-identified] : Returns for follow up.\par Delivered baby #4 in 2/2020.\par Feels well, but tired.\par No fevers, night sweats, weight loss.\par No recent infections.\par \par

## 2020-07-06 NOTE — ASSESSMENT
[FreeTextEntry1] : 40 year old female with TTP diagnosed in 4/2019.  ADAMTS 13 activity <2%, ADAMTS 13 antibody is 70-80 (high).  Kidney biopsy c/w chronic thrombotic microangiopathy.  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    S/p ~13 sessions of plasma exchange and high dose prednisone with recovery of platelets and kidney function.  Prednisone taper completed 5/11/19.\par On 5/7/19 her [id number] activity was normal at 71%.\par JAK2 V617F negative. \par \par Mild leukocytosis persists, previously neutrophilia, today ALC minimall elevated.  REviewed labs. \par Possible related to obesity / chronic inflammation, less likely lympho or myeloproliferative disorder\par \par Plan:\par -continue observation.\par -on next visit, immunoelectrophoresis, flow cytometry, CRP, LDH\par -follow up in 12/2020

## 2020-07-06 NOTE — ASSESSMENT
[FreeTextEntry1] : 40 year old female with TTP diagnosed in 4/2019.  ADAMTS 13 activity <2%, ADAMTS 13 antibody is 70-80 (high).  Kidney biopsy c/w chronic thrombotic microangiopathy.  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    S/p ~13 sessions of plasma exchange and high dose prednisone with recovery of platelets and kidney function.  Prednisone taper completed 5/11/19.\par On 5/7/19 her [id number] activity was normal at 71%.\par JAK2 V617F negative. \par \par Mild leukocytosis persists, previously neutrophilia, today ALC minimall elevated.  REviewed labs. \par Possible related to obesity / chronic inflammation, less likely lympho or myeloproliferative disorder\par \par Plan:\par -continue observation.\par -on next visit, immunoelectrophoresis, flow cytometry, CRP, LDH\par -follow up in 12/2020

## 2020-07-06 NOTE — HISTORY OF PRESENT ILLNESS
[de-identified] : Returns for follow up.\par Delivered baby #4 in 2/2020.\par Feels well, but tired.\par No fevers, night sweats, weight loss.\par No recent infections.\par \par  [de-identified] : Ms. Gutierres presents with TTP at the age of 38.\par \par The patient was admitted to [facility] 4/5/19-4/20-/19 for blurry vision and nausea/vomiting. She was found to have severe thrombocytopenia with LISSETT and metabolic acidosis and HTN. Peripheral smear showed schistocytes with elevated LDH and low haptoglobin with worsening renal failure and thrombocytopenia concerning for TTP. A CT C/A/P showed perinephric stranding bilaterally. \par \par A renal biopsy showed severe obliterative arterial sclerosis, with mild segmental glomerular endothelial injury, suggesting chronic thrombotic angiopathy. Acute tubular injury with focal tubular necrosis and mild reactive interstitial inflammation. \par \par She was started on high dose prednisone with a fast taper and received PLEX x 10 sessions, after which her platelets recovered but kidney function plateaued at 1.4.\par Her prednisone was again increased to 1 mg/kg and she received another 3 sessions of plasma exchange starting 4/17/19 - 4/19/19.  Creatinine improved to 1.1 - 1.2 \par She received total 13 sessions of plasmapheresis. She was started on prednisone taper after kidney function normalized to 1.1 and to taper over 2-3 weeks. \par \par 4/6/19 ADAMTS 13 activity <2 % (range >66%)\par ADAMTS 13 antibody 82 (range <12)\par 4/5/19 WBC 15.3, HGB 11.6, PLT 9 K,  BUN/Cr 2.80\par 4/6/19 WBC 15.6, HGB 11.3, PLT 6 K, LDH 1923, Haptoglobin <20\par 4/7/19 WBC 20.1, HGB 10.1, PLT 11 K\par 4/8/19 WBC 16.6, HGB 9.5, PLT 54 K \par 4/13/19 WBC 18.0, HGB 8.6,  K, \par 4/17/19 \par 4/20/19 WBC 25.0, HGB 8.7,  K\par 4/23/19 WBC 26.5, HGB 10.3,  K\par \par She is currently on Prednisone 60 mg a day and scheduled to finish the taper on 5/3/19. No dysuria. No headaches or blurred vision. No abdominal pain. Bowel movements are normal. \par \par PCP: Dr. Bobby Saunders, [facility]

## 2020-07-06 NOTE — PHYSICAL EXAM
[Fully active, able to carry on all pre-disease performance without restriction] : Status 0 - Fully active, able to carry on all pre-disease performance without restriction [Obese] : obese [Normal] : affect appropriate [de-identified] : clear [de-identified] : S1/S2 [de-identified] : supple [de-identified] : soft, obese [de-identified] : NO edema

## 2020-07-06 NOTE — REVIEW OF SYSTEMS
[Negative] : Allergic/Immunologic [FreeTextEntry2] : negative except as reviewed in interval history

## 2020-07-06 NOTE — PHYSICAL EXAM
[Fully active, able to carry on all pre-disease performance without restriction] : Status 0 - Fully active, able to carry on all pre-disease performance without restriction [Obese] : obese [Normal] : affect appropriate [de-identified] : supple [de-identified] : S1/S2 [de-identified] : clear [de-identified] : soft, obese [de-identified] : NO edema

## 2020-07-09 ENCOUNTER — APPOINTMENT (OUTPATIENT)
Dept: CARDIOLOGY | Facility: CLINIC | Age: 40
End: 2020-07-09
Payer: COMMERCIAL

## 2020-07-09 VITALS
DIASTOLIC BLOOD PRESSURE: 84 MMHG | OXYGEN SATURATION: 98 % | BODY MASS INDEX: 49.61 KG/M2 | SYSTOLIC BLOOD PRESSURE: 126 MMHG | WEIGHT: 280 LBS | HEART RATE: 112 BPM | HEIGHT: 63 IN

## 2020-07-09 PROCEDURE — 99215 OFFICE O/P EST HI 40 MIN: CPT

## 2020-07-09 PROCEDURE — 93000 ELECTROCARDIOGRAM COMPLETE: CPT

## 2020-07-09 NOTE — ASSESSMENT
[FreeTextEntry1] : Patient is a 40 year old AA female with family history of hypertension, heart disease and cerebral aneurysm. \par She carries a personal history of 4 pregnancies. \par Most recent pregnancy, patient developed pre eclampsia requiring an emergency C- section at 29 weeks gestation in 2/2020 presented for routine follow up and re-evaluation of her BP\par \par Additional past medical history: \par TTP exacerbation requiring high dose steroids and plasmapheresis to treat LISSETT.\par \par HTN\par Blood pressure today under acceptable control in the office but at home in 140s\par pt is no longer breastfeeding\par will start lostartan/HCTZ - 50/12.5 daily and d/c the hydralazine\par Continue with Nifedipine ER 60 mg BID\par Encouraged the patient to monitor blood pressure at home, keep a log, and report results back to us for evaluation. Based on results, we will adjust the regimen as necessary.\par \par *echocardiogram to reassess cardiac function\par \par Follow up in one month.. \par

## 2020-07-09 NOTE — HISTORY OF PRESENT ILLNESS
[FreeTextEntry1] : 40 year old AA female with family history of hypertension, heart disease and cerebral aneurysm. \par She carries a personal history of 4 pregnancies. First three pregnancies were full term and uncomplicated. \par Most recent pregnancy, patient developed pre eclampsia requiring an emergency C- section at 29 weeks gestation. Delivery date: February 19, 2020\par Additionally, baby demonstrated IUGR. Now child is in the NICU and being weaned from oxygen support. \par \par Of note. back in April of 2019, patient developed blurry vision and nausea.She was taken to [facility] and was diagnosed with severe thrombocytopenia with LISSETT and metabolic acidosis and HTN. 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 \par She was started on high dose prednisone with a fast taper and received PLEX X 10 sessions.after which her platelets recovered. After several more sessions of plasmapheresis ( total 13 sessions) kidney function returned to normal. \par \par She presents today for a cardiac evaluation and management of her hypertension.

## 2020-07-09 NOTE — PHYSICAL EXAM
[Normal Conjunctiva] : the conjunctiva exhibited no abnormalities [Eyelids - No Xanthelasma] : the eyelids demonstrated no xanthelasmas [No Oral Pallor] : no oral pallor [Normal Oral Mucosa] : normal oral mucosa [No Oral Cyanosis] : no oral cyanosis [Normal Jugular Venous A Waves Present] : normal jugular venous A waves present [Normal Jugular Venous V Waves Present] : normal jugular venous V waves present [No Jugular Venous Rodriguez A Waves] : no jugular venous rodriguez A waves [Respiration, Rhythm And Depth] : normal respiratory rhythm and effort [Exaggerated Use Of Accessory Muscles For Inspiration] : no accessory muscle use [Auscultation Breath Sounds / Voice Sounds] : lungs were clear to auscultation bilaterally [Abdomen Tenderness] : non-tender [Abdomen Soft] : soft [Abdomen Mass (___ Cm)] : no abdominal mass palpated [Abnormal Walk] : normal gait [Gait - Sufficient For Exercise Testing] : the gait was sufficient for exercise testing [Nail Clubbing] : no clubbing of the fingernails [Cyanosis, Localized] : no localized cyanosis [Petechial Hemorrhages (___cm)] : no petechial hemorrhages [Skin Color & Pigmentation] : normal skin color and pigmentation [] : no rash [No Venous Stasis] : no venous stasis [Skin Lesions] : no skin lesions [No Xanthoma] : no  xanthoma was observed [No Skin Ulcers] : no skin ulcer [Affect] : the affect was normal [Oriented To Time, Place, And Person] : oriented to person, place, and time [No Anxiety] : not feeling anxious [Mood] : the mood was normal [Normal] : normal [Normal Rate] : normal [Normal S1] : normal S1 [Rhythm Regular] : regular [Normal S2] : normal S2 [No Murmur] : no murmurs heard [2+] : left 2+ [FreeTextEntry1] : BMI 50.66

## 2020-07-21 ENCOUNTER — APPOINTMENT (OUTPATIENT)
Dept: NEPHROLOGY | Facility: CLINIC | Age: 40
End: 2020-07-21
Payer: COMMERCIAL

## 2020-07-21 VITALS
WEIGHT: 288 LBS | BODY MASS INDEX: 51.03 KG/M2 | HEART RATE: 87 BPM | HEIGHT: 63 IN | DIASTOLIC BLOOD PRESSURE: 86 MMHG | SYSTOLIC BLOOD PRESSURE: 142 MMHG

## 2020-07-21 PROCEDURE — 36415 COLL VENOUS BLD VENIPUNCTURE: CPT

## 2020-07-21 PROCEDURE — 99215 OFFICE O/P EST HI 40 MIN: CPT | Mod: 25

## 2020-07-21 RX ORDER — NIFEDIPINE 60 MG/1
60 TABLET, EXTENDED RELEASE ORAL DAILY
Qty: 90 | Refills: 1 | Status: DISCONTINUED | COMMUNITY
Start: 2020-03-05 | End: 2020-07-21

## 2020-07-21 NOTE — PHYSICAL EXAM
[General Appearance - Alert] : alert [Sclera] : the sclera and conjunctiva were normal [General Appearance - In No Acute Distress] : in no acute distress [General Appearance - Well Nourished] : well nourished [General Appearance - Well Developed] : well developed [Outer Ear] : the ears and nose were normal in appearance [Strabismus] : no strabismus was seen [Hearing Threshold Finger Rub Not [place]] : hearing was normal [Neck Appearance] : the appearance of the neck was normal [Respiration, Rhythm And Depth] : normal respiratory rhythm and effort [] : the neck was supple [Auscultation Breath Sounds / Voice Sounds] : lungs were clear to auscultation bilaterally [Heart Rate And Rhythm] : heart rate was normal and rhythm regular [Heart Sounds] : normal S1 and S2 [Edema] : there was no peripheral edema [Bowel Sounds] : normal bowel sounds [Abdomen Soft] : soft [Abdomen Tenderness] : non-tender [Abnormal Walk] : normal gait [Skin Color & Pigmentation] : normal skin color and pigmentation [No Focal Deficits] : no focal deficits [Skin Turgor] : normal skin turgor [Oriented To Time, Place, And Person] : oriented to person, place, and time [Affect] : the affect was normal [Impaired Insight] : insight and judgment were intact

## 2020-07-23 LAB
ALBUMIN SERPL ELPH-MCNC: 4.2 G/DL
ANION GAP SERPL CALC-SCNC: 14 MMOL/L
APPEARANCE: CLEAR
BACTERIA: ABNORMAL
BILIRUBIN URINE: NEGATIVE
BLOOD URINE: NEGATIVE
BUN SERPL-MCNC: 13 MG/DL
CALCIUM SERPL-MCNC: 9.5 MG/DL
CHLORIDE SERPL-SCNC: 100 MMOL/L
CO2 SERPL-SCNC: 26 MMOL/L
COLOR: NORMAL
CREAT SERPL-MCNC: 0.89 MG/DL
CREAT SPEC-SCNC: 135 MG/DL
CREAT/PROT UR: 0.1 RATIO
GLUCOSE QUALITATIVE U: NEGATIVE
GLUCOSE SERPL-MCNC: 161 MG/DL
HYALINE CASTS: 1 /LPF
KETONES URINE: NEGATIVE
LEUKOCYTE ESTERASE URINE: NEGATIVE
MICROSCOPIC-UA: NORMAL
NITRITE URINE: NEGATIVE
PH URINE: 7
PHOSPHATE SERPL-MCNC: 3.8 MG/DL
POTASSIUM SERPL-SCNC: 4.8 MMOL/L
PROT UR-MCNC: 11 MG/DL
PROTEIN URINE: NEGATIVE
RED BLOOD CELLS URINE: 3 /HPF
SODIUM SERPL-SCNC: 141 MMOL/L
SPECIFIC GRAVITY URINE: 1.02
SQUAMOUS EPITHELIAL CELLS: 4 /HPF
UROBILINOGEN URINE: NORMAL
WHITE BLOOD CELLS URINE: 1 /HPF

## 2020-07-23 NOTE — REVIEW OF SYSTEMS
[Negative] : Psychiatric [Fever] : no fever [Chills] : no chills [Red Eyes] : eyes not red [Eye Pain] : no eye pain [Heart Rate Is Slow] : the heart rate was not slow [Earache] : no earache [Loss Of Hearing] : no hearing loss [Heart Rate Is Fast] : the heart rate was not fast [Shortness Of Breath] : no shortness of breath [Wheezing] : no wheezing [Vomiting] : no vomiting [Abdominal Pain] : no abdominal pain [Skin Lesions] : no skin lesions [Arthralgias] : no arthralgias [Joint Pain] : no joint pain [Skin Wound] : no skin wound [Confused] : no confusion [Proptosis] : no proptosis [Convulsions] : no convulsions [Hot Flashes] : no hot flashes [Easy Bleeding] : no tendency for easy bleeding [Easy Bruising] : no tendency for easy bruising

## 2020-07-23 NOTE — ASSESSMENT
[FreeTextEntry1] : 1) Chronic HTN \par 2) TTP- s/p PLEX\par 3) LISSETT- resolved\par 4) Proteinuria\par 5) Microscopic hematuria\par \par BP stable\par Continue current regimen\par Obtain renal panel, UA, TP/Cr ratio\par RTC in 4 months.\par \par Addendum:\par labs reviewed- wnl\par UA with asymptomatic bacteruria- no indication to treat\par discussed with pt

## 2020-07-23 NOTE — HISTORY OF PRESENT ILLNESS
[FreeTextEntry1] : Ms. 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 She was found to have severe thrombocytopenia with LISSETT and metabolic acidosis and HTN. Peripheral smear showed schistocytes with elevated LDH and low haptoglobin with worsening renal failure and thrombocytopenia concerning for TTP. A CT C/A/P showed perinephric stranding bilaterally. A renal biopsy showed severe obliterative arterial sclerosis, with mild segmental glomerular endothelial injury, suggesting chronic thrombotic angiopathy. Acute tubular injury with focal tubular necrosis and mild reactive interstitial inflammation.  She was started on high dose prednisone with a fast taper and received PLEX x 10 sessions, after which her platelets recovered but kidney function plateaued at 1.4. Her prednisone was again increased to 1 mg/kg and she received another 3 sessions of plasma exchange starting 19 - 19. Creatinine improved to 1.1 - 1.2 . She received total 13 sessions of plasmapheresis. She was started on prednisone taper after kidney function normalized to 1.1and finished the taper on 5/3/19. \par \par 19 ADAMTS 13 activity <2 % (range >66%)\par ADAMTS 13 antibody 82 (range <12)\par \par PCP: Dr. Bobby Saunders, [facility]\par \par Today,\par Pt presents for a follow up. She is 22 weeks pregnant. Nifedipine was increased to 60 mg daily on last visit (). Pt states she feels well.  Doesn't have any acute complaint. Denies headaches, nausea, vomiting, diarrhea, leg edema.  Denies hematuria, foamy urine. Checks BP at home; reports BP runs high. Bp was 170/110 this AM. \par -----------------------------------------------------------------------------------------------------------------------\par \par \par Pt presents for a follow up. She delivered a baby boy at [facility] at ~ 29weeks via emegerncy  for pre eclampsia. \par Pt states she feels well.\par Following with Cards; was recently started on Losartan- HCTZ\par \par

## 2020-11-02 ENCOUNTER — APPOINTMENT (OUTPATIENT)
Dept: CARDIOLOGY | Facility: CLINIC | Age: 40
End: 2020-11-02
Payer: COMMERCIAL

## 2020-11-02 ENCOUNTER — NON-APPOINTMENT (OUTPATIENT)
Age: 40
End: 2020-11-02

## 2020-11-02 VITALS
HEIGHT: 63 IN | HEART RATE: 104 BPM | OXYGEN SATURATION: 100 % | WEIGHT: 288 LBS | DIASTOLIC BLOOD PRESSURE: 85 MMHG | SYSTOLIC BLOOD PRESSURE: 144 MMHG | BODY MASS INDEX: 51.03 KG/M2

## 2020-11-02 PROCEDURE — 93000 ELECTROCARDIOGRAM COMPLETE: CPT

## 2020-11-02 PROCEDURE — 99072 ADDL SUPL MATRL&STAF TM PHE: CPT

## 2020-11-02 PROCEDURE — 99215 OFFICE O/P EST HI 40 MIN: CPT

## 2020-11-02 NOTE — DISCUSSION/SUMMARY
[FreeTextEntry1] : Patient is a 40 year old AA female with family history of hypertension, heart disease and cerebral aneurysm. \par She carries a personal history of 4 pregnancies. \par Most recent pregnancy, patient developed pre eclampsia requiring an emergency C- section at 29 weeks gestation in 2/2020 presented for routine follow up and re-evaluation of her BP\par \par Additional past medical history: \par TTP exacerbation requiring high dose steroids and plasmapheresis to treat LISSETT.\par \par HTN\par She presents today for a cardiac evaluation and management of her hypertension. NOt well controlled at home - 140-150. Denies new cardiac sympotms. Not very active\par pt is no longer breastfeeding\par will increase the  lostartan/HCTZ to 100/25 daily. Encouraged the patient to monitor blood pressure at home, keep a log, and report results back to us for evaluation. Based on results, we will adjust the regimen as necessary.\par Encouraged patient to continue healthy exercise and eating habits, focusing on a Mediterranean style of eating and aiming for the recommended 150 minutes per week of moderate physical activity.\par Encouraged the patient to find healthy outlets and coping mechanisms to help manage stress, such as physical activity/exercise, reducing workload if possible, spending time with family and friends, engaging in an enjoyable hobby, or using meditation or mindfulness techniques.\par \par \par

## 2020-11-02 NOTE — PHYSICAL EXAM
[Normal Conjunctiva] : the conjunctiva exhibited no abnormalities [Eyelids - No Xanthelasma] : the eyelids demonstrated no xanthelasmas [Normal Oral Mucosa] : normal oral mucosa [No Oral Pallor] : no oral pallor [No Oral Cyanosis] : no oral cyanosis [Normal Jugular Venous A Waves Present] : normal jugular venous A waves present [Normal Jugular Venous V Waves Present] : normal jugular venous V waves present [No Jugular Venous Rodriguez A Waves] : no jugular venous rodriguez A waves [Respiration, Rhythm And Depth] : normal respiratory rhythm and effort [Exaggerated Use Of Accessory Muscles For Inspiration] : no accessory muscle use [Auscultation Breath Sounds / Voice Sounds] : lungs were clear to auscultation bilaterally [Abdomen Soft] : soft [Abdomen Tenderness] : non-tender [Abdomen Mass (___ Cm)] : no abdominal mass palpated [Abnormal Walk] : normal gait [Gait - Sufficient For Exercise Testing] : the gait was sufficient for exercise testing [Nail Clubbing] : no clubbing of the fingernails [Cyanosis, Localized] : no localized cyanosis [Petechial Hemorrhages (___cm)] : no petechial hemorrhages [Skin Color & Pigmentation] : normal skin color and pigmentation [] : no rash [No Venous Stasis] : no venous stasis [Skin Lesions] : no skin lesions [No Skin Ulcers] : no skin ulcer [No Xanthoma] : no  xanthoma was observed [Oriented To Time, Place, And Person] : oriented to person, place, and time [Affect] : the affect was normal [Mood] : the mood was normal [No Anxiety] : not feeling anxious [Normal] : normal [Normal Rate] : normal [Rhythm Regular] : regular [Normal S1] : normal S1 [Normal S2] : normal S2 [No Murmur] : no murmurs heard [2+] : left 2+ [FreeTextEntry1] : BMI 50.66

## 2020-11-17 ENCOUNTER — APPOINTMENT (OUTPATIENT)
Dept: NEPHROLOGY | Facility: CLINIC | Age: 40
End: 2020-11-17
Payer: COMMERCIAL

## 2020-11-17 VITALS
WEIGHT: 285 LBS | HEIGHT: 63 IN | BODY MASS INDEX: 50.5 KG/M2 | HEART RATE: 96 BPM | TEMPERATURE: 97 F | SYSTOLIC BLOOD PRESSURE: 144 MMHG | DIASTOLIC BLOOD PRESSURE: 90 MMHG

## 2020-11-17 PROCEDURE — 99215 OFFICE O/P EST HI 40 MIN: CPT | Mod: 25

## 2020-11-17 PROCEDURE — 99072 ADDL SUPL MATRL&STAF TM PHE: CPT

## 2020-11-17 PROCEDURE — 36415 COLL VENOUS BLD VENIPUNCTURE: CPT

## 2020-11-17 NOTE — REVIEW OF SYSTEMS
[Fever] : no fever [Chills] : no chills [Eye Pain] : no eye pain [Red Eyes] : eyes not red [Earache] : no earache [Loss Of Hearing] : no hearing loss [Heart Rate Is Slow] : the heart rate was not slow [Heart Rate Is Fast] : the heart rate was not fast [Shortness Of Breath] : no shortness of breath [Wheezing] : no wheezing [Abdominal Pain] : no abdominal pain [Vomiting] : no vomiting [Arthralgias] : no arthralgias [Joint Pain] : no joint pain [Skin Lesions] : no skin lesions [Skin Wound] : no skin wound [Confused] : no confusion [Convulsions] : no convulsions [Proptosis] : no proptosis [Hot Flashes] : no hot flashes [Easy Bleeding] : no tendency for easy bleeding [Easy Bruising] : no tendency for easy bruising [Negative] : Heme/Lymph

## 2020-11-17 NOTE — ASSESSMENT
[FreeTextEntry1] : 1) Chronic HTN \par 2) TTP- s/p PLEX\par 3) LISSETT- resolved\par 4) Proteinuria\par 5) Microscopic hematuria\par \par BP running high\par Just on losartan-HCTZ 100-25\par Will restart nifedipine @ 60mg daily\par CBC, renal panel, UA, urine pro/cr ratio\par Pt to follow up with Dr Isaiah raza/onc for TTP history;\par \par RTC 3 mo

## 2020-11-17 NOTE — HISTORY OF PRESENT ILLNESS
[FreeTextEntry1] : Ms. 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 She was found to have severe thrombocytopenia with LISSETT and metabolic acidosis and HTN. Peripheral smear showed schistocytes with elevated LDH and low haptoglobin with worsening renal failure and thrombocytopenia concerning for TTP. A CT C/A/P showed perinephric stranding bilaterally. A renal biopsy showed severe obliterative arterial sclerosis, with mild segmental glomerular endothelial injury, suggesting chronic thrombotic angiopathy. Acute tubular injury with focal tubular necrosis and mild reactive interstitial inflammation.  She was started on high dose prednisone with a fast taper and received PLEX x 10 sessions, after which her platelets recovered but kidney function plateaued at 1.4. Her prednisone was again increased to 1 mg/kg and she received another 3 sessions of plasma exchange starting 19 - 19. Creatinine improved to 1.1 - 1.2 . She received total 13 sessions of plasmapheresis. She was started on prednisone taper after kidney function normalized to 1.1and finished the taper on 5/3/19. \par \par 19 ADAMTS 13 activity <2 % (range >66%)\par ADAMTS 13 antibody 82 (range <12)\par \par PCP: Dr. Bobby Saunders, [facility]\par \par Today,\par Pt presents for a follow up. She is 22 weeks pregnant. Nifedipine was increased to 60 mg daily on last visit (). Pt states she feels well.  Doesn't have any acute complaint. Denies headaches, nausea, vomiting, diarrhea, leg edema.  Denies hematuria, foamy urine. Checks BP at home; reports BP runs high. Bp was 170/110 this AM. \par -----------------------------------------------------------------------------------------------------------------------\par \par \par Pt presents for a follow up. She delivered a baby boy at [facility] at ~ 29weeks via emegerncy  for pre eclampsia. \par Pt states she feels well.\par \par Current: Pt seen/examined; on losartan-HCTZ 100/25; no complaints; doing well; BP still high; started exercising;\par Following with Cards; was recently started on Losartan- HCTZ\par \par

## 2020-11-17 NOTE — PHYSICAL EXAM
[General Appearance - Alert] : alert [General Appearance - In No Acute Distress] : in no acute distress [General Appearance - Well Nourished] : well nourished [General Appearance - Well Developed] : well developed [Sclera] : the sclera and conjunctiva were normal [Strabismus] : no strabismus was seen [Outer Ear] : the ears and nose were normal in appearance [Hearing Threshold Finger Rub Not [place]] : hearing was normal [Neck Appearance] : the appearance of the neck was normal [] : no respiratory distress [Respiration, Rhythm And Depth] : normal respiratory rhythm and effort [Auscultation Breath Sounds / Voice Sounds] : lungs were clear to auscultation bilaterally [Heart Rate And Rhythm] : heart rate was normal and rhythm regular [Heart Sounds] : normal S1 and S2 [Edema] : there was no peripheral edema [Bowel Sounds] : normal bowel sounds [Abdomen Soft] : soft [Abdomen Tenderness] : non-tender [Abnormal Walk] : normal gait [Skin Color & Pigmentation] : normal skin color and pigmentation [Skin Turgor] : normal skin turgor [No Focal Deficits] : no focal deficits [Oriented To Time, Place, And Person] : oriented to person, place, and time [Impaired Insight] : insight and judgment were intact [Affect] : the affect was normal

## 2020-11-19 LAB
ALBUMIN SERPL ELPH-MCNC: 4.4 G/DL
ANION GAP SERPL CALC-SCNC: 16 MMOL/L
APPEARANCE: CLEAR
BACTERIA: NEGATIVE
BASOPHILS # BLD AUTO: 0.04 K/UL
BASOPHILS NFR BLD AUTO: 0.4 %
BILIRUBIN URINE: NEGATIVE
BLOOD URINE: NEGATIVE
BUN SERPL-MCNC: 18 MG/DL
CALCIUM SERPL-MCNC: 10 MG/DL
CHLORIDE SERPL-SCNC: 98 MMOL/L
CO2 SERPL-SCNC: 25 MMOL/L
COLOR: YELLOW
CREAT SERPL-MCNC: 0.83 MG/DL
CREAT SPEC-SCNC: 164 MG/DL
CREAT/PROT UR: 0.1 RATIO
EOSINOPHIL # BLD AUTO: 0.3 K/UL
EOSINOPHIL NFR BLD AUTO: 3.1 %
GLUCOSE QUALITATIVE U: NEGATIVE
GLUCOSE SERPL-MCNC: 147 MG/DL
HCT VFR BLD CALC: 39.6 %
HGB BLD-MCNC: 11.8 G/DL
HYALINE CASTS: 1 /LPF
IMM GRANULOCYTES NFR BLD AUTO: 0.2 %
KETONES URINE: NEGATIVE
LEUKOCYTE ESTERASE URINE: NEGATIVE
LYMPHOCYTES # BLD AUTO: 3.7 K/UL
LYMPHOCYTES NFR BLD AUTO: 38.7 %
MAN DIFF?: NORMAL
MCHC RBC-ENTMCNC: 27.6 PG
MCHC RBC-ENTMCNC: 29.8 GM/DL
MCV RBC AUTO: 92.5 FL
MICROSCOPIC-UA: NORMAL
MONOCYTES # BLD AUTO: 0.8 K/UL
MONOCYTES NFR BLD AUTO: 8.4 %
NEUTROPHILS # BLD AUTO: 4.69 K/UL
NEUTROPHILS NFR BLD AUTO: 49.2 %
NITRITE URINE: NEGATIVE
PH URINE: 6.5
PHOSPHATE SERPL-MCNC: 4 MG/DL
PLATELET # BLD AUTO: 579 K/UL
POTASSIUM SERPL-SCNC: 4.3 MMOL/L
PROT UR-MCNC: 14 MG/DL
PROTEIN URINE: NEGATIVE
RBC # BLD: 4.28 M/UL
RBC # FLD: 15.4 %
RED BLOOD CELLS URINE: 3 /HPF
SODIUM SERPL-SCNC: 139 MMOL/L
SPECIFIC GRAVITY URINE: 1.02
SQUAMOUS EPITHELIAL CELLS: 3 /HPF
UROBILINOGEN URINE: NORMAL
WBC # FLD AUTO: 9.55 K/UL
WHITE BLOOD CELLS URINE: 1 /HPF

## 2020-12-04 ENCOUNTER — OUTPATIENT (OUTPATIENT)
Dept: OUTPATIENT SERVICES | Facility: HOSPITAL | Age: 40
LOS: 1 days | Discharge: HOME | End: 2020-12-04

## 2020-12-04 DIAGNOSIS — D64.9 ANEMIA, UNSPECIFIED: ICD-10-CM

## 2020-12-07 ENCOUNTER — APPOINTMENT (OUTPATIENT)
Dept: HEMATOLOGY ONCOLOGY | Facility: CLINIC | Age: 40
End: 2020-12-07
Payer: COMMERCIAL

## 2020-12-07 ENCOUNTER — RESULT REVIEW (OUTPATIENT)
Age: 40
End: 2020-12-07

## 2020-12-07 ENCOUNTER — LABORATORY RESULT (OUTPATIENT)
Age: 40
End: 2020-12-07

## 2020-12-07 VITALS
HEIGHT: 63 IN | OXYGEN SATURATION: 98 % | HEART RATE: 95 BPM | DIASTOLIC BLOOD PRESSURE: 81 MMHG | WEIGHT: 285 LBS | BODY MASS INDEX: 50.5 KG/M2 | SYSTOLIC BLOOD PRESSURE: 125 MMHG

## 2020-12-07 LAB
BASOPHILS # BLD AUTO: 0.1 K/UL — SIGNIFICANT CHANGE UP (ref 0–0.2)
BASOPHILS NFR BLD AUTO: 0.7 % — SIGNIFICANT CHANGE UP (ref 0–2)
EOSINOPHIL # BLD AUTO: 0.3 K/UL — SIGNIFICANT CHANGE UP (ref 0–0.5)
EOSINOPHIL NFR BLD AUTO: 2.3 % — SIGNIFICANT CHANGE UP (ref 0–6)
HCT VFR BLD CALC: 40.9 % — SIGNIFICANT CHANGE UP (ref 34.5–45)
HGB BLD-MCNC: 12.6 G/DL — SIGNIFICANT CHANGE UP (ref 11.5–15.5)
LYMPHOCYTES # BLD AUTO: 28.9 % — SIGNIFICANT CHANGE UP (ref 13–44)
LYMPHOCYTES # BLD AUTO: 3.5 K/UL — HIGH (ref 1–3.3)
MCHC RBC-ENTMCNC: 27.8 PG — SIGNIFICANT CHANGE UP (ref 27–34)
MCHC RBC-ENTMCNC: 30.8 G/DL — LOW (ref 32–36)
MCV RBC AUTO: 90 FL — SIGNIFICANT CHANGE UP (ref 80–100)
MONOCYTES # BLD AUTO: 0.7 K/UL — SIGNIFICANT CHANGE UP (ref 0–0.9)
MONOCYTES NFR BLD AUTO: 6.1 % — SIGNIFICANT CHANGE UP (ref 2–14)
NEUTROPHILS # BLD AUTO: 7.5 K/UL — HIGH (ref 1.8–7.4)
NEUTROPHILS NFR BLD AUTO: 62 % — SIGNIFICANT CHANGE UP (ref 43–77)
PLATELET # BLD AUTO: 494 K/UL — HIGH (ref 150–400)
RBC # BLD: 4.54 M/UL — SIGNIFICANT CHANGE UP (ref 3.8–5.2)
RBC # FLD: 14 % — SIGNIFICANT CHANGE UP (ref 10.3–14.5)
WBC # BLD: 12.1 K/UL — HIGH (ref 3.8–10.5)
WBC # FLD AUTO: 12.1 K/UL — HIGH (ref 3.8–10.5)

## 2020-12-07 PROCEDURE — 99072 ADDL SUPL MATRL&STAF TM PHE: CPT

## 2020-12-07 PROCEDURE — 99214 OFFICE O/P EST MOD 30 MIN: CPT

## 2020-12-07 RX ORDER — LOSARTAN POTASSIUM AND HYDROCHLOROTHIAZIDE 12.5; 5 MG/1; MG/1
50-12.5 TABLET ORAL
Qty: 30 | Refills: 0 | Status: DISCONTINUED | COMMUNITY
Start: 2020-07-09

## 2020-12-07 RX ORDER — HYDRALAZINE HYDROCHLORIDE 25 MG/1
25 TABLET ORAL
Qty: 90 | Refills: 0 | Status: DISCONTINUED | COMMUNITY
Start: 2020-04-28

## 2020-12-07 NOTE — HISTORY OF PRESENT ILLNESS
[de-identified] : Ms. Gutierres presents with TTP at the age of 38.\par \par The patient was admitted to [facility] 4/5/19-4/20-/19 for blurry vision and nausea/vomiting. She was found to have severe thrombocytopenia with LISSETT and metabolic acidosis and HTN. Peripheral smear showed schistocytes with elevated LDH and low haptoglobin with worsening renal failure and thrombocytopenia concerning for TTP. A CT C/A/P showed perinephric stranding bilaterally. \par \par A renal biopsy showed severe obliterative arterial sclerosis, with mild segmental glomerular endothelial injury, suggesting chronic thrombotic angiopathy. Acute tubular injury with focal tubular necrosis and mild reactive interstitial inflammation. \par \par She was started on high dose prednisone with a fast taper and received PLEX x 10 sessions, after which her platelets recovered but kidney function plateaued at 1.4.\par Her prednisone was again increased to 1 mg/kg and she received another 3 sessions of plasma exchange starting 4/17/19 - 4/19/19.  Creatinine improved to 1.1 - 1.2 \par She received total 13 sessions of plasmapheresis. She was started on prednisone taper after kidney function normalized to 1.1 and to taper over 2-3 weeks. \par \par 4/6/19 ADAMTS 13 activity <2 % (range >66%)\par ADAMTS 13 antibody 82 (range <12)\par 4/5/19 WBC 15.3, HGB 11.6, PLT 9 K,  BUN/Cr 2.80\par 4/6/19 WBC 15.6, HGB 11.3, PLT 6 K, LDH 1923, Haptoglobin <20\par 4/7/19 WBC 20.1, HGB 10.1, PLT 11 K\par 4/8/19 WBC 16.6, HGB 9.5, PLT 54 K \par 4/13/19 WBC 18.0, HGB 8.6,  K, \par 4/17/19 \par 4/20/19 WBC 25.0, HGB 8.7,  K\par 4/23/19 WBC 26.5, HGB 10.3,  K\par \par She is currently on Prednisone 60 mg a day and scheduled to finish the taper on 5/3/19. No dysuria. No headaches or blurred vision. No abdominal pain. Bowel movements are normal. \par \par PCP: Dr. Bobby Saunders, [facility] [de-identified] : Returns for follow up.\par Periods are regular, sometimes heavy.\par She feels fatigued.\par No fevers or night sweats, or weight loss. \par No infections. \par Recently started on Nifedipine ER for BP.

## 2020-12-07 NOTE — PHYSICAL EXAM
[Fully active, able to carry on all pre-disease performance without restriction] : Status 0 - Fully active, able to carry on all pre-disease performance without restriction [Obese] : obese [Normal] : affect appropriate [de-identified] : supple [de-identified] : clear [de-identified] : S1/S2 [de-identified] : NO edema [de-identified] : soft, obese

## 2020-12-07 NOTE — ASSESSMENT
[FreeTextEntry1] : 40 year old female with TTP diagnosed in 4/2019.  ADAMTS 13 activity <2%, ADAMTS 13 antibody is 70-80 (high).  Kidney biopsy c/w chronic thrombotic microangiopathy.  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    S/p ~13 sessions of plasma exchange and high dose prednisone with recovery of platelets and kidney function.  Prednisone taper completed 5/11/19.\par On 5/7/19 her [id number] activity was normal at 71%.\par JAK2 V617F negative. \par \par Labs reviewed. WBC today 12, Hgb 12.6, ANC 7.5, ALC 3.5, platelets 496K\par Mild leukocytosis persists, with neutrophilia/lymphocytosis. \par Possible related to obesity / chronic inflammation, less likely lympho or myeloproliferative disorder\par \par Plan:\par -check immunoelectrophoresis, flow cytometry, CRP, LDH, iron studies today\par -follow up in 6 months

## 2020-12-10 ENCOUNTER — NON-APPOINTMENT (OUTPATIENT)
Age: 40
End: 2020-12-10

## 2020-12-10 LAB
ALBUMIN MFR SERPL ELPH: 47.8 %
ALBUMIN SERPL-MCNC: 3.9 G/DL
ALBUMIN/GLOB SERPL: 0.9 RATIO
ALPHA1 GLOB MFR SERPL ELPH: 5.3 %
ALPHA1 GLOB SERPL ELPH-MCNC: 0.4 G/DL
ALPHA2 GLOB MFR SERPL ELPH: 12 %
ALPHA2 GLOB SERPL ELPH-MCNC: 1 G/DL
B-GLOBULIN MFR SERPL ELPH: 15.6 %
B-GLOBULIN SERPL ELPH-MCNC: 1.3 G/DL
CRP SERPL-MCNC: 2.33 MG/DL
DEPRECATED KAPPA LC FREE/LAMBDA SER: 1.12 RATIO
GAMMA GLOB FLD ELPH-MCNC: 1.6 G/DL
GAMMA GLOB MFR SERPL ELPH: 19.3 %
IGA SER QL IEP: 352 MG/DL
IGG SER QL IEP: 1723 MG/DL
IGM SER QL IEP: 105 MG/DL
INTERPRETATION SERPL IEP-IMP: NORMAL
KAPPA LC CSF-MCNC: 2.05 MG/DL
KAPPA LC SERPL-MCNC: 2.3 MG/DL
LDH SERPL-CCNC: 211 U/L
M PROTEIN SPEC IFE-MCNC: NORMAL
PROT SERPL-MCNC: 8.1 G/DL
PROT SERPL-MCNC: 8.1 G/DL

## 2021-02-04 ENCOUNTER — APPOINTMENT (OUTPATIENT)
Dept: CARDIOLOGY | Facility: CLINIC | Age: 41
End: 2021-02-04

## 2021-02-16 ENCOUNTER — APPOINTMENT (OUTPATIENT)
Dept: NEPHROLOGY | Facility: CLINIC | Age: 41
End: 2021-02-16

## 2021-03-23 ENCOUNTER — APPOINTMENT (OUTPATIENT)
Dept: NEPHROLOGY | Facility: CLINIC | Age: 41
End: 2021-03-23
Payer: COMMERCIAL

## 2021-03-23 VITALS
BODY MASS INDEX: 49.08 KG/M2 | SYSTOLIC BLOOD PRESSURE: 150 MMHG | DIASTOLIC BLOOD PRESSURE: 94 MMHG | HEART RATE: 94 BPM | WEIGHT: 277 LBS | TEMPERATURE: 97.4 F | HEIGHT: 63 IN

## 2021-03-23 PROCEDURE — 99215 OFFICE O/P EST HI 40 MIN: CPT | Mod: 25

## 2021-03-23 PROCEDURE — 36415 COLL VENOUS BLD VENIPUNCTURE: CPT

## 2021-03-23 PROCEDURE — 99072 ADDL SUPL MATRL&STAF TM PHE: CPT

## 2021-03-23 NOTE — PHYSICAL EXAM
[General Appearance - Alert] : alert [General Appearance - In No Acute Distress] : in no acute distress [General Appearance - Well Nourished] : well nourished [General Appearance - Well Developed] : well developed [Sclera] : the sclera and conjunctiva were normal [Strabismus] : no strabismus was seen [Outer Ear] : the ears and nose were normal in appearance [Hearing Threshold Finger Rub Not Dick[place]] : hearing was normal [Neck Appearance] : the appearance of the neck was normal [] : no respiratory distress [Respiration, Rhythm And Depth] : normal respiratory rhythm and effort [Auscultation Breath Sounds / Voice Sounds] : lungs were clear to auscultation bilaterally [Heart Rate And Rhythm] : heart rate was normal and rhythm regular [Heart Sounds] : normal S1 and S2 [Edema] : there was no peripheral edema [Bowel Sounds] : normal bowel sounds [Abdomen Soft] : soft [Abdomen Tenderness] : non-tender [Abnormal Walk] : normal gait [Skin Color & Pigmentation] : normal skin color and pigmentation [Skin Turgor] : normal skin turgor [No Focal Deficits] : no focal deficits [Oriented To Time, Place, And Person] : oriented to person, place, and time [Impaired Insight] : insight and judgment were intact [Affect] : the affect was normal

## 2021-03-23 NOTE — ASSESSMENT
[FreeTextEntry1] : 1) Chronic HTN \par 2) TTP- s/p PLEX\par 3) LISSETT- resolved\par 4) Proteinuria\par 5) Microscopic hematuria\par \par BP running high\par Just on losartan-HCTZ 100-25\par Will increase dose to 90mg of nifedipine XL daily\par CBC, renal panel, UA, urine pro/cr ratio\par Pt to follow up with Dr Isaiah raza/onc for TTP history;\par \par RTC 3 mo

## 2021-03-23 NOTE — HISTORY OF PRESENT ILLNESS
[FreeTextEntry1] : Ms. 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 She was found to have severe thrombocytopenia with LISSETT and metabolic acidosis and HTN. Peripheral smear showed schistocytes with elevated LDH and low haptoglobin with worsening renal failure and thrombocytopenia concerning for TTP. A CT C/A/P showed perinephric stranding bilaterally. A renal biopsy showed severe obliterative arterial sclerosis, with mild segmental glomerular endothelial injury, suggesting chronic thrombotic angiopathy. Acute tubular injury with focal tubular necrosis and mild reactive interstitial inflammation.  She was started on high dose prednisone with a fast taper and received PLEX x 10 sessions, after which her platelets recovered but kidney function plateaued at 1.4. Her prednisone was again increased to 1 mg/kg and she received another 3 sessions of plasma exchange starting 19 - 19. Creatinine improved to 1.1 - 1.2 . She received total 13 sessions of plasmapheresis. She was started on prednisone taper after kidney function normalized to 1.1and finished the taper on 5/3/19. \par \par 19 ADAMTS 13 activity <2 % (range >66%)\par ADAMTS 13 antibody 82 (range <12)\par \par PCP: Dr. Bobby Saunders, [facility]\par \par Today,\par Pt presents for a follow up. She is 22 weeks pregnant. Nifedipine was increased to 60 mg daily on last visit (). Pt states she feels well.  Doesn't have any acute complaint. Denies headaches, nausea, vomiting, diarrhea, leg edema.  Denies hematuria, foamy urine. Checks BP at home; reports BP runs high. Bp was 170/110 this AM. \par -----------------------------------------------------------------------------------------------------------------------\par \par \par Pt presents for a follow up. She delivered a baby boy at [facility] at ~ 29weeks via emegerncy  for pre eclampsia. \par Pt states she feels well.\par  Pt seen/examined; on losartan-HCTZ 100/25; no complaints; doing well; BP still high; started exercising;\par Following with Cards; was recently started on Losartan- HCTZ\par \par Current: Pt seen/examined; doing well; no complaints;\par \par

## 2021-03-25 LAB
ALBUMIN SERPL ELPH-MCNC: 4.4 G/DL
ANION GAP SERPL CALC-SCNC: 11 MMOL/L
APPEARANCE: CLEAR
BACTERIA: NEGATIVE
BASOPHILS # BLD AUTO: 0.04 K/UL
BASOPHILS NFR BLD AUTO: 0.5 %
BILIRUBIN URINE: NEGATIVE
BLOOD URINE: ABNORMAL
BUN SERPL-MCNC: 16 MG/DL
CALCIUM SERPL-MCNC: 9.3 MG/DL
CHLORIDE SERPL-SCNC: 102 MMOL/L
CO2 SERPL-SCNC: 27 MMOL/L
COLOR: YELLOW
CREAT SERPL-MCNC: 0.8 MG/DL
CREAT SPEC-SCNC: 196 MG/DL
CREAT/PROT UR: 0.1 RATIO
EOSINOPHIL # BLD AUTO: 0.2 K/UL
EOSINOPHIL NFR BLD AUTO: 2.4 %
ESTIMATED AVERAGE GLUCOSE: 186 MG/DL
GLUCOSE QUALITATIVE U: NEGATIVE
GLUCOSE SERPL-MCNC: 145 MG/DL
HBA1C MFR BLD HPLC: 8.1 %
HCT VFR BLD CALC: 40.6 %
HGB BLD-MCNC: 12.2 G/DL
HYALINE CASTS: 3 /LPF
IMM GRANULOCYTES NFR BLD AUTO: 0.1 %
KETONES URINE: NEGATIVE
LEUKOCYTE ESTERASE URINE: NEGATIVE
LYMPHOCYTES # BLD AUTO: 2.46 K/UL
LYMPHOCYTES NFR BLD AUTO: 29.5 %
MAN DIFF?: NORMAL
MCHC RBC-ENTMCNC: 27.1 PG
MCHC RBC-ENTMCNC: 30 GM/DL
MCV RBC AUTO: 90.2 FL
MICROSCOPIC-UA: NORMAL
MONOCYTES # BLD AUTO: 0.74 K/UL
MONOCYTES NFR BLD AUTO: 8.9 %
NEUTROPHILS # BLD AUTO: 4.88 K/UL
NEUTROPHILS NFR BLD AUTO: 58.6 %
NITRITE URINE: NEGATIVE
PH URINE: 6
PHOSPHATE SERPL-MCNC: 3.6 MG/DL
PLATELET # BLD AUTO: 604 K/UL
POTASSIUM SERPL-SCNC: 4.5 MMOL/L
PROT UR-MCNC: 25 MG/DL
PROTEIN URINE: NORMAL
RBC # BLD: 4.5 M/UL
RBC # FLD: 15.8 %
RED BLOOD CELLS URINE: 2 /HPF
SODIUM SERPL-SCNC: 139 MMOL/L
SPECIFIC GRAVITY URINE: 1.02
SQUAMOUS EPITHELIAL CELLS: 4 /HPF
UROBILINOGEN URINE: NORMAL
WBC # FLD AUTO: 8.33 K/UL
WHITE BLOOD CELLS URINE: 1 /HPF

## 2021-06-04 ENCOUNTER — OUTPATIENT (OUTPATIENT)
Dept: OUTPATIENT SERVICES | Facility: HOSPITAL | Age: 41
LOS: 1 days | Discharge: HOME | End: 2021-06-04

## 2021-06-04 DIAGNOSIS — D64.9 ANEMIA, UNSPECIFIED: ICD-10-CM

## 2021-06-08 ENCOUNTER — RESULT REVIEW (OUTPATIENT)
Age: 41
End: 2021-06-08

## 2021-06-08 ENCOUNTER — LABORATORY RESULT (OUTPATIENT)
Age: 41
End: 2021-06-08

## 2021-06-08 ENCOUNTER — APPOINTMENT (OUTPATIENT)
Dept: HEMATOLOGY ONCOLOGY | Facility: CLINIC | Age: 41
End: 2021-06-08
Payer: COMMERCIAL

## 2021-06-08 VITALS
BODY MASS INDEX: 46.78 KG/M2 | DIASTOLIC BLOOD PRESSURE: 88 MMHG | HEART RATE: 91 BPM | HEIGHT: 63 IN | WEIGHT: 264.01 LBS | OXYGEN SATURATION: 95 % | SYSTOLIC BLOOD PRESSURE: 138 MMHG

## 2021-06-08 LAB
BASOPHILS # BLD AUTO: 0.1 K/UL — SIGNIFICANT CHANGE UP (ref 0–0.2)
BASOPHILS NFR BLD AUTO: 0.9 % — SIGNIFICANT CHANGE UP (ref 0–2)
EOSINOPHIL # BLD AUTO: 0.4 K/UL — SIGNIFICANT CHANGE UP (ref 0–0.5)
EOSINOPHIL NFR BLD AUTO: 4.1 % — SIGNIFICANT CHANGE UP (ref 0–6)
HCT VFR BLD CALC: 39 % — SIGNIFICANT CHANGE UP (ref 34.5–45)
HGB BLD-MCNC: 11.7 G/DL — SIGNIFICANT CHANGE UP (ref 11.5–15.5)
LYMPHOCYTES # BLD AUTO: 3.2 K/UL — SIGNIFICANT CHANGE UP (ref 1–3.3)
LYMPHOCYTES # BLD AUTO: 31.7 % — SIGNIFICANT CHANGE UP (ref 13–44)
MCHC RBC-ENTMCNC: 26.9 PG — LOW (ref 27–34)
MCHC RBC-ENTMCNC: 30 G/DL — LOW (ref 32–36)
MCV RBC AUTO: 89.7 FL — SIGNIFICANT CHANGE UP (ref 80–100)
MONOCYTES # BLD AUTO: 0.6 K/UL — SIGNIFICANT CHANGE UP (ref 0–0.9)
MONOCYTES NFR BLD AUTO: 6.1 % — SIGNIFICANT CHANGE UP (ref 2–14)
NEUTROPHILS # BLD AUTO: 5.7 K/UL — SIGNIFICANT CHANGE UP (ref 1.8–7.4)
NEUTROPHILS NFR BLD AUTO: 57.2 % — SIGNIFICANT CHANGE UP (ref 43–77)
PLATELET # BLD AUTO: 482 K/UL — HIGH (ref 150–400)
RBC # BLD: 4.34 M/UL — SIGNIFICANT CHANGE UP (ref 3.8–5.2)
RBC # FLD: 15.5 % — HIGH (ref 10.3–14.5)
WBC # BLD: 10 K/UL — SIGNIFICANT CHANGE UP (ref 3.8–10.5)
WBC # FLD AUTO: 10 K/UL — SIGNIFICANT CHANGE UP (ref 3.8–10.5)

## 2021-06-08 PROCEDURE — 99213 OFFICE O/P EST LOW 20 MIN: CPT

## 2021-06-08 PROCEDURE — 99072 ADDL SUPL MATRL&STAF TM PHE: CPT

## 2021-06-08 NOTE — HISTORY OF PRESENT ILLNESS
[de-identified] : Ms. Gutierres presents with TTP at the age of 38.\par \par The patient was admitted to [facility] 4/5/19-4/20-/19 for blurry vision and nausea/vomiting. She was found to have severe thrombocytopenia with LISSETT and metabolic acidosis and HTN. Peripheral smear showed schistocytes with elevated LDH and low haptoglobin with worsening renal failure and thrombocytopenia concerning for TTP. A CT C/A/P showed perinephric stranding bilaterally. \par \par A renal biopsy showed severe obliterative arterial sclerosis, with mild segmental glomerular endothelial injury, suggesting chronic thrombotic angiopathy. Acute tubular injury with focal tubular necrosis and mild reactive interstitial inflammation. \par \par She was started on high dose prednisone with a fast taper and received PLEX x 10 sessions, after which her platelets recovered but kidney function plateaued at 1.4.\par Her prednisone was again increased to 1 mg/kg and she received another 3 sessions of plasma exchange starting 4/17/19 - 4/19/19.  Creatinine improved to 1.1 - 1.2 \par She received total 13 sessions of plasmapheresis. She was started on prednisone taper after kidney function normalized to 1.1 and to taper over 2-3 weeks. \par \par 4/6/19 ADAMTS 13 activity <2 % (range >66%)\par ADAMTS 13 antibody 82 (range <12)\par 4/5/19 WBC 15.3, HGB 11.6, PLT 9 K,  BUN/Cr 2.80\par 4/6/19 WBC 15.6, HGB 11.3, PLT 6 K, LDH 1923, Haptoglobin <20\par 4/7/19 WBC 20.1, HGB 10.1, PLT 11 K\par 4/8/19 WBC 16.6, HGB 9.5, PLT 54 K \par 4/13/19 WBC 18.0, HGB 8.6,  K, \par 4/17/19 \par 4/20/19 WBC 25.0, HGB 8.7,  K\par 4/23/19 WBC 26.5, HGB 10.3,  K\par \par She is currently on Prednisone 60 mg a day and scheduled to finish the taper on 5/3/19. No dysuria. No headaches or blurred vision. No abdominal pain. Bowel movements are normal. \par \par PCP: Dr. Bobby Saunders, [facility] [de-identified] : Returns for follow up.\par Reports weight loss 10kg since December 2021, going to the gym and exercising daily \par LMP, 1 week ago, not heavy, regular cycle\par Completed COVID-10 vaccine series

## 2021-06-08 NOTE — PHYSICAL EXAM
[Fully active, able to carry on all pre-disease performance without restriction] : Status 0 - Fully active, able to carry on all pre-disease performance without restriction [Normal] : affect appropriate [de-identified] : supple [de-identified] : clear [de-identified] : S1/S2 [de-identified] : NO edema

## 2021-06-08 NOTE — REVIEW OF SYSTEMS
[Negative] : Allergic/Immunologic [FreeTextEntry2] : negative except as reviewed in interval history Airway patent, Nasal mucosa clear. Mouth with normal mucosa.

## 2021-06-08 NOTE — ASSESSMENT
[FreeTextEntry1] : 40 year old female with h/o TTP diagnosed in 4/2019.  ADAMTS 13 activity <2%, ADAMTS 13 antibody is 70-80 (high).  Kidney biopsy c/w chronic thrombotic microangiopathy.  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    S/p ~13 sessions of plasma exchange and high dose prednisone with recovery of platelets and kidney function.  Prednisone taper completed 5/11/19.\par On 5/7/19 her [id number] activity was normal at 71%.\par JAK2 V617F negative. \par \par Doing well. Has lost 10 kg in 6 months intentionally. \par CBC reviewed. Platelets remain elevated, 482K today. \par 12/7/20 ferritin was 20 ng/m\par \par Plan:\par -thrombocytosis persists, possibly secondary to iron deficiency.\par -repeat iron studies today and if low, will benefit from slow release iron 3 times weekly\par -follow up in 6 months

## 2021-06-09 LAB
ALBUMIN MFR SERPL ELPH: 49.4 %
ALBUMIN SERPL-MCNC: 3.6 G/DL
ALBUMIN/GLOB SERPL: 1 RATIO
ALPHA1 GLOB MFR SERPL ELPH: 5.4 %
ALPHA1 GLOB SERPL ELPH-MCNC: 0.4 G/DL
ALPHA2 GLOB MFR SERPL ELPH: 12.1 %
ALPHA2 GLOB SERPL ELPH-MCNC: 0.9 G/DL
B-GLOBULIN MFR SERPL ELPH: 14.8 %
B-GLOBULIN SERPL ELPH-MCNC: 1.1 G/DL
CRP SERPL-MCNC: 25 MG/L
DEPRECATED KAPPA LC FREE/LAMBDA SER: 1.19 RATIO
GAMMA GLOB FLD ELPH-MCNC: 1.3 G/DL
GAMMA GLOB MFR SERPL ELPH: 18.3 %
IGA SER QL IEP: 312 MG/DL
IGG SER QL IEP: 1447 MG/DL
IGM SER QL IEP: 89 MG/DL
INTERPRETATION SERPL IEP-IMP: NORMAL
KAPPA LC CSF-MCNC: 1.95 MG/DL
KAPPA LC SERPL-MCNC: 2.32 MG/DL
LDH SERPL-CCNC: 201 U/L
M PROTEIN SPEC IFE-MCNC: NORMAL
PROT SERPL-MCNC: 7.3 G/DL
PROT SERPL-MCNC: 7.3 G/DL

## 2021-06-27 NOTE — HISTORY OF PRESENT ILLNESS
[FreeTextEntry1] : 40 year old AA female with family history of hypertension, heart disease and cerebral aneurysm. \par She carries a personal history of 4 pregnancies. First three pregnancies were full term and uncomplicated. \par Most recent pregnancy, patient developed pre eclampsia requiring an emergency C- section at 29 weeks gestation. Delivery date: February 19, 2020\par Additionally, baby demonstrated IUGR. Now child is in the NICU and being weaned from oxygen support. \par \par Of note. back in April of 2019, patient developed blurry vision and nausea.She was taken to [facility] and was diagnosed with severe thrombocytopenia with LISSETT and metabolic acidosis and HTN. 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 \par She was started on high dose prednisone with a fast taper and received PLEX X 10 sessions.after which her platelets recovered. After several more sessions of plasmapheresis ( total 13 sessions) kidney function returned to normal. \par \par She presents today for a cardiac evaluation and management of her hypertension. NOt well controlled at home - 140-150. Denies new cardiac sympotms. Not very active No

## 2021-07-19 ENCOUNTER — APPOINTMENT (OUTPATIENT)
Dept: CARDIOLOGY | Facility: CLINIC | Age: 41
End: 2021-07-19

## 2021-07-19 NOTE — HISTORY OF PRESENT ILLNESS
[FreeTextEntry1] : 40 year old AA female with family history of hypertension, heart disease and cerebral aneurysm. \par She carries a personal history of 4 pregnancies. First three pregnancies were full term and uncomplicated. \par Most recent pregnancy, patient developed pre eclampsia requiring an emergency C- section at 29 weeks gestation. Delivery date: February 19, 2020\par Additionally, baby demonstrated IUGR. Now child is in the NICU and being weaned from oxygen support. \par \par Of note. back in April of 2019, patient developed blurry vision and nausea.She was taken to [facility] and was diagnosed with severe thrombocytopenia with LISSETT and metabolic acidosis and HTN. 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 \par She was started on high dose prednisone with a fast taper and received PLEX X 10 sessions.after which her platelets recovered. After several more sessions of plasmapheresis ( total 13 sessions) kidney function returned to normal. \par \par She presents today for a cardiac evaluation and management of her hypertension. NOt well controlled at home - 140-150. Denies new cardiac sympotms. Not very active

## 2021-09-20 DIAGNOSIS — Z86.2 PERSONAL HISTORY OF DISEASES OF THE BLOOD AND BLOOD-FORMING ORGANS AND CERTAIN DISORDERS INVOLVING THE IMMUNE MECHANISM: ICD-10-CM

## 2021-09-20 DIAGNOSIS — O99.891 OTHER SPECIFIED DISEASES AND CONDITIONS COMPLICATING PREGNANCY: ICD-10-CM

## 2021-09-20 DIAGNOSIS — R82.71 OTHER SPECIFIED DISEASES AND CONDITIONS COMPLICATING PREGNANCY: ICD-10-CM

## 2021-09-20 DIAGNOSIS — O28.9 UNSPECIFIED ABNORMAL FINDINGS ON ANTENATAL SCREENING OF MOTHER: ICD-10-CM

## 2021-09-20 DIAGNOSIS — O09.529 SUPERVISION OF ELDERLY MULTIGRAVIDA, UNSPECIFIED TRIMESTER: ICD-10-CM

## 2021-09-20 DIAGNOSIS — O10.919 UNSPECIFIED PRE-EXISTING HYPERTENSION COMPLICATING PREGNANCY, UNSPECIFIED TRIMESTER: ICD-10-CM

## 2021-09-20 DIAGNOSIS — O09.891 SUPERVISION OF OTHER HIGH RISK PREGNANCIES, FIRST TRIMESTER: ICD-10-CM

## 2021-09-21 ENCOUNTER — APPOINTMENT (OUTPATIENT)
Dept: NEPHROLOGY | Facility: CLINIC | Age: 41
End: 2021-09-21

## 2021-10-05 ENCOUNTER — APPOINTMENT (OUTPATIENT)
Dept: NEPHROLOGY | Facility: CLINIC | Age: 41
End: 2021-10-05
Payer: COMMERCIAL

## 2021-10-05 VITALS
DIASTOLIC BLOOD PRESSURE: 74 MMHG | SYSTOLIC BLOOD PRESSURE: 116 MMHG | WEIGHT: 262 LBS | OXYGEN SATURATION: 98 % | BODY MASS INDEX: 46.42 KG/M2 | HEART RATE: 92 BPM | HEIGHT: 63 IN

## 2021-10-05 PROCEDURE — 99215 OFFICE O/P EST HI 40 MIN: CPT

## 2021-10-05 NOTE — HISTORY OF PRESENT ILLNESS
[FreeTextEntry1] : Ms. 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 She was found to have severe thrombocytopenia with LISSETT and metabolic acidosis and HTN. Peripheral smear showed schistocytes with elevated LDH and low haptoglobin with worsening renal failure and thrombocytopenia concerning for TTP. A CT C/A/P showed perinephric stranding bilaterally. A renal biopsy showed severe obliterative arterial sclerosis, with mild segmental glomerular endothelial injury, suggesting chronic thrombotic angiopathy. Acute tubular injury with focal tubular necrosis and mild reactive interstitial inflammation.  She was started on high dose prednisone with a fast taper and received PLEX x 10 sessions, after which her platelets recovered but kidney function plateaued at 1.4. Her prednisone was again increased to 1 mg/kg and she received another 3 sessions of plasma exchange starting 19 - 19. Creatinine improved to 1.1 - 1.2 . She received total 13 sessions of plasmapheresis. She was started on prednisone taper after kidney function normalized to 1.1and finished the taper on 5/3/19. \par \par 19 ADAMTS 13 activity <2 % (range >66%)\par ADAMTS 13 antibody 82 (range <12)\par \par PCP: Dr. Bobby Saunders, [facility]\par \par Today,\par Pt presents for a follow up. She is 22 weeks pregnant. Nifedipine was increased to 60 mg daily on last visit (). Pt states she feels well.  Doesn't have any acute complaint. Denies headaches, nausea, vomiting, diarrhea, leg edema.  Denies hematuria, foamy urine. Checks BP at home; reports BP runs high. Bp was 170/110 this AM. \par -----------------------------------------------------------------------------------------------------------------------\par \par \par Pt presents for a follow up. She delivered a baby boy at [facility] at ~ 29weeks via emegerncy  for pre eclampsia. \par Pt states she feels well.\par  Pt seen/examined; on losartan-HCTZ 100/25; no complaints; doing well; BP still high; started exercising;\par Following with Cards; was recently started on Losartan- HCTZ\par \par Current: Pt seen/examined; doing well; no complaints;\par \par

## 2021-10-05 NOTE — PHYSICAL EXAM
[General Appearance - In No Acute Distress] : in no acute distress [General Appearance - Alert] : alert [General Appearance - Well Nourished] : well nourished [General Appearance - Well Developed] : well developed [Sclera] : the sclera and conjunctiva were normal [Strabismus] : no strabismus was seen [Outer Ear] : the ears and nose were normal in appearance [Hearing Threshold Finger Rub Not [place]] : hearing was normal [Neck Appearance] : the appearance of the neck was normal [] : no respiratory distress [Respiration, Rhythm And Depth] : normal respiratory rhythm and effort [Auscultation Breath Sounds / Voice Sounds] : lungs were clear to auscultation bilaterally [Heart Rate And Rhythm] : heart rate was normal and rhythm regular [Heart Sounds] : normal S1 and S2 [Edema] : there was no peripheral edema [Bowel Sounds] : normal bowel sounds [Abdomen Soft] : soft [Abdomen Tenderness] : non-tender [Abnormal Walk] : normal gait [Skin Color & Pigmentation] : normal skin color and pigmentation [Skin Turgor] : normal skin turgor [No Focal Deficits] : no focal deficits [Oriented To Time, Place, And Person] : oriented to person, place, and time [Impaired Insight] : insight and judgment were intact [Affect] : the affect was normal

## 2021-10-05 NOTE — ASSESSMENT
[FreeTextEntry1] : 1) Chronic HTN \par 2) TTP- s/p PLEX\par 3) LISSETT- resolved\par 4) Proteinuria\par 5) Microscopic hematuria\par \par Just on losartan-HCTZ 100-25\par cw 90mg of nifedipine XL daily\par Pt to follow up with Dr Isaiah raza/onc for TTP history;\par \par \par RTC 6 mo

## 2021-10-05 NOTE — REVIEW OF SYSTEMS
[Fever] : no fever [Chills] : no chills [Eye Pain] : no eye pain [Red Eyes] : eyes not red [Earache] : no earache [Loss Of Hearing] : no hearing loss [Heart Rate Is Slow] : the heart rate was not slow [Heart Rate Is Fast] : the heart rate was not fast [Shortness Of Breath] : no shortness of breath [Wheezing] : no wheezing [Abdominal Pain] : no abdominal pain [Vomiting] : no vomiting [Arthralgias] : no arthralgias [Skin Lesions] : no skin lesions [Skin Wound] : no skin wound [Confused] : no confusion [Convulsions] : no convulsions [Proptosis] : no proptosis [Hot Flashes] : no hot flashes [Easy Bleeding] : no tendency for easy bleeding [Easy Bruising] : no tendency for easy bruising [Negative] : Heme/Lymph

## 2021-10-12 ENCOUNTER — APPOINTMENT (OUTPATIENT)
Dept: ANTEPARTUM | Facility: CLINIC | Age: 41
End: 2021-10-12

## 2021-10-19 ENCOUNTER — ASOB RESULT (OUTPATIENT)
Age: 41
End: 2021-10-19

## 2021-10-19 ENCOUNTER — APPOINTMENT (OUTPATIENT)
Dept: ANTEPARTUM | Facility: CLINIC | Age: 41
End: 2021-10-19
Payer: COMMERCIAL

## 2021-10-19 PROCEDURE — 76830 TRANSVAGINAL US NON-OB: CPT

## 2021-10-19 PROCEDURE — 76856 US EXAM PELVIC COMPLETE: CPT | Mod: 59

## 2021-12-03 ENCOUNTER — OUTPATIENT (OUTPATIENT)
Dept: OUTPATIENT SERVICES | Facility: HOSPITAL | Age: 41
LOS: 1 days | Discharge: HOME | End: 2021-12-03

## 2021-12-03 DIAGNOSIS — D64.9 ANEMIA, UNSPECIFIED: ICD-10-CM

## 2021-12-29 ENCOUNTER — APPOINTMENT (OUTPATIENT)
Dept: HEMATOLOGY ONCOLOGY | Facility: CLINIC | Age: 41
End: 2021-12-29

## 2022-01-05 ENCOUNTER — OUTPATIENT (OUTPATIENT)
Dept: OUTPATIENT SERVICES | Facility: HOSPITAL | Age: 42
LOS: 1 days | End: 2022-01-05
Payer: COMMERCIAL

## 2022-01-05 DIAGNOSIS — Z20.828 CONTACT WITH AND (SUSPECTED) EXPOSURE TO OTHER VIRAL COMMUNICABLE DISEASES: ICD-10-CM

## 2022-01-05 LAB — SARS-COV-2 RNA SPEC QL NAA+PROBE: SIGNIFICANT CHANGE UP

## 2022-01-05 PROCEDURE — C9803: CPT

## 2022-01-05 PROCEDURE — U0005: CPT

## 2022-01-05 PROCEDURE — U0003: CPT

## 2022-01-06 DIAGNOSIS — Z20.828 CONTACT WITH AND (SUSPECTED) EXPOSURE TO OTHER VIRAL COMMUNICABLE DISEASES: ICD-10-CM

## 2022-02-09 ENCOUNTER — OUTPATIENT (OUTPATIENT)
Dept: OUTPATIENT SERVICES | Facility: HOSPITAL | Age: 42
LOS: 1 days | Discharge: HOME | End: 2022-02-09

## 2022-02-09 DIAGNOSIS — D64.9 ANEMIA, UNSPECIFIED: ICD-10-CM

## 2022-02-09 DIAGNOSIS — M31.10 THROMBOTIC MICROANGIOPATHY, UNSPECIFIED: ICD-10-CM

## 2022-02-14 ENCOUNTER — APPOINTMENT (OUTPATIENT)
Dept: HEMATOLOGY ONCOLOGY | Facility: CLINIC | Age: 42
End: 2022-02-14

## 2022-03-08 ENCOUNTER — RX CHANGE (OUTPATIENT)
Age: 42
End: 2022-03-08

## 2022-03-08 RX ORDER — LOSARTAN POTASSIUM AND HYDROCHLOROTHIAZIDE 25; 100 MG/1; MG/1
100-25 TABLET ORAL
Qty: 90 | Refills: 3 | Status: DISCONTINUED | COMMUNITY
Start: 2020-07-09 | End: 2022-03-08

## 2022-03-28 ENCOUNTER — OUTPATIENT (OUTPATIENT)
Dept: OUTPATIENT SERVICES | Facility: HOSPITAL | Age: 42
LOS: 1 days | Discharge: HOME | End: 2022-03-28

## 2022-03-28 DIAGNOSIS — D64.9 ANEMIA, UNSPECIFIED: ICD-10-CM

## 2022-03-29 ENCOUNTER — RESULT REVIEW (OUTPATIENT)
Age: 42
End: 2022-03-29

## 2022-03-29 ENCOUNTER — APPOINTMENT (OUTPATIENT)
Dept: HEMATOLOGY ONCOLOGY | Facility: CLINIC | Age: 42
End: 2022-03-29
Payer: COMMERCIAL

## 2022-03-29 VITALS
DIASTOLIC BLOOD PRESSURE: 84 MMHG | BODY MASS INDEX: 45.71 KG/M2 | HEIGHT: 63 IN | HEART RATE: 100 BPM | SYSTOLIC BLOOD PRESSURE: 125 MMHG | WEIGHT: 258 LBS | OXYGEN SATURATION: 99 %

## 2022-03-29 LAB
BASOPHILS # BLD AUTO: 0.1 K/UL — SIGNIFICANT CHANGE UP (ref 0–0.2)
BASOPHILS NFR BLD AUTO: 1 % — SIGNIFICANT CHANGE UP (ref 0–2)
EOSINOPHIL # BLD AUTO: 0.2 K/UL — SIGNIFICANT CHANGE UP (ref 0–0.5)
EOSINOPHIL NFR BLD AUTO: 2.1 % — SIGNIFICANT CHANGE UP (ref 0–6)
HCT VFR BLD CALC: 45.5 % — HIGH (ref 34.5–45)
HGB BLD-MCNC: 13.7 G/DL — SIGNIFICANT CHANGE UP (ref 11.5–15.5)
LYMPHOCYTES # BLD AUTO: 3.2 K/UL — SIGNIFICANT CHANGE UP (ref 1–3.3)
LYMPHOCYTES # BLD AUTO: 32.7 % — SIGNIFICANT CHANGE UP (ref 13–44)
MCHC RBC-ENTMCNC: 28.7 PG — SIGNIFICANT CHANGE UP (ref 27–34)
MCHC RBC-ENTMCNC: 30 G/DL — LOW (ref 32–36)
MCV RBC AUTO: 95.7 FL — SIGNIFICANT CHANGE UP (ref 80–100)
MONOCYTES # BLD AUTO: 0.6 K/UL — SIGNIFICANT CHANGE UP (ref 0–0.9)
MONOCYTES NFR BLD AUTO: 6.7 % — SIGNIFICANT CHANGE UP (ref 2–14)
NEUTROPHILS # BLD AUTO: 5.6 K/UL — SIGNIFICANT CHANGE UP (ref 1.8–7.4)
NEUTROPHILS NFR BLD AUTO: 57.5 % — SIGNIFICANT CHANGE UP (ref 43–77)
PLATELET # BLD AUTO: 549 K/UL — HIGH (ref 150–400)
RBC # BLD: 4.76 M/UL — SIGNIFICANT CHANGE UP (ref 3.8–5.2)
RBC # FLD: 15 % — HIGH (ref 10.3–14.5)
WBC # BLD: 9.7 K/UL — SIGNIFICANT CHANGE UP (ref 3.8–10.5)
WBC # FLD AUTO: 9.7 K/UL — SIGNIFICANT CHANGE UP (ref 3.8–10.5)

## 2022-03-29 PROCEDURE — 99214 OFFICE O/P EST MOD 30 MIN: CPT

## 2022-03-29 RX ORDER — CLOTRIMAZOLE AND BETAMETHASONE DIPROPIONATE 10; .5 MG/G; MG/G
1-0.05 CREAM TOPICAL
Qty: 45 | Refills: 0 | Status: DISCONTINUED | COMMUNITY
Start: 2021-10-25

## 2022-03-29 NOTE — PHYSICAL EXAM
[Fully active, able to carry on all pre-disease performance without restriction] : Status 0 - Fully active, able to carry on all pre-disease performance without restriction [Normal] : affect appropriate [de-identified] : supple [de-identified] : clear [de-identified] : S1/S2 [de-identified] : NO edema

## 2022-03-29 NOTE — HISTORY OF PRESENT ILLNESS
[de-identified] : Ms. Gutierres presents with TTP at the age of 38.\par \par The patient was admitted to [facility] 4/5/19-4/20-/19 for blurry vision and nausea/vomiting. She was found to have severe thrombocytopenia with LISSETT and metabolic acidosis and HTN. Peripheral smear showed schistocytes with elevated LDH and low haptoglobin with worsening renal failure and thrombocytopenia concerning for TTP. A CT C/A/P showed perinephric stranding bilaterally. \par \par A renal biopsy showed severe obliterative arterial sclerosis, with mild segmental glomerular endothelial injury, suggesting chronic thrombotic angiopathy. Acute tubular injury with focal tubular necrosis and mild reactive interstitial inflammation. \par \par She was started on high dose prednisone with a fast taper and received PLEX x 10 sessions, after which her platelets recovered but kidney function plateaued at 1.4.\par Her prednisone was again increased to 1 mg/kg and she received another 3 sessions of plasma exchange starting 4/17/19 - 4/19/19.  Creatinine improved to 1.1 - 1.2 \par She received total 13 sessions of plasmapheresis. She was started on prednisone taper after kidney function normalized to 1.1 and to taper over 2-3 weeks. \par \par 4/6/19 ADAMTS 13 activity <2 % (range >66%)\par ADAMTS 13 antibody 82 (range <12)\par 4/5/19 WBC 15.3, HGB 11.6, PLT 9 K,  BUN/Cr 2.80\par 4/6/19 WBC 15.6, HGB 11.3, PLT 6 K, LDH 1923, Haptoglobin <20\par 4/7/19 WBC 20.1, HGB 10.1, PLT 11 K\par 4/8/19 WBC 16.6, HGB 9.5, PLT 54 K \par 4/13/19 WBC 18.0, HGB 8.6,  K, \par 4/17/19 \par 4/20/19 WBC 25.0, HGB 8.7,  K\par 4/23/19 WBC 26.5, HGB 10.3,  K\par \par She is currently on Prednisone 60 mg a day and scheduled to finish the taper on 5/3/19. No dysuria. No headaches or blurred vision. No abdominal pain. Bowel movements are normal. \par \par PCP: Dr. Bobby Saunders, [facility] [de-identified] : Returns for follow up.\par Reports noncompliance with oral iron three days a week, off and off iron for weeks at a time, forgets to take it. \par Has fatigue, reports poor sleep with two year old at home, wakes up frequently at night, intermittently snores \par Denies thromboembolic event \par Denies blood in stool, hematuria \par Menstrual cycle is regular, lasts 5-7 days, menses is not heavy \par No headache or dizziness \par No ice craving\par No restless leg \par No dyspnea on exertion \par No leg swelling \par Exercising at gym 4 days/ week

## 2022-03-29 NOTE — ASSESSMENT
[FreeTextEntry1] : 40 year old female with h/o TTP diagnosed in 4/2019.  ADAMTS 13 activity <2%, ADAMTS 13 antibody is 70-80 (high).  Kidney biopsy c/w chronic thrombotic microangiopathy.  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    S/p ~13 sessions of plasma exchange and high dose prednisone with recovery of platelets and kidney function.  Prednisone taper completed 5/11/19.\par On 5/7/19 her [id number] activity was normal at 71%.\par JAK2 V617F negative. \par \par CBC from today WBC 9.7 K HGB 13.7 g HCT 45.5 % Platelets remains elevated, increased 549K today. \par 12/7/20 Ferritin 20 ng/mL\par 6/8/21 Ferritin 25 ng/mL \par \par Plan:\par -thrombocytosis persists, possibly secondary to iron deficiency. Poor compliance to iron, forgets to take it\par -repeat iron studies today and if low, will benefit from IV Iron \par -if no resolution of thrombocytosis after, will need additional molecular testing: JAK2 exon 12, MPL, CALR\par -follow up in 4 months

## 2022-03-30 ENCOUNTER — NON-APPOINTMENT (OUTPATIENT)
Age: 42
End: 2022-03-30

## 2022-04-01 LAB
CRP SERPL-MCNC: 8 MG/L
FERRITIN SERPL-MCNC: 32 NG/ML
FOLATE SERPL-MCNC: 7.4 NG/ML
IRON SATN MFR SERPL: 23 %
IRON SERPL-MCNC: 79 UG/DL
LDH SERPL-CCNC: 225 U/L
TIBC SERPL-MCNC: 346 UG/DL
UIBC SERPL-MCNC: 267 UG/DL
VIT B12 SERPL-MCNC: 596 PG/ML

## 2022-04-12 ENCOUNTER — APPOINTMENT (OUTPATIENT)
Age: 42
End: 2022-04-12

## 2022-04-13 DIAGNOSIS — D50.9 IRON DEFICIENCY ANEMIA, UNSPECIFIED: ICD-10-CM

## 2022-04-15 LAB
ALBUMIN MFR SERPL ELPH: 50.5 %
ALBUMIN SERPL-MCNC: 4 G/DL
ALBUMIN/GLOB SERPL: 1 RATIO
ALPHA1 GLOB MFR SERPL ELPH: 4.5 %
ALPHA1 GLOB SERPL ELPH-MCNC: 0.4 G/DL
ALPHA2 GLOB MFR SERPL ELPH: 10.9 %
ALPHA2 GLOB SERPL ELPH-MCNC: 0.9 G/DL
B-GLOBULIN MFR SERPL ELPH: 14.3 %
B-GLOBULIN SERPL ELPH-MCNC: 1.1 G/DL
DEPRECATED KAPPA LC FREE/LAMBDA SER: 1.34 RATIO
GAMMA GLOB FLD ELPH-MCNC: 1.6 G/DL
GAMMA GLOB MFR SERPL ELPH: 19.8 %
IGA SER QL IEP: 392 MG/DL
IGG SER QL IEP: 1776 MG/DL
IGM SER QL IEP: 104 MG/DL
INTERPRETATION SERPL IEP-IMP: NORMAL
KAPPA LC CSF-MCNC: 1.72 MG/DL
KAPPA LC SERPL-MCNC: 2.3 MG/DL
M PROTEIN SPEC IFE-MCNC: NORMAL
PROT SERPL-MCNC: 8 G/DL
PROT SERPL-MCNC: 8 G/DL

## 2022-04-19 ENCOUNTER — APPOINTMENT (OUTPATIENT)
Age: 42
End: 2022-04-19

## 2022-06-07 ENCOUNTER — RX RENEWAL (OUTPATIENT)
Age: 42
End: 2022-06-07

## 2022-06-07 RX ORDER — NIFEDIPINE 90 MG/1
90 TABLET, EXTENDED RELEASE ORAL DAILY
Qty: 30 | Refills: 11 | Status: ACTIVE | COMMUNITY
Start: 2020-11-17 | End: 1900-01-01

## 2022-06-19 ENCOUNTER — EMERGENCY (EMERGENCY)
Facility: HOSPITAL | Age: 42
LOS: 1 days | Discharge: HOME | End: 2022-06-19
Attending: EMERGENCY MEDICINE | Admitting: EMERGENCY MEDICINE
Payer: COMMERCIAL

## 2022-06-19 VITALS
SYSTOLIC BLOOD PRESSURE: 140 MMHG | RESPIRATION RATE: 16 BRPM | DIASTOLIC BLOOD PRESSURE: 70 MMHG | TEMPERATURE: 98 F | OXYGEN SATURATION: 98 % | HEART RATE: 82 BPM

## 2022-06-19 VITALS
WEIGHT: 250 LBS | HEIGHT: 63 IN | RESPIRATION RATE: 16 BRPM | HEART RATE: 84 BPM | DIASTOLIC BLOOD PRESSURE: 78 MMHG | TEMPERATURE: 98 F | SYSTOLIC BLOOD PRESSURE: 145 MMHG

## 2022-06-19 PROCEDURE — 28510 TREATMENT OF TOE FRACTURE: CPT | Mod: 54

## 2022-06-19 PROCEDURE — 73630 X-RAY EXAM OF FOOT: CPT | Mod: 26,RT

## 2022-06-19 PROCEDURE — 99284 EMERGENCY DEPT VISIT MOD MDM: CPT | Mod: 57

## 2022-06-19 PROCEDURE — 99283 EMERGENCY DEPT VISIT LOW MDM: CPT | Mod: 25

## 2022-06-19 PROCEDURE — 73630 X-RAY EXAM OF FOOT: CPT

## 2022-06-19 RX ORDER — IBUPROFEN 200 MG
600 TABLET ORAL ONCE
Refills: 0 | Status: COMPLETED | OUTPATIENT
Start: 2022-06-19 | End: 2022-06-19

## 2022-06-19 RX ADMIN — Medication 600 MILLIGRAM(S): at 19:10

## 2022-06-19 RX ADMIN — Medication 600 MILLIGRAM(S): at 20:50

## 2022-06-19 NOTE — ED PROVIDER NOTE - PATIENT PORTAL LINK FT
You can access the FollowMyHealth Patient Portal offered by [facility] by registering at the following website: [email or web address]. By joining [employer]’s FollowMyHealth portal, you will also be able to view your health information using other applications (apps) compatible with our system.

## 2022-06-19 NOTE — ED PROVIDER NOTE - CLINICAL SUMMARY MEDICAL DECISION MAKING FREE TEXT BOX
41 y/o F with a PMHx of TTP presents to the ED c/o injury to R 5th toe x this morning. pt stubbed toe on corner of furniture at home. pt did not take any pain medication pta. denies any other injuries or sx at this time. Plan: xray, motrin 43 y/o F with a PMHx of TTP presents to the ED c/o injury to R 5th toe x this morning. pt stubbed toe on corner of furniture at home. pt did not take any pain medication pta. denies any other injuries or sx. Plan: xray, motrin

## 2022-06-19 NOTE — ED PROVIDER NOTE - NSFOLLOWUPINSTRUCTIONS_ED_ALL_ED_FT
Follow up with podiatrist for re-evaluation, ongoing care and treatment. Rest, elevate foot, non weight bearing right foot, ice compresses, motrin or tylenol as directed for pain. Keep splint and surgical shoe clean/dry/intact, use crutches for support. If having worsening of symptoms or other related symptoms, RETURN TO THE ER IMMEDIATELY.     Toe Fracture    A foot showing fractures in the bones of the first two toes.   A toe fracture is a break in one of the toe bones (phalanges). This may happen if you:  •Drop a heavy object on your toe.      •Stub your toe.      •Twist your toe.      •Exercise the same way too much.        What are the signs or symptoms?  Bones and joints of the foot and toe showing a fracture and blood beneath the toenail.   The main symptoms are swelling and pain in the toe. You may also have:  •Bruising.      •Stiffness.      •Numbness.      •A change in the way the toe looks.      •Broken bones that poke through the skin.      •Blood under the toenail.        How is this treated?    Treatments may include:  •Taping the broken toe to a toe that is next to it (rafael taping).      •Wearing a shoe that has a wide, rigid sole to protect the toe and to limit its movement.      •Wearing a cast.    •Surgery. This may be needed if the:  •Pieces of broken bone are out of place.      •Bone pokes through the skin.        •Physical therapy.        Follow these instructions at home:    If you have a shoe:     •Wear the shoe as told by your doctor. Remove it only as told by your doctor.      •Loosen the shoe if your toes tingle, become numb, or turn cold and blue.      •Keep the shoe clean and dry.      If you have a cast:     • Do not put pressure on any part of the cast until it is fully hardened. This may take a few hours.      • Do not stick anything inside the cast to scratch your skin.      •Check the skin around the cast every day. Tell your doctor about any concerns.      •You may put lotion on dry skin around the edges of the cast.      • Do not put lotion on the skin under the cast.      •Keep the cast clean and dry.      Bathing     • Do not take baths, swim, or use a hot tub until your doctor says it is okay. Ask your doctor if you can take showers.    •If the shoe or cast is not waterproof:  •Do not let it get wet.      •Cover it with a watertight covering when you take a bath or a shower.        Activity     • Do not use your foot to support your body weight until your doctor says it is okay.      •Use crutches as told by your doctor.      •Ask your doctor what activities are safe for you during recovery.      •Avoid activities as told by your doctor.      •Do exercises as told by your doctor or therapist.      Driving     • Do not drive or use heavy machinery while taking pain medicine.      • Do not drive while wearing a cast on a foot that you use for driving.        Managing pain, stiffness, and swelling   A bag of ice on a towel on the skin. •Put ice on the injured area if told by your doctor:  •Put ice in a plastic bag.    •Place a towel between your skin and the bag.  •If you have a shoe, remove it as told by your doctor.      •If you have a cast, place a towel between your cast and the bag.        •Leave the ice on for 20 minutes, 2–3 times per day.        •Raise (elevate) the injured area above the level of your heart while you are sitting or lying down.      General instructions   •If your toe was taped to a toe that is next to it, follow your doctor's instructions for changing the gauze and tape. Change it more often:  •If the gauze and tape get wet. If this happens, dry the space between the toes.      •If the gauze and tape are too tight and they cause your toe to become pale or to lose feeling (go numb).      •If your doctor did not give you a protective shoe, wear sturdy shoes that support your foot. Your shoes should not:  •Pinch your toes.      •Fit tightly against your toes.        • Do not use any tobacco products, including cigarettes, chewing tobacco, or e-cigarettes. These can delay bone healing. If you need help quitting, ask your doctor.      •Take medicines only as told by your doctor.      •Keep all follow-up visits as told by your doctor. This is important.        Contact a doctor if:    •Your pain medicine is not helping.      •You have a fever.      •You notice a bad smell coming from your cast.        Get help right away if:    •You lose feeling (have numbness) in your toe or foot, and it is getting worse.      •Your toe or your foot tingles.      •Your toe or your foot gets cold or turns blue.      •You have redness or swelling in your toe or foot, and it is getting worse.      •You have very bad pain.        Summary    •A toe fracture is a break in one of the toe bones.      •Use ice and raise your foot. This will help lessen pain and swelling.      •Use crutches as told by your doctor.      This information is not intended to replace advice given to you by your health care provider. Make sure you discuss any questions you have with your health care provider.

## 2022-06-19 NOTE — ED PROVIDER NOTE - CARE PROVIDER_API CALL
Dennis James (DPM)  Podiatric Medicine and Surgery  [address] [place] [address]  Phone: [phone]  Fax: [phone]  Follow Up Time: 1-3 Days

## 2022-06-19 NOTE — ED PROVIDER NOTE - OBJECTIVE STATEMENT
43 y/o F with hx of HTN, TTP presents with c/o R 5th toe pain x today. Pt states that she accidentally stubbed her toe on a piece of furniture at home today with severe pain. Pt denies numbness, tingling, open wounds, fall, other injuries/symptoms.

## 2022-06-19 NOTE — ED PROVIDER NOTE - MUSCULOSKELETAL, MLM
R foot: +ttp R 5th toe, no swelling or erythema noted, nl cap refill, distal pulses and sensation intact, skin intact, rest of the toes and foot NT with FROM, NVI

## 2022-06-19 NOTE — ED PROVIDER NOTE - NSICDXPASTMEDICALHX_GEN_ALL_CORE_FT
PAST MEDICAL HISTORY:  No pertinent past medical history     TTP (thrombotic thrombocytopenic purpura)

## 2022-06-19 NOTE — ED PROVIDER NOTE - PROGRESS NOTE DETAILS
R 5th toe placed in rafael spline, surgical shoe placed and crutches given, advised RICE, nsaids for pain, f/u podiatry.

## 2022-06-19 NOTE — ED PROVIDER NOTE - NS ED ATTENDING STATEMENT MOD
This was a shared visit with the [facility]. I reviewed and verified the documentation and independently performed the documented:

## 2022-07-12 ENCOUNTER — OUTPATIENT (OUTPATIENT)
Dept: OUTPATIENT SERVICES | Facility: HOSPITAL | Age: 42
LOS: 1 days | Discharge: HOME | End: 2022-07-12

## 2022-07-12 DIAGNOSIS — D50.9 IRON DEFICIENCY ANEMIA, UNSPECIFIED: ICD-10-CM

## 2022-07-25 ENCOUNTER — APPOINTMENT (OUTPATIENT)
Dept: HEMATOLOGY ONCOLOGY | Facility: CLINIC | Age: 42
End: 2022-07-25

## 2022-08-04 ENCOUNTER — RESULT REVIEW (OUTPATIENT)
Age: 42
End: 2022-08-04

## 2022-08-04 ENCOUNTER — APPOINTMENT (OUTPATIENT)
Dept: HEMATOLOGY ONCOLOGY | Facility: CLINIC | Age: 42
End: 2022-08-04

## 2022-08-04 ENCOUNTER — LABORATORY RESULT (OUTPATIENT)
Age: 42
End: 2022-08-04

## 2022-08-04 VITALS
HEART RATE: 90 BPM | DIASTOLIC BLOOD PRESSURE: 88 MMHG | HEIGHT: 63 IN | WEIGHT: 257.02 LBS | BODY MASS INDEX: 45.54 KG/M2 | OXYGEN SATURATION: 93 % | SYSTOLIC BLOOD PRESSURE: 130 MMHG

## 2022-08-04 LAB
BASOPHILS # BLD AUTO: 0.1 K/UL — SIGNIFICANT CHANGE UP (ref 0–0.2)
BASOPHILS NFR BLD AUTO: 1.1 % — SIGNIFICANT CHANGE UP (ref 0–2)
EOSINOPHIL # BLD AUTO: 0.3 K/UL — SIGNIFICANT CHANGE UP (ref 0–0.5)
EOSINOPHIL NFR BLD AUTO: 2.9 % — SIGNIFICANT CHANGE UP (ref 0–6)
HCT VFR BLD CALC: 43.3 % — SIGNIFICANT CHANGE UP (ref 34.5–45)
HGB BLD-MCNC: 13.8 G/DL — SIGNIFICANT CHANGE UP (ref 11.5–15.5)
LYMPHOCYTES # BLD AUTO: 3.7 K/UL — HIGH (ref 1–3.3)
LYMPHOCYTES # BLD AUTO: 37.9 % — SIGNIFICANT CHANGE UP (ref 13–44)
MCHC RBC-ENTMCNC: 30.4 PG — SIGNIFICANT CHANGE UP (ref 27–34)
MCHC RBC-ENTMCNC: 31.9 G/DL — LOW (ref 32–36)
MCV RBC AUTO: 95.2 FL — SIGNIFICANT CHANGE UP (ref 80–100)
MONOCYTES # BLD AUTO: 0.7 K/UL — SIGNIFICANT CHANGE UP (ref 0–0.9)
MONOCYTES NFR BLD AUTO: 7.4 % — SIGNIFICANT CHANGE UP (ref 2–14)
NEUTROPHILS # BLD AUTO: 5 K/UL — SIGNIFICANT CHANGE UP (ref 1.8–7.4)
NEUTROPHILS NFR BLD AUTO: 50.6 % — SIGNIFICANT CHANGE UP (ref 43–77)
PLATELET # BLD AUTO: 498 K/UL — HIGH (ref 150–400)
RBC # BLD: 4.54 M/UL — SIGNIFICANT CHANGE UP (ref 3.8–5.2)
RBC # FLD: 13.4 % — SIGNIFICANT CHANGE UP (ref 10.3–14.5)
WBC # BLD: 9.9 K/UL — SIGNIFICANT CHANGE UP (ref 3.8–10.5)
WBC # FLD AUTO: 9.9 K/UL — SIGNIFICANT CHANGE UP (ref 3.8–10.5)

## 2022-08-04 PROCEDURE — 99214 OFFICE O/P EST MOD 30 MIN: CPT

## 2022-08-05 LAB
ALBUMIN SERPL ELPH-MCNC: 4.3 G/DL
ALP BLD-CCNC: 137 U/L
ALT SERPL-CCNC: 18 U/L
ANION GAP SERPL CALC-SCNC: 13 MMOL/L
AST SERPL-CCNC: 14 U/L
BILIRUB SERPL-MCNC: 0.4 MG/DL
BUN SERPL-MCNC: 23 MG/DL
CALCIUM SERPL-MCNC: 9.7 MG/DL
CHLORIDE SERPL-SCNC: 101 MMOL/L
CO2 SERPL-SCNC: 25 MMOL/L
CREAT SERPL-MCNC: 0.82 MG/DL
CRP SERPL-MCNC: 6 MG/L
EGFR: 91 ML/MIN/1.73M2
FERRITIN SERPL-MCNC: 175 NG/ML
FOLATE SERPL-MCNC: 6.2 NG/ML
GLUCOSE SERPL-MCNC: 137 MG/DL
IRON SATN MFR SERPL: 36 %
IRON SERPL-MCNC: 107 UG/DL
LDH SERPL-CCNC: 206 U/L
POTASSIUM SERPL-SCNC: 4 MMOL/L
PROT SERPL-MCNC: 7.5 G/DL
SODIUM SERPL-SCNC: 139 MMOL/L
TIBC SERPL-MCNC: 296 UG/DL
UIBC SERPL-MCNC: 189 UG/DL
VIT B12 SERPL-MCNC: 536 PG/ML

## 2022-08-08 LAB
ALBUMIN MFR SERPL ELPH: 51 %
ALBUMIN SERPL-MCNC: 3.8 G/DL
ALBUMIN/GLOB SERPL: 1 RATIO
ALPHA1 GLOB MFR SERPL ELPH: 4.4 %
ALPHA1 GLOB SERPL ELPH-MCNC: 0.3 G/DL
ALPHA2 GLOB MFR SERPL ELPH: 11 %
ALPHA2 GLOB SERPL ELPH-MCNC: 0.8 G/DL
B-GLOBULIN MFR SERPL ELPH: 13.6 %
B-GLOBULIN SERPL ELPH-MCNC: 1 G/DL
DEPRECATED KAPPA LC FREE/LAMBDA SER: 1.54 RATIO
GAMMA GLOB FLD ELPH-MCNC: 1.5 G/DL
GAMMA GLOB MFR SERPL ELPH: 20 %
IGA SER QL IEP: 339 MG/DL
IGG SER QL IEP: 1528 MG/DL
IGM SER QL IEP: 86 MG/DL
INTERPRETATION SERPL IEP-IMP: NORMAL
KAPPA LC CSF-MCNC: 1.75 MG/DL
KAPPA LC SERPL-MCNC: 2.7 MG/DL
M PROTEIN SPEC IFE-MCNC: NORMAL
PROT SERPL-MCNC: 7.5 G/DL
PROT SERPL-MCNC: 7.5 G/DL

## 2022-08-08 NOTE — ED PROCEDURE NOTE - CPROC ED COMPLICATIONS1
How Severe Are They?: severe
Is This A New Presentation, Or A Follow-Up?: Skin Lesion
Is This A New Presentation, Or A Follow-Up?: Skin Lesions
no

## 2022-08-10 LAB
GENE XXX MUT ANL BLD/T: NORMAL
MPL EXON 10 MUTATION: NORMAL

## 2022-08-12 NOTE — PHYSICAL EXAM
[Fully active, able to carry on all pre-disease performance without restriction] : Status 0 - Fully active, able to carry on all pre-disease performance without restriction [Normal] : RRR, normal S1S2, no murmurs, rubs, gallops [de-identified] : supple [de-identified] : clear [de-identified] : S1/S2 [de-identified] : NO edema

## 2022-08-12 NOTE — HISTORY OF PRESENT ILLNESS
[de-identified] : Ms. Gutierres presents with TTP at the age of 38.\par \par The patient was admitted to [facility] 4/5/19-4/20-/19 for blurry vision and nausea/vomiting. She was found to have severe thrombocytopenia with LISSETT and metabolic acidosis and HTN. Peripheral smear showed schistocytes with elevated LDH and low haptoglobin with worsening renal failure and thrombocytopenia concerning for TTP. A CT C/A/P showed perinephric stranding bilaterally. \par \par A renal biopsy showed severe obliterative arterial sclerosis, with mild segmental glomerular endothelial injury, suggesting chronic thrombotic angiopathy. Acute tubular injury with focal tubular necrosis and mild reactive interstitial inflammation. \par \par She was started on high dose prednisone with a fast taper and received PLEX x 10 sessions, after which her platelets recovered but kidney function plateaued at 1.4.\par Her prednisone was again increased to 1 mg/kg and she received another 3 sessions of plasma exchange starting 4/17/19 - 4/19/19.  Creatinine improved to 1.1 - 1.2 \par She received total 13 sessions of plasmapheresis. She was started on prednisone taper after kidney function normalized to 1.1 and to taper over 2-3 weeks. \par \par 4/6/19 ADAMTS 13 activity <2 % (range >66%)\par ADAMTS 13 antibody 82 (range <12)\par 4/5/19 WBC 15.3, HGB 11.6, PLT 9 K,  BUN/Cr 2.80\par 4/6/19 WBC 15.6, HGB 11.3, PLT 6 K, LDH 1923, Haptoglobin <20\par 4/7/19 WBC 20.1, HGB 10.1, PLT 11 K\par 4/8/19 WBC 16.6, HGB 9.5, PLT 54 K \par 4/13/19 WBC 18.0, HGB 8.6,  K, \par 4/17/19 \par 4/20/19 WBC 25.0, HGB 8.7,  K\par 4/23/19 WBC 26.5, HGB 10.3,  K\par \par She is currently on Prednisone 60 mg a day and scheduled to finish the taper on 5/3/19. No dysuria. No headaches or blurred vision. No abdominal pain. Bowel movements are normal. \par \par PCP: Dr. Bobby Saunders, [facility] [de-identified] : Returns for follow up. S/p Feraheme on 4/12/22 and 4/19/22\par \par LMP 7/14/22, lasted 5-6 days, not heavy \par Has significant improvement in fatigue \par Denies thromboembolic event \par Denies ice craving\par Denies dyspnea \par Denies headache, dizziness, fever, infection, night sweats, weight loss \par Good appetite. Denies abdominal pain\par Denies blood in stool, black stool, hematuria\par Follows up with PCP Dr. Ria Sykes

## 2022-08-12 NOTE — ASSESSMENT
[FreeTextEntry1] : 40 year old female with h/o TTP diagnosed in 4/2019.  ADAMTS 13 activity <2%, ADAMTS 13 antibody is 70-80 (high).  Kidney biopsy c/w chronic thrombotic microangiopathy.  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    S/p ~13 sessions of plasma exchange and high dose prednisone with recovery of platelets and kidney function.  Prednisone taper completed 5/11/19.\par On 5/7/19 her [id number] activity was normal at 71%.\par JAK2 V617F negative. \par \par S/p Feraheme on 4/12/22 and 4/19/22, feels well \par CBC from today WBC 9.9 K HGB 13.8 g HCT 43.3 %  K \par Platelets remains elevated, 498K today, decreased from 549K on 3/29/22\par \par \par Plan:\par -Thrombocytosis persists: JAK2 exon 12, MPL, ROZ ordered. Immunoelectrophoresis ordered. Possibly secondary to iron deficiency. Repeat iron studies today- if iron stores are low will schedule Feraheme X 2 doses weekly\par -Follow up in 4 months with Dr Colon or sooner if needed

## 2022-08-17 LAB
EXTRACTION: NORMAL
JAK2 P.V617F BLD/T QL: NORMAL
LAB DIRECTOR NAME PROVIDER: NORMAL
LABORATORY COMMENT REPORT: NORMAL
REFLEX:: NORMAL

## 2022-08-25 ENCOUNTER — APPOINTMENT (OUTPATIENT)
Dept: NEPHROLOGY | Facility: CLINIC | Age: 42
End: 2022-08-25

## 2022-08-25 ENCOUNTER — NON-APPOINTMENT (OUTPATIENT)
Age: 42
End: 2022-08-25

## 2022-08-25 VITALS
OXYGEN SATURATION: 98 % | HEIGHT: 63 IN | WEIGHT: 263 LBS | SYSTOLIC BLOOD PRESSURE: 136 MMHG | BODY MASS INDEX: 46.6 KG/M2 | DIASTOLIC BLOOD PRESSURE: 90 MMHG | HEART RATE: 75 BPM

## 2022-08-25 PROCEDURE — 99214 OFFICE O/P EST MOD 30 MIN: CPT | Mod: 25

## 2022-08-25 PROCEDURE — 36415 COLL VENOUS BLD VENIPUNCTURE: CPT

## 2022-08-25 RX ORDER — NIFEDIPINE 90 MG/1
90 TABLET, EXTENDED RELEASE ORAL DAILY
Qty: 90 | Refills: 3 | Status: DISCONTINUED | COMMUNITY
Start: 2021-10-05 | End: 2022-08-25

## 2022-08-25 RX ORDER — NYSTATIN 100000 1/G
100000 POWDER TOPICAL
Qty: 60 | Refills: 0 | Status: DISCONTINUED | COMMUNITY
Start: 2021-10-25 | End: 2022-08-25

## 2022-08-25 RX ORDER — ERGOCALCIFEROL (VITAMIN D2) 10 MCG
27-0.8 TABLET ORAL DAILY
Qty: 90 | Refills: 3 | Status: DISCONTINUED | COMMUNITY
Start: 2020-03-05 | End: 2022-08-25

## 2022-08-25 NOTE — ASSESSMENT
[FreeTextEntry1] : 1) Chronic HTN \par 2) TTP- S/P  PLEX\par 3) LISSETT- resolved\par 4) Proteinuria\par 5) Microscopic hematuria\par \par Meds & W.U Reviewed, \par \par HTN Controlled, \par \par Thrombocytosis (238.71) (D75.839)\par Iron deficiency (280.9) (E61.1)\par \par 40 year old female with h/o TTP diagnosed in 4/2019. ADAMTS 13 activity <2%, ADAMTS 13 antibody is 70-80 (high). 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 S/p ~13 sessions of plasma exchange and high dose prednisone with recovery of platelets and kidney function. Prednisone taper completed 5/11/19.\par On 5/7/19 her [id number] activity was normal at 71%.\par JAK2 V617F negative. \par \par S/p Feraheme on 4/12/22 and 4/19/22, feels well \par CBC from today WBC 9.9 K HGB 13.8 g HCT 43.3 %  K \par Platelets remains elevated, 498K today, decreased from 549K on 3/29/22\par \par Plan:\par -Thrombocytosis persists: JAK2 exon 12, MPL, ROZ ordered. Immunoelectrophoresis ordered. Possibly secondary to iron deficiency. Repeat iron studies today- if iron stores are low will schedule Feraheme X 2 doses weekly\par -Follow up in 4 months with Dr Colon or sooner if needed. \par  \par Plan \par Iron deficiency \par Iron Total, Serum; Status:In Progress - Specimen/Data Collected;   Done: [id number]\par Thrombocytosis \par JAK2 V617F Mutation, Qualitative with Reflex to Exon12; Status:In Progress -\par Specimen/Data Collected;   Done: [id number]\par \par RTC 6 mo

## 2022-08-25 NOTE — PHYSICAL EXAM
[General Appearance - Alert] : alert [General Appearance - In No Acute Distress] : in no acute distress [General Appearance - Well Nourished] : well nourished [General Appearance - Well Developed] : well developed [Sclera] : the sclera and conjunctiva were normal [Strabismus] : no strabismus was seen [Outer Ear] : the ears and nose were normal in appearance [Hearing Threshold Finger Rub Not [place]] : hearing was normal [Neck Appearance] : the appearance of the neck was normal [] : no respiratory distress [Respiration, Rhythm And Depth] : normal respiratory rhythm and effort [Auscultation Breath Sounds / Voice Sounds] : lungs were clear to auscultation bilaterally [Heart Rate And Rhythm] : heart rate was normal and rhythm regular [Heart Sounds] : normal S1 and S2 [Edema] : there was no peripheral edema [Bowel Sounds] : normal bowel sounds [Abdomen Soft] : soft [Abdomen Tenderness] : non-tender [Abnormal Walk] : normal gait [Skin Color & Pigmentation] : normal skin color and pigmentation [Skin Turgor] : normal skin turgor [No Focal Deficits] : no focal deficits [Oriented To Time, Place, And Person] : oriented to person, place, and time [Impaired Insight] : insight and judgment were intact [Affect] : the affect was normal [Cervical Lymph Nodes Enlarged Posterior Bilaterally] : posterior cervical [Cervical Lymph Nodes Enlarged Anterior Bilaterally] : anterior cervical [Supraclavicular Lymph Nodes Enlarged Bilaterally] : supraclavicular [Axillary Lymph Nodes Enlarged Bilaterally] : axillary [Femoral Lymph Nodes Enlarged Bilaterally] : femoral [Inguinal Lymph Nodes Enlarged Bilaterally] : inguinal [No CVA Tenderness] : no ~M costovertebral angle tenderness [No Spinal Tenderness] : no spinal tenderness

## 2022-08-25 NOTE — HISTORY OF PRESENT ILLNESS
[FreeTextEntry1] : Ms. 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 She was found to have severe thrombocytopenia with LISSETT and metabolic acidosis and HTN. \par \par Peripheral smear showed schistocytes with elevated LDH and low haptoglobin with worsening renal failure and thrombocytopenia concerning for TTP. A CT C/A/P showed perinephric stranding bilaterally. A renal biopsy showed severe obliterative arterial sclerosis, with mild segmental glomerular endothelial injury, suggesting chronic thrombotic angiopathy. Acute tubular injury with focal tubular necrosis and mild reactive interstitial inflammation.  \par \par She was started on high dose prednisone with a fast taper and received PLEX x 10 sessions, after which her platelets recovered but kidney function plateaued at 1.4. Her prednisone was again increased to 1 mg/kg and she received another 3 sessions of plasma exchange starting 19 - 19. Creatinine improved to 1.1 - 1.2 . She received total 13 sessions of plasmapheresis. She was started on prednisone taper after kidney function normalized to 1.1and finished the taper on 5/3/19. \par \par 19 ADAMTS 13 activity <2 % (range >66%)\par ADAMTS 13 antibody 82 (range <12)\par \par PCP: Dr. Bobby Saunders, [facility]\par \par -----------------------------------------------------------------------------------------------------------------------\par \par \par Pt presents for a follow up. She delivered a baby boy at [facility] at ~ 29weeks via emegerncy  for pre eclampsia. \par Pt states she feels well.\par  Pt seen/examined; on losartan-HCTZ 100/25; no complaints; doing well; BP still high; started exercising;\par Following with Cards; \par \par Current: Pt seen/examined; doing well; no complaints;\par \par

## 2022-08-26 LAB
25(OH)D3 SERPL-MCNC: 17.2 NG/ML
ALBUMIN SERPL ELPH-MCNC: 4 G/DL
ANION GAP SERPL CALC-SCNC: 12 MMOL/L
BASOPHILS # BLD AUTO: 0.06 K/UL
BASOPHILS NFR BLD AUTO: 0.6 %
BUN SERPL-MCNC: 15 MG/DL
CALCIUM SERPL-MCNC: 9 MG/DL
CALCIUM SERPL-MCNC: 9 MG/DL
CHLORIDE SERPL-SCNC: 102 MMOL/L
CHOLEST SERPL-MCNC: 171 MG/DL
CO2 SERPL-SCNC: 24 MMOL/L
CREAT SERPL-MCNC: 0.82 MG/DL
CREAT SPEC-SCNC: 134 MG/DL
CREAT/PROT UR: 0.1 RATIO
EGFR: 91 ML/MIN/1.73M2
EOSINOPHIL # BLD AUTO: 0.23 K/UL
EOSINOPHIL NFR BLD AUTO: 2.3 %
GLUCOSE SERPL-MCNC: 97 MG/DL
HCT VFR BLD CALC: 39.7 %
HDLC SERPL-MCNC: 60 MG/DL
HGB BLD-MCNC: 12.8 G/DL
IMM GRANULOCYTES NFR BLD AUTO: 0.3 %
LDLC SERPL CALC-MCNC: 96 MG/DL
LYMPHOCYTES # BLD AUTO: 3.25 K/UL
LYMPHOCYTES NFR BLD AUTO: 33.1 %
MAN DIFF?: NORMAL
MCHC RBC-ENTMCNC: 31.3 PG
MCHC RBC-ENTMCNC: 32.2 GM/DL
MCV RBC AUTO: 97.1 FL
MONOCYTES # BLD AUTO: 0.79 K/UL
MONOCYTES NFR BLD AUTO: 8 %
NEUTROPHILS # BLD AUTO: 5.46 K/UL
NEUTROPHILS NFR BLD AUTO: 55.7 %
NONHDLC SERPL-MCNC: 111 MG/DL
PARATHYROID HORMONE INTACT: 75 PG/ML
PHOSPHATE SERPL-MCNC: 4 MG/DL
PLATELET # BLD AUTO: 425 K/UL
POTASSIUM SERPL-SCNC: 4.3 MMOL/L
PROT UR-MCNC: 9 MG/DL
RBC # BLD: 4.09 M/UL
RBC # FLD: 14.2 %
SODIUM SERPL-SCNC: 139 MMOL/L
TRIGL SERPL-MCNC: 76 MG/DL
URATE SERPL-MCNC: 4.8 MG/DL
WBC # FLD AUTO: 9.82 K/UL

## 2022-08-26 RX ORDER — ERGOCALCIFEROL 1.25 MG/1
1.25 MG CAPSULE, LIQUID FILLED ORAL
Qty: 12 | Refills: 3 | Status: ACTIVE | COMMUNITY
Start: 2021-10-21 | End: 1900-01-01

## 2022-08-29 LAB
APPEARANCE: ABNORMAL
BACTERIA: ABNORMAL
BILIRUBIN URINE: NEGATIVE
BLOOD URINE: NEGATIVE
COLOR: YELLOW
GLUCOSE QUALITATIVE U: NEGATIVE
HYALINE CASTS: 0 /LPF
KETONES URINE: NEGATIVE
LEUKOCYTE ESTERASE URINE: NEGATIVE
MICROSCOPIC-UA: NORMAL
NITRITE URINE: NEGATIVE
PH URINE: 6.5
PROTEIN URINE: NORMAL
RED BLOOD CELLS URINE: 4 /HPF
SPECIFIC GRAVITY URINE: 1.02
SQUAMOUS EPITHELIAL CELLS: 8 /HPF
URINE COMMENTS: NORMAL
UROBILINOGEN URINE: NORMAL
WHITE BLOOD CELLS URINE: 1 /HPF

## 2022-10-03 ENCOUNTER — APPOINTMENT (OUTPATIENT)
Dept: CARDIOLOGY | Facility: CLINIC | Age: 42
End: 2022-10-03

## 2022-10-03 ENCOUNTER — NON-APPOINTMENT (OUTPATIENT)
Age: 42
End: 2022-10-03

## 2022-10-03 VITALS
HEIGHT: 63 IN | OXYGEN SATURATION: 98 % | BODY MASS INDEX: 46.6 KG/M2 | SYSTOLIC BLOOD PRESSURE: 123 MMHG | WEIGHT: 263 LBS | HEART RATE: 89 BPM | DIASTOLIC BLOOD PRESSURE: 87 MMHG

## 2022-10-03 PROCEDURE — 93000 ELECTROCARDIOGRAM COMPLETE: CPT

## 2022-10-03 PROCEDURE — 99214 OFFICE O/P EST MOD 30 MIN: CPT

## 2022-10-03 NOTE — HISTORY OF PRESENT ILLNESS
[FreeTextEntry1] : 42 year old AA female with family history of hypertension, heart disease and cerebral aneurysm. \par She carries a personal history of 4 pregnancies. First three pregnancies were full term and uncomplicated. \par Most recent pregnancy, patient developed pre eclampsia requiring an emergency C- section at 29 weeks gestation. Delivery date: February 19, 2020\par Additionally, baby demonstrated IUGR. Now child is in the NICU and being weaned from oxygen support. \par \par Of note. back in April of 2019, patient developed blurry vision and nausea.She was taken to [facility] and was diagnosed with severe thrombocytopenia with LISSETT and metabolic acidosis and HTN. 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 \par She was started on high dose prednisone with a fast taper and received PLEX X 10 sessions.after which her platelets recovered. After several more sessions of plasmapheresis ( total 13 sessions) kidney function returned to normal. \par \par interval note\par 10/3/22\par \par She presents today for a cardiac evaluation and management of her hypertension - first time in 2 years\par well controlled at home - 120-130. \par Denies new cardiac sympotms. \par Not very active\par compliant with medical management

## 2022-10-03 NOTE — DISCUSSION/SUMMARY
[FreeTextEntry1] : 42 year old AA female with family history of hypertension, heart disease and cerebral aneurysm. \par She carries a personal history of 4 pregnancies. First three pregnancies were full term and uncomplicated. \par Most recent pregnancy, patient developed pre eclampsia requiring an emergency C- section at 29 weeks gestation. Delivery date: February 19, 2020\par Additionally, baby demonstrated IUGR. Now child is in the NICU and being weaned from oxygen support. \par \par Of note. back in April of 2019, patient developed blurry vision and nausea.She was taken to [facility] and was diagnosed with severe thrombocytopenia with LISSETT and metabolic acidosis and HTN. 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 \par She was started on high dose prednisone with a fast taper and received PLEX X 10 sessions.after which her platelets recovered. After several more sessions of plasmapheresis ( total 13 sessions) kidney function returned to normal. \par \par interval note\par 10/3/22\par \par She presents today for a cardiac evaluation and management of her hypertension - first time in 2 years\par well controlled at home - 120-130. \par Denies new cardiac sympotms. \par Not very active\par compliant with medical management\par \par HTN\par Blood pressure today under acceptable control in the office\par pt is no longer breastfeeding\par c/w losartan/HCTZ 100/25\par c/w procardia 90 mg daily\par will refer for an echo and exercise stress test to assess her functional status and to re-evaluate her LV funtion (c/o mild dyspnea)\par Encouraged the patient to monitor blood pressure at home, keep a log, and report results back to us for evaluation. Based on results, we will adjust the regimen as necessary.\par Encouraged patient to continue healthy exercise and eating habits, focusing on a Mediterranean style of eating and aiming for the recommended 150 minutes per week of moderate physical activity.\par Encouraged the patient to find healthy outlets and coping mechanisms to help manage stress, such as physical activity/exercise, reducing workload if possible, spending time with family and friends, engaging in an enjoyable hobby, or using meditation or mindfulness techniques.\par \par \par  [EKG obtained to assist in diagnosis and management of assessed problem(s)] : EKG obtained to assist in diagnosis and management of assessed problem(s)

## 2022-11-03 ENCOUNTER — APPOINTMENT (OUTPATIENT)
Dept: NEPHROLOGY | Facility: CLINIC | Age: 42
End: 2022-11-03

## 2022-11-03 VITALS
WEIGHT: 263 LBS | HEIGHT: 63 IN | SYSTOLIC BLOOD PRESSURE: 112 MMHG | DIASTOLIC BLOOD PRESSURE: 78 MMHG | HEART RATE: 102 BPM | BODY MASS INDEX: 46.6 KG/M2 | OXYGEN SATURATION: 97 %

## 2022-11-03 DIAGNOSIS — E55.9 VITAMIN D DEFICIENCY, UNSPECIFIED: ICD-10-CM

## 2022-11-03 PROCEDURE — 99214 OFFICE O/P EST MOD 30 MIN: CPT

## 2022-11-03 NOTE — PHYSICAL EXAM
[General Appearance - Alert] : alert [General Appearance - In No Acute Distress] : in no acute distress [General Appearance - Well Nourished] : well nourished [General Appearance - Well Developed] : well developed [Sclera] : the sclera and conjunctiva were normal [Strabismus] : no strabismus was seen [Outer Ear] : the ears and nose were normal in appearance [Hearing Threshold Finger Rub Not Butler] : hearing was normal [Neck Appearance] : the appearance of the neck was normal [] : no respiratory distress [Respiration, Rhythm And Depth] : normal respiratory rhythm and effort [Auscultation Breath Sounds / Voice Sounds] : lungs were clear to auscultation bilaterally [Heart Rate And Rhythm] : heart rate was normal and rhythm regular [Heart Sounds] : normal S1 and S2 [Edema] : there was no peripheral edema [Bowel Sounds] : normal bowel sounds [Abdomen Soft] : soft [Abdomen Tenderness] : non-tender [No CVA Tenderness] : no ~M costovertebral angle tenderness [Abnormal Walk] : normal gait [Skin Color & Pigmentation] : normal skin color and pigmentation [Skin Turgor] : normal skin turgor [No Focal Deficits] : no focal deficits [Oriented To Time, Place, And Person] : oriented to person, place, and time [Impaired Insight] : insight and judgment were intact [Affect] : the affect was normal

## 2022-11-03 NOTE — ASSESSMENT
[FreeTextEntry1] : 1) Chronic HTN \par 2) TTP- s/p PLEX\par 3) LISSETT- resolved\par 4) Asymptomatic bacteruria\par 5) Vitamin D deficiency\par \par Bp is well controlled\par Continue current regimen Losartan -HCTZ 100 -25 and Procardia 90 mg daily\par Labs reviewed\par SCr stable, normal lytes\par She has asymptomatic bacteruria- will not treat\par Vitamin D levels low- Continue Vitamin D supplements\par \par RTC in 6 months \par \par

## 2022-11-03 NOTE — HISTORY OF PRESENT ILLNESS
[FreeTextEntry1] : Ms. 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 She was found to have severe thrombocytopenia with LISSETT and metabolic acidosis and HTN. Peripheral smear showed schistocytes with elevated LDH and low haptoglobin with worsening renal failure and thrombocytopenia concerning for TTP. A CT C/A/P showed perinephric stranding bilaterally. A renal biopsy showed severe obliterative arterial sclerosis, with mild segmental glomerular endothelial injury, suggesting chronic thrombotic angiopathy. Acute tubular injury with focal tubular necrosis and mild reactive interstitial inflammation.  She was started on high dose prednisone with a fast taper and received PLEX x 10 sessions, after which her platelets recovered but kidney function plateaued at 1.4. Her prednisone was again increased to 1 mg/kg and she received another 3 sessions of plasma exchange starting 19 - 19. Creatinine improved to 1.1 - 1.2 . She received total 13 sessions of plasmapheresis. She was started on prednisone taper after kidney function normalized to 1.1and finished the taper on 5/3/19. \par \par 19 ADAMTS 13 activity <2 % (range >66%)\par ADAMTS 13 antibody 82 (range <12)\par \par PCP: Dr. Bobby Saunders, [facility]\par \par Pt presents for a follow up. She is 22 weeks pregnant. Nifedipine was increased to 60 mg daily on last visit (). Pt states she feels well.  Doesn't have any acute complaint. Denies headaches, nausea, vomiting, diarrhea, leg edema.  Denies hematuria, foamy urine. Checks BP at home; reports BP runs high. Bp was 170/110 this AM. \par -----------------------------------------------------------------------------------------------------------------------\par \par \par Pt presents for a follow up. She delivered a baby boy at [facility] at ~ 29weeks via emergency  for pre eclampsia. \par Pt states she feels well.\par Following with Cards; was recently started on Losartan- HCTZ\par \par ------------------------------------------------------------------------------------------------------------------\par \par \par Pt seen and examined\par Feels well\par Denies any acute complaint.\par Denies any interval illness/ change in health\par \par

## 2022-11-05 ENCOUNTER — NON-APPOINTMENT (OUTPATIENT)
Age: 42
End: 2022-11-05

## 2022-11-30 ENCOUNTER — OUTPATIENT (OUTPATIENT)
Dept: OUTPATIENT SERVICES | Facility: HOSPITAL | Age: 42
LOS: 1 days | Discharge: HOME | End: 2022-11-30

## 2022-11-30 DIAGNOSIS — D50.9 IRON DEFICIENCY ANEMIA, UNSPECIFIED: ICD-10-CM

## 2022-12-05 ENCOUNTER — APPOINTMENT (OUTPATIENT)
Dept: CV DIAGNOSITCS | Facility: HOSPITAL | Age: 42
End: 2022-12-05

## 2022-12-05 ENCOUNTER — APPOINTMENT (OUTPATIENT)
Dept: CV DIAGNOSTICS | Facility: HOSPITAL | Age: 42
End: 2022-12-05

## 2022-12-06 ENCOUNTER — APPOINTMENT (OUTPATIENT)
Dept: HEMATOLOGY ONCOLOGY | Facility: CLINIC | Age: 42
End: 2022-12-06

## 2022-12-08 ENCOUNTER — RESULT REVIEW (OUTPATIENT)
Age: 42
End: 2022-12-08

## 2022-12-08 ENCOUNTER — APPOINTMENT (OUTPATIENT)
Dept: HEMATOLOGY ONCOLOGY | Facility: CLINIC | Age: 42
End: 2022-12-08

## 2022-12-08 VITALS
SYSTOLIC BLOOD PRESSURE: 135 MMHG | HEIGHT: 63 IN | WEIGHT: 269 LBS | OXYGEN SATURATION: 97 % | BODY MASS INDEX: 47.66 KG/M2 | DIASTOLIC BLOOD PRESSURE: 87 MMHG | HEART RATE: 93 BPM

## 2022-12-08 LAB
BASOPHILS # BLD AUTO: 0.1 K/UL — SIGNIFICANT CHANGE UP (ref 0–0.2)
BASOPHILS NFR BLD AUTO: 0.6 % — SIGNIFICANT CHANGE UP (ref 0–2)
EOSINOPHIL # BLD AUTO: 0.2 K/UL — SIGNIFICANT CHANGE UP (ref 0–0.5)
EOSINOPHIL NFR BLD AUTO: 1.9 % — SIGNIFICANT CHANGE UP (ref 0–6)
HCT VFR BLD CALC: 40.9 % — SIGNIFICANT CHANGE UP (ref 34.5–45)
HGB BLD-MCNC: 13.2 G/DL — SIGNIFICANT CHANGE UP (ref 11.5–15.5)
LYMPHOCYTES # BLD AUTO: 3.6 K/UL — HIGH (ref 1–3.3)
LYMPHOCYTES # BLD AUTO: 34.9 % — SIGNIFICANT CHANGE UP (ref 13–44)
MCHC RBC-ENTMCNC: 30.3 PG — SIGNIFICANT CHANGE UP (ref 27–34)
MCHC RBC-ENTMCNC: 32.2 G/DL — SIGNIFICANT CHANGE UP (ref 32–36)
MCV RBC AUTO: 94.1 FL — SIGNIFICANT CHANGE UP (ref 80–100)
MONOCYTES # BLD AUTO: 0.7 K/UL — SIGNIFICANT CHANGE UP (ref 0–0.9)
MONOCYTES NFR BLD AUTO: 6.6 % — SIGNIFICANT CHANGE UP (ref 2–14)
NEUTROPHILS # BLD AUTO: 5.8 K/UL — SIGNIFICANT CHANGE UP (ref 1.8–7.4)
NEUTROPHILS NFR BLD AUTO: 55.9 % — SIGNIFICANT CHANGE UP (ref 43–77)
PLATELET # BLD AUTO: 491 K/UL — HIGH (ref 150–400)
RBC # BLD: 4.35 M/UL — SIGNIFICANT CHANGE UP (ref 3.8–5.2)
RBC # FLD: 12.6 % — SIGNIFICANT CHANGE UP (ref 10.3–14.5)
WBC # BLD: 10.3 K/UL — SIGNIFICANT CHANGE UP (ref 3.8–10.5)
WBC # FLD AUTO: 10.3 K/UL — SIGNIFICANT CHANGE UP (ref 3.8–10.5)

## 2022-12-08 PROCEDURE — 99214 OFFICE O/P EST MOD 30 MIN: CPT

## 2022-12-08 NOTE — HISTORY OF PRESENT ILLNESS
[de-identified] : Ms. Gutierres presents with TTP at the age of 38.\par \par The patient was admitted to [facility] 4/5/19-4/20-/19 for blurry vision and nausea/vomiting. She was found to have severe thrombocytopenia with LISSETT and metabolic acidosis and HTN. Peripheral smear showed schistocytes with elevated LDH and low haptoglobin with worsening renal failure and thrombocytopenia concerning for TTP. A CT C/A/P showed perinephric stranding bilaterally. \par \par A renal biopsy showed severe obliterative arterial sclerosis, with mild segmental glomerular endothelial injury, suggesting chronic thrombotic angiopathy. Acute tubular injury with focal tubular necrosis and mild reactive interstitial inflammation. \par \par She was started on high dose prednisone with a fast taper and received PLEX x 10 sessions, after which her platelets recovered but kidney function plateaued at 1.4.\par Her prednisone was again increased to 1 mg/kg and she received another 3 sessions of plasma exchange starting 4/17/19 - 4/19/19.  Creatinine improved to 1.1 - 1.2 \par She received total 13 sessions of plasmapheresis. She was started on prednisone taper after kidney function normalized to 1.1 and to taper over 2-3 weeks. \par \par 4/6/19 ADAMTS 13 activity <2 % (range >66%)\par ADAMTS 13 antibody 82 (range <12)\par 4/5/19 WBC 15.3, HGB 11.6, PLT 9 K,  BUN/Cr 2.80\par 4/6/19 WBC 15.6, HGB 11.3, PLT 6 K, LDH 1923, Haptoglobin <20\par 4/7/19 WBC 20.1, HGB 10.1, PLT 11 K\par 4/8/19 WBC 16.6, HGB 9.5, PLT 54 K \par 4/13/19 WBC 18.0, HGB 8.6,  K, \par 4/17/19 \par 4/20/19 WBC 25.0, HGB 8.7,  K\par 4/23/19 WBC 26.5, HGB 10.3,  K\par \par She is currently on Prednisone 60 mg a day and scheduled to finish the taper on 5/3/19. No dysuria. No headaches or blurred vision. No abdominal pain. Bowel movements are normal. \par \par PCP: Dr. Bobby Saunders, [facility] [de-identified] : Returns for follow up. S/p Feraheme on 4/12/22 and 4/19/22\par \par LMP 12/3/22, not heavy, currently on last day \par Prior LMP, 11/3/22, not heavy \par S/p COVID-19 2 months ago, experienced cough, did not require treatment, f/u with PCP \par Denies headache, dizziness, fever, night sweats, weight loss \par Denies dyspnea \par Good appetite. Denies abdominal pain\par Denies blood in stool, black stool, hematuria

## 2022-12-08 NOTE — PHYSICAL EXAM
[Fully active, able to carry on all pre-disease performance without restriction] : Status 0 - Fully active, able to carry on all pre-disease performance without restriction [Normal] : no JVD, no calf tenderness, venous stasis changes, varices [de-identified] : supple [de-identified] : clear [de-identified] : S1/S2 [de-identified] : NO edema

## 2022-12-08 NOTE — ASSESSMENT
[FreeTextEntry1] : 42 year old female with h/o TTP diagnosed in 4/2019.  ADAMTS 13 activity <2%, ADAMTS 13 antibody is 70-80 (high).  Kidney biopsy c/w chronic thrombotic microangiopathy.  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    S/p ~13 sessions of plasma exchange and high dose prednisone with recovery of platelets and kidney function.  Prednisone taper completed 5/11/19.\par On 5/7/19 her [id number] activity was normal at 71%.\par JAK2 V617F negative. \par \par S/p Feraheme on 4/12/22 and 4/19/22\par 8/4/2022 JAK2 Exons 12-15, JAK2 V617F mutation detection, HLX Calreticulin Case , MPL Mutation Analysis Case- Negative\par Reviewed CBC from today WBC 10.3 K HGB 13.2 g HCT 40.9 %  K             \par \par Plan:\par -Thrombocytosis persists: JAK2, CALR, MPL Negative. Immunoelectrophoresis ordered. Possibly secondary to iron deficiency. Repeat iron studies today- if iron stores are low will schedule Feraheme X 2 doses weekly\par -Advised to call with concerns/symptoms\par -Follow up with GYN and PCP \par -Follow up in 4 months with Dr Colon or sooner if needed

## 2022-12-09 ENCOUNTER — EMERGENCY (EMERGENCY)
Facility: HOSPITAL | Age: 42
LOS: 1 days | Discharge: HOME | End: 2022-12-09
Attending: EMERGENCY MEDICINE | Admitting: EMERGENCY MEDICINE
Payer: COMMERCIAL

## 2022-12-09 VITALS
OXYGEN SATURATION: 99 % | DIASTOLIC BLOOD PRESSURE: 81 MMHG | HEART RATE: 84 BPM | WEIGHT: 259.93 LBS | RESPIRATION RATE: 16 BRPM | HEIGHT: 63 IN | TEMPERATURE: 99 F | SYSTOLIC BLOOD PRESSURE: 119 MMHG

## 2022-12-09 VITALS
SYSTOLIC BLOOD PRESSURE: 120 MMHG | DIASTOLIC BLOOD PRESSURE: 80 MMHG | RESPIRATION RATE: 15 BRPM | TEMPERATURE: 99 F | HEART RATE: 84 BPM | OXYGEN SATURATION: 99 %

## 2022-12-09 LAB
ALBUMIN SERPL ELPH-MCNC: 4.1 G/DL
ALP BLD-CCNC: 124 U/L
ALT SERPL-CCNC: 16 U/L
ANION GAP SERPL CALC-SCNC: 10 MMOL/L
AST SERPL-CCNC: 13 U/L
BILIRUB SERPL-MCNC: 0.2 MG/DL
BUN SERPL-MCNC: 16 MG/DL
CALCIUM SERPL-MCNC: 9.6 MG/DL
CHLORIDE SERPL-SCNC: 99 MMOL/L
CO2 SERPL-SCNC: 28 MMOL/L
CREAT SERPL-MCNC: 0.87 MG/DL
EGFR: 85 ML/MIN/1.73M2
FERRITIN SERPL-MCNC: 167 NG/ML
FLUAV AG NPH QL: SIGNIFICANT CHANGE UP
FLUBV AG NPH QL: SIGNIFICANT CHANGE UP
FOLATE SERPL-MCNC: 5.7 NG/ML
GLUCOSE SERPL-MCNC: 163 MG/DL
HCG UR QL: NEGATIVE — SIGNIFICANT CHANGE UP
IRON SATN MFR SERPL: 23 %
IRON SERPL-MCNC: 68 UG/DL
LDH SERPL-CCNC: 185 U/L
POTASSIUM SERPL-SCNC: 3.6 MMOL/L
PROT SERPL-MCNC: 7.7 G/DL
RSV RNA NPH QL NAA+NON-PROBE: SIGNIFICANT CHANGE UP
SARS-COV-2 RNA SPEC QL NAA+PROBE: SIGNIFICANT CHANGE UP
SODIUM SERPL-SCNC: 137 MMOL/L
TIBC SERPL-MCNC: 295 UG/DL
UIBC SERPL-MCNC: 226 UG/DL
VIT B12 SERPL-MCNC: 671 PG/ML

## 2022-12-09 PROCEDURE — 71046 X-RAY EXAM CHEST 2 VIEWS: CPT

## 2022-12-09 PROCEDURE — 94640 AIRWAY INHALATION TREATMENT: CPT

## 2022-12-09 PROCEDURE — 99284 EMERGENCY DEPT VISIT MOD MDM: CPT

## 2022-12-09 PROCEDURE — 99283 EMERGENCY DEPT VISIT LOW MDM: CPT | Mod: 25

## 2022-12-09 PROCEDURE — 71046 X-RAY EXAM CHEST 2 VIEWS: CPT | Mod: 26

## 2022-12-09 PROCEDURE — 81025 URINE PREGNANCY TEST: CPT

## 2022-12-09 PROCEDURE — 87637 SARSCOV2&INF A&B&RSV AMP PRB: CPT

## 2022-12-09 PROCEDURE — 94664 DEMO&/EVAL PT USE INHALER: CPT

## 2022-12-09 RX ORDER — AZITHROMYCIN 500 MG/1
500 TABLET, FILM COATED ORAL ONCE
Refills: 0 | Status: COMPLETED | OUTPATIENT
Start: 2022-12-09 | End: 2022-12-09

## 2022-12-09 RX ORDER — IPRATROPIUM/ALBUTEROL SULFATE 18-103MCG
3 AEROSOL WITH ADAPTER (GRAM) INHALATION ONCE
Refills: 0 | Status: COMPLETED | OUTPATIENT
Start: 2022-12-09 | End: 2022-12-09

## 2022-12-09 RX ORDER — AZITHROMYCIN 500 MG/1
1 TABLET, FILM COATED ORAL
Qty: 4 | Refills: 0
Start: 2022-12-09

## 2022-12-09 RX ADMIN — AZITHROMYCIN 500 MILLIGRAM(S): 500 TABLET, FILM COATED ORAL at 19:28

## 2022-12-09 RX ADMIN — Medication 3 MILLILITER(S): at 17:48

## 2022-12-09 NOTE — ED PROVIDER NOTE - PATIENT PORTAL LINK FT
You can access the FollowMyHealth Patient Portal offered by [facility] by registering at the following website: [email or web address]. By joining [email or web address] FollowMyHealth portal, you will also be able to view your health information using other applications (apps) compatible with our system.

## 2022-12-09 NOTE — ED PROVIDER NOTE - PROGRESS NOTE DETAILS
Reevaluated patient at bedside.  Patient feeling well.  Discussed the results of all diagnostic testing in ED and copies of available reports given.  Patient relates has albuterol inhaler already.  An opportunity to ask questions was given.  Discussed the importance of prompt, close medical follow-up.  Patient will return with any changes, concerns or persistent / worsening symptoms.  Understanding of all instructions verbalized.

## 2022-12-09 NOTE — ED PROVIDER NOTE - LAB INTERPRETATION
Flu With COVID-19 By [facility] (12.09.22 @ 17:27)   SARS-CoV-2 Result: NotDetec: This Respiratory Panel uses polymerase chain reaction (PCR) to detect for   influenza A; influenza B; respiratory syncytial virus; and SARS-CoV-2.   Influenza A Result: NotDetec   Influenza B Result: NotDetec   Resp Syn Virus Result: NotDetec

## 2022-12-09 NOTE — ED PROVIDER NOTE - CLINICAL SUMMARY MEDICAL DECISION MAKING FREE TEXT BOX
Patient with COVID approximately 4 weeks ago complaining of continued cough for the past 4 weeks.  Patient relates that she saw her hematologist yesterday had the blood work done so is declining labs at this time.  Patient was told that she should have a chest x-ray to make sure does not have pneumonia so came to ER.  No fevers chills chest pain short of breath abdominal pain vomiting diarrhea or any other complaints.    Plan [facility] chest x-ray DuoNeb differential including but not limited to bronchitis pneumonia flu or long COVID

## 2022-12-09 NOTE — ED PROVIDER NOTE - CARE PROVIDER_API CALL
Bobby Saunders)  Internal Medicine  [address] [place] [address]  Phone: [phone]  Fax: [phone]  Follow Up Time: 1-3 Days

## 2022-12-09 NOTE — ED PROVIDER NOTE - MUSCULOSKELETAL, MLM
Spine appears normal, range of motion is not limited, no muscle or joint tenderness
None
Eucrisa Pregnancy And Lactation Text: This medication has not been assigned a Pregnancy Risk Category but animal studies failed to show danger with the topical medication. It is unknown if the medication is excreted in breast milk.

## 2022-12-09 NOTE — ED PROVIDER NOTE - OBJECTIVE STATEMENT
Patient with COVID approximately 4 weeks ago complaining of continued cough for the past 4 weeks.  Patient relates that she saw her hematologist yesterday had the blood work done so is declining labs at this time.  Patient was told that she should have a chest x-ray to make sure does not have pneumonia so came to ER.  No fevers chills chest pain short of breath abdominal pain vomiting diarrhea or any other complaints. Patient with COVID approximately 4 weeks ago complaining of continued cough for the past 4 weeks.  Patient relates that she saw her hematologist yesterday had the blood work done so is declining labs at this time.  Patient was told that she should have a chest x-ray to make sure does not have pneumonia so came to ER.  No fevers chills chest pain short of breath abdominal pain vomiting diarrhea or any other complaints.  pmd wishner

## 2022-12-09 NOTE — ED ADULT NURSE NOTE - OBJECTIVE STATEMENT
Patient c/o fever, cough and runny nose x 1 week.  Denies sick contact, CP, SOB, N/V/D.  Patient is afebrile on assessment and able to speak in full sentences without difficulty.  History of HTN.

## 2022-12-09 NOTE — ED ADULT TRIAGE NOTE - ISOLATION INDICATION [facility] CONTACT
CARDIOLOGY CONSULT NOTE    Patient is a 78y Male with a known history of :  Dyspnea, unspecified [R06.00]    Benign essential HTN [I10]    Hypothyroidism [E03.9]    Mild dementia [F03.90]    Elevated troponin [R77.8]    Stage 3 chronic kidney disease [N18.30]      HPI:  79 yo male w/ PMH of BPH, HTN, Prostate CA, presents to the ED w/ x3 days of SOB and non-exertional chest tightness without any associated fevers. Pt has been a heavy smoker his whole life, currently smokes 1 PPD. Denies fever, chills, cough, or congestion. No sick contacts.      TSH 70... stopped synthroid a few weeks ago; similar event a few years ago that developed into severe dementia that predominantly resolved.  Echo today with large pericardial effusion with early tamponade physiology       REVIEW OF SYSTEMS:    CONSTITUTIONAL: No fever, weight loss, or fatigue  EYES: No eye pain, visual disturbances, or discharge  ENMT:  No difficulty hearing, tinnitus, vertigo; No sinus or throat pain  NECK: No pain or stiffness  BREASTS: No pain, masses, or nipple discharge  RESPIRATORY: No cough, wheezing, chills or hemoptysis; No shortness of breath  CARDIOVASCULAR: No chest pain, palpitations, dizziness, or leg swelling  GASTROINTESTINAL: No abdominal or epigastric pain. No nausea, vomiting, or hematemesis; No diarrhea or constipation. No melena or hematochezia.  GENITOURINARY: No dysuria, frequency, hematuria, or incontinence  NEUROLOGICAL: No headaches, memory loss, loss of strength, numbness, or tremors  SKIN: No itching, burning, rashes, or lesions   LYMPH NODES: No enlarged glands  ENDOCRINE: No heat or cold intolerance; No hair loss  MUSCULOSKELETAL: No joint pain or swelling; No muscle, back, or extremity pain  PSYCHIATRIC: No depression, anxiety, mood swings, or difficulty sleeping  HEME/LYMPH: No easy bruising, or bleeding gums  ALLERGY AND IMMUNOLOGIC: No hives or eczema    MEDICATIONS  (STANDING):  albuterol/ipratropium for Nebulization 3 milliLiter(s) Nebulizer every 6 hours  budesonide 160 MICROgram(s)/formoterol 4.5 MICROgram(s) Inhaler 2 Puff(s) Inhalation two times a day  doxazosin 4 milliGRAM(s) Oral at bedtime  heparin   Injectable 5000 Unit(s) SubCutaneous every 12 hours  hydrALAZINE 75 milliGRAM(s) Oral three times a day  levothyroxine Injectable 50 MICROGram(s) IV Push at bedtime  losartan 100 milliGRAM(s) Oral daily  methylPREDNISolone sodium succinate Injectable 20 milliGRAM(s) IV Push every 6 hours  regadenoson Injectable 0.4 milliGRAM(s) IV Push once  simvastatin 20 milliGRAM(s) Oral at bedtime  tamsulosin 0.4 milliGRAM(s) Oral at bedtime    MEDICATIONS  (PRN):      ALLERGIES: No Known Allergies      FAMILY HISTORY:  Hypertension (Mother, Sibling)        PHYSICAL EXAMINATION:  -----------------------------  T(C): 36.3 (09-06-22 @ 04:41), Max: 36.3 (09-06-22 @ 00:11)  HR: 72 (09-06-22 @ 13:27) (64 - 79)  BP: 167/84 (09-06-22 @ 13:27) (150/100 - 186/98)  RR: 19 (09-06-22 @ 13:00) (19 - 20)  SpO2: 99% (09-06-22 @ 13:00) (93% - 99%)      Constitutional: well developed, normal appearance, well groomed, well nourished, no deformities and no acute distress.   Eyes: the conjunctiva exhibited no abnormalities and the eyelids demonstrated no xanthelasmas.   HEENT: normal oral mucosa, no oral pallor and no oral cyanosis.   Neck: normal jugular venous A waves present, normal jugular venous V waves present and no jugular venous franklin A waves.   Pulmonary: no respiratory distress, normal respiratory rhythm and effort, no accessory muscle use and lungs were clear to auscultation bilaterally.   Cardiovascular: heart rate and rhythm were normal, normal S1 and S2 and no murmur, gallop, rub, heave or thrill are present.   Abdomen: soft, non-tender, no hepato-splenomegaly and no abdominal mass palpated.   Musculoskeletal: the gait could not be assessed..   Extremities: no clubbing of the fingernails, no localized cyanosis, no petechial hemorrhages and no ischemic changes.   Skin: normal skin color and pigmentation, no rash, no venous stasis, no skin lesions, no skin ulcer and no xanthoma was observed.   Psychiatric: oriented to person, place, and time, the affect was normal, the mood was normal and not feeling anxious.     LABS:   --------  09-06    125<L>  |  92<L>  |  20  ----------------------------<  116<H>  3.7   |  24  |  1.50<H>    Ca    8.0<L>      06 Sep 2022 06:46    TPro  7.0  /  Alb  3.2<L>  /  TBili  0.8  /  DBili  x   /  AST  61<H>  /  ALT  44  /  AlkPhos  69  09-05                         12.0   17.02 )-----------( 244      ( 05 Sep 2022 06:15 )             35.3             RADIOLOGY:  -----------------    ECG: sinus 70bpm    < from: TTE Echo Complete w/o Contrast w/ Doppler (09.06.22 @ 10:07) >  Summary:   1. Left ventricular ejection fraction, by visual estimation, is 50 to   55%.   2. Mildly enlarged left atrium.   3. Maximum thickness of effusion is 2-2.5 cm.   4. Mild mitral annular calcification.   5. Mild thickening of the anterior and posterior mitral valveleaflets.   6. Trace mitral valve regurgitation.   7. Mild tricuspid regurgitation.   8. Estimated pulmonary artery systolic pressure is 37.4 mmHg assuming a   right atrial pressure of 5 mmHg, which is consistent with mild pulmonary   hypertension.  9. Findings are consistent with mild cardiac tamponade.      [id number] Mejia Church MD, [facility]  Electronically signed on 9/6/2022 at 11:59:24 AM      < end of copied text >  
 Patient is a 78y old  Male who presents with a chief complaint of SOB and CP (06 Sep 2022 16:35)    HPI:  77 yo male with BPH, HTN, HLD,  Prostate CA S/P Radiation treatment, Hypothyroidism, Seizure and active tobacco abuse since age 12.  Presented  to the ED w/ x3 days of SOB and non-exertional chest tightness without any associated fevers. Pt has been a heavy smoker his whole life, currently smokes 1 PPD.   Denies fever, chills, cough, or congestion. No sick contacts.   In ED with BP of 202/112  Found to have pericardial effusion with early tamponade and elevated troponin.    PAST MEDICAL & SURGICAL HISTORY:  ETOH abuse  sober since     Seizure disorder  last seizure     BPH (benign prostatic hyperplasia)    HTN (hypertension)    Prostate CA  Dx&#x27;d , s/p radiation    Hyperlipidemia    Poor historian    S/P hernia repair  right inguinal hernia    H/O prostate cancer  10- yrs ago, s/p radiation therapy    Status post cataract extraction    H/O urethrotomy  2018    FAMILY HISTORY:  Hypertension (Mother, Sibling)    SOCIAL HISTORY: Active smoker since age 12    Allergies  No Known Allergies    MEDICATIONS  (STANDING):  albuterol/ipratropium for Nebulization 3 milliLiter(s) Nebulizer every 6 hours  budesonide 160 MICROgram(s)/formoterol 4.5 MICROgram(s) Inhaler 2 Puff(s) Inhalation two times a day  doxazosin 4 milliGRAM(s) Oral at bedtime  heparin   Injectable 5000 Unit(s) SubCutaneous every 12 hours  hydrALAZINE 75 milliGRAM(s) Oral three times a day  levothyroxine Injectable 50 MICROGram(s) IV Push at bedtime  losartan 100 milliGRAM(s) Oral daily  methylPREDNISolone sodium succinate Injectable 20 milliGRAM(s) IV Push every 6 hours  regadenoson Injectable 0.4 milliGRAM(s) IV Push once  simvastatin 20 milliGRAM(s) Oral at bedtime  tamsulosin 0.4 milliGRAM(s) Oral at bedtime    REVIEW OF SYSTEMS:    Constitutional:            No fever, weight loss or fatigue  HEENT:                      No difficulty hearing, tinnitus, vertigo; No sinus or throat pain  Respiratory:              SOB  Cardiovascular:        No chest pain, palpitations  Gastrointestinal:        No abdominal or epigastric pain. No N/V/diarrhea or hematemesis  SKIN:                             no rash  Musculoskeletal:        No joint pain or swelling  Extremities:                No swelling  Neurological:              No headaches  Psychiatric:                 No depression, anxiety    MACRA & MIPS:  Vaccines - Influenza: ye and Pneumovax: Yes  Tobacco: Active smoker  Blood Pressure Screening / Control of: 164/86  Current Medications Reviewed: yes    Vital Signs Last 24 Hrs  T(C): 36.8 (06 Sep 2022 18:05), Max: 36.8 (06 Sep 2022 18:05)  T(F): 98.2 (06 Sep 2022 18:05), Max: 98.2 (06 Sep 2022 18:05)  HR: 72 (06 Sep 2022 18:05) (64 - 79)  BP: 164/86 (06 Sep 2022 18:05) (150/100 - 186/98)  BP(mean): --  RR: 18 (06 Sep 2022 18:05) (18 - 20)  SpO2: 97% (06 Sep 2022 18:05) (93% - 99%)    Parameters below as of 06 Sep 2022 18:05  Patient On (Oxygen Delivery Method): room air    PHYSICAL EXAM:  GEN:         Awake, responsive and comfortable.  HEENT:    Normal.    RESP:        no wheezing.  CVS:          Regular rate and rhythm.   ABD:         Soft, non-tender, non-distended;   SKIN:           Warm and dry.  EXTR:            No clubbing, cyanosis or edema  CNS:              Intact sensory and motor function.  PSYCH:        cooperative, no anxiety or depression    LABS:                        12.0   17.02 )-----------( 244      ( 05 Sep 2022 06:15 )             35.3     09-06    125<L>  |  92<L>  |  20  ----------------------------<  116<H>  3.7   |  24  |  1.50<H>    Ca    8.0<L>      06 Sep 2022 06:46    TPro  7.0  /  Alb  3.2<L>  /  TBili  0.8  /  DBili  x   /  AST  61<H>  /  ALT  44  /  AlkPhos  69  -05    EKG: Sinus rhythm    RADIOLOGY & ADDITIONAL STUDIES:  < from: CT Chest No Cont (22 @ 14:05) >    ACC: [id number] EXAM:  CT CHEST                          PROCEDURE DATE:  2022      INTERPRETATION:  HISTORY: Dyspnea on exertion. Smoker.    EXAMINATION: CT CHEST was performed without IV contrast.    COMPARISON: No CT chest comparison. Correlation with 2018 CT abdomen.    FINDINGS:    AIRWAYS, LUNGS, PLEURA: Trachea and mainstem bronchi patent. Emphysema.   Posterior left upper lobe and left basilar atelectasis. Small bilateral   pleural effusions.    MEDIASTINUM: Large circumferential pericardial effusion; depth of   pericardial fluid is 34 mm at the level of the basal inferior wall of the   left ventricle (sagittal series image 78). There is flattening of the   free wall of the right ventricle. Coronary atherosclerosis.    IMAGED ABDOMEN: Bilateral hydronephrosis. Cholelithiasis.    SOFT TISSUES: Bilateral gynecomastia.    BONES: Degenerative changes of the spine. Loss of height of L3 vertebral   body similar to 2018 CT abdomen.    IMPRESSION:.    Large pericardial effusion.    Small bilateral pleural effusions.    Bilateral hydronephrosis.    Findings discussed with Dr. Shaw on 2022 at 2:43 PM.    SEBASTIAN ALEXIS MD; Attending Radiologist  This document has been electronically signed. Sep  6 2022  2:44PM  < from: TTE Echo Complete w/o Contrast w/ Doppler (22 @ 10:07) >  ACC: [id number] EXAM:  ECHO TTE WO CON COMP W DOPP                          PROCEDURE DATE:  2022      INTERPRETATION:  TRANSTHORACIC ECHOCARDIOGRAM REPORT    Patient Name:   AMBAR PICKARD Patient Location: [facility] Rec #:  [id number]         Accession #:      [id number]  Account #:                         Height:           67.7 in 172.0 cm  YOB: 1944          Weight:           153.2 lb 69.50 kg  Patient Age:    78 years           BSA:              1.82 m²  Patient Gender: M                  BP:               150/100 mmHg      Date of Exam:        2022 10:07:09 AM  Sonographer:         LÁZARO  Referring Physician: PATTIE GALLO    Procedure:     2D Echo/Doppler/Color Doppler Complete.  Indications:   Dyspnea, unspecified - R06.00; Shortness of breath - R06.02  Diagnosis:     Pericardial effusion (noninflammatory) - I31.3  Study Details: Technically fair study.    2D AND M-MODE MEASUREMENTS (normal ranges within parentheses):  Left                 Normal  Aorta/Left            Normal  Ventricle:                    Atrium:  IVSd (2D):    1.06  (0.7-1.1) Aortic Root    3.52  (2.4-3.7)                 cm             (2D):           cm  LVPWd (2D):   1.01  (0.7-1.1) Left Atrium    3.11  (1.9-4.0)           cm             (2D):           cm  LVIDd (2D):   5.13  (3.4-5.7) LA Vol Index   46.2                 cm             (A4C):        ml/m²  LVIDs (2D):   3.39            LA Vol Index   20.8                 cm             (A2C):        ml/m²  LVFS (2D):   33.8   (>25%)   LA Vol Index   40.6                  %             (BP):         ml/m²  Relative Wall 0.40   (<0.42)  Right  Thickness                     Ventricle:                                TAPSE:          1.50 cm    LV DIASTOLIC FUNCTION:  MV Peak E: 0.69 m/s Decel Time:  269 msec  MV Peak A: 0.96 m/s Lateral E/e' 8.8  E/A Ratio: 0.72  Lateral e' 0.1 m/s    SPECTRAL DOPPLER ANALYSIS (where applicable):  Mitral Valve:  MV P1/2 Time: 78.11 msec  MV Area, PHT: 2.82 cm²    Aortic Valve: AoV Max Javier: 2.00 m/s AoV Peak PG: 15.9 mmHg AoV Mean P.7 mmHg    LVOT Vmax: 1.12 m/s LVOT VTI: 0.200 m LVOT Diameter: 1.98 cm    AoV Area, Vmax: 1.72 cm² AoV Area, VTI: 2.37 cm² AoV Area, Vmn: 1.74 cm²    Tricuspid Valve and PA/RV Systolic Pressure: TR Max Velocity: 2.85 m/s RA   Pressure: 5 mmHg RVSP/PASP: 37.4 mmHg    PHYSICIAN INTERPRETATION:  Left Ventricle: Normal left ventricular size and wall thicknesses, with   normal systolic and diastolic function.  Left ventricular ejection fraction, by visual estimation, is 50 to 55%.  Right Ventricle: Normal right ventricular size and function.  Left Atrium: Mildly enlarged left atrium.  Right Atrium: The right atrium is normal in size.  Pericardium: A large pericardial effusion is present. The pericardial   effusion is globally located around the entire heart. There is inversion   of the right atrial wall, excessive respiratory variation in the   tricuspid valve spectral Doppler velocities and inversion of the right   ventricular wall. There is evidence of mild cardiac tamponade. Maximum   thickness of effusion is 2-2.5 cm.  Mitral Valve: Mild thickening of the anterior and posterior mitral valve   leaflets. There is mild mitral annular calcification. Trace mitral valve   regurgitation is seen.  Tricuspid Valve: Structurally normal tricuspid valve, with normal leaflet   excursion. The tricuspid valve is normal in structure. Mild tricuspid   regurgitation is visualized. Estimated pulmonary artery systolic pressure   is 37.4 mmHg assuming a right atrial pressure of 5 mmHg, which is   consistent with mild pulmonary hypertension.  Aortic Valve: The aortic valve was not well visualized.  Pulmonic Valve: Structurally normal pulmonic valve, with normal leaflet   excursion.  Aorta: The aortic root is normal in size and structure.  Pulmonary Artery: The main pulmonary artery is normal in size.  Venous: The inferior vena cava was normal sized, with respiratory size   variation greater than 50%.    Summary:   1. Left ventricular ejection fraction, by visual estimation, is 50 to   55%.   2. Mildly enlarged left atrium.   3. Maximum thickness of effusion is 2-2.5 cm.   4. Mild mitral annular calcification.   5. Mild thickening of the anterior and posterior mitral valveleaflets.   6. Trace mitral valve regurgitation.   7. Mild tricuspid regurgitation.   8. Estimated pulmonary artery systolic pressure is 37.4 mmHg assuming a   right atrial pressure of 5 mmHg, which is consistent with mild pulmonary   hypertension.  9. Findings are consistent with mild cardiac tamponade.    [phone] Mejia Church MD, [facility]  Electronically signed on 2022 at 11:59:24 AM    ASSESSMENT AND PLAN:  ·	SOB.  ·	Pericardial effusion with early tamponade.  ·	Bilateral pleural effusion.  ·	Hypertensive urgency.  ·	Elevated troponin.  ·	Hyponatremia.  ·	Tobacco abuse.  ·	Suspect COPD.  ·	Hypothyroidism.  ·	Prostate CA S/P Radiation.  ·	HTN.  ·	HLD.  ·	Seizure by history.  ·	Leukocytosis, on steroids.    Oxygenation status stable at this point.  Being planned for pericardial window if pt and family agree.  Will reduce steroid, continue Symbicort and Albuterol.  Smoking cessation, PFT out pt.  On injectable levothyroxine.
Other Specify
HPI:  77 yo male w/ PMH of BPH, HTN, Prostate CA, presents to the ED w/ x3 days of SOB and non-exertional chest tightness without any associated fevers. Pt has been a heavy smoker his whole life, currently smokes 1 PPD. Denies fever, chills, cough, or congestion. No sick contacts.   (03 Sep 2022 14:54)      PAST MEDICAL & SURGICAL HISTORY:   has severe hypothyroidism, on TTR Levo thyroxine 112 Micrograms.  ETOH abuse  sober since 2016      Seizure disorder  last seizure 2015      BPH (benign prostatic hyperplasia)      HTN (hypertension)      Prostate CA  Dx&#x27;d 2008, s/p radiation      Hyperlipidemia      Poor historian      S/P hernia repair  right inguinal hernia      H/O prostate cancer  10- yrs ago, s/p radiation therapy      Status post cataract extraction      H/O urethrotomy  June 2018          REVIEW OF SYSTEMS:    CONSTITUTIONAL: No fever, weight loss, or fatigue  EYES: No eye pain, visual disturbances, or discharge  ENMT:  No difficulty hearing, tinnitus, vertigo; No sinus or throat pain  NECK: No pain or stiffness  BREASTS: No pain, masses, or nipple discharge  RESPIRATORY: No cough, wheezing, chills or hemoptysis; No shortness of breath  CARDIOVASCULAR: No chest pain, palpitations, dizziness, or leg swelling  GASTROINTESTINAL: No abdominal or epigastric pain. No nausea, vomiting, or hematemesis; No diarrhea or constipation. No melena or hematochezia.  GENITOURINARY: No dysuria, frequency, hematuria, or incontinence  NEUROLOGICAL: No headaches, memory loss, loss of strength, numbness, or tremors  SKIN: No itching, burning, rashes, or lesions   LYMPH NODES: No enlarged glands  ENDOCRINE: No heat or cold intolerance; No hair loss  MUSCULOSKELETAL: No joint pain or swelling; No muscle, back, or extremity pain  PSYCHIATRIC: No depression, anxiety, mood swings, or difficulty sleeping  HEME/LYMPH: No easy bruising, or bleeding gums  ALLERY AND IMMUNOLOGIC: No hives or eczema      MEDICATIONS  (STANDING):  albuterol/ipratropium for Nebulization 3 milliLiter(s) Nebulizer every 6 hours  budesonide 160 MICROgram(s)/formoterol 4.5 MICROgram(s) Inhaler 2 Puff(s) Inhalation two times a day  doxazosin 4 milliGRAM(s) Oral at bedtime  heparin   Injectable 5000 Unit(s) SubCutaneous every 12 hours  hydrALAZINE 75 milliGRAM(s) Oral three times a day  levothyroxine 112 MICROGram(s) Oral daily  losartan 100 milliGRAM(s) Oral daily  methylPREDNISolone sodium succinate Injectable 20 milliGRAM(s) IV Push every 6 hours  regadenoson Injectable 0.4 milliGRAM(s) IV Push once  simvastatin 20 milliGRAM(s) Oral at bedtime  tamsulosin 0.4 milliGRAM(s) Oral at bedtime    MEDICATIONS  (PRN):      Allergies    No Known Allergies    Intolerances        SOCIAL HISTORY: lives at home with [facility]    FAMILY HISTORY:  Hypertension (Mother, Sibling)        Vital Signs Last 24 Hrs  T(C): 36.3 (06 Sep 2022 04:41), Max: 36.4 (05 Sep 2022 16:15)  T(F): 97.3 (06 Sep 2022 04:41), Max: 97.6 (05 Sep 2022 16:15)  HR: 66 (06 Sep 2022 08:12) (64 - 95)  BP: 150/100 (06 Sep 2022 04:41) (150/100 - 186/98)  BP(mean): --  RR: 20 (06 Sep 2022 04:41) (18 - 20)  SpO2: 97% (06 Sep 2022 06:01) (93% - 98%)    Parameters below as of 06 Sep 2022 06:01  Patient On (Oxygen Delivery Method): nasal cannula        PHYSICAL EXAM:    GENERAL: NAD, well-groomed, well-developed  HEAD:  Atraumatic, Normocephalic  EYES: EOMI, PERRLA, conjunctiva and sclera clear  ENMT: No tonsillar erythema, exudates, or enlargement; Moist mucous membranes, Good dentition, No lesions  NECK: Supple, No JVD, Normal thyroid/ JVD elevated     NERVOUS SYSTEM:  Alert & Oriented X3, Good concentration; Motor Strength 5/5 B/L upper and lower extremities; DTRs 2+ intact and symmetric  CHEST/LUNG: Clear to percussion bilaterally; No rales, rhonchi, wheezing, or rubs  HEART: Regular rate and rhythm; No murmurs, rubs, or gallops  ABDOMEN: Soft, Nontender, Nondistended; Bowel sounds present  EXTREMITIES:  2+ Peripheral Pulses, No clubbing, cyanosis, or edema  LYMPH: No lymphadenopathy noted  SKIN: No rashes or lesions      LABS:                        12.0   17.02 )-----------( 244      ( 05 Sep 2022 06:15 )             35.3     09-06    125<L>  |  92<L>  |  20  ----------------------------<  116<H>  3.7   |  24  |  1.50<H>    Ca    8.0<L>      06 Sep 2022 06:46    TPro  7.0  /  Alb  3.2<L>  /  TBili  0.8  /  DBili  x   /  AST  61<H>  /  ALT  44  /  AlkPhos  69  09-05      Troponin elevated        RADIOLOGY & ADDITIONAL STUDIES:  < from: Xray Chest 1 View- PORTABLE-Urgent (09.03.22 @ 14:19) >    ACC: [id number] EXAM:  XR CHEST PORTABLE URGENT 1V                          PROCEDURE DATE:  09/03/2022          INTERPRETATION:  CLINICAL STATEMENT: Chest Pain. Shortness of breath    TECHNIQUE: AP view of the chest.      COMPARISON: 7/19/2020    New cardiomegaly, may be exaggerated by AP technique. Suboptimal degree   of inspiration. Small left pleural effusion.    Degenerative changes of the spine, shoulders and AC joints.    IMPRESSION:    New cardiomegaly, may be exaggerated by AP technique.    Small left pleural effusion.    --- End of Report ---    < end of copied text >  sinus rhythm PRWP. NS stst changes

## 2022-12-12 LAB
ALBUMIN MFR SERPL ELPH: 49 %
ALBUMIN SERPL-MCNC: 3.8 G/DL
ALBUMIN/GLOB SERPL: 1 RATIO
ALPHA1 GLOB MFR SERPL ELPH: 4.4 %
ALPHA1 GLOB SERPL ELPH-MCNC: 0.3 G/DL
ALPHA2 GLOB MFR SERPL ELPH: 11.5 %
ALPHA2 GLOB SERPL ELPH-MCNC: 0.9 G/DL
B-GLOBULIN MFR SERPL ELPH: 14.5 %
B-GLOBULIN SERPL ELPH-MCNC: 1.1 G/DL
DEPRECATED KAPPA LC FREE/LAMBDA SER: 1.3 RATIO
GAMMA GLOB FLD ELPH-MCNC: 1.6 G/DL
GAMMA GLOB MFR SERPL ELPH: 20.6 %
IGA SER QL IEP: 324 MG/DL
IGG SER QL IEP: 1469 MG/DL
IGM SER QL IEP: 81 MG/DL
INTERPRETATION SERPL IEP-IMP: NORMAL
KAPPA LC CSF-MCNC: 1.99 MG/DL
KAPPA LC SERPL-MCNC: 2.59 MG/DL
M PROTEIN SPEC IFE-MCNC: NORMAL
PROT SERPL-MCNC: 7.7 G/DL
PROT SERPL-MCNC: 7.7 G/DL

## 2023-03-20 ENCOUNTER — RX RENEWAL (OUTPATIENT)
Age: 43
End: 2023-03-20

## 2023-04-18 NOTE — ED ADULT NURSE NOTE - NSFALLRSKOUTCOME_ED_ALL_ED
Diagnosis: Open nondisplaced fracture of phalanx of left ring finger, unspecified phalanx, initial encounter [[id number]]   Future Attending Provider: NICHOLAS BANEGAS JR [[id number]]   Admitting Provider:: NICHOLAS BANEGAS JR [[id number]]  
Universal Safety Interventions

## 2023-04-20 NOTE — PATIENT PROFILE ADULT - NSPROPTRIGHTREPPHONE_GEN_A_NUR
4/20  RN report received, case reviewed, pt seen, MSE done.  No acute events o/n.  No PRNs needed.  Reviewed contents of ED note with pt, corroborates much but focused more on behavior of sister.  Explained no adderall restart.  Will call pharmacy and restart testosterone.  No leonidas psychosis.  Needs a mood stabilizer for impulse control.  No new SEs reported or observed. [phone]

## 2023-04-24 ENCOUNTER — NON-APPOINTMENT (OUTPATIENT)
Age: 43
End: 2023-04-24

## 2023-04-24 ENCOUNTER — APPOINTMENT (OUTPATIENT)
Dept: CARDIOLOGY | Facility: CLINIC | Age: 43
End: 2023-04-24

## 2023-04-24 ENCOUNTER — APPOINTMENT (OUTPATIENT)
Dept: CARDIOLOGY | Facility: CLINIC | Age: 43
End: 2023-04-24
Payer: COMMERCIAL

## 2023-04-24 VITALS — HEART RATE: 96 BPM | DIASTOLIC BLOOD PRESSURE: 84 MMHG | OXYGEN SATURATION: 96 % | SYSTOLIC BLOOD PRESSURE: 131 MMHG

## 2023-04-24 DIAGNOSIS — Z82.49 FAMILY HISTORY OF ISCHEMIC HEART DISEASE AND OTHER DISEASES OF THE CIRCULATORY SYSTEM: ICD-10-CM

## 2023-04-24 PROCEDURE — 93000 ELECTROCARDIOGRAM COMPLETE: CPT

## 2023-04-24 PROCEDURE — 99214 OFFICE O/P EST MOD 30 MIN: CPT

## 2023-04-24 NOTE — PHYSICAL EXAM
[Normal] : alert and oriented, normal memory [Normal Rate] : normal [Rhythm Regular] : regular [Normal S1] : normal S1 [Normal S2] : normal S2

## 2023-04-25 PROBLEM — Z82.49 FAMILY HISTORY OF HYPERTENSION: Status: ACTIVE | Noted: 2020-03-05

## 2023-04-25 PROBLEM — Z82.49 FAMILY HISTORY OF CORONARY ARTERY DISEASE: Status: ACTIVE | Noted: 2020-03-05

## 2023-04-25 NOTE — ASSESSMENT
[FreeTextEntry1] : Ms. 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 \par   Pt loss 25 pounds since January with healthy eating and  exercise. \par  Continue Nifedipine ER 90 mg daily\par  Continue Losartan 100 mg/ HCTZ 25 mg - advised pt to take at noon time, since diuretic taken in the late evening\par  causes patient to get up during hours of sleep to urinate\par   Plan is to  schedule exercise stress testing to assess functional status and baseline screening \par   Echocardiogram to assess cardiac structure and rule out LVH and valvular disease\par \par #History of prior severe thrombocytopenia\par   Last platelet assessment: December 9, 2022\par   Stable, 491 K/uL\par   Followed by hematologist, Dr. Andressa Parada\par \par #Obesity \par   BMI 47.65\par   Patient reports that she is struggling to lose weight and has asked for assistance\par - nutritionist referral\par \par - Encouraged patient to participate in  healthy walking and eating habits, focusing on a Mediterranean style of eating and aiming for the recommended 150 minutes per week of moderate physical activity.\par \par - Encouraged the patient to find healthy outlets and coping mechanisms to help manage stress, such as reducing workload if possible, spending time with family and friends, engaging in an enjoyable hobby, or using meditation or mindfulness techniques.\par \par \par \par \par \par \par \par

## 2023-04-25 NOTE — CARDIOLOGY SUMMARY
[de-identified] : 2/10/2020\par Normal Mitral valve. Mild Mitral regurgitation\par Aortic valve not well visualized probably normal .Minimal Aortic regurgitation\par Aortic root 2.5cm\par Normal Left Atrium\par Hyperdynamic left ventricular systolic function, Normal internal dimension\par and wall thickness. Normal diastolic function\par Normal right atrium. Normal right ventricular size and function. Normal tricuspid valve. Minimal tricuspid\par regurgitation. Minimal pulmonic regurgitation\par Normal pericardium with no pericardial effusion\par Estimated right ventricular systolic pressure equals 35mmhg, assuming right atrial pressure equals 8mm hg  consistent with borderline pulmonary hypertension\par EF (visual estimate) 65-70%

## 2023-04-25 NOTE — HISTORY OF PRESENT ILLNESS
[FreeTextEntry1] : 42 year old AA female with family history of hypertension, heart disease and cerebral aneurysm. \par She carries a personal history of 4 pregnancies. First three pregnancies were full term and uncomplicated. \par Most recent pregnancy, patient developed pre eclampsia requiring an emergency C- section at 29 weeks gestation. Delivery date: February 19, 2020\par Additionally, baby demonstrated IUGR. Now child is in the NICU and being weaned from oxygen support. \par \par Of note. back in April of 2019, patient developed blurry vision and nausea.She was taken to [facility] and was diagnosed with severe thrombocytopenia with LISSETT and metabolic acidosis and HTN. 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 \par She was started on high dose prednisone with a fast taper and received PLEX X 10 sessions.after which her platelets recovered. After several more sessions of plasmapheresis ( total 13 sessions) kidney function returned to normal. \par \par interval note\par 10/3/22\par \par She presents today for a cardiac evaluation and management of her hypertension - first time in 2 years\par well controlled at home - 120-130. \par Denies new cardiac symptoms. \par Not very active\par compliant with medical management\par \par Interval Note\par April 24, 2023\par \par Ms. 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 Pt loss 25 pounds since January with health eating and  exercise. Pt denies chest pain, SOB, palpitation and dizziness during this visit.

## 2023-05-01 ENCOUNTER — APPOINTMENT (OUTPATIENT)
Dept: CARDIOLOGY | Facility: CLINIC | Age: 43
End: 2023-05-01
Payer: COMMERCIAL

## 2023-05-01 PROCEDURE — 93015 CV STRESS TEST SUPVJ I&R: CPT

## 2023-05-01 PROCEDURE — 93306 TTE W/DOPPLER COMPLETE: CPT

## 2023-05-04 ENCOUNTER — APPOINTMENT (OUTPATIENT)
Dept: NEPHROLOGY | Facility: CLINIC | Age: 43
End: 2023-05-04

## 2023-05-05 NOTE — PROGRESS NOTE ADULT - ASSESSMENT
A/P: 39y  originally admitted at 30w0d with uncontrolled cHTN with abnormal UA dopplers(AEDV, intermittent REVD). Now POD#4 s/p pLTCS due to non reassuring fetal heart tracing. Patient status post magnesium sulfate for seizure ppx for 24h postpartum. Patient's BPs have been well controlled postpartum and antihypertensive regimen has been adjusted with BPs well controlled. No s/s PEC at this time. Awaiting final recommendations from cardiology and nephrology regarding antihypertensive regimen postpartum. Sellar or suprasellar mass

## 2023-05-07 ENCOUNTER — OUTPATIENT (OUTPATIENT)
Dept: OUTPATIENT SERVICES | Facility: HOSPITAL | Age: 43
LOS: 1 days | Discharge: HOME | End: 2023-05-07

## 2023-05-07 DIAGNOSIS — D50.9 IRON DEFICIENCY ANEMIA, UNSPECIFIED: ICD-10-CM

## 2023-05-11 ENCOUNTER — APPOINTMENT (OUTPATIENT)
Dept: NEPHROLOGY | Facility: CLINIC | Age: 43
End: 2023-05-11
Payer: COMMERCIAL

## 2023-05-11 VITALS
HEART RATE: 95 BPM | OXYGEN SATURATION: 98 % | WEIGHT: 263 LBS | SYSTOLIC BLOOD PRESSURE: 134 MMHG | HEIGHT: 63 IN | DIASTOLIC BLOOD PRESSURE: 82 MMHG | BODY MASS INDEX: 46.6 KG/M2

## 2023-05-11 DIAGNOSIS — I10 ESSENTIAL (PRIMARY) HYPERTENSION: ICD-10-CM

## 2023-05-11 DIAGNOSIS — E66.9 OBESITY, UNSPECIFIED: ICD-10-CM

## 2023-05-11 PROCEDURE — 99214 OFFICE O/P EST MOD 30 MIN: CPT

## 2023-05-11 NOTE — PHYSICAL EXAM
[General Appearance - Alert] : alert [General Appearance - In No Acute Distress] : in no acute distress [General Appearance - Well Nourished] : well nourished [General Appearance - Well Developed] : well developed [Sclera] : the sclera and conjunctiva were normal [Strabismus] : no strabismus was seen [Outer Ear] : the ears and nose were normal in appearance [Hearing Threshold Finger Rub Not Kemper] : hearing was normal [Neck Appearance] : the appearance of the neck was normal [] : no respiratory distress [Respiration, Rhythm And Depth] : normal respiratory rhythm and effort [Auscultation Breath Sounds / Voice Sounds] : lungs were clear to auscultation bilaterally [Heart Rate And Rhythm] : heart rate was normal and rhythm regular [Heart Sounds] : normal S1 and S2 [Edema] : there was no peripheral edema [Bowel Sounds] : normal bowel sounds [Abdomen Soft] : soft [Abdomen Tenderness] : non-tender [No CVA Tenderness] : no ~M costovertebral angle tenderness [Abnormal Walk] : normal gait [Skin Color & Pigmentation] : normal skin color and pigmentation [Skin Turgor] : normal skin turgor [No Focal Deficits] : no focal deficits [Impaired Insight] : insight and judgment were intact [Oriented To Time, Place, And Person] : oriented to person, place, and time [Affect] : the affect was normal

## 2023-05-16 ENCOUNTER — RX RENEWAL (OUTPATIENT)
Age: 43
End: 2023-05-16

## 2023-05-16 RX ORDER — LOSARTAN POTASSIUM AND HYDROCHLOROTHIAZIDE 25; 100 MG/1; MG/1
100-25 TABLET ORAL
Qty: 90 | Refills: 3 | Status: ACTIVE | COMMUNITY
Start: 2022-03-08 | End: 1900-01-01

## 2023-05-18 ENCOUNTER — APPOINTMENT (OUTPATIENT)
Dept: HEMATOLOGY ONCOLOGY | Facility: CLINIC | Age: 43
End: 2023-05-18
Payer: COMMERCIAL

## 2023-05-18 ENCOUNTER — RESULT REVIEW (OUTPATIENT)
Age: 43
End: 2023-05-18

## 2023-05-18 VITALS
TEMPERATURE: 97.4 F | WEIGHT: 263.03 LBS | SYSTOLIC BLOOD PRESSURE: 143 MMHG | HEIGHT: 63 IN | DIASTOLIC BLOOD PRESSURE: 81 MMHG | BODY MASS INDEX: 46.61 KG/M2 | HEART RATE: 81 BPM | OXYGEN SATURATION: 95 %

## 2023-05-18 DIAGNOSIS — E61.1 IRON DEFICIENCY: ICD-10-CM

## 2023-05-18 PROBLEM — E66.9 OBESITY: Status: ACTIVE | Noted: 2023-04-24

## 2023-05-18 PROBLEM — I10 HTN (HYPERTENSION): Status: ACTIVE | Noted: 2019-12-17

## 2023-05-18 LAB
APPEARANCE: CLEAR
BACTERIA: ABNORMAL /HPF
BASOPHILS # BLD AUTO: 0.1 K/UL — SIGNIFICANT CHANGE UP (ref 0–0.2)
BASOPHILS NFR BLD AUTO: 0.7 % — SIGNIFICANT CHANGE UP (ref 0–2)
BILIRUBIN URINE: NEGATIVE
BLOOD URINE: NEGATIVE
CAST: 1 /LPF
COLOR: YELLOW
CREAT SPEC-SCNC: 69 MG/DL
EOSINOPHIL # BLD AUTO: 0.3 K/UL — SIGNIFICANT CHANGE UP (ref 0–0.5)
EOSINOPHIL NFR BLD AUTO: 3.3 % — SIGNIFICANT CHANGE UP (ref 0–6)
EPITHELIAL CELLS: 4 /HPF
GLUCOSE QUALITATIVE U: NEGATIVE MG/DL
HCT VFR BLD CALC: 41.4 % — SIGNIFICANT CHANGE UP (ref 34.5–45)
HGB BLD-MCNC: 13.4 G/DL — SIGNIFICANT CHANGE UP (ref 11.5–15.5)
KETONES URINE: 15 MG/DL
LEUKOCYTE ESTERASE URINE: NEGATIVE
LYMPHOCYTES # BLD AUTO: 3.1 K/UL — SIGNIFICANT CHANGE UP (ref 1–3.3)
LYMPHOCYTES # BLD AUTO: 30.8 % — SIGNIFICANT CHANGE UP (ref 13–44)
MCHC RBC-ENTMCNC: 29.6 PG — SIGNIFICANT CHANGE UP (ref 27–34)
MCHC RBC-ENTMCNC: 32.4 G/DL — SIGNIFICANT CHANGE UP (ref 32–36)
MCV RBC AUTO: 91.4 FL — SIGNIFICANT CHANGE UP (ref 80–100)
MICROALBUMIN 24H UR DL<=1MG/L-MCNC: <1.2 MG/DL
MICROALBUMIN/CREAT 24H UR-RTO: NORMAL MG/G
MICROSCOPIC-UA: NORMAL
MONOCYTES # BLD AUTO: 0.6 K/UL — SIGNIFICANT CHANGE UP (ref 0–0.9)
MONOCYTES NFR BLD AUTO: 6 % — SIGNIFICANT CHANGE UP (ref 2–14)
NEUTROPHILS # BLD AUTO: 5.9 K/UL — SIGNIFICANT CHANGE UP (ref 1.8–7.4)
NEUTROPHILS NFR BLD AUTO: 59.2 % — SIGNIFICANT CHANGE UP (ref 43–77)
NITRITE URINE: NEGATIVE
PH URINE: 6.5
PLATELET # BLD AUTO: 444 K/UL — HIGH (ref 150–400)
PROTEIN URINE: NEGATIVE MG/DL
RBC # BLD: 4.53 M/UL — SIGNIFICANT CHANGE UP (ref 3.8–5.2)
RBC # FLD: 13.5 % — SIGNIFICANT CHANGE UP (ref 10.3–14.5)
RED BLOOD CELLS URINE: 2 /HPF
SPECIFIC GRAVITY URINE: 1.01
UROBILINOGEN URINE: 0.2 MG/DL
WBC # BLD: 10 K/UL — SIGNIFICANT CHANGE UP (ref 3.8–10.5)
WBC # FLD AUTO: 10 K/UL — SIGNIFICANT CHANGE UP (ref 3.8–10.5)
WHITE BLOOD CELLS URINE: 3 /HPF

## 2023-05-18 PROCEDURE — 99214 OFFICE O/P EST MOD 30 MIN: CPT

## 2023-05-18 NOTE — ASSESSMENT
[FreeTextEntry1] : 1) Chronic HTN \par 2) TTP- s/p PLEX\par 3) Obesity\par 4) Asymptomatic bacteruria\par 5) Vitamin D deficiency\par \par Labs reviewed\par SCr stable, normal lytes\par Vitamin D levels low- Continue Vitamin D supplements\par \par Bp  stable\par Continue current regimen Losartan -HCTZ 100 -25 and Procardia 90 mg daily for now\par Will obtain UA, alb/cr ratio; \par \par Weight loss encouraged\par \par RTC in 6 months \par \par

## 2023-05-18 NOTE — HISTORY OF PRESENT ILLNESS
[FreeTextEntry1] : Ms. Gutierres is a 41 yo F with history of  TTP  at the age of 38.\par \par Patient was admitted to [facility] 19- for blurry vision and nausea/vomiting. She was found to have severe thrombocytopenia with LISSETT and metabolic acidosis and HTN. Peripheral smear showed schistocytes with elevated LDH and low haptoglobin with worsening renal failure and thrombocytopenia concerning for TTP. A CT C/A/P showed perinephric stranding bilaterally. A renal biopsy showed severe obliterative arterial sclerosis, with mild segmental glomerular endothelial injury, suggesting chronic thrombotic angiopathy. Acute tubular injury with focal tubular necrosis and mild reactive interstitial inflammation.  She was started on high dose prednisone with a fast taper and received PLEX x 10 sessions, after which her platelets recovered but kidney function plateaued at 1.4. Her prednisone was again increased to 1 mg/kg and she received another 3 sessions of plasma exchange starting 19 - 19. Creatinine improved to 1.1 - 1.2 . She received total 13 sessions of plasmapheresis. She was started on prednisone taper after kidney function normalized to 1.1and finished the taper on 5/3/19. \par \par 19 ADAMTS 13 activity <2 % (range >66%)\par ADAMTS 13 antibody 82 (range <12)\par \par PCP: Dr. Bobby Saunders, [facility]\par \par Pt presents for a follow up. She is 22 weeks pregnant. Nifedipine was increased to 60 mg daily on last visit (). Pt states she feels well.  Doesn't have any acute complaint. Denies headaches, nausea, vomiting, diarrhea, leg edema.  Denies hematuria, foamy urine. Checks BP at home; reports BP runs high. Bp was 170/110 this AM. \par -----------------------------------------------------------------------------------------------------------------------\par \par \par Pt presents for a follow up. She delivered a baby boy at [facility] at ~ 29weeks via emergency  for pre eclampsia. \par Pt states she feels well.\par Following with Cards; was recently started on Losartan- HCTZ\par \par ------------------------------------------------------------------------------------------------------------------\par \par \par Pt seen and examined\par Feels well\par Denies any acute complaint.\par Denies any interval illness/ change in health\par \par -----------------------------------------------------------------------------------------------------------\par \par 2023\par Pt presents for follow up of HTN\par Pt seen and examined;Feels well\par Denies any acute complaint.\par Recently saw Cards; had a NST/ Echo which were normal\par Seeing Hematologist on Monday\par \par Checks BP at home sometimes; gets BP in 120-130's/ 80- low 90's\par \par

## 2023-05-22 PROBLEM — E61.1 IRON DEFICIENCY: Status: ACTIVE | Noted: 2022-03-29

## 2023-05-22 LAB
ALBUMIN SERPL ELPH-MCNC: 4.4 G/DL
ALP BLD-CCNC: 112 U/L
ALT SERPL-CCNC: 15 U/L
ANION GAP SERPL CALC-SCNC: 15 MMOL/L
AST SERPL-CCNC: 14 U/L
BILIRUB SERPL-MCNC: 0.3 MG/DL
BUN SERPL-MCNC: 20 MG/DL
CALCIUM SERPL-MCNC: 10.2 MG/DL
CHLORIDE SERPL-SCNC: 103 MMOL/L
CO2 SERPL-SCNC: 23 MMOL/L
CREAT SERPL-MCNC: 0.88 MG/DL
EGFR: 84 ML/MIN/1.73M2
FERRITIN SERPL-MCNC: 91 NG/ML
FOLATE SERPL-MCNC: 12 NG/ML
GLUCOSE SERPL-MCNC: 139 MG/DL
IRON SATN MFR SERPL: 20 %
IRON SERPL-MCNC: 64 UG/DL
LDH SERPL-CCNC: 198 U/L
POTASSIUM SERPL-SCNC: 4.7 MMOL/L
PROT SERPL-MCNC: 7.5 G/DL
SODIUM SERPL-SCNC: 141 MMOL/L
TIBC SERPL-MCNC: 321 UG/DL
UIBC SERPL-MCNC: 257 UG/DL
VIT B12 SERPL-MCNC: 539 PG/ML

## 2023-05-22 NOTE — HISTORY OF PRESENT ILLNESS
[de-identified] : Ms. Gutierres presents with TTP at the age of 38.\par \par The patient was admitted to [facility] 4/5/19-4/20-/19 for blurry vision and nausea/vomiting. She was found to have severe thrombocytopenia with LISSETT and metabolic acidosis and HTN. Peripheral smear showed schistocytes with elevated LDH and low haptoglobin with worsening renal failure and thrombocytopenia concerning for TTP. A CT C/A/P showed perinephric stranding bilaterally. \par \par A renal biopsy showed severe obliterative arterial sclerosis, with mild segmental glomerular endothelial injury, suggesting chronic thrombotic angiopathy. Acute tubular injury with focal tubular necrosis and mild reactive interstitial inflammation. \par \par She was started on high dose prednisone with a fast taper and received PLEX x 10 sessions, after which her platelets recovered but kidney function plateaued at 1.4.\par Her prednisone was again increased to 1 mg/kg and she received another 3 sessions of plasma exchange starting 4/17/19 - 4/19/19.  Creatinine improved to 1.1 - 1.2 \par She received total 13 sessions of plasmapheresis. She was started on prednisone taper after kidney function normalized to 1.1 and to taper over 2-3 weeks. \par \par 4/6/19 ADAMTS 13 activity <2 % (range >66%)\par ADAMTS 13 antibody 82 (range <12)\par 4/5/19 WBC 15.3, HGB 11.6, PLT 9 K,  BUN/Cr 2.80\par 4/6/19 WBC 15.6, HGB 11.3, PLT 6 K, LDH 1923, Haptoglobin <20\par 4/7/19 WBC 20.1, HGB 10.1, PLT 11 K\par 4/8/19 WBC 16.6, HGB 9.5, PLT 54 K \par 4/13/19 WBC 18.0, HGB 8.6,  K, \par 4/17/19 \par 4/20/19 WBC 25.0, HGB 8.7,  K\par 4/23/19 WBC 26.5, HGB 10.3,  K\par \par She is currently on Prednisone 60 mg a day and scheduled to finish the taper on 5/3/19. No dysuria. No headaches or blurred vision. No abdominal pain. Bowel movements are normal. \par \par PCP: Dr. Bobby Saunders, [facility] [de-identified] : Returns for follow up. S/p Feraheme on 4/12/22 and 4/19/22\par \par LMP 4/2023, unable to recall exact date, lasted 5-6 days, not heavy. Menstrual cycle remains regular, occurs every 28 days.\par Followed up with GYN 3/2023, normal findings PCP Dr Ester Zavala \par Denies headache, dizziness, fever, night sweats, weight loss \par Denies dyspnea, cough \par Good appetite. Denies abdominal pain, blood in stool, black stool, hematuria\par Has never obtained colonoscopy in the past \par Mild fatigue \par Feels well \par \par

## 2023-05-22 NOTE — PHYSICAL EXAM
[Fully active, able to carry on all pre-disease performance without restriction] : Status 0 - Fully active, able to carry on all pre-disease performance without restriction [Normal] : affect appropriate [de-identified] : supple [de-identified] : clear [de-identified] : S1/S2

## 2023-05-22 NOTE — ASSESSMENT
[FreeTextEntry1] : 42 year old female with h/o TTP diagnosed in 4/2019.  ADAMTS 13 activity <2%, ADAMTS 13 antibody is 70-80 (high).  Kidney biopsy c/w chronic thrombotic microangiopathy.  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    S/p ~13 sessions of plasma exchange and high dose prednisone with recovery of platelets and kidney function.  Prednisone taper completed 5/11/19.\par On 5/7/19 her [id number] activity was normal at 71%.\par JAK2 V617F negative. \par S/p Feraheme on 4/12/22 and 4/19/22\par 8/4/2022 JAK2 Exons 12-15, JAK2 V617F mutation detection, HLX Calreticulin Case , MPL Mutation Analysis Case- Negative\par \par Reviewed CBC from today  WBC 10.0 K HGB 13.4 g HCT 41.4 %  K \par \par Plan:\par -Thrombocytosis: improved JAK2, CALR, MPL Negative. Possibly secondary to iron deficiency. Repeat iron studies today- if iron stores are low will schedule Feraheme X 2 doses weekly\par -Follow up with GYN and PCP- continue preventative screening\par -Referral sent to GI for GI workup series colonoscopy/EGD- never had preventative screening colonoscopy\par -Advised to call with concerns/symptoms\par -Follow up in 4 months with Dr Colon or sooner if needed

## 2023-07-24 ENCOUNTER — APPOINTMENT (OUTPATIENT)
Dept: CARDIOLOGY | Facility: CLINIC | Age: 43
End: 2023-07-24
Payer: COMMERCIAL

## 2023-07-24 VITALS
BODY MASS INDEX: 44.83 KG/M2 | WEIGHT: 253 LBS | HEART RATE: 104 BPM | HEIGHT: 63 IN | DIASTOLIC BLOOD PRESSURE: 84 MMHG | OXYGEN SATURATION: 97 % | SYSTOLIC BLOOD PRESSURE: 121 MMHG

## 2023-07-24 DIAGNOSIS — R94.39 ABNORMAL RESULT OF OTHER CARDIOVASCULAR FUNCTION STUDY: ICD-10-CM

## 2023-07-24 PROCEDURE — 93000 ELECTROCARDIOGRAM COMPLETE: CPT

## 2023-07-24 PROCEDURE — 99214 OFFICE O/P EST MOD 30 MIN: CPT

## 2023-07-24 NOTE — DISCUSSION/SUMMARY
[EKG obtained to assist in diagnosis and management of assessed problem(s)] : EKG obtained to assist in diagnosis and management of assessed problem(s) [FreeTextEntry1] : Ms. 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 \par   Pt loss 25 pounds since January with healthy eating and  exercise. \par  Continue Nifedipine ER 90 mg daily\par  Continue Losartan 100 mg/ HCTZ 25 mg - advised pt to take at noon time, since diuretic taken in the late evening\par  causes patient to get up during hours of sleep to urinate\par   Plan is to  schedule exercise stress testing to assess functional status and baseline screening \par   Echocardiogram to assess cardiac structure and rule out LVH and valvular disease\par \par #History of prior severe thrombocytopenia\par   Last platelet assessment: December 9, 2022\par   Stable, 491 K/uL\par   Followed by hematologist, Dr. Andressa Parada\par \par #Obesity \par   BMI 47.65\par   Patient reports that she is struggling to lose weight and has asked for assistance\par - nutritionist referral\par \par - Encouraged patient to participate in  healthy walking and eating habits, focusing on a Mediterranean style of eating and aiming for the recommended 150 minutes per week of moderate physical activity.\par \par - Encouraged the patient to find healthy outlets and coping mechanisms to help manage stress, such as reducing workload if possible, spending time with family and friends, engaging in an enjoyable hobby, or using meditation or mindfulness techniques.\par \par \par \par \par \par \par \par

## 2023-07-24 NOTE — PHYSICAL EXAM
[Normal Rate] : normal [Rhythm Regular] : regular [Normal S1] : normal S1 [Normal S2] : normal S2 [Normal] : alert and oriented, normal memory

## 2023-07-24 NOTE — CARDIOLOGY SUMMARY
[___] : [unfilled] [Normal] : normal [de-identified] : 7/24/23\par \par nsr [de-identified] : 2/10/2020\par Normal Mitral valve. Mild Mitral regurgitation\par Aortic valve not well visualized probably normal .Minimal Aortic regurgitation\par Aortic root 2.5cm\par Normal Left Atrium\par Hyperdynamic left ventricular systolic function, Normal internal dimension\par and wall thickness. Normal diastolic function\par Normal right atrium. Normal right ventricular size and function. Normal tricuspid valve. Minimal tricuspid\par regurgitation. Minimal pulmonic regurgitation\par Normal pericardium with no pericardial effusion\par Estimated right ventricular systolic pressure equals 35mmhg, assuming right atrial pressure equals 8mm hg  consistent with borderline pulmonary hypertension\par EF (visual estimate) 65-70%

## 2023-07-24 NOTE — HISTORY OF PRESENT ILLNESS
[FreeTextEntry1] : 43 year old AA female with family history of hypertension, heart disease and cerebral aneurysm. \par She carries a personal history of 4 pregnancies. First three pregnancies were full term and uncomplicated. \par Most recent pregnancy, patient developed pre eclampsia requiring an emergency C- section at 29 weeks gestation. Delivery date: February 19, 2020\par Additionally, baby demonstrated IUGR. Now child is in the NICU and being weaned from oxygen support. \par \par Of note. back in April of 2019, patient developed blurry vision and nausea.She was taken to [facility] and was diagnosed with severe thrombocytopenia with LISSETT and metabolic acidosis and HTN. 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 \par She was started on high dose prednisone with a fast taper and received PLEX X 10 sessions.after which her platelets recovered. After several more sessions of plasmapheresis ( total 13 sessions) kidney function returned to normal. \par \par interval note\par 10/3/22\par \par She presents today for a cardiac evaluation and management of her hypertension - first time in 2 years\par well controlled at home - 120-130. \par Denies new cardiac symptoms. \par Not very active\par compliant with medical management\par \par Interval Note\par 7/24/23\par \par Ms. 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 Pt loss 25 pounds since January with health eating and  exercise. Pt denies chest pain, SOB, palpitation and dizziness during this visit.

## 2023-08-29 ENCOUNTER — OUTPATIENT (OUTPATIENT)
Dept: OUTPATIENT SERVICES | Facility: HOSPITAL | Age: 43
LOS: 1 days | Discharge: HOME | End: 2023-08-29

## 2023-08-29 DIAGNOSIS — D50.9 IRON DEFICIENCY ANEMIA, UNSPECIFIED: ICD-10-CM

## 2023-08-29 DIAGNOSIS — M31.10 THROMBOTIC MICROANGIOPATHY, UNSPECIFIED: ICD-10-CM

## 2023-09-05 ENCOUNTER — APPOINTMENT (OUTPATIENT)
Dept: HEMATOLOGY ONCOLOGY | Facility: CLINIC | Age: 43
End: 2023-09-05
Payer: COMMERCIAL

## 2023-09-05 ENCOUNTER — RESULT REVIEW (OUTPATIENT)
Age: 43
End: 2023-09-05

## 2023-09-05 VITALS
SYSTOLIC BLOOD PRESSURE: 120 MMHG | OXYGEN SATURATION: 95 % | BODY MASS INDEX: 44.98 KG/M2 | HEART RATE: 90 BPM | DIASTOLIC BLOOD PRESSURE: 86 MMHG | HEIGHT: 63 IN | WEIGHT: 253.86 LBS

## 2023-09-05 LAB
BASOPHILS # BLD AUTO: 0.1 K/UL — SIGNIFICANT CHANGE UP (ref 0–0.2)
BASOPHILS NFR BLD AUTO: 1.4 % — SIGNIFICANT CHANGE UP (ref 0–2)
EOSINOPHIL # BLD AUTO: 0.3 K/UL — SIGNIFICANT CHANGE UP (ref 0–0.5)
EOSINOPHIL NFR BLD AUTO: 3.1 % — SIGNIFICANT CHANGE UP (ref 0–6)
HCT VFR BLD CALC: 40.8 % — SIGNIFICANT CHANGE UP (ref 34.5–45)
HGB BLD-MCNC: 13.3 G/DL — SIGNIFICANT CHANGE UP (ref 11.5–15.5)
LYMPHOCYTES # BLD AUTO: 2.9 K/UL — SIGNIFICANT CHANGE UP (ref 1–3.3)
LYMPHOCYTES # BLD AUTO: 28.7 % — SIGNIFICANT CHANGE UP (ref 13–44)
MCHC RBC-ENTMCNC: 28.8 PG — SIGNIFICANT CHANGE UP (ref 27–34)
MCHC RBC-ENTMCNC: 32.5 G/DL — SIGNIFICANT CHANGE UP (ref 32–36)
MCV RBC AUTO: 88.6 FL — SIGNIFICANT CHANGE UP (ref 80–100)
MONOCYTES # BLD AUTO: 0.8 K/UL — SIGNIFICANT CHANGE UP (ref 0–0.9)
MONOCYTES NFR BLD AUTO: 7.6 % — SIGNIFICANT CHANGE UP (ref 2–14)
NEUTROPHILS # BLD AUTO: 6.1 K/UL — SIGNIFICANT CHANGE UP (ref 1.8–7.4)
NEUTROPHILS NFR BLD AUTO: 59.3 % — SIGNIFICANT CHANGE UP (ref 43–77)
PLATELET # BLD AUTO: 517 K/UL — HIGH (ref 150–400)
RBC # BLD: 4.6 M/UL — SIGNIFICANT CHANGE UP (ref 3.8–5.2)
RBC # FLD: 12.3 % — SIGNIFICANT CHANGE UP (ref 10.3–14.5)
WBC # BLD: 10.3 K/UL — SIGNIFICANT CHANGE UP (ref 3.8–10.5)
WBC # FLD AUTO: 10.3 K/UL — SIGNIFICANT CHANGE UP (ref 3.8–10.5)

## 2023-09-05 PROCEDURE — 99214 OFFICE O/P EST MOD 30 MIN: CPT

## 2023-09-05 NOTE — ASSESSMENT
[FreeTextEntry1] : 42 year old female with h/o TTP diagnosed in 4/2019.  ADAMTS 13 activity <2%, ADAMTS 13 antibody is 70-80 (high).  Kidney biopsy c/w chronic thrombotic microangiopathy.  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    S/p ~13 sessions of plasma exchange and high dose prednisone with recovery of platelets and kidney function.  Prednisone taper completed 5/11/19. On 5/7/19 her [id number] activity was normal at 71%. JAK2 V617F negative.  S/p Feraheme on 4/12/22 and 4/19/22 8/4/2022 JAK2 Exons 12-15, JAK2 V617F mutation detection, HLX Calreticulin Case ,  MPL Mutation Analysis Case- Negative  Reviewed CBC from today  WBC 10.3, Hgb 13, and platelets 517K.   Plan: -Thrombocytosis- JAK2, CALR, MPL Negative. BMB if platelets continue to trend up -RTO 6 months

## 2023-09-05 NOTE — PHYSICAL EXAM
[Fully active, able to carry on all pre-disease performance without restriction] : Status 0 - Fully active, able to carry on all pre-disease performance without restriction [Normal] : affect appropriate [de-identified] : supple [de-identified] : clear [de-identified] : S1/S2

## 2023-09-05 NOTE — HISTORY OF PRESENT ILLNESS
[de-identified] : Ms. Gutierres presents with TTP at the age of 38.\par  \par  The patient was admitted to [facility] 4/5/19-4/20-/19 for blurry vision and nausea/vomiting. She was found to have severe thrombocytopenia with LISSETT and metabolic acidosis and HTN. Peripheral smear showed schistocytes with elevated LDH and low haptoglobin with worsening renal failure and thrombocytopenia concerning for TTP. A CT C/A/P showed perinephric stranding bilaterally. \par  \par  A renal biopsy showed severe obliterative arterial sclerosis, with mild segmental glomerular endothelial injury, suggesting chronic thrombotic angiopathy. Acute tubular injury with focal tubular necrosis and mild reactive interstitial inflammation. \par  \par  She was started on high dose prednisone with a fast taper and received PLEX x 10 sessions, after which her platelets recovered but kidney function plateaued at 1.4.\par  Her prednisone was again increased to 1 mg/kg and she received another 3 sessions of plasma exchange starting 4/17/19 - 4/19/19.  Creatinine improved to 1.1 - 1.2 \par  She received total 13 sessions of plasmapheresis. She was started on prednisone taper after kidney function normalized to 1.1 and to taper over 2-3 weeks. \par  \par  4/6/19 ADAMTS 13 activity <2 % (range >66%)\par  ADAMTS 13 antibody 82 (range <12)\par  4/5/19 WBC 15.3, HGB 11.6, PLT 9 K,  BUN/Cr 2.80\par  4/6/19 WBC 15.6, HGB 11.3, PLT 6 K, LDH 1923, Haptoglobin <20\par  4/7/19 WBC 20.1, HGB 10.1, PLT 11 K\par  4/8/19 WBC 16.6, HGB 9.5, PLT 54 K \par  4/13/19 WBC 18.0, HGB 8.6,  K, \par  4/17/19 \par  4/20/19 WBC 25.0, HGB 8.7,  K\par  4/23/19 WBC 26.5, HGB 10.3,  K\par  \par  She is currently on Prednisone 60 mg a day and scheduled to finish the taper on 5/3/19. No dysuria. No headaches or blurred vision. No abdominal pain. Bowel movements are normal. \par  \par  PCP: Dr. Bobby Saunders, [facility] [de-identified] : Returns for follow up. S/p Feraheme on 4/12/22 and 4/19/22 LMP 2 weeks Denies fevers, night sweats, no hot flashes. She has lost 4 kgs since 5/2023 - she is doing kickboxing and walking more. She has modified her eating habits, is doing low carbs, less sugar intake.

## 2023-09-07 LAB
ALBUMIN SERPL ELPH-MCNC: 4.4 G/DL
ALP BLD-CCNC: 136 U/L
ALT SERPL-CCNC: 16 U/L
ANION GAP SERPL CALC-SCNC: 15 MMOL/L
AST SERPL-CCNC: 15 U/L
BILIRUB SERPL-MCNC: 0.4 MG/DL
BUN SERPL-MCNC: 20 MG/DL
CALCIUM SERPL-MCNC: 9.4 MG/DL
CHLORIDE SERPL-SCNC: 101 MMOL/L
CO2 SERPL-SCNC: 24 MMOL/L
CREAT SERPL-MCNC: 0.87 MG/DL
EGFR: 84 ML/MIN/1.73M2
FERRITIN SERPL-MCNC: 73 NG/ML
FOLATE SERPL-MCNC: >20 NG/ML
GLUCOSE SERPL-MCNC: 132 MG/DL
IRON SATN MFR SERPL: 26 %
IRON SERPL-MCNC: 81 UG/DL
POTASSIUM SERPL-SCNC: 4.3 MMOL/L
PROT SERPL-MCNC: 7.6 G/DL
SODIUM SERPL-SCNC: 140 MMOL/L
TIBC SERPL-MCNC: 317 UG/DL
UIBC SERPL-MCNC: 236 UG/DL
VIT B12 SERPL-MCNC: 513 PG/ML

## 2023-09-23 NOTE — PATIENT PROFILE OB - MEDICAL/SURG HX
Patient takes 8-2 mg soboxone TID. Last dose yesterday.      Triage Assessment       Row Name 09/23/23 [id number]       Triage Assessment (Adult)    Airway WDL WDL       Respiratory WDL    Respiratory WDL WDL       Skin Circulation/Temperature WDL    Skin Circulation/Temperature WDL WDL       Cardiac WDL    Cardiac WDL WDL       Peripheral/Neurovascular WDL    Peripheral Neurovascular WDL WDL       Cognitive/Neuro/Behavioral WDL    Cognitive/Neuro/Behavioral WDL WDL                     For Medical Surgical Hx obtained at Admission, Please see Provider H&P

## 2023-10-04 ENCOUNTER — APPOINTMENT (OUTPATIENT)
Dept: CT IMAGING | Facility: CLINIC | Age: 43
End: 2023-10-04
Payer: COMMERCIAL

## 2023-10-04 ENCOUNTER — OUTPATIENT (OUTPATIENT)
Dept: OUTPATIENT SERVICES | Facility: HOSPITAL | Age: 43
LOS: 1 days | End: 2023-10-04
Payer: COMMERCIAL

## 2023-10-04 DIAGNOSIS — R94.39 ABNORMAL RESULT OF OTHER CARDIOVASCULAR FUNCTION STUDY: ICD-10-CM

## 2023-10-04 PROCEDURE — 75574 CT ANGIO HRT W/3D IMAGE: CPT | Mod: 26

## 2023-10-04 PROCEDURE — 75574 CT ANGIO HRT W/3D IMAGE: CPT

## 2023-10-28 NOTE — PROVIDER CONTACT NOTE (CHANGE IN STATUS NOTIFICATION) - BACKGROUND
[facility]        Pt Name: Linda Singletary  MRN: [id number]  [address] 2023  Date of evaluation: 10/28/2023  Provider: TABATHA Bryson  PCP: No primary care provider on file. Note Started: 12:16 PM EDT 10/28/23      STEVEN. I have evaluated this patient. CHIEF COMPLAINT       Chief Complaint   Patient presents with    Cough     Pt to ED with father, father report cough and congestion since yesterday. PT was given tylenol last (last night). PT has had two diaper changes today with last BM yesterday. Pt is not up to date on immunizations. HISTORY OF PRESENT ILLNESS: 1 or more Elements     History From: Father  Limitations to history : Language English    Cathy Merino is a 24 m.o. female with no significant past medical history who presents ED with complaint of a cough. Child brought to the ED by father. Reports cough, congestion and feelings of fever since yesterday. Has not checked temperature. Did give Tylenol last night. Has not given antipyretics today. Brought to the ED for further evaluation treatment. Denies any sick contacts. Reports child is missing 2 immunizations but otherwise is up-to-date. Denies any changes in mentation for activity. Denies any cyanosis or difficulty breathing. Reports congestion and rhinorrhea. Denies ear tugging or ear drainage. Denies any seizures. Denies any rashes or lesions. Denies any abdominal distention, nausea/vomiting, decreased oral intake, decreased urine output or changes in bowel movements. Nursing Notes were all reviewed and agreed with or any disagreements were addressed in the HPI. REVIEW OF SYSTEMS :      Review of Systems   Constitutional:  Positive for fever. Negative for activity change, appetite change, chills, crying, diaphoresis, fatigue, irritability and unexpected weight change. HENT:  Positive for congestion and rhinorrhea. 28 3/7 gestation; cHTN, iAEDV, GDM. Due for Labetalol 700 mg  now, awaiting meds from pharmacy.

## 2023-11-15 ENCOUNTER — APPOINTMENT (OUTPATIENT)
Dept: NEPHROLOGY | Facility: CLINIC | Age: 43
End: 2023-11-15
Payer: COMMERCIAL

## 2023-11-15 VITALS
SYSTOLIC BLOOD PRESSURE: 136 MMHG | BODY MASS INDEX: 46.07 KG/M2 | WEIGHT: 260 LBS | HEART RATE: 98 BPM | HEIGHT: 63 IN | DIASTOLIC BLOOD PRESSURE: 92 MMHG | OXYGEN SATURATION: 98 %

## 2023-11-15 PROCEDURE — 99214 OFFICE O/P EST MOD 30 MIN: CPT

## 2023-11-15 NOTE — CHART NOTE - NSCHARTNOTEFT_GEN_A_CORE
Vascular surgery was consulted to remove RIJ HD catheter that was inserted on 4/6/19 for plasmapheresis.    RIJ HD catheter was removed without issue, pressure was held until no bleed was evident and dressing placed. Patient was instructed on the aftercare of HD dialysis catheter removal. Patient tolerated the procedure without issue. Walking

## 2024-01-24 ENCOUNTER — APPOINTMENT (OUTPATIENT)
Dept: CARDIOLOGY | Facility: CLINIC | Age: 44
End: 2024-01-24
Payer: COMMERCIAL

## 2024-01-24 VITALS
SYSTOLIC BLOOD PRESSURE: 116 MMHG | DIASTOLIC BLOOD PRESSURE: 85 MMHG | WEIGHT: 260 LBS | OXYGEN SATURATION: 98 % | BODY MASS INDEX: 46.06 KG/M2 | HEART RATE: 116 BPM

## 2024-01-24 PROCEDURE — 93000 ELECTROCARDIOGRAM COMPLETE: CPT

## 2024-01-24 PROCEDURE — 99214 OFFICE O/P EST MOD 30 MIN: CPT

## 2024-01-24 NOTE — DISCUSSION/SUMMARY
[FreeTextEntry1] : Ms. 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 \par    Pt loss 25 pounds since January with healthy eating and  exercise. \par   Continue Nifedipine ER 90 mg daily\par   Continue Losartan 100 mg/ HCTZ 25 mg - advised pt to take at noon time, since diuretic taken in the late evening\par   causes patient to get up during hours of sleep to urinate\par    Plan is to  schedule exercise stress testing to assess functional status and baseline screening \par    Echocardiogram to assess cardiac structure and rule out LVH and valvular disease\par  \par  #History of prior severe thrombocytopenia\par    Last platelet assessment: December 9, 2022\par    Stable, 491 K/uL\par    Followed by hematologist, Dr. Andressa Parada\par  \par  #Obesity \par    BMI 47.65\par    Patient reports that she is struggling to lose weight and has asked for assistance\par  - nutritionist referral\par  \par  - Encouraged patient to participate in  healthy walking and eating habits, focusing on a Mediterranean style of eating and aiming for the recommended 150 minutes per week of moderate physical activity.\par  \par  - Encouraged the patient to find healthy outlets and coping mechanisms to help manage stress, such as reducing workload if possible, spending time with family and friends, engaging in an enjoyable hobby, or using meditation or mindfulness techniques.\par  \par  \par  \par  \par  \par  \par  \par   [EKG obtained to assist in diagnosis and management of assessed problem(s)] : EKG obtained to assist in diagnosis and management of assessed problem(s)

## 2024-01-24 NOTE — CARDIOLOGY SUMMARY
[___] : [unfilled] [Normal] : normal [de-identified] : 1/24/24  ari [de-identified] : 2/10/2020\par  Normal Mitral valve. Mild Mitral regurgitation\par  Aortic valve not well visualized probably normal .Minimal Aortic regurgitation\par  Aortic root 2.5cm\par  Normal Left Atrium\par  Hyperdynamic left ventricular systolic function, Normal internal dimension\par  and wall thickness. Normal diastolic function\par  Normal right atrium. Normal right ventricular size and function. Normal tricuspid valve. Minimal tricuspid\par  regurgitation. Minimal pulmonic regurgitation\par  Normal pericardium with no pericardial effusion\par  Estimated right ventricular systolic pressure equals 35mmhg, assuming right atrial pressure equals 8mm hg  consistent with borderline pulmonary hypertension\par  EF (visual estimate) 65-70%

## 2024-01-24 NOTE — HISTORY OF PRESENT ILLNESS
[FreeTextEntry1] : 43 year old AA female with family history of hypertension, heart disease and cerebral aneurysm.  She carries a personal history of 4 pregnancies. First three pregnancies were full term and uncomplicated.  Most recent pregnancy, patient developed pre eclampsia requiring an emergency C- section at 29 weeks gestation. Delivery date: February 19, 2020 Additionally, baby demonstrated IUGR. Now child is in the NICU and being weaned from oxygen support.   Of note. back in April of 2019, patient developed blurry vision and nausea.She was taken to [facility] and was diagnosed with severe thrombocytopenia with LISSETT and metabolic acidosis and HTN.  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  She was started on high dose prednisone with a fast taper and received PLEX X 10 sessions.after which her platelets recovered. After several more sessions of plasmapheresis ( total 13 sessions) kidney function returned to normal.   interval note 10/3/22  She presents today for a cardiac evaluation and management of her hypertension - first time in 2 years well controlled at home - 120-130.  Denies new cardiac symptoms.  Not very active compliant with medical management  Interval Note 1/24/24  Ms. Gutierres is now 43 years old and returns for a cardiovascular evaluation and blood pressure management. 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 Pt loss 25 pounds since January with health eating and  exercise. Pt denies chest pain, SOB, palpitation and dizziness during this visit.

## 2024-02-27 ENCOUNTER — OUTPATIENT (OUTPATIENT)
Dept: OUTPATIENT SERVICES | Facility: HOSPITAL | Age: 44
LOS: 1 days | Discharge: HOME | End: 2024-02-27

## 2024-02-27 DIAGNOSIS — D50.9 IRON DEFICIENCY ANEMIA, UNSPECIFIED: ICD-10-CM

## 2024-02-27 DIAGNOSIS — M31.10 THROMBOTIC MICROANGIOPATHY, UNSPECIFIED: ICD-10-CM

## 2024-03-05 ENCOUNTER — APPOINTMENT (OUTPATIENT)
Dept: HEMATOLOGY ONCOLOGY | Facility: CLINIC | Age: 44
End: 2024-03-05

## 2024-03-17 DIAGNOSIS — M31.19 OTHER THROMBOTIC MICROANGIOPATHY: ICD-10-CM

## 2024-04-22 DIAGNOSIS — D75.839 THROMBOCYTOSIS, UNSPECIFIED: ICD-10-CM

## 2024-04-23 ENCOUNTER — APPOINTMENT (OUTPATIENT)
Dept: HEMATOLOGY ONCOLOGY | Facility: CLINIC | Age: 44
End: 2024-04-23

## 2024-05-21 ENCOUNTER — APPOINTMENT (OUTPATIENT)
Dept: NEPHROLOGY | Facility: CLINIC | Age: 44
End: 2024-05-21

## 2024-07-24 ENCOUNTER — APPOINTMENT (OUTPATIENT)
Dept: CARDIOLOGY | Facility: CLINIC | Age: 44
End: 2024-07-24
Payer: COMMERCIAL

## 2024-07-24 VITALS
BODY MASS INDEX: 45.36 KG/M2 | DIASTOLIC BLOOD PRESSURE: 96 MMHG | SYSTOLIC BLOOD PRESSURE: 152 MMHG | HEIGHT: 63 IN | OXYGEN SATURATION: 98 % | HEART RATE: 93 BPM | WEIGHT: 256 LBS

## 2024-07-24 PROCEDURE — 99214 OFFICE O/P EST MOD 30 MIN: CPT

## 2024-07-24 PROCEDURE — 93000 ELECTROCARDIOGRAM COMPLETE: CPT

## 2024-07-24 RX ORDER — SEMAGLUTIDE 1.34 MG/ML
2 INJECTION, SOLUTION SUBCUTANEOUS
Refills: 0 | Status: ACTIVE | COMMUNITY

## 2024-07-24 NOTE — HISTORY OF PRESENT ILLNESS
[FreeTextEntry1] : 44 year old AA female with family history of hypertension, heart disease and cerebral aneurysm.  She carries a personal history of 4 pregnancies. First three pregnancies were full term and uncomplicated.  Most recent pregnancy, patient developed pre eclampsia requiring an emergency C- section at 29 weeks gestation. Delivery date: February 19, 2020 Additionally, baby demonstrated IUGR. Now child is in the NICU and being weaned from oxygen support.   Of note. back in April of 2019, patient developed blurry vision and nausea.She was taken to [facility] and was diagnosed with severe thrombocytopenia with LISSETT and metabolic acidosis and HTN.  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  She was started on high dose prednisone with a fast taper and received PLEX X 10 sessions.after which her platelets recovered. After several more sessions of plasmapheresis ( total 13 sessions) kidney function returned to normal.   interval note 10/3/22  She presents today for a cardiac evaluation and management of her hypertension - first time in 2 years well controlled at home - 120-130.  Denies new cardiac symptoms.  Not very active compliant with medical management  Interval Note 7/24/24  Ms. Gutierres is now 44 years old and returns for a cardiovascular evaluation and blood pressure management. 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 Pt loss 25 pounds since January with health eating and  exercise. Pt denies chest pain, SOB, palpitation and dizziness during this visit.   Was recently started on ozempic

## 2024-07-24 NOTE — DISCUSSION/SUMMARY
[FreeTextEntry1] : Interval Note 7/24/24  Ms. Gutierres is now 44 years old and returns for a cardiovascular evaluation and blood pressure management. 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 Pt loss 25 pounds since January with health eating and  exercise. Pt denies chest pain, SOB, palpitation and dizziness during this visit.   Was recently started on ozempic  #Hypertension  Her BP at home ranging 120- 130 / 80- 90.    started on ozempic off of  Nifedipine ER 90 mg daily  Continue Losartan 100 mg/ HCTZ 25 mg - advised pt to take at noon time, since diuretic taken in the late evening  causes patient to get up during hours of sleep to urinate prior cardiac work up reviewed with the pt #History of prior severe thrombocytopenia   Last platelet assessment: December 9, 2022   Stable, 491 K/uL   Followed by hematologist, Dr. Andressa Parada  #Obesity    BMI 47.65   Patient reports that she is struggling to lose weight and has asked for assistance - nutritionist referral  - Encouraged patient to participate in  healthy walking and eating habits, focusing on a Mediterranean style of eating and aiming for the recommended 150 minutes per week of moderate physical activity.  - Encouraged the patient to find healthy outlets and coping mechanisms to help manage stress, such as reducing workload if possible, spending time with family and friends, engaging in an enjoyable hobby, or using meditation or mindfulness techniques.         [EKG obtained to assist in diagnosis and management of assessed problem(s)] : EKG obtained to assist in diagnosis and management of assessed problem(s)

## 2024-07-24 NOTE — CARDIOLOGY SUMMARY
[___] : [unfilled] [Normal] : normal [___] : [unfilled] [de-identified] : 7/24/24  nsr [de-identified] : 2/10/2020\par  Normal Mitral valve. Mild Mitral regurgitation\par  Aortic valve not well visualized probably normal .Minimal Aortic regurgitation\par  Aortic root 2.5cm\par  Normal Left Atrium\par  Hyperdynamic left ventricular systolic function, Normal internal dimension\par  and wall thickness. Normal diastolic function\par  Normal right atrium. Normal right ventricular size and function. Normal tricuspid valve. Minimal tricuspid\par  regurgitation. Minimal pulmonic regurgitation\par  Normal pericardium with no pericardial effusion\par  Estimated right ventricular systolic pressure equals 35mmhg, assuming right atrial pressure equals 8mm hg  consistent with borderline pulmonary hypertension\par  EF (visual estimate) 65-70%

## 2024-07-24 NOTE — HISTORY OF PRESENT ILLNESS
[FreeTextEntry1] : 44 year old AA female with family history of hypertension, heart disease and cerebral aneurysm.  She carries a personal history of 4 pregnancies. First three pregnancies were full term and uncomplicated.  Most recent pregnancy, patient developed pre eclampsia requiring an emergency C- section at 29 weeks gestation. Delivery date: February 19, 2020 Additionally, baby demonstrated IUGR. Now child is in the NICU and being weaned from oxygen support.   Of note. back in April of 2019, patient developed blurry vision and nausea.She was taken to [facility] and was diagnosed with severe thrombocytopenia with LISSETT and metabolic acidosis and HTN.  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  She was started on high dose prednisone with a fast taper and received PLEX X 10 sessions.after which her platelets recovered. After several more sessions of plasmapheresis ( total 13 sessions) kidney function returned to normal.   interval note 10/3/22  She presents today for a cardiac evaluation and management of her hypertension - first time in 2 years well controlled at home - 120-130.  Denies new cardiac symptoms.  Not very active compliant with medical management  Interval Note 7/24/24  Ms. Gutierres is now 44 years old and returns for a cardiovascular evaluation and blood pressure management. 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 Pt loss 25 pounds since January with health eating and  exercise. Pt denies chest pain, SOB, palpitation and dizziness during this visit.   Was recently started on ozempic

## 2024-07-24 NOTE — CARDIOLOGY SUMMARY
[___] : [unfilled] [Normal] : normal [___] : [unfilled] [de-identified] : 7/24/24  nsr [de-identified] : 2/10/2020\par  Normal Mitral valve. Mild Mitral regurgitation\par  Aortic valve not well visualized probably normal .Minimal Aortic regurgitation\par  Aortic root 2.5cm\par  Normal Left Atrium\par  Hyperdynamic left ventricular systolic function, Normal internal dimension\par  and wall thickness. Normal diastolic function\par  Normal right atrium. Normal right ventricular size and function. Normal tricuspid valve. Minimal tricuspid\par  regurgitation. Minimal pulmonic regurgitation\par  Normal pericardium with no pericardial effusion\par  Estimated right ventricular systolic pressure equals 35mmhg, assuming right atrial pressure equals 8mm hg  consistent with borderline pulmonary hypertension\par  EF (visual estimate) 65-70%

## 2024-08-22 ENCOUNTER — APPOINTMENT (OUTPATIENT)
Dept: NEPHROLOGY | Facility: CLINIC | Age: 44
End: 2024-08-22
Payer: COMMERCIAL

## 2024-08-22 VITALS
SYSTOLIC BLOOD PRESSURE: 124 MMHG | HEIGHT: 63 IN | HEART RATE: 94 BPM | WEIGHT: 270 LBS | OXYGEN SATURATION: 98 % | DIASTOLIC BLOOD PRESSURE: 78 MMHG | BODY MASS INDEX: 47.84 KG/M2

## 2024-08-22 PROCEDURE — 99212 OFFICE O/P EST SF 10 MIN: CPT

## 2024-08-27 LAB
APPEARANCE: CLEAR
BACTERIA: ABNORMAL /HPF
BILIRUBIN URINE: NEGATIVE
BLOOD URINE: NEGATIVE
CAST: 2 /LPF
COLOR: YELLOW
CREAT SPEC-SCNC: 201 MG/DL
EPITHELIAL CELLS: 8 /HPF
GLUCOSE QUALITATIVE U: NEGATIVE MG/DL
KETONES URINE: NEGATIVE MG/DL
LEUKOCYTE ESTERASE URINE: NEGATIVE
MICROALBUMIN 24H UR DL<=1MG/L-MCNC: 1.2 MG/DL
MICROALBUMIN/CREAT 24H UR-RTO: 6 MG/G
MICROSCOPIC-UA: NORMAL
NITRITE URINE: NEGATIVE
PH URINE: 6
PROTEIN URINE: NORMAL MG/DL
RED BLOOD CELLS URINE: 0 /HPF
REVIEW: NORMAL
SPECIFIC GRAVITY URINE: 1.03
UROBILINOGEN URINE: 1 MG/DL
WHITE BLOOD CELLS URINE: 1 /HPF

## 2024-08-28 NOTE — ASSESSMENT
[FreeTextEntry1] : 1) Chronic HTN  2) TTP- s/p PLEX  3) Obesity  4) Asymptomatic bacteruria  5) Vitamin D deficiency  Labs reviewed SCr stable, normal lytes Will repeat urinalysis and urine protein to creatinine ratio  Bp stable Continue current regimen Losartan -HCTZ 100 -25 and Procardia 90 mg daily for now  Obesity Weight loss encouraged

## 2024-08-28 NOTE — PHYSICAL EXAM
[General Appearance - Alert] : alert [General Appearance - In No Acute Distress] : in no acute distress [General Appearance - Well Nourished] : well nourished [General Appearance - Well Developed] : well developed [Sclera] : the sclera and conjunctiva were normal [Strabismus] : no strabismus was seen [Outer Ear] : the ears and nose were normal in appearance [Hearing Threshold Finger Rub Not [place]] : hearing was normal [Neck Appearance] : the appearance of the neck was normal [] : the neck was supple [Auscultation Breath Sounds / Voice Sounds] : lungs were clear to auscultation bilaterally [Respiration, Rhythm And Depth] : normal respiratory rhythm and effort [Heart Rate And Rhythm] : heart rate was normal and rhythm regular [Heart Sounds] : normal S1 and S2 [Edema] : there was no peripheral edema [Bowel Sounds] : normal bowel sounds [Abdomen Soft] : soft [Abdomen Tenderness] : non-tender [No CVA Tenderness] : no ~M costovertebral angle tenderness [Abnormal Walk] : normal gait [Skin Color & Pigmentation] : normal skin color and pigmentation [Skin Turgor] : normal skin turgor [No Focal Deficits] : no focal deficits [Oriented To Time, Place, And Person] : oriented to person, place, and time [Impaired Insight] : insight and judgment were intact [Affect] : the affect was normal

## 2024-08-28 NOTE — HISTORY OF PRESENT ILLNESS
[FreeTextEntry1] : Ms. Gutierres is a 43 yo F with history of  TTP  at the age of 38.  Patient was admitted to [facility] 19- for blurry vision and nausea/vomiting. She was found to have severe thrombocytopenia with LISSETT and metabolic acidosis and HTN. Peripheral smear showed schistocytes with elevated LDH and low haptoglobin with worsening renal failure and thrombocytopenia concerning for TTP. A CT C/A/P showed perinephric stranding bilaterally. A renal biopsy showed severe obliterative arterial sclerosis, with mild segmental glomerular endothelial injury, suggesting chronic thrombotic angiopathy. Acute tubular injury with focal tubular necrosis and mild reactive interstitial inflammation.  She was started on high dose prednisone with a fast taper and received PLEX x 10 sessions, after which her platelets recovered but kidney function plateaued at 1.4. Her prednisone was again increased to 1 mg/kg and she received another 3 sessions of plasma exchange starting 19 - 19. Creatinine improved to 1.1 - 1.2 . She received total 13 sessions of plasmapheresis. She was started on prednisone taper after kidney function normalized to 1.1and finished the taper on 5/3/19.   19 ADAMTS 13 activity <2 % (range >66%) ADAMTS 13 antibody 82 (range <12)  PCP: Dr. Bobby Saunders, [facility]  Pt presents for a follow up. She is 22 weeks pregnant. Nifedipine was increased to 60 mg daily on last visit (). Pt states she feels well.  Doesn't have any acute complaint. Denies headaches, nausea, vomiting, diarrhea, leg edema.  Denies hematuria, foamy urine. Checks BP at home; reports BP runs high. Bp was 170/110 this AM.  -----------------------------------------------------------------------------------------------------------------------   Pt presents for a follow up. She delivered a baby boy at [facility] at ~ 29weeks via emergency  for pre eclampsia.  Pt states she feels well. Following with Cards; was recently started on Losartan- HCTZ  ------------------------------------------------------------------------------------------------------------------   Pt seen and examined Feels well Denies any acute complaint. Denies any interval illness/ change in health  -----------------------------------------------------------------------------------------------------------  2023 Pt presents for follow up of HTN Pt seen and examined;Feels well Denies any acute complaint. Recently saw Cards; had a NST/ Echo which were normal Seeing Hematologist on Monday  Checks BP at home sometimes; gets BP in 120-130's/ 80- low 90's --------------------------------------------------------------  11/15/2023 Pt presents for follow up of HTN Pt seen and examined; Feels well Denies any acute complaint.  Recently saw Hematologist  Does not check BP at home ------------------------------------------------------------  24 Patient reports no health-related adverse event since last clinic visit.  NO ER/Hospitalization/urgent care visit. Denies any cardiac or urinary complaints. No dysuria, gross hematuria, SOB, LUTS. Reports BP has been well controlled.

## 2024-12-22 ENCOUNTER — NON-APPOINTMENT (OUTPATIENT)
Age: 44
End: 2024-12-22

## 2024-12-29 ENCOUNTER — NON-APPOINTMENT (OUTPATIENT)
Age: 44
End: 2024-12-29

## 2024-12-31 NOTE — PATIENT PROFILE ADULT - DO YOU FEEL LIKE HURTING YOURSELF OR OTHERS?
Dupixent Dosing: 600 mg J-Code:  Syringe Size Used (Required For Enhanced Ndc): 300 mg/2ml prefilled pen Include J-Code In Bill: No Ndc (200 Mg Prefilled Syringe): 0899-1349-26 Render If Medication Purchased By Clinic In Visit Note?: Yes Ndc (200 Mg Prefilled Pen): 7031-1029-83 Date Of Next Injection: 2 Weeks Expiration Date (Optional): 3/31/26 Lot # (Optional): [id number] Administered By (Optional): Phyllis Uribe Consent: The risks of pain and injection site reactions were reviewed with the patient prior to the injection. Ndc (300 Mg Prefilled Pen): 8079-1638-38 Detail Level: None Ndc (300 Mg Prefilled Syringe): 7613-2960-65 08992 Billing Preferences: 1 no

## 2025-01-29 ENCOUNTER — APPOINTMENT (OUTPATIENT)
Dept: CARDIOLOGY | Facility: CLINIC | Age: 45
End: 2025-01-29
Payer: COMMERCIAL

## 2025-01-29 ENCOUNTER — NON-APPOINTMENT (OUTPATIENT)
Age: 45
End: 2025-01-29

## 2025-01-29 VITALS — HEART RATE: 116 BPM | OXYGEN SATURATION: 95 % | HEIGHT: 63 IN

## 2025-01-29 DIAGNOSIS — I10 ESSENTIAL (PRIMARY) HYPERTENSION: ICD-10-CM

## 2025-01-29 PROCEDURE — G2211 COMPLEX E/M VISIT ADD ON: CPT

## 2025-01-29 PROCEDURE — 99214 OFFICE O/P EST MOD 30 MIN: CPT

## 2025-01-29 PROCEDURE — 93000 ELECTROCARDIOGRAM COMPLETE: CPT

## 2025-02-21 ENCOUNTER — NON-APPOINTMENT (OUTPATIENT)
Age: 45
End: 2025-02-21

## 2025-02-25 ENCOUNTER — APPOINTMENT (OUTPATIENT)
Dept: NEPHROLOGY | Facility: CLINIC | Age: 45
End: 2025-02-25

## 2025-04-01 ENCOUNTER — APPOINTMENT (OUTPATIENT)
Dept: CARDIOLOGY | Facility: CLINIC | Age: 45
End: 2025-04-01

## 2025-04-11 ENCOUNTER — NON-APPOINTMENT (OUTPATIENT)
Age: 45
End: 2025-04-11

## 2025-07-21 ENCOUNTER — APPOINTMENT (OUTPATIENT)
Dept: CARDIOLOGY | Facility: CLINIC | Age: 45
End: 2025-07-21